# Patient Record
Sex: FEMALE | Race: BLACK OR AFRICAN AMERICAN | NOT HISPANIC OR LATINO | Employment: OTHER | URBAN - METROPOLITAN AREA
[De-identification: names, ages, dates, MRNs, and addresses within clinical notes are randomized per-mention and may not be internally consistent; named-entity substitution may affect disease eponyms.]

---

## 2019-01-07 ENCOUNTER — OFFICE VISIT CONVERTED (OUTPATIENT)
Dept: ORTHOPEDIC SURGERY | Facility: CLINIC | Age: 74
End: 2019-01-07
Attending: ORTHOPAEDIC SURGERY

## 2019-03-19 ENCOUNTER — OFFICE VISIT CONVERTED (OUTPATIENT)
Dept: ORTHOPEDIC SURGERY | Facility: CLINIC | Age: 74
End: 2019-03-19
Attending: ORTHOPAEDIC SURGERY

## 2019-04-11 ENCOUNTER — HOSPITAL ENCOUNTER (OUTPATIENT)
Dept: URGENT CARE | Facility: CLINIC | Age: 74
Discharge: HOME | End: 2019-04-11

## 2019-05-14 ENCOUNTER — HOSPITAL ENCOUNTER (OUTPATIENT)
Dept: URGENT CARE | Facility: CLINIC | Age: 74
Discharge: HOME | End: 2019-05-14
Attending: PHYSICIAN ASSISTANT

## 2019-06-14 ENCOUNTER — HOSPITAL ENCOUNTER (OUTPATIENT)
Dept: OTHER | Facility: HOSPITAL | Age: 74
Discharge: HOME | End: 2019-06-14
Attending: NURSE PRACTITIONER

## 2019-06-15 LAB
CONV HEPATITIS C AB WITH REFLEX TO CONFIRMATION: <0.1 S/CO RATIO (ref 0–0.9)
CONV HEPATITIS COMMENT: NORMAL

## 2019-06-19 ENCOUNTER — HOSPITAL ENCOUNTER (OUTPATIENT)
Dept: CARDIOLOGY | Facility: HOSPITAL | Age: 74
Discharge: HOME | End: 2019-06-19
Attending: NURSE PRACTITIONER

## 2019-08-13 ENCOUNTER — HOSPITAL ENCOUNTER (OUTPATIENT)
Dept: MAMMOGRAPHY | Facility: HOSPITAL | Age: 74
Discharge: HOME | End: 2019-08-13
Attending: NURSE PRACTITIONER

## 2020-01-08 ENCOUNTER — HOSPITAL ENCOUNTER (OUTPATIENT)
Dept: URGENT CARE | Facility: CLINIC | Age: 75
Discharge: HOME | End: 2020-01-08
Attending: NURSE PRACTITIONER

## 2020-03-19 ENCOUNTER — HOSPITAL ENCOUNTER (OUTPATIENT)
Dept: URGENT CARE | Facility: CLINIC | Age: 75
Discharge: HOME | End: 2020-03-19

## 2020-07-31 ENCOUNTER — HOSPITAL ENCOUNTER (OUTPATIENT)
Dept: OTHER | Facility: HOSPITAL | Age: 75
Discharge: HOME | End: 2020-07-31
Attending: INTERNAL MEDICINE

## 2020-09-08 ENCOUNTER — HOSPITAL ENCOUNTER (OUTPATIENT)
Dept: URGENT CARE | Facility: CLINIC | Age: 75
Discharge: HOME | End: 2020-09-08
Attending: EMERGENCY MEDICINE

## 2020-09-10 LAB — SARS-COV-2 RNA SPEC QL NAA+PROBE: NOT DETECTED

## 2020-10-02 ENCOUNTER — HOSPITAL ENCOUNTER (OUTPATIENT)
Dept: MAMMOGRAPHY | Facility: HOSPITAL | Age: 75
Discharge: HOME | End: 2020-10-02
Attending: NURSE PRACTITIONER

## 2021-01-01 ENCOUNTER — OFFICE VISIT (OUTPATIENT)
Dept: GASTROENTEROLOGY | Facility: CLINIC | Age: 76
End: 2021-01-01

## 2021-01-01 ENCOUNTER — PREP FOR SURGERY (OUTPATIENT)
Dept: OTHER | Facility: HOSPITAL | Age: 76
End: 2021-01-01

## 2021-01-01 VITALS
OXYGEN SATURATION: 99 % | WEIGHT: 227 LBS | DIASTOLIC BLOOD PRESSURE: 85 MMHG | HEART RATE: 95 BPM | SYSTOLIC BLOOD PRESSURE: 180 MMHG | HEIGHT: 68 IN | BODY MASS INDEX: 34.4 KG/M2

## 2021-01-01 DIAGNOSIS — R19.5 POSITIVE COLORECTAL CANCER SCREENING USING COLOGUARD TEST: Primary | ICD-10-CM

## 2021-01-01 PROCEDURE — 99203 OFFICE O/P NEW LOW 30 MIN: CPT | Performed by: NURSE PRACTITIONER

## 2021-01-01 RX ORDER — POLYETHYLENE GLYCOL 3350, SODIUM SULFATE ANHYDROUS, SODIUM BICARBONATE, SODIUM CHLORIDE, POTASSIUM CHLORIDE 227.1; 21.5; 6.36; 5.53; .754 G/L; G/L; G/L; G/L; G/L
4 POWDER, FOR SOLUTION ORAL DAILY
Qty: 1 EACH | Refills: 0 | Status: SHIPPED | OUTPATIENT
Start: 2021-01-01 | End: 2021-01-01

## 2021-02-09 ENCOUNTER — HOSPITAL ENCOUNTER (OUTPATIENT)
Dept: VACCINE CLINIC | Facility: HOSPITAL | Age: 76
Discharge: HOME | End: 2021-02-09
Attending: INTERNAL MEDICINE

## 2021-03-03 ENCOUNTER — HOSPITAL ENCOUNTER (OUTPATIENT)
Dept: URGENT CARE | Facility: CLINIC | Age: 76
Discharge: HOME | End: 2021-03-03
Attending: EMERGENCY MEDICINE

## 2021-03-11 ENCOUNTER — HOSPITAL ENCOUNTER (OUTPATIENT)
Dept: VACCINE CLINIC | Facility: HOSPITAL | Age: 76
Discharge: HOME | End: 2021-03-11
Attending: INTERNAL MEDICINE

## 2021-05-15 VITALS — BODY MASS INDEX: 35.61 KG/M2 | RESPIRATION RATE: 16 BRPM | WEIGHT: 235 LBS | HEIGHT: 68 IN

## 2021-05-15 VITALS — RESPIRATION RATE: 16 BRPM | BODY MASS INDEX: 35.61 KG/M2 | HEIGHT: 68 IN | WEIGHT: 235 LBS

## 2021-12-14 PROBLEM — R19.5 POSITIVE COLORECTAL CANCER SCREENING USING COLOGUARD TEST: Status: ACTIVE | Noted: 2021-01-01

## 2021-12-14 NOTE — PROGRESS NOTES
"Patient Name: Lauren Redd   Visit Date: 12/14/2021   Patient ID: [id number]  Provider: GERMAINE Callaway    Sex: female  Location:  Location Address:  Location Phone: [phone]  [place] [address] [phone]    YOB: 1945  Age: 76 y.o.   Primary Care Provider Dennis Kohler MD      Referring Provider: No ref. provider found        Chief Complaint  Referral (Colonoscopy from [facility])    History of Present Illness  New pt presents for +cologuard. Last colonoscopy >10 yrs ago. Pt does not have any GI c/o. No blood in stool, no abd pain, no diarrhea.  No HB.   Pt has seen cardio, describes follows annually for aneurysm.     Past Medical History:   Diagnosis Date   • Aneurysm (HCC)    • Arthritis    • Hypertension        Past Surgical History:   Procedure Laterality Date   • COLONOSCOPY     • HYSTERECTOMY         Allergies   Allergen Reactions   • Lotrel [Amlodipine Besy-Benazepril Hcl] Unknown - Low Severity       Family History   Problem Relation Age of Onset   • Hypertension Mother    • Colon cancer Neg Hx         Social History     Tobacco Use   • Smoking status: Never Smoker   • Smokeless tobacco: Never Used   Vaping Use   • Vaping Use: Never used   Substance Use Topics   • Alcohol use: Not Currently   • Drug use: Never       Objective     Vital Signs:   /85 (BP Location: Right arm, Patient Position: Sitting, Cuff Size: Adult)   Pulse 95   Ht 172.7 cm (68\")   Wt 103 kg (227 lb)   SpO2 99%   BMI 34.52 kg/m²       Physical Exam  Constitutional:       General: The patient is not in acute distress.     Appearance: Normal appearance.   HENT:      Head: Normocephalic and atraumatic.      Nose: Nose normal.   Pulmonary:      Effort: Pulmonary effort is normal. No respiratory distress.   Abdominal:      General: Abdomen is flat.      Palpations: Abdomen is soft. There is no mass.      Tenderness: There is no abdominal tenderness. There is no guarding.   Musculoskeletal:      " Cervical back: Neck supple.      Right lower leg: No edema.      Left lower leg: No edema.   Skin:     General: Skin is warm and dry.   Neurological:      General: No focal deficit present.      Mental Status: The patient is alert and oriented to person, place, and time.      Gait: Gait normal.   Psychiatric:         Mood and Affect: Mood normal.         Speech: Speech normal.         Behavior: Behavior normal.         Thought Content: Thought content normal.     Result Review :   The following data was reviewed by: GERMAINE Callaway on 12/14/2021:  CMP    CMP 3/3/21   Glucose 99   BUN 20   Creatinine 0.91 (A)   Sodium 146   Potassium 3.6   Chloride 104   Calcium 10.3   Albumin 4.1   Total Bilirubin 0.38   Alkaline Phosphatase 83   AST (SGOT) 21   ALT (SGPT) 22   (A) Abnormal value            CBC    CBC 3/3/21   WBC 7.18   RBC 4.42   Hemoglobin 13.1   Hematocrit 40.9   MCV 92.5   MCH 29.6   MCHC 32.0 (A)   RDW 13.8   Platelets 216   (A) Abnormal value                      Assessment and Plan    Diagnoses and all orders for this visit:    1. Positive colorectal cancer screening using Cologuard test (Primary)            Follow Up      Colonoscopy Surgical Risk and Benefits: Possible risks/complications, benefits, and alternatives to surgical or invasive procedure have been explained to patient and/or legal guardian; risks include bleeding, infection, and perforation. Patient has been evaluated and can tolerate anesthesia and/or sedation. Risks, benefits, and alternatives to anesthesia and sedation have been explained to patient and/or legal guardian.   Cardio clearance   Patient was given instructions and counseling regarding her condition or for health maintenance advice. Please see specific information pulled into the AVS if appropriate.

## 2022-01-01 ENCOUNTER — TRANSCRIBE ORDERS (OUTPATIENT)
Dept: LAB | Facility: HOSPITAL | Age: 77
End: 2022-01-01

## 2022-01-01 ENCOUNTER — APPOINTMENT (OUTPATIENT)
Dept: GENERAL RADIOLOGY | Facility: HOSPITAL | Age: 77
End: 2022-01-01

## 2022-01-01 ENCOUNTER — OFFICE VISIT (OUTPATIENT)
Dept: WOUND CARE | Facility: HOSPITAL | Age: 77
End: 2022-01-01

## 2022-01-01 ENCOUNTER — HOSPITAL ENCOUNTER (OUTPATIENT)
Dept: OCCUPATIONAL THERAPY | Facility: HOSPITAL | Age: 77
Setting detail: THERAPIES SERIES
Discharge: HOME | End: 2022-03-11

## 2022-01-01 ENCOUNTER — DOCUMENTATION (OUTPATIENT)
Dept: OCCUPATIONAL THERAPY | Facility: HOSPITAL | Age: 77
End: 2022-01-01

## 2022-01-01 ENCOUNTER — APPOINTMENT (OUTPATIENT)
Dept: CT IMAGING | Facility: HOSPITAL | Age: 77
End: 2022-01-01

## 2022-01-01 ENCOUNTER — ANESTHESIA (OUTPATIENT)
Dept: GASTROENTEROLOGY | Facility: HOSPITAL | Age: 77
End: 2022-01-01

## 2022-01-01 ENCOUNTER — PREP FOR SURGERY (OUTPATIENT)
Dept: OTHER | Facility: HOSPITAL | Age: 77
End: 2022-01-01

## 2022-01-01 ENCOUNTER — LAB (OUTPATIENT)
Dept: LAB | Facility: HOSPITAL | Age: 77
End: 2022-01-01

## 2022-01-01 ENCOUNTER — READMISSION MANAGEMENT (OUTPATIENT)
Dept: CALL CENTER | Facility: HOSPITAL | Age: 77
End: 2022-01-01

## 2022-01-01 ENCOUNTER — HOSPITAL ENCOUNTER (INPATIENT)
Facility: HOSPITAL | Age: 77
LOS: 13 days | Discharge: NURSING, REHAB OR LONG-TERM CARE | End: 2022-10-14
Attending: EMERGENCY MEDICINE | Admitting: INTERNAL MEDICINE

## 2022-01-01 ENCOUNTER — HOSPITAL ENCOUNTER (INPATIENT)
Facility: HOSPITAL | Age: 77
LOS: 9 days | Discharge: NURSING, REHAB OR LONG-TERM CARE | End: 2022-03-23
Attending: INTERNAL MEDICINE | Admitting: INTERNAL MEDICINE

## 2022-01-01 ENCOUNTER — TELEPHONE (OUTPATIENT)
Dept: GASTROENTEROLOGY | Facility: CLINIC | Age: 77
End: 2022-01-01

## 2022-01-01 ENCOUNTER — HOSPITAL ENCOUNTER (INPATIENT)
Facility: HOSPITAL | Age: 77
LOS: 4 days | Discharge: HOME | End: 2022-07-14
Attending: EMERGENCY MEDICINE | Admitting: INTERNAL MEDICINE

## 2022-01-01 ENCOUNTER — APPOINTMENT (OUTPATIENT)
Dept: CARDIOLOGY | Facility: HOSPITAL | Age: 77
End: 2022-01-01

## 2022-01-01 ENCOUNTER — HOSPITAL ENCOUNTER (INPATIENT)
Facility: HOSPITAL | Age: 77
LOS: 29 days | Discharge: HOME | End: 2022-04-21
Attending: INTERNAL MEDICINE | Admitting: INTERNAL MEDICINE

## 2022-01-01 ENCOUNTER — ANESTHESIA EVENT (OUTPATIENT)
Dept: GASTROENTEROLOGY | Facility: HOSPITAL | Age: 77
End: 2022-01-01

## 2022-01-01 ENCOUNTER — HOSPITAL ENCOUNTER (EMERGENCY)
Facility: HOSPITAL | Age: 77
Discharge: HOME | End: 2022-02-19
Admitting: EMERGENCY MEDICINE

## 2022-01-01 ENCOUNTER — HOSPITAL ENCOUNTER (INPATIENT)
Facility: HOSPITAL | Age: 77
LOS: 6 days | Discharge: NURSING, REHAB OR LONG-TERM CARE | End: 2022-04-29
Attending: EMERGENCY MEDICINE | Admitting: INTERNAL MEDICINE

## 2022-01-01 ENCOUNTER — HOSPITAL ENCOUNTER (INPATIENT)
Facility: HOSPITAL | Age: 77
LOS: 5 days | Discharge: NURSING, REHAB OR LONG-TERM CARE | End: 2022-06-24
Attending: EMERGENCY MEDICINE | Admitting: INTERNAL MEDICINE

## 2022-01-01 ENCOUNTER — APPOINTMENT (OUTPATIENT)
Dept: OCCUPATIONAL THERAPY | Facility: HOSPITAL | Age: 77
End: 2022-01-01

## 2022-01-01 ENCOUNTER — TRANSCRIBE ORDERS (OUTPATIENT)
Dept: CARDIOLOGY | Facility: HOSPITAL | Age: 77
End: 2022-01-01

## 2022-01-01 ENCOUNTER — HOSPITAL ENCOUNTER (OUTPATIENT)
Facility: HOSPITAL | Age: 77
Setting detail: OBSERVATION
Discharge: HOME | End: 2022-02-10
Attending: EMERGENCY MEDICINE | Admitting: INTERNAL MEDICINE

## 2022-01-01 VITALS
SYSTOLIC BLOOD PRESSURE: 134 MMHG | HEART RATE: 68 BPM | HEIGHT: 68 IN | BODY MASS INDEX: 40.16 KG/M2 | TEMPERATURE: 98.6 F | DIASTOLIC BLOOD PRESSURE: 86 MMHG | RESPIRATION RATE: 18 BRPM | WEIGHT: 265 LBS

## 2022-01-01 VITALS
WEIGHT: 252.43 LBS | DIASTOLIC BLOOD PRESSURE: 63 MMHG | BODY MASS INDEX: 38.26 KG/M2 | TEMPERATURE: 97.3 F | HEIGHT: 68 IN | SYSTOLIC BLOOD PRESSURE: 96 MMHG | HEART RATE: 93 BPM | RESPIRATION RATE: 16 BRPM | OXYGEN SATURATION: 94 %

## 2022-01-01 VITALS
RESPIRATION RATE: 20 BRPM | HEIGHT: 68 IN | TEMPERATURE: 97.5 F | OXYGEN SATURATION: 98 % | WEIGHT: 246.25 LBS | DIASTOLIC BLOOD PRESSURE: 90 MMHG | BODY MASS INDEX: 37.32 KG/M2 | HEART RATE: 83 BPM | SYSTOLIC BLOOD PRESSURE: 131 MMHG

## 2022-01-01 VITALS
DIASTOLIC BLOOD PRESSURE: 76 MMHG | WEIGHT: 233.69 LBS | TEMPERATURE: 98.1 F | BODY MASS INDEX: 35.42 KG/M2 | HEART RATE: 75 BPM | RESPIRATION RATE: 14 BRPM | SYSTOLIC BLOOD PRESSURE: 100 MMHG | OXYGEN SATURATION: 100 % | HEIGHT: 68 IN

## 2022-01-01 VITALS
TEMPERATURE: 97.6 F | RESPIRATION RATE: 18 BRPM | BODY MASS INDEX: 30.27 KG/M2 | DIASTOLIC BLOOD PRESSURE: 66 MMHG | OXYGEN SATURATION: 100 % | HEIGHT: 68 IN | WEIGHT: 199.74 LBS | SYSTOLIC BLOOD PRESSURE: 99 MMHG | HEART RATE: 68 BPM

## 2022-01-01 VITALS
SYSTOLIC BLOOD PRESSURE: 108 MMHG | RESPIRATION RATE: 18 BRPM | TEMPERATURE: 96.6 F | HEART RATE: 66 BPM | OXYGEN SATURATION: 98 % | DIASTOLIC BLOOD PRESSURE: 78 MMHG

## 2022-01-01 VITALS
TEMPERATURE: 98.1 F | HEIGHT: 68 IN | WEIGHT: 248.68 LBS | BODY MASS INDEX: 37.69 KG/M2 | RESPIRATION RATE: 20 BRPM | DIASTOLIC BLOOD PRESSURE: 87 MMHG | SYSTOLIC BLOOD PRESSURE: 127 MMHG | OXYGEN SATURATION: 100 % | HEART RATE: 71 BPM

## 2022-01-01 VITALS
TEMPERATURE: 97.4 F | RESPIRATION RATE: 20 BRPM | BODY MASS INDEX: 40.16 KG/M2 | SYSTOLIC BLOOD PRESSURE: 114 MMHG | WEIGHT: 265 LBS | HEART RATE: 76 BPM | DIASTOLIC BLOOD PRESSURE: 76 MMHG | HEIGHT: 68 IN

## 2022-01-01 VITALS
TEMPERATURE: 97.1 F | HEIGHT: 68 IN | DIASTOLIC BLOOD PRESSURE: 84 MMHG | BODY MASS INDEX: 40.16 KG/M2 | SYSTOLIC BLOOD PRESSURE: 130 MMHG | RESPIRATION RATE: 18 BRPM | WEIGHT: 265 LBS | HEART RATE: 112 BPM

## 2022-01-01 VITALS
TEMPERATURE: 97.7 F | HEIGHT: 68 IN | RESPIRATION RATE: 20 BRPM | HEART RATE: 62 BPM | HEART RATE: 72 BPM | BODY MASS INDEX: 40.16 KG/M2 | WEIGHT: 265 LBS | RESPIRATION RATE: 18 BRPM | HEIGHT: 68 IN | DIASTOLIC BLOOD PRESSURE: 76 MMHG | WEIGHT: 265 LBS | SYSTOLIC BLOOD PRESSURE: 108 MMHG | TEMPERATURE: 97.2 F | BODY MASS INDEX: 40.16 KG/M2 | DIASTOLIC BLOOD PRESSURE: 70 MMHG | SYSTOLIC BLOOD PRESSURE: 130 MMHG

## 2022-01-01 VITALS
DIASTOLIC BLOOD PRESSURE: 77 MMHG | HEART RATE: 82 BPM | TEMPERATURE: 97.6 F | BODY MASS INDEX: 38.38 KG/M2 | RESPIRATION RATE: 18 BRPM | OXYGEN SATURATION: 100 % | SYSTOLIC BLOOD PRESSURE: 124 MMHG | HEIGHT: 68 IN

## 2022-01-01 VITALS
HEART RATE: 83 BPM | WEIGHT: 217.15 LBS | DIASTOLIC BLOOD PRESSURE: 64 MMHG | BODY MASS INDEX: 32.91 KG/M2 | RESPIRATION RATE: 16 BRPM | HEIGHT: 68 IN | SYSTOLIC BLOOD PRESSURE: 93 MMHG | OXYGEN SATURATION: 100 % | TEMPERATURE: 98.2 F

## 2022-01-01 VITALS
DIASTOLIC BLOOD PRESSURE: 78 MMHG | HEART RATE: 80 BPM | TEMPERATURE: 98 F | HEIGHT: 68 IN | SYSTOLIC BLOOD PRESSURE: 124 MMHG | WEIGHT: 265 LBS | RESPIRATION RATE: 18 BRPM | BODY MASS INDEX: 40.16 KG/M2

## 2022-01-01 VITALS
HEART RATE: 83 BPM | OXYGEN SATURATION: 99 % | BODY MASS INDEX: 37.42 KG/M2 | RESPIRATION RATE: 16 BRPM | DIASTOLIC BLOOD PRESSURE: 69 MMHG | HEIGHT: 68 IN | SYSTOLIC BLOOD PRESSURE: 117 MMHG | WEIGHT: 246.91 LBS | TEMPERATURE: 98 F

## 2022-01-01 DIAGNOSIS — I83.029 VENOUS STASIS ULCER OF LEFT LOWER EXTREMITY: ICD-10-CM

## 2022-01-01 DIAGNOSIS — R60.0 VENOUS STASIS ULCER OF LEFT LOWER LEG WITH EDEMA OF LEFT LOWER LEG: ICD-10-CM

## 2022-01-01 DIAGNOSIS — Z78.9 DECREASED ACTIVITIES OF DAILY LIVING (ADL): ICD-10-CM

## 2022-01-01 DIAGNOSIS — R60.0 BILATERAL LOWER EXTREMITY EDEMA: ICD-10-CM

## 2022-01-01 DIAGNOSIS — L97.929 VENOUS STASIS ULCER OF LEFT LOWER EXTREMITY: ICD-10-CM

## 2022-01-01 DIAGNOSIS — R26.2 DIFFICULTY WALKING: ICD-10-CM

## 2022-01-01 DIAGNOSIS — I83.892 VENOUS STASIS ULCER OF LEFT LOWER LEG WITH EDEMA OF LEFT LOWER LEG: Primary | ICD-10-CM

## 2022-01-01 DIAGNOSIS — T14.8XXA WOUND INFECTION: Primary | ICD-10-CM

## 2022-01-01 DIAGNOSIS — R06.02 SHORTNESS OF BREATH: ICD-10-CM

## 2022-01-01 DIAGNOSIS — I50.9 CHRONIC CONGESTIVE HEART FAILURE, UNSPECIFIED HEART FAILURE TYPE: Primary | ICD-10-CM

## 2022-01-01 DIAGNOSIS — R60.0 VENOUS STASIS ULCER OF RIGHT LOWER LEG WITH EDEMA OF RIGHT LOWER LEG: ICD-10-CM

## 2022-01-01 DIAGNOSIS — R07.9 CHEST PAIN, UNSPECIFIED TYPE: ICD-10-CM

## 2022-01-01 DIAGNOSIS — I48.91 ATRIAL FIBRILLATION WITH RVR: ICD-10-CM

## 2022-01-01 DIAGNOSIS — L97.929 VENOUS STASIS ULCER OF LEFT LOWER LEG WITH EDEMA OF LEFT LOWER LEG: ICD-10-CM

## 2022-01-01 DIAGNOSIS — L97.919 VENOUS STASIS ULCER OF RIGHT LOWER EXTREMITY: ICD-10-CM

## 2022-01-01 DIAGNOSIS — L97.919 VENOUS STASIS ULCER OF RIGHT LOWER LEG WITH EDEMA OF RIGHT LOWER LEG: ICD-10-CM

## 2022-01-01 DIAGNOSIS — I83.019 VENOUS STASIS ULCERS OF BOTH LOWER EXTREMITIES: ICD-10-CM

## 2022-01-01 DIAGNOSIS — I83.019 VENOUS STASIS ULCER OF RIGHT LOWER LEG WITH EDEMA OF RIGHT LOWER LEG: ICD-10-CM

## 2022-01-01 DIAGNOSIS — R53.1 GENERALIZED WEAKNESS: ICD-10-CM

## 2022-01-01 DIAGNOSIS — R60.0 VENOUS STASIS ULCER OF LEFT LOWER LEG WITH EDEMA OF LEFT LOWER LEG: Primary | ICD-10-CM

## 2022-01-01 DIAGNOSIS — L03.119 CELLULITIS OF ANTERIOR LOWER LEG: Chronic | ICD-10-CM

## 2022-01-01 DIAGNOSIS — L97.929 VENOUS STASIS ULCER OF LEFT LOWER LEG WITH EDEMA OF LEFT LOWER LEG: Primary | ICD-10-CM

## 2022-01-01 DIAGNOSIS — I48.91 ATRIAL FIBRILLATION, NEW ONSET: ICD-10-CM

## 2022-01-01 DIAGNOSIS — I83.029 VENOUS STASIS ULCER OF LEFT LOWER LEG WITH EDEMA OF LEFT LOWER LEG: Primary | ICD-10-CM

## 2022-01-01 DIAGNOSIS — R07.9 CHEST PAIN, UNSPECIFIED TYPE: Primary | ICD-10-CM

## 2022-01-01 DIAGNOSIS — I83.029 VENOUS STASIS ULCERS OF BOTH LOWER EXTREMITIES: ICD-10-CM

## 2022-01-01 DIAGNOSIS — I89.0 LYMPHEDEMA: Primary | ICD-10-CM

## 2022-01-01 DIAGNOSIS — R53.81 MALAISE AND FATIGUE: ICD-10-CM

## 2022-01-01 DIAGNOSIS — I83.891 VENOUS STASIS ULCER OF RIGHT LOWER LEG WITH EDEMA OF RIGHT LOWER LEG: ICD-10-CM

## 2022-01-01 DIAGNOSIS — I83.892 VENOUS STASIS ULCER OF LEFT LOWER LEG WITH EDEMA OF LEFT LOWER LEG: ICD-10-CM

## 2022-01-01 DIAGNOSIS — L98.8 MACERATION OF SKIN: ICD-10-CM

## 2022-01-01 DIAGNOSIS — R04.0 ACUTE POSTERIOR EPISTAXIS: Primary | ICD-10-CM

## 2022-01-01 DIAGNOSIS — I48.11 LONGSTANDING PERSISTENT ATRIAL FIBRILLATION: ICD-10-CM

## 2022-01-01 DIAGNOSIS — R60.0 BILATERAL LOWER EXTREMITY EDEMA: Primary | ICD-10-CM

## 2022-01-01 DIAGNOSIS — I83.029 VENOUS STASIS ULCER OF LEFT LOWER LEG WITH EDEMA OF LEFT LOWER LEG: ICD-10-CM

## 2022-01-01 DIAGNOSIS — R13.12 OROPHARYNGEAL DYSPHAGIA: ICD-10-CM

## 2022-01-01 DIAGNOSIS — R53.83 MALAISE AND FATIGUE: ICD-10-CM

## 2022-01-01 DIAGNOSIS — I48.91 ATRIAL FIBRILLATION WITH RAPID VENTRICULAR RESPONSE: Primary | ICD-10-CM

## 2022-01-01 DIAGNOSIS — S81.801A WOUND OF RIGHT LOWER EXTREMITY, INITIAL ENCOUNTER: Primary | ICD-10-CM

## 2022-01-01 DIAGNOSIS — R06.02 SHORTNESS OF BREATH: Primary | ICD-10-CM

## 2022-01-01 DIAGNOSIS — J18.9 PNEUMONIA DUE TO INFECTIOUS ORGANISM, UNSPECIFIED LATERALITY, UNSPECIFIED PART OF LUNG: Primary | ICD-10-CM

## 2022-01-01 DIAGNOSIS — I50.9 CHRONIC CONGESTIVE HEART FAILURE, UNSPECIFIED HEART FAILURE TYPE: ICD-10-CM

## 2022-01-01 DIAGNOSIS — K92.2 GASTROINTESTINAL HEMORRHAGE, UNSPECIFIED GASTROINTESTINAL HEMORRHAGE TYPE: ICD-10-CM

## 2022-01-01 DIAGNOSIS — R26.2 DIFFICULTY IN WALKING: ICD-10-CM

## 2022-01-01 DIAGNOSIS — R53.1 WEAKNESS GENERALIZED: ICD-10-CM

## 2022-01-01 DIAGNOSIS — E87.5 HYPERKALEMIA: ICD-10-CM

## 2022-01-01 DIAGNOSIS — L08.9 WOUND INFECTION: Primary | ICD-10-CM

## 2022-01-01 DIAGNOSIS — L60.8 ONYCHOMADESIS OF TOENAIL: ICD-10-CM

## 2022-01-01 DIAGNOSIS — L03.115 CELLULITIS OF RIGHT LOWER EXTREMITY: ICD-10-CM

## 2022-01-01 DIAGNOSIS — L30.8 ASTEATOTIC DERMATITIS: ICD-10-CM

## 2022-01-01 DIAGNOSIS — K92.2 UPPER GI BLEED: ICD-10-CM

## 2022-01-01 DIAGNOSIS — L97.929 VENOUS STASIS ULCERS OF BOTH LOWER EXTREMITIES: ICD-10-CM

## 2022-01-01 DIAGNOSIS — I50.9 ACUTE ON CHRONIC CONGESTIVE HEART FAILURE, UNSPECIFIED HEART FAILURE TYPE: Primary | ICD-10-CM

## 2022-01-01 DIAGNOSIS — I83.019 VENOUS STASIS ULCER OF RIGHT LOWER EXTREMITY: ICD-10-CM

## 2022-01-01 DIAGNOSIS — R26.2 DIFFICULTY IN WALKING: Primary | ICD-10-CM

## 2022-01-01 DIAGNOSIS — D64.9 ANEMIA, UNSPECIFIED TYPE: Primary | ICD-10-CM

## 2022-01-01 DIAGNOSIS — L97.919 VENOUS STASIS ULCERS OF BOTH LOWER EXTREMITIES: ICD-10-CM

## 2022-01-01 LAB
ABO GROUP BLD: NORMAL
ALBUMIN SERPL-MCNC: 2.1 G/DL (ref 3.5–5.2)
ALBUMIN SERPL-MCNC: 2.2 G/DL (ref 3.5–5.2)
ALBUMIN SERPL-MCNC: 2.2 G/DL (ref 3.5–5.2)
ALBUMIN SERPL-MCNC: 2.3 G/DL (ref 3.5–5.2)
ALBUMIN SERPL-MCNC: 2.3 G/DL (ref 3.5–5.2)
ALBUMIN SERPL-MCNC: 2.4 G/DL (ref 3.5–5.2)
ALBUMIN SERPL-MCNC: 2.5 G/DL (ref 3.5–5.2)
ALBUMIN SERPL-MCNC: 2.6 G/DL (ref 3.5–5.2)
ALBUMIN SERPL-MCNC: 2.7 G/DL (ref 3.5–5.2)
ALBUMIN SERPL-MCNC: 2.8 G/DL (ref 3.5–5.2)
ALBUMIN SERPL-MCNC: 2.9 G/DL (ref 3.5–5.2)
ALBUMIN SERPL-MCNC: 3 G/DL (ref 3.5–5.2)
ALBUMIN SERPL-MCNC: 3.1 G/DL (ref 3.5–5.2)
ALBUMIN SERPL-MCNC: 3.2 G/DL (ref 3.5–5.2)
ALBUMIN SERPL-MCNC: 3.2 G/DL (ref 3.5–5.2)
ALBUMIN SERPL-MCNC: 3.3 G/DL (ref 3.5–5.2)
ALBUMIN SERPL-MCNC: 3.3 G/DL (ref 3.5–5.2)
ALBUMIN SERPL-MCNC: 3.4 G/DL (ref 3.5–5.2)
ALBUMIN SERPL-MCNC: 3.7 G/DL (ref 3.5–5.2)
ALBUMIN SERPL-MCNC: 3.9 G/DL (ref 3.5–5.2)
ALBUMIN/GLOB SERPL: 0.5 G/DL
ALBUMIN/GLOB SERPL: 0.5 G/DL
ALBUMIN/GLOB SERPL: 0.6 G/DL
ALBUMIN/GLOB SERPL: 0.7 G/DL
ALBUMIN/GLOB SERPL: 0.8 G/DL
ALBUMIN/GLOB SERPL: 0.9 G/DL
ALBUMIN/GLOB SERPL: 1 G/DL
ALBUMIN/GLOB SERPL: 1.1 G/DL
ALBUMIN/GLOB SERPL: 1.1 G/DL
ALBUMIN/GLOB SERPL: 1.4 G/DL
ALP SERPL-CCNC: 101 U/L (ref 39–117)
ALP SERPL-CCNC: 103 U/L (ref 39–117)
ALP SERPL-CCNC: 105 U/L (ref 39–117)
ALP SERPL-CCNC: 111 U/L (ref 39–117)
ALP SERPL-CCNC: 117 U/L (ref 39–117)
ALP SERPL-CCNC: 122 U/L (ref 39–117)
ALP SERPL-CCNC: 123 U/L (ref 39–117)
ALP SERPL-CCNC: 125 U/L (ref 39–117)
ALP SERPL-CCNC: 129 U/L (ref 39–117)
ALP SERPL-CCNC: 136 U/L (ref 39–117)
ALP SERPL-CCNC: 136 U/L (ref 39–117)
ALP SERPL-CCNC: 141 U/L (ref 39–117)
ALP SERPL-CCNC: 141 U/L (ref 39–117)
ALP SERPL-CCNC: 147 U/L (ref 39–117)
ALP SERPL-CCNC: 151 U/L (ref 39–117)
ALP SERPL-CCNC: 158 U/L (ref 39–117)
ALP SERPL-CCNC: 162 U/L (ref 39–117)
ALP SERPL-CCNC: 171 U/L (ref 39–117)
ALP SERPL-CCNC: 176 U/L (ref 39–117)
ALP SERPL-CCNC: 176 U/L (ref 39–117)
ALP SERPL-CCNC: 181 U/L (ref 39–117)
ALP SERPL-CCNC: 75 U/L (ref 39–117)
ALP SERPL-CCNC: 76 U/L (ref 39–117)
ALP SERPL-CCNC: 77 U/L (ref 39–117)
ALP SERPL-CCNC: 79 U/L (ref 39–117)
ALP SERPL-CCNC: 82 U/L (ref 39–117)
ALP SERPL-CCNC: 92 U/L (ref 39–117)
ALP SERPL-CCNC: 93 U/L (ref 39–117)
ALP SERPL-CCNC: 94 U/L (ref 39–117)
ALP SERPL-CCNC: 95 U/L (ref 39–117)
ALP SERPL-CCNC: 96 U/L (ref 39–117)
ALP SERPL-CCNC: 99 U/L (ref 39–117)
ALT SERPL W P-5'-P-CCNC: 118 U/L (ref 1–33)
ALT SERPL W P-5'-P-CCNC: 13 U/L (ref 1–33)
ALT SERPL W P-5'-P-CCNC: 14 U/L (ref 1–33)
ALT SERPL W P-5'-P-CCNC: 14 U/L (ref 1–33)
ALT SERPL W P-5'-P-CCNC: 15 U/L (ref 1–33)
ALT SERPL W P-5'-P-CCNC: 16 U/L (ref 1–33)
ALT SERPL W P-5'-P-CCNC: 169 U/L (ref 1–33)
ALT SERPL W P-5'-P-CCNC: 17 U/L (ref 1–33)
ALT SERPL W P-5'-P-CCNC: 17 U/L (ref 1–33)
ALT SERPL W P-5'-P-CCNC: 18 U/L (ref 1–33)
ALT SERPL W P-5'-P-CCNC: 19 U/L (ref 1–33)
ALT SERPL W P-5'-P-CCNC: 20 U/L (ref 1–33)
ALT SERPL W P-5'-P-CCNC: 21 U/L (ref 1–33)
ALT SERPL W P-5'-P-CCNC: 21 U/L (ref 1–33)
ALT SERPL W P-5'-P-CCNC: 22 U/L (ref 1–33)
ALT SERPL W P-5'-P-CCNC: 23 U/L (ref 1–33)
ALT SERPL W P-5'-P-CCNC: 23 U/L (ref 1–33)
ALT SERPL W P-5'-P-CCNC: 24 U/L (ref 1–33)
ALT SERPL W P-5'-P-CCNC: 25 U/L (ref 1–33)
ALT SERPL W P-5'-P-CCNC: 31 U/L (ref 1–33)
ALT SERPL W P-5'-P-CCNC: 33 U/L (ref 1–33)
ALT SERPL W P-5'-P-CCNC: 36 U/L (ref 1–33)
ALT SERPL W P-5'-P-CCNC: 48 U/L (ref 1–33)
ALT SERPL W P-5'-P-CCNC: 51 U/L (ref 1–33)
ALT SERPL W P-5'-P-CCNC: 51 U/L (ref 1–33)
ALT SERPL W P-5'-P-CCNC: 53 U/L (ref 1–33)
ALT SERPL W P-5'-P-CCNC: 68 U/L (ref 1–33)
ALT SERPL W P-5'-P-CCNC: 69 U/L (ref 1–33)
ALT SERPL W P-5'-P-CCNC: 8 U/L (ref 1–33)
ALT SERPL W P-5'-P-CCNC: <5 U/L (ref 1–33)
ANION GAP SERPL CALCULATED.3IONS-SCNC: 10.1 MMOL/L (ref 5–15)
ANION GAP SERPL CALCULATED.3IONS-SCNC: 10.3 MMOL/L (ref 5–15)
ANION GAP SERPL CALCULATED.3IONS-SCNC: 10.4 MMOL/L (ref 5–15)
ANION GAP SERPL CALCULATED.3IONS-SCNC: 10.7 MMOL/L (ref 5–15)
ANION GAP SERPL CALCULATED.3IONS-SCNC: 10.8 MMOL/L (ref 5–15)
ANION GAP SERPL CALCULATED.3IONS-SCNC: 10.9 MMOL/L (ref 5–15)
ANION GAP SERPL CALCULATED.3IONS-SCNC: 11.1 MMOL/L (ref 5–15)
ANION GAP SERPL CALCULATED.3IONS-SCNC: 11.4 MMOL/L (ref 5–15)
ANION GAP SERPL CALCULATED.3IONS-SCNC: 12.2 MMOL/L (ref 5–15)
ANION GAP SERPL CALCULATED.3IONS-SCNC: 12.6 MMOL/L (ref 5–15)
ANION GAP SERPL CALCULATED.3IONS-SCNC: 12.7 MMOL/L (ref 5–15)
ANION GAP SERPL CALCULATED.3IONS-SCNC: 13 MMOL/L (ref 5–15)
ANION GAP SERPL CALCULATED.3IONS-SCNC: 13.4 MMOL/L (ref 5–15)
ANION GAP SERPL CALCULATED.3IONS-SCNC: 13.5 MMOL/L (ref 5–15)
ANION GAP SERPL CALCULATED.3IONS-SCNC: 14 MMOL/L (ref 5–15)
ANION GAP SERPL CALCULATED.3IONS-SCNC: 14.1 MMOL/L (ref 5–15)
ANION GAP SERPL CALCULATED.3IONS-SCNC: 15.4 MMOL/L (ref 5–15)
ANION GAP SERPL CALCULATED.3IONS-SCNC: 4.2 MMOL/L (ref 5–15)
ANION GAP SERPL CALCULATED.3IONS-SCNC: 6.4 MMOL/L (ref 5–15)
ANION GAP SERPL CALCULATED.3IONS-SCNC: 6.8 MMOL/L (ref 5–15)
ANION GAP SERPL CALCULATED.3IONS-SCNC: 7 MMOL/L (ref 5–15)
ANION GAP SERPL CALCULATED.3IONS-SCNC: 7.5 MMOL/L (ref 5–15)
ANION GAP SERPL CALCULATED.3IONS-SCNC: 7.7 MMOL/L (ref 5–15)
ANION GAP SERPL CALCULATED.3IONS-SCNC: 7.8 MMOL/L (ref 5–15)
ANION GAP SERPL CALCULATED.3IONS-SCNC: 7.9 MMOL/L (ref 5–15)
ANION GAP SERPL CALCULATED.3IONS-SCNC: 8.1 MMOL/L (ref 5–15)
ANION GAP SERPL CALCULATED.3IONS-SCNC: 8.2 MMOL/L (ref 5–15)
ANION GAP SERPL CALCULATED.3IONS-SCNC: 8.3 MMOL/L (ref 5–15)
ANION GAP SERPL CALCULATED.3IONS-SCNC: 8.6 MMOL/L (ref 5–15)
ANION GAP SERPL CALCULATED.3IONS-SCNC: 8.6 MMOL/L (ref 5–15)
ANION GAP SERPL CALCULATED.3IONS-SCNC: 8.7 MMOL/L (ref 5–15)
ANION GAP SERPL CALCULATED.3IONS-SCNC: 8.8 MMOL/L (ref 5–15)
ANION GAP SERPL CALCULATED.3IONS-SCNC: 8.8 MMOL/L (ref 5–15)
ANION GAP SERPL CALCULATED.3IONS-SCNC: 8.9 MMOL/L (ref 5–15)
ANION GAP SERPL CALCULATED.3IONS-SCNC: 8.9 MMOL/L (ref 5–15)
ANION GAP SERPL CALCULATED.3IONS-SCNC: 9.2 MMOL/L (ref 5–15)
ANION GAP SERPL CALCULATED.3IONS-SCNC: 9.3 MMOL/L (ref 5–15)
ANION GAP SERPL CALCULATED.3IONS-SCNC: 9.3 MMOL/L (ref 5–15)
ANION GAP SERPL CALCULATED.3IONS-SCNC: 9.4 MMOL/L (ref 5–15)
ANION GAP SERPL CALCULATED.3IONS-SCNC: 9.5 MMOL/L (ref 5–15)
ANION GAP SERPL CALCULATED.3IONS-SCNC: 9.5 MMOL/L (ref 5–15)
ANION GAP SERPL CALCULATED.3IONS-SCNC: 9.6 MMOL/L (ref 5–15)
ANION GAP SERPL CALCULATED.3IONS-SCNC: 9.7 MMOL/L (ref 5–15)
ANION GAP SERPL CALCULATED.3IONS-SCNC: 9.7 MMOL/L (ref 5–15)
ANISOCYTOSIS BLD QL: ABNORMAL
ANISOCYTOSIS BLD QL: NORMAL
AORTIC DIMENSIONLESS INDEX: 0.6 (DI)
APTT PPP: 22.6 SECONDS (ref 22.2–34.2)
ARTERIAL PATENCY WRIST A: ABNORMAL
ARTERIAL PATENCY WRIST A: ABNORMAL
ARTERIAL PATENCY WRIST A: POSITIVE
ASCENDING AORTA: 4 CM
AST SERPL-CCNC: 14 U/L (ref 1–32)
AST SERPL-CCNC: 14 U/L (ref 1–32)
AST SERPL-CCNC: 15 U/L (ref 1–32)
AST SERPL-CCNC: 15 U/L (ref 1–32)
AST SERPL-CCNC: 18 U/L (ref 1–32)
AST SERPL-CCNC: 20 U/L (ref 1–32)
AST SERPL-CCNC: 21 U/L (ref 1–32)
AST SERPL-CCNC: 22 U/L (ref 1–32)
AST SERPL-CCNC: 22 U/L (ref 1–32)
AST SERPL-CCNC: 23 U/L (ref 1–32)
AST SERPL-CCNC: 24 U/L (ref 1–32)
AST SERPL-CCNC: 25 U/L (ref 1–32)
AST SERPL-CCNC: 25 U/L (ref 1–32)
AST SERPL-CCNC: 26 U/L (ref 1–32)
AST SERPL-CCNC: 26 U/L (ref 1–32)
AST SERPL-CCNC: 28 U/L (ref 1–32)
AST SERPL-CCNC: 29 U/L (ref 1–32)
AST SERPL-CCNC: 31 U/L (ref 1–32)
AST SERPL-CCNC: 33 U/L (ref 1–32)
AST SERPL-CCNC: 36 U/L (ref 1–32)
AST SERPL-CCNC: 38 U/L (ref 1–32)
AST SERPL-CCNC: 41 U/L (ref 1–32)
AST SERPL-CCNC: 43 U/L (ref 1–32)
AST SERPL-CCNC: 50 U/L (ref 1–32)
AST SERPL-CCNC: 57 U/L (ref 1–32)
AST SERPL-CCNC: 63 U/L (ref 1–32)
AST SERPL-CCNC: 67 U/L (ref 1–32)
BACTERIA BLD CULT: ABNORMAL
BACTERIA BLD CULT: NORMAL
BACTERIA SPEC AEROBE CULT: ABNORMAL
BACTERIA SPEC AEROBE CULT: NORMAL
BACTERIA SPEC RESP CULT: NORMAL
BACTERIA UR QL AUTO: ABNORMAL /HPF
BACTERIA UR QL AUTO: ABNORMAL /HPF
BASE EXCESS BLDA CALC-SCNC: -1.4 MMOL/L (ref -2–2)
BASE EXCESS BLDA CALC-SCNC: -1.7 MMOL/L (ref -2–2)
BASE EXCESS BLDA CALC-SCNC: 10.5 MMOL/L (ref -2–2)
BASE EXCESS BLDA CALC-SCNC: 5 MMOL/L (ref -2–2)
BASOPHILS # BLD AUTO: 0.01 10*3/MM3 (ref 0–0.2)
BASOPHILS # BLD AUTO: 0.02 10*3/MM3 (ref 0–0.2)
BASOPHILS # BLD AUTO: 0.02 10*3/MM3 (ref 0–0.2)
BASOPHILS # BLD AUTO: 0.03 10*3/MM3 (ref 0–0.2)
BASOPHILS # BLD AUTO: 0.04 10*3/MM3 (ref 0–0.2)
BASOPHILS # BLD AUTO: 0.04 10*3/MM3 (ref 0–0.2)
BASOPHILS # BLD AUTO: 0.05 10*3/MM3 (ref 0–0.2)
BASOPHILS # BLD AUTO: 0.06 10*3/MM3 (ref 0–0.2)
BASOPHILS # BLD AUTO: 0.07 10*3/MM3 (ref 0–0.2)
BASOPHILS # BLD AUTO: 0.08 10*3/MM3 (ref 0–0.2)
BASOPHILS # BLD AUTO: 0.09 10*3/MM3 (ref 0–0.2)
BASOPHILS NFR BLD AUTO: 0.1 % (ref 0–1.5)
BASOPHILS NFR BLD AUTO: 0.2 % (ref 0–1.5)
BASOPHILS NFR BLD AUTO: 0.3 % (ref 0–1.5)
BASOPHILS NFR BLD AUTO: 0.4 % (ref 0–1.5)
BASOPHILS NFR BLD AUTO: 0.5 % (ref 0–1.5)
BASOPHILS NFR BLD AUTO: 0.6 % (ref 0–1.5)
BASOPHILS NFR BLD AUTO: 0.6 % (ref 0–1.5)
BASOPHILS NFR BLD AUTO: 0.7 % (ref 0–1.5)
BASOPHILS NFR BLD AUTO: 0.7 % (ref 0–1.5)
BDY SITE: ABNORMAL
BH BB BLOOD EXPIRATION DATE: NORMAL
BH BB BLOOD EXPIRATION DATE: NORMAL
BH BB BLOOD TYPE BARCODE: 5100
BH BB BLOOD TYPE BARCODE: 5100
BH BB DISPENSE STATUS: NORMAL
BH BB DISPENSE STATUS: NORMAL
BH BB PRODUCT CODE: NORMAL
BH BB PRODUCT CODE: NORMAL
BH BB UNIT NUMBER: NORMAL
BH BB UNIT NUMBER: NORMAL
BH CV ECHO MEAS - AI P1/2T: 348 MSEC
BH CV ECHO MEAS - AI P1/2T: 455 MSEC
BH CV ECHO MEAS - AO MAX PG: 9 MMHG
BH CV ECHO MEAS - AO MEAN PG: 5 MMHG
BH CV ECHO MEAS - AO ROOT DIAM: 2.7 CM
BH CV ECHO MEAS - AO ROOT DIAM: 3.1 CM
BH CV ECHO MEAS - AO V2 MAX: 149 CM/SEC
BH CV ECHO MEAS - AO V2 VTI: 26.7 CM
BH CV ECHO MEAS - AVA PLANIMETRY TRACED: 1.3 CM2
BH CV ECHO MEAS - EDV(MOD-SP2): 83.5 ML
BH CV ECHO MEAS - EDV(MOD-SP4): 95.3 ML
BH CV ECHO MEAS - EF(MOD-BP): 59 %
BH CV ECHO MEAS - EF(MOD-BP): 65 %
BH CV ECHO MEAS - ESV(MOD-SP2): 30 ML
BH CV ECHO MEAS - ESV(MOD-SP4): 35.3 ML
BH CV ECHO MEAS - IVSD: 1 CM
BH CV ECHO MEAS - IVSD: 1.8 CM
BH CV ECHO MEAS - LA A2CS (ATRIAL LENGTH): 8.4 CM
BH CV ECHO MEAS - LA DIMENSION(2D): 5.2 CM
BH CV ECHO MEAS - LA DIMENSION: 5.9 CM
BH CV ECHO MEAS - LAT PEAK E' VEL: 11.9 CM/SEC
BH CV ECHO MEAS - LAT PEAK E' VEL: 19.2 CM/SEC
BH CV ECHO MEAS - LV MAX PG: 5 MMHG
BH CV ECHO MEAS - LV MEAN PG: 3 MMHG
BH CV ECHO MEAS - LV V1 MAX: 115 CM/SEC
BH CV ECHO MEAS - LV V1 VTI: 14.9 CM
BH CV ECHO MEAS - LVIDD: 5 CM
BH CV ECHO MEAS - LVIDD: 5.8 CM
BH CV ECHO MEAS - LVIDS: 3.3 CM
BH CV ECHO MEAS - LVIDS: 3.9 CM
BH CV ECHO MEAS - LVOT DIAM: 1.7 CM
BH CV ECHO MEAS - LVOT DIAM: 1.8 CM
BH CV ECHO MEAS - LVPWD: 1 CM
BH CV ECHO MEAS - LVPWD: 2.1 CM
BH CV ECHO MEAS - MED PEAK E' VEL: 13.4 CM/SEC
BH CV ECHO MEAS - MED PEAK E' VEL: 9 CM/SEC
BH CV ECHO MEAS - MR MAX PG: 70 MMHG
BH CV ECHO MEAS - MR MAX PG: 73 MMHG
BH CV ECHO MEAS - MR MAX VEL: 418 CM/SEC
BH CV ECHO MEAS - MR MAX VEL: 428 CM/SEC
BH CV ECHO MEAS - MR MEAN PG: 47 MMHG
BH CV ECHO MEAS - MR MEAN PG: 49 MMHG
BH CV ECHO MEAS - MR MEAN VEL: 323 CM/SEC
BH CV ECHO MEAS - MR MEAN VEL: 338 CM/SEC
BH CV ECHO MEAS - MR VTI: 103 CM
BH CV ECHO MEAS - MR VTI: 114 CM
BH CV ECHO MEAS - MV DEC SLOPE: 468 CM/SEC2
BH CV ECHO MEAS - MV DEC TIME: 270 MSEC
BH CV ECHO MEAS - MV DEC TIME: 273 MSEC
BH CV ECHO MEAS - MV E MAX VEL: 105 CM/SEC
BH CV ECHO MEAS - MV E MAX VEL: 128 CM/SEC
BH CV ECHO MEAS - MV MAX PG: 10 MMHG
BH CV ECHO MEAS - MV MEAN PG: 4 MMHG
BH CV ECHO MEAS - MV P1/2T: 84 MSEC
BH CV ECHO MEAS - MV V2 VTI: 36 CM
BH CV ECHO MEAS - MVA(P1/2T): 2.6 CM2
BH CV ECHO MEAS - PA V2 MAX: 64 CM/SEC
BH CV ECHO MEAS - RAP SYSTOLE: 15 MMHG
BH CV ECHO MEAS - RVDD: 3.4 CM
BH CV ECHO MEAS - RVDD: 3.4 CM
BH CV ECHO MEAS - RVSP: 40 MMHG
BH CV ECHO MEAS - TR MAX PG: 25 MMHG
BH CV ECHO MEAS - TR MAX PG: 25 MMHG
BH CV ECHO MEAS - TR MAX VEL: 252 CM/SEC
BH CV ECHO MEAS - TR MAX VEL: 252 CM/SEC
BH CV ECHO MEASUREMENTS AVERAGE E/E' RATIO: 12.25
BH CV ECHO MEASUREMENTS AVERAGE E/E' RATIO: 6.44
BH CV LOWER ARTERIAL LEFT ABI RATIO: 1.1
BH CV LOWER ARTERIAL LEFT DORSALIS PEDIS SYS MAX: 124
BH CV LOWER ARTERIAL LEFT GREAT TOE SYS MAX: 95
BH CV LOWER ARTERIAL LEFT POST TIBIAL SYS MAX: 129
BH CV LOWER ARTERIAL LEFT TBI RATIO: 0.87
BH CV LOWER ARTERIAL RIGHT ABI RATIO: 1.2
BH CV LOWER ARTERIAL RIGHT DORSALIS PEDIS SYS MAX: 129
BH CV LOWER ARTERIAL RIGHT GREAT TOE SYS MAX: 110
BH CV LOWER ARTERIAL RIGHT POST TIBIAL SYS MAX: 133
BH CV LOWER ARTERIAL RIGHT TBI RATIO: 1
BH CV LOWER VASCULAR LEFT COMMON FEMORAL AUGMENT: NORMAL
BH CV LOWER VASCULAR LEFT COMMON FEMORAL COMPETENT: NORMAL
BH CV LOWER VASCULAR LEFT COMMON FEMORAL COMPRESS: NORMAL
BH CV LOWER VASCULAR LEFT COMMON FEMORAL PHASIC: NORMAL
BH CV LOWER VASCULAR LEFT COMMON FEMORAL SPONT: NORMAL
BH CV LOWER VASCULAR LEFT DISTAL FEMORAL COMPRESS: NORMAL
BH CV LOWER VASCULAR LEFT GREATER SAPH AK COMPRESS: NORMAL
BH CV LOWER VASCULAR LEFT GREATER SAPH BK COMPRESS: NORMAL
BH CV LOWER VASCULAR LEFT MID FEMORAL AUGMENT: NORMAL
BH CV LOWER VASCULAR LEFT MID FEMORAL COMPETENT: NORMAL
BH CV LOWER VASCULAR LEFT MID FEMORAL COMPRESS: NORMAL
BH CV LOWER VASCULAR LEFT MID FEMORAL PHASIC: NORMAL
BH CV LOWER VASCULAR LEFT MID FEMORAL SPONT: NORMAL
BH CV LOWER VASCULAR LEFT PERONEAL COMPRESS: NORMAL
BH CV LOWER VASCULAR LEFT POPLITEAL AUGMENT: NORMAL
BH CV LOWER VASCULAR LEFT POPLITEAL COMPETENT: NORMAL
BH CV LOWER VASCULAR LEFT POPLITEAL COMPRESS: NORMAL
BH CV LOWER VASCULAR LEFT POPLITEAL PHASIC: NORMAL
BH CV LOWER VASCULAR LEFT POPLITEAL SPONT: NORMAL
BH CV LOWER VASCULAR LEFT POSTERIOR TIBIAL COMPRESS: NORMAL
BH CV LOWER VASCULAR LEFT PROXIMAL FEMORAL COMPRESS: NORMAL
BH CV LOWER VASCULAR LEFT SAPHENOFEMORAL JUNCTION COMPRESS: NORMAL
BH CV LOWER VASCULAR RIGHT COMMON FEMORAL AUGMENT: NORMAL
BH CV LOWER VASCULAR RIGHT COMMON FEMORAL COMPETENT: NORMAL
BH CV LOWER VASCULAR RIGHT COMMON FEMORAL COMPRESS: NORMAL
BH CV LOWER VASCULAR RIGHT COMMON FEMORAL PHASIC: NORMAL
BH CV LOWER VASCULAR RIGHT COMMON FEMORAL SPONT: NORMAL
BH CV LOWER VASCULAR RIGHT DISTAL FEMORAL COMPRESS: NORMAL
BH CV LOWER VASCULAR RIGHT GREATER SAPH AK COMPRESS: NORMAL
BH CV LOWER VASCULAR RIGHT GREATER SAPH BK COMPRESS: NORMAL
BH CV LOWER VASCULAR RIGHT MID FEMORAL AUGMENT: NORMAL
BH CV LOWER VASCULAR RIGHT MID FEMORAL COMPETENT: NORMAL
BH CV LOWER VASCULAR RIGHT MID FEMORAL COMPRESS: NORMAL
BH CV LOWER VASCULAR RIGHT MID FEMORAL PHASIC: NORMAL
BH CV LOWER VASCULAR RIGHT MID FEMORAL SPONT: NORMAL
BH CV LOWER VASCULAR RIGHT PERONEAL COMPRESS: NORMAL
BH CV LOWER VASCULAR RIGHT POPLITEAL AUGMENT: NORMAL
BH CV LOWER VASCULAR RIGHT POPLITEAL COMPETENT: NORMAL
BH CV LOWER VASCULAR RIGHT POPLITEAL COMPRESS: NORMAL
BH CV LOWER VASCULAR RIGHT POPLITEAL PHASIC: NORMAL
BH CV LOWER VASCULAR RIGHT POPLITEAL SPONT: NORMAL
BH CV LOWER VASCULAR RIGHT POSTERIOR TIBIAL COMPRESS: NORMAL
BH CV LOWER VASCULAR RIGHT PROXIMAL FEMORAL COMPRESS: NORMAL
BH CV LOWER VASCULAR RIGHT SAPHENOFEMORAL JUNCTION COMPRESS: NORMAL
BH CV XLRA - RV BASE: 5.9 CM
BH CV XLRA - RV LENGTH: 7.6 CM
BH CV XLRA - RV MID: 3.8 CM
BILIRUB SERPL-MCNC: 0.4 MG/DL (ref 0–1.2)
BILIRUB SERPL-MCNC: 0.5 MG/DL (ref 0–1.2)
BILIRUB SERPL-MCNC: 0.6 MG/DL (ref 0–1.2)
BILIRUB SERPL-MCNC: 0.7 MG/DL (ref 0–1.2)
BILIRUB SERPL-MCNC: 0.8 MG/DL (ref 0–1.2)
BILIRUB SERPL-MCNC: 1 MG/DL (ref 0–1.2)
BILIRUB SERPL-MCNC: 1.2 MG/DL (ref 0–1.2)
BILIRUB SERPL-MCNC: 1.3 MG/DL (ref 0–1.2)
BILIRUB SERPL-MCNC: 1.4 MG/DL (ref 0–1.2)
BILIRUB SERPL-MCNC: 1.4 MG/DL (ref 0–1.2)
BILIRUB SERPL-MCNC: 1.5 MG/DL (ref 0–1.2)
BILIRUB UR QL STRIP: NEGATIVE
BLD GP AB SCN SERPL QL: NEGATIVE
BLD GP AB SCN SERPL QL: NEGATIVE
BUN SERPL-MCNC: 100 MG/DL (ref 8–23)
BUN SERPL-MCNC: 27 MG/DL (ref 8–23)
BUN SERPL-MCNC: 29 MG/DL (ref 8–23)
BUN SERPL-MCNC: 37 MG/DL (ref 8–23)
BUN SERPL-MCNC: 37 MG/DL (ref 8–23)
BUN SERPL-MCNC: 40 MG/DL (ref 8–23)
BUN SERPL-MCNC: 42 MG/DL (ref 8–23)
BUN SERPL-MCNC: 43 MG/DL (ref 8–23)
BUN SERPL-MCNC: 43 MG/DL (ref 8–23)
BUN SERPL-MCNC: 45 MG/DL (ref 8–23)
BUN SERPL-MCNC: 45 MG/DL (ref 8–23)
BUN SERPL-MCNC: 46 MG/DL (ref 8–23)
BUN SERPL-MCNC: 46 MG/DL (ref 8–23)
BUN SERPL-MCNC: 47 MG/DL (ref 8–23)
BUN SERPL-MCNC: 48 MG/DL (ref 8–23)
BUN SERPL-MCNC: 49 MG/DL (ref 8–23)
BUN SERPL-MCNC: 49 MG/DL (ref 8–23)
BUN SERPL-MCNC: 50 MG/DL (ref 8–23)
BUN SERPL-MCNC: 51 MG/DL (ref 8–23)
BUN SERPL-MCNC: 55 MG/DL (ref 8–23)
BUN SERPL-MCNC: 56 MG/DL (ref 8–23)
BUN SERPL-MCNC: 57 MG/DL (ref 8–23)
BUN SERPL-MCNC: 62 MG/DL (ref 8–23)
BUN SERPL-MCNC: 67 MG/DL (ref 8–23)
BUN SERPL-MCNC: 68 MG/DL (ref 8–23)
BUN SERPL-MCNC: 68 MG/DL (ref 8–23)
BUN SERPL-MCNC: 69 MG/DL (ref 8–23)
BUN SERPL-MCNC: 69 MG/DL (ref 8–23)
BUN SERPL-MCNC: 74 MG/DL (ref 8–23)
BUN SERPL-MCNC: 75 MG/DL (ref 8–23)
BUN SERPL-MCNC: 78 MG/DL (ref 8–23)
BUN SERPL-MCNC: 81 MG/DL (ref 8–23)
BUN SERPL-MCNC: 82 MG/DL (ref 8–23)
BUN SERPL-MCNC: 83 MG/DL (ref 8–23)
BUN SERPL-MCNC: 90 MG/DL (ref 8–23)
BUN SERPL-MCNC: 91 MG/DL (ref 8–23)
BUN SERPL-MCNC: 93 MG/DL (ref 8–23)
BUN SERPL-MCNC: 95 MG/DL (ref 8–23)
BUN/CREAT SERPL: 22.8 (ref 7–25)
BUN/CREAT SERPL: 25 (ref 7–25)
BUN/CREAT SERPL: 25.2 (ref 7–25)
BUN/CREAT SERPL: 25.4 (ref 7–25)
BUN/CREAT SERPL: 25.4 (ref 7–25)
BUN/CREAT SERPL: 26.1 (ref 7–25)
BUN/CREAT SERPL: 26.3 (ref 7–25)
BUN/CREAT SERPL: 28.1 (ref 7–25)
BUN/CREAT SERPL: 28.2 (ref 7–25)
BUN/CREAT SERPL: 28.8 (ref 7–25)
BUN/CREAT SERPL: 28.8 (ref 7–25)
BUN/CREAT SERPL: 29 (ref 7–25)
BUN/CREAT SERPL: 29.1 (ref 7–25)
BUN/CREAT SERPL: 29.7 (ref 7–25)
BUN/CREAT SERPL: 30.2 (ref 7–25)
BUN/CREAT SERPL: 30.6 (ref 7–25)
BUN/CREAT SERPL: 30.6 (ref 7–25)
BUN/CREAT SERPL: 30.7 (ref 7–25)
BUN/CREAT SERPL: 31 (ref 7–25)
BUN/CREAT SERPL: 31.4 (ref 7–25)
BUN/CREAT SERPL: 31.5 (ref 7–25)
BUN/CREAT SERPL: 32.3 (ref 7–25)
BUN/CREAT SERPL: 32.5 (ref 7–25)
BUN/CREAT SERPL: 32.7 (ref 7–25)
BUN/CREAT SERPL: 33.5 (ref 7–25)
BUN/CREAT SERPL: 33.5 (ref 7–25)
BUN/CREAT SERPL: 33.6 (ref 7–25)
BUN/CREAT SERPL: 33.6 (ref 7–25)
BUN/CREAT SERPL: 34.1 (ref 7–25)
BUN/CREAT SERPL: 34.5 (ref 7–25)
BUN/CREAT SERPL: 34.6 (ref 7–25)
BUN/CREAT SERPL: 34.7 (ref 7–25)
BUN/CREAT SERPL: 34.9 (ref 7–25)
BUN/CREAT SERPL: 35.1 (ref 7–25)
BUN/CREAT SERPL: 35.8 (ref 7–25)
BUN/CREAT SERPL: 36.9 (ref 7–25)
BUN/CREAT SERPL: 38.6 (ref 7–25)
BUN/CREAT SERPL: 38.8 (ref 7–25)
BUN/CREAT SERPL: 39 (ref 7–25)
BUN/CREAT SERPL: 39.2 (ref 7–25)
BUN/CREAT SERPL: 39.3 (ref 7–25)
BUN/CREAT SERPL: 40.3 (ref 7–25)
BUN/CREAT SERPL: 40.8 (ref 7–25)
BUN/CREAT SERPL: 41 (ref 7–25)
BUN/CREAT SERPL: 41.7 (ref 7–25)
BUN/CREAT SERPL: 41.8 (ref 7–25)
BUN/CREAT SERPL: 44.8 (ref 7–25)
BUN/CREAT SERPL: 50 (ref 7–25)
BURR CELLS BLD QL SMEAR: ABNORMAL
CA-I BLDA-SCNC: 1.15 MMOL/L (ref 1.13–1.32)
CA-I BLDA-SCNC: 1.16 MMOL/L (ref 1.13–1.32)
CA-I BLDA-SCNC: 1.18 MMOL/L (ref 1.13–1.32)
CALCIUM SPEC-SCNC: 10 MG/DL (ref 8.6–10.5)
CALCIUM SPEC-SCNC: 10.1 MG/DL (ref 8.6–10.5)
CALCIUM SPEC-SCNC: 10.2 MG/DL (ref 8.6–10.5)
CALCIUM SPEC-SCNC: 10.5 MG/DL (ref 8.6–10.5)
CALCIUM SPEC-SCNC: 10.6 MG/DL (ref 8.6–10.5)
CALCIUM SPEC-SCNC: 10.8 MG/DL (ref 8.6–10.5)
CALCIUM SPEC-SCNC: 11.2 MG/DL (ref 8.6–10.5)
CALCIUM SPEC-SCNC: 7.3 MG/DL (ref 8.6–10.5)
CALCIUM SPEC-SCNC: 8.5 MG/DL (ref 8.6–10.5)
CALCIUM SPEC-SCNC: 8.8 MG/DL (ref 8.6–10.5)
CALCIUM SPEC-SCNC: 8.9 MG/DL (ref 8.6–10.5)
CALCIUM SPEC-SCNC: 8.9 MG/DL (ref 8.6–10.5)
CALCIUM SPEC-SCNC: 9 MG/DL (ref 8.6–10.5)
CALCIUM SPEC-SCNC: 9.1 MG/DL (ref 8.6–10.5)
CALCIUM SPEC-SCNC: 9.2 MG/DL (ref 8.6–10.5)
CALCIUM SPEC-SCNC: 9.3 MG/DL (ref 8.6–10.5)
CALCIUM SPEC-SCNC: 9.4 MG/DL (ref 8.6–10.5)
CALCIUM SPEC-SCNC: 9.5 MG/DL (ref 8.6–10.5)
CALCIUM SPEC-SCNC: 9.5 MG/DL (ref 8.6–10.5)
CALCIUM SPEC-SCNC: 9.6 MG/DL (ref 8.6–10.5)
CALCIUM SPEC-SCNC: 9.7 MG/DL (ref 8.6–10.5)
CALCIUM SPEC-SCNC: 9.8 MG/DL (ref 8.6–10.5)
CALCIUM SPEC-SCNC: 9.9 MG/DL (ref 8.6–10.5)
CHLORIDE BLDA-SCNC: 100 MMOL/L (ref 98–106)
CHLORIDE BLDA-SCNC: 90 MMOL/L (ref 98–106)
CHLORIDE SERPL-SCNC: 100 MMOL/L (ref 98–107)
CHLORIDE SERPL-SCNC: 101 MMOL/L (ref 98–107)
CHLORIDE SERPL-SCNC: 102 MMOL/L (ref 98–107)
CHLORIDE SERPL-SCNC: 103 MMOL/L (ref 98–107)
CHLORIDE SERPL-SCNC: 103 MMOL/L (ref 98–107)
CHLORIDE SERPL-SCNC: 104 MMOL/L (ref 98–107)
CHLORIDE SERPL-SCNC: 105 MMOL/L (ref 98–107)
CHLORIDE SERPL-SCNC: 106 MMOL/L (ref 98–107)
CHLORIDE SERPL-SCNC: 108 MMOL/L (ref 98–107)
CHLORIDE SERPL-SCNC: 90 MMOL/L (ref 98–107)
CHLORIDE SERPL-SCNC: 91 MMOL/L (ref 98–107)
CHLORIDE SERPL-SCNC: 92 MMOL/L (ref 98–107)
CHLORIDE SERPL-SCNC: 93 MMOL/L (ref 98–107)
CHLORIDE SERPL-SCNC: 95 MMOL/L (ref 98–107)
CHLORIDE SERPL-SCNC: 96 MMOL/L (ref 98–107)
CHLORIDE SERPL-SCNC: 97 MMOL/L (ref 98–107)
CHLORIDE SERPL-SCNC: 97 MMOL/L (ref 98–107)
CHLORIDE SERPL-SCNC: 98 MMOL/L (ref 98–107)
CHLORIDE SERPL-SCNC: 99 MMOL/L (ref 98–107)
CLARITY UR: ABNORMAL
CLARITY UR: CLEAR
CLARITY UR: CLEAR
CLUMPED PLATELETS: PRESENT
CO2 SERPL-SCNC: 19.6 MMOL/L (ref 22–29)
CO2 SERPL-SCNC: 21.4 MMOL/L (ref 22–29)
CO2 SERPL-SCNC: 22.4 MMOL/L (ref 22–29)
CO2 SERPL-SCNC: 22.5 MMOL/L (ref 22–29)
CO2 SERPL-SCNC: 22.6 MMOL/L (ref 22–29)
CO2 SERPL-SCNC: 22.7 MMOL/L (ref 22–29)
CO2 SERPL-SCNC: 22.8 MMOL/L (ref 22–29)
CO2 SERPL-SCNC: 23 MMOL/L (ref 22–29)
CO2 SERPL-SCNC: 23.1 MMOL/L (ref 22–29)
CO2 SERPL-SCNC: 23.1 MMOL/L (ref 22–29)
CO2 SERPL-SCNC: 23.5 MMOL/L (ref 22–29)
CO2 SERPL-SCNC: 23.8 MMOL/L (ref 22–29)
CO2 SERPL-SCNC: 24.2 MMOL/L (ref 22–29)
CO2 SERPL-SCNC: 24.6 MMOL/L (ref 22–29)
CO2 SERPL-SCNC: 24.8 MMOL/L (ref 22–29)
CO2 SERPL-SCNC: 24.9 MMOL/L (ref 22–29)
CO2 SERPL-SCNC: 25.4 MMOL/L (ref 22–29)
CO2 SERPL-SCNC: 26.3 MMOL/L (ref 22–29)
CO2 SERPL-SCNC: 26.3 MMOL/L (ref 22–29)
CO2 SERPL-SCNC: 26.7 MMOL/L (ref 22–29)
CO2 SERPL-SCNC: 27.9 MMOL/L (ref 22–29)
CO2 SERPL-SCNC: 28.2 MMOL/L (ref 22–29)
CO2 SERPL-SCNC: 28.3 MMOL/L (ref 22–29)
CO2 SERPL-SCNC: 28.4 MMOL/L (ref 22–29)
CO2 SERPL-SCNC: 28.7 MMOL/L (ref 22–29)
CO2 SERPL-SCNC: 29.2 MMOL/L (ref 22–29)
CO2 SERPL-SCNC: 29.9 MMOL/L (ref 22–29)
CO2 SERPL-SCNC: 30.1 MMOL/L (ref 22–29)
CO2 SERPL-SCNC: 31 MMOL/L (ref 22–29)
CO2 SERPL-SCNC: 33.6 MMOL/L (ref 22–29)
CO2 SERPL-SCNC: 34.1 MMOL/L (ref 22–29)
CO2 SERPL-SCNC: 34.3 MMOL/L (ref 22–29)
CO2 SERPL-SCNC: 34.6 MMOL/L (ref 22–29)
CO2 SERPL-SCNC: 35.5 MMOL/L (ref 22–29)
CO2 SERPL-SCNC: 35.7 MMOL/L (ref 22–29)
CO2 SERPL-SCNC: 35.8 MMOL/L (ref 22–29)
CO2 SERPL-SCNC: 36.3 MMOL/L (ref 22–29)
CO2 SERPL-SCNC: 36.5 MMOL/L (ref 22–29)
CO2 SERPL-SCNC: 36.7 MMOL/L (ref 22–29)
CO2 SERPL-SCNC: 36.9 MMOL/L (ref 22–29)
CO2 SERPL-SCNC: 37 MMOL/L (ref 22–29)
CO2 SERPL-SCNC: 37.3 MMOL/L (ref 22–29)
CO2 SERPL-SCNC: 39.2 MMOL/L (ref 22–29)
CO2 SERPL-SCNC: 39.3 MMOL/L (ref 22–29)
CO2 SERPL-SCNC: 39.3 MMOL/L (ref 22–29)
CO2 SERPL-SCNC: 39.6 MMOL/L (ref 22–29)
CO2 SERPL-SCNC: 42.2 MMOL/L (ref 22–29)
CO2 SERPL-SCNC: 43.7 MMOL/L (ref 22–29)
COHGB MFR BLD: 0.4 % (ref 0–1.5)
COHGB MFR BLD: 0.4 % (ref 0–1.5)
COHGB MFR BLD: 0.7 % (ref 0–1.5)
COHGB MFR BLD: 1 % (ref 0–1.5)
COLOR UR: ABNORMAL
COLOR UR: ABNORMAL
COLOR UR: YELLOW
CREAT SERPL-MCNC: 1.07 MG/DL (ref 0.57–1)
CREAT SERPL-MCNC: 1.14 MG/DL (ref 0.57–1)
CREAT SERPL-MCNC: 1.19 MG/DL (ref 0.57–1)
CREAT SERPL-MCNC: 1.21 MG/DL (ref 0.57–1)
CREAT SERPL-MCNC: 1.27 MG/DL (ref 0.57–1)
CREAT SERPL-MCNC: 1.35 MG/DL (ref 0.57–1)
CREAT SERPL-MCNC: 1.39 MG/DL (ref 0.57–1)
CREAT SERPL-MCNC: 1.4 MG/DL (ref 0.57–1)
CREAT SERPL-MCNC: 1.42 MG/DL (ref 0.57–1)
CREAT SERPL-MCNC: 1.46 MG/DL (ref 0.57–1)
CREAT SERPL-MCNC: 1.47 MG/DL (ref 0.57–1)
CREAT SERPL-MCNC: 1.49 MG/DL (ref 0.57–1)
CREAT SERPL-MCNC: 1.49 MG/DL (ref 0.57–1)
CREAT SERPL-MCNC: 1.52 MG/DL (ref 0.57–1)
CREAT SERPL-MCNC: 1.53 MG/DL (ref 0.57–1)
CREAT SERPL-MCNC: 1.56 MG/DL (ref 0.57–1)
CREAT SERPL-MCNC: 1.57 MG/DL (ref 0.57–1)
CREAT SERPL-MCNC: 1.58 MG/DL (ref 0.57–1)
CREAT SERPL-MCNC: 1.6 MG/DL (ref 0.57–1)
CREAT SERPL-MCNC: 1.62 MG/DL (ref 0.57–1)
CREAT SERPL-MCNC: 1.64 MG/DL (ref 0.57–1)
CREAT SERPL-MCNC: 1.65 MG/DL (ref 0.57–1)
CREAT SERPL-MCNC: 1.66 MG/DL (ref 0.57–1)
CREAT SERPL-MCNC: 1.68 MG/DL (ref 0.57–1)
CREAT SERPL-MCNC: 1.69 MG/DL (ref 0.57–1)
CREAT SERPL-MCNC: 1.7 MG/DL (ref 0.57–1)
CREAT SERPL-MCNC: 1.71 MG/DL (ref 0.57–1)
CREAT SERPL-MCNC: 1.72 MG/DL (ref 0.57–1)
CREAT SERPL-MCNC: 1.73 MG/DL (ref 0.57–1)
CREAT SERPL-MCNC: 1.75 MG/DL (ref 0.57–1)
CREAT SERPL-MCNC: 1.77 MG/DL (ref 0.57–1)
CREAT SERPL-MCNC: 1.78 MG/DL (ref 0.57–1)
CREAT SERPL-MCNC: 1.89 MG/DL (ref 0.57–1)
CREAT SERPL-MCNC: 1.89 MG/DL (ref 0.57–1)
CREAT SERPL-MCNC: 1.9 MG/DL (ref 0.57–1)
CREAT SERPL-MCNC: 1.91 MG/DL (ref 0.57–1)
CREAT SERPL-MCNC: 1.93 MG/DL (ref 0.57–1)
CREAT SERPL-MCNC: 2 MG/DL (ref 0.57–1)
CREAT SERPL-MCNC: 2 MG/DL (ref 0.57–1)
CREAT SERPL-MCNC: 2.06 MG/DL (ref 0.57–1)
CREAT SERPL-MCNC: 2.15 MG/DL (ref 0.57–1)
CREAT SERPL-MCNC: 2.16 MG/DL (ref 0.57–1)
CREAT SERPL-MCNC: 2.17 MG/DL (ref 0.57–1)
CREAT SERPL-MCNC: 2.23 MG/DL (ref 0.57–1)
CREAT SERPL-MCNC: 2.32 MG/DL (ref 0.57–1)
CREAT SERPL-MCNC: 2.4 MG/DL (ref 0.57–1)
CREAT SERPL-MCNC: 2.59 MG/DL (ref 0.57–1)
CREAT SERPL-MCNC: 2.75 MG/DL (ref 0.57–1)
CROSSMATCH INTERPRETATION: NORMAL
CROSSMATCH INTERPRETATION: NORMAL
CRP SERPL-MCNC: 1.15 MG/DL (ref 0–0.5)
CRP SERPL-MCNC: 7.38 MG/DL (ref 0–0.5)
CYTO UR: NORMAL
D-LACTATE SERPL-SCNC: 1.2 MMOL/L (ref 0.5–2)
D-LACTATE SERPL-SCNC: 1.3 MMOL/L (ref 0.5–2)
D-LACTATE SERPL-SCNC: 1.3 MMOL/L (ref 0.5–2)
D-LACTATE SERPL-SCNC: 1.4 MMOL/L (ref 0.5–2)
D-LACTATE SERPL-SCNC: 1.4 MMOL/L (ref 0.5–2)
D-LACTATE SERPL-SCNC: 1.7 MMOL/L (ref 0.5–2)
D-LACTATE SERPL-SCNC: 1.8 MMOL/L (ref 0.5–2)
D-LACTATE SERPL-SCNC: 1.8 MMOL/L (ref 0.5–2)
D-LACTATE SERPL-SCNC: 2 MMOL/L (ref 0.5–2)
D-LACTATE SERPL-SCNC: 2.1 MMOL/L (ref 0.5–2)
D-LACTATE SERPL-SCNC: 2.2 MMOL/L (ref 0.5–2)
D-LACTATE SERPL-SCNC: 2.5 MMOL/L (ref 0.5–2)
D-LACTATE SERPL-SCNC: 2.7 MMOL/L (ref 0.5–2)
D-LACTATE SERPL-SCNC: 3.1 MMOL/L (ref 0.5–2)
D-LACTATE SERPL-SCNC: 3.2 MMOL/L (ref 0.5–2)
D-LACTATE SERPL-SCNC: 3.3 MMOL/L (ref 0.5–2)
D-LACTATE SERPL-SCNC: 3.4 MMOL/L (ref 0.5–2)
D-LACTATE SERPL-SCNC: 3.6 MMOL/L (ref 0.5–2)
D-LACTATE SERPL-SCNC: 3.7 MMOL/L (ref 0.5–2)
D-LACTATE SERPL-SCNC: 3.8 MMOL/L (ref 0.5–2)
D-LACTATE SERPL-SCNC: 5 MMOL/L (ref 0.5–2)
D-LACTATE SERPL-SCNC: 5.7 MMOL/L (ref 0.5–2)
D-LACTATE SERPL-SCNC: 5.8 MMOL/L (ref 0.5–2)
DEPRECATED RDW RBC AUTO: 53.4 FL (ref 37–54)
DEPRECATED RDW RBC AUTO: 53.7 FL (ref 37–54)
DEPRECATED RDW RBC AUTO: 55.1 FL (ref 37–54)
DEPRECATED RDW RBC AUTO: 55.4 FL (ref 37–54)
DEPRECATED RDW RBC AUTO: 57 FL (ref 37–54)
DEPRECATED RDW RBC AUTO: 57.1 FL (ref 37–54)
DEPRECATED RDW RBC AUTO: 57.2 FL (ref 37–54)
DEPRECATED RDW RBC AUTO: 57.2 FL (ref 37–54)
DEPRECATED RDW RBC AUTO: 57.6 FL (ref 37–54)
DEPRECATED RDW RBC AUTO: 57.9 FL (ref 37–54)
DEPRECATED RDW RBC AUTO: 59.2 FL (ref 37–54)
DEPRECATED RDW RBC AUTO: 59.4 FL (ref 37–54)
DEPRECATED RDW RBC AUTO: 59.7 FL (ref 37–54)
DEPRECATED RDW RBC AUTO: 59.8 FL (ref 37–54)
DEPRECATED RDW RBC AUTO: 60.1 FL (ref 37–54)
DEPRECATED RDW RBC AUTO: 60.6 FL (ref 37–54)
DEPRECATED RDW RBC AUTO: 60.8 FL (ref 37–54)
DEPRECATED RDW RBC AUTO: 60.9 FL (ref 37–54)
DEPRECATED RDW RBC AUTO: 61.3 FL (ref 37–54)
DEPRECATED RDW RBC AUTO: 61.8 FL (ref 37–54)
DEPRECATED RDW RBC AUTO: 62 FL (ref 37–54)
DEPRECATED RDW RBC AUTO: 62.3 FL (ref 37–54)
DEPRECATED RDW RBC AUTO: 63.3 FL (ref 37–54)
DEPRECATED RDW RBC AUTO: 63.6 FL (ref 37–54)
DEPRECATED RDW RBC AUTO: 63.7 FL (ref 37–54)
DEPRECATED RDW RBC AUTO: 66.9 FL (ref 37–54)
DEPRECATED RDW RBC AUTO: 70.5 FL (ref 37–54)
DEPRECATED RDW RBC AUTO: 70.7 FL (ref 37–54)
DEPRECATED RDW RBC AUTO: 72.1 FL (ref 37–54)
DEPRECATED RDW RBC AUTO: 72.2 FL (ref 37–54)
DIGOXIN SERPL-MCNC: 0.34 NG/ML (ref 0.6–1.2)
DIGOXIN SERPL-MCNC: 0.34 NG/ML (ref 0.6–1.2)
DIGOXIN SERPL-MCNC: 0.61 NG/ML (ref 0.6–1.2)
DIGOXIN SERPL-MCNC: 0.87 NG/ML (ref 0.6–1.2)
DIGOXIN SERPL-MCNC: 1.1 NG/ML (ref 0.6–1.2)
DIGOXIN SERPL-MCNC: 1.14 NG/ML (ref 0.6–1.2)
DIGOXIN SERPL-MCNC: 1.18 NG/ML (ref 0.6–1.2)
DIGOXIN SERPL-MCNC: 1.32 NG/ML (ref 0.6–1.2)
EGFRCR SERPLBLD CKD-EPI 2021: 17.4 ML/MIN/1.73
EGFRCR SERPLBLD CKD-EPI 2021: 18.7 ML/MIN/1.73
EGFRCR SERPLBLD CKD-EPI 2021: 20.5 ML/MIN/1.73
EGFRCR SERPLBLD CKD-EPI 2021: 21.3 ML/MIN/1.73
EGFRCR SERPLBLD CKD-EPI 2021: 22.3 ML/MIN/1.73
EGFRCR SERPLBLD CKD-EPI 2021: 23.1 ML/MIN/1.73
EGFRCR SERPLBLD CKD-EPI 2021: 23.2 ML/MIN/1.73
EGFRCR SERPLBLD CKD-EPI 2021: 23.3 ML/MIN/1.73
EGFRCR SERPLBLD CKD-EPI 2021: 24.6 ML/MIN/1.73
EGFRCR SERPLBLD CKD-EPI 2021: 25.5 ML/MIN/1.73
EGFRCR SERPLBLD CKD-EPI 2021: 25.5 ML/MIN/1.73
EGFRCR SERPLBLD CKD-EPI 2021: 26.6 ML/MIN/1.73
EGFRCR SERPLBLD CKD-EPI 2021: 26.9 ML/MIN/1.73
EGFRCR SERPLBLD CKD-EPI 2021: 27.1 ML/MIN/1.73
EGFRCR SERPLBLD CKD-EPI 2021: 27.3 ML/MIN/1.73
EGFRCR SERPLBLD CKD-EPI 2021: 27.3 ML/MIN/1.73
EGFRCR SERPLBLD CKD-EPI 2021: 29.3 ML/MIN/1.73
EGFRCR SERPLBLD CKD-EPI 2021: 29.5 ML/MIN/1.73
EGFRCR SERPLBLD CKD-EPI 2021: 29.9 ML/MIN/1.73
EGFRCR SERPLBLD CKD-EPI 2021: 30.3 ML/MIN/1.73
EGFRCR SERPLBLD CKD-EPI 2021: 30.5 ML/MIN/1.73
EGFRCR SERPLBLD CKD-EPI 2021: 30.7 ML/MIN/1.73
EGFRCR SERPLBLD CKD-EPI 2021: 31 ML/MIN/1.73
EGFRCR SERPLBLD CKD-EPI 2021: 31.8 ML/MIN/1.73
EGFRCR SERPLBLD CKD-EPI 2021: 32.1 ML/MIN/1.73
EGFRCR SERPLBLD CKD-EPI 2021: 32.3 ML/MIN/1.73
EGFRCR SERPLBLD CKD-EPI 2021: 32.8 ML/MIN/1.73
EGFRCR SERPLBLD CKD-EPI 2021: 33.3 ML/MIN/1.73
EGFRCR SERPLBLD CKD-EPI 2021: 33.8 ML/MIN/1.73
EGFRCR SERPLBLD CKD-EPI 2021: 34.1 ML/MIN/1.73
EGFRCR SERPLBLD CKD-EPI 2021: 35.1 ML/MIN/1.73
EGFRCR SERPLBLD CKD-EPI 2021: 35.4 ML/MIN/1.73
EGFRCR SERPLBLD CKD-EPI 2021: 36.3 ML/MIN/1.73
EGFRCR SERPLBLD CKD-EPI 2021: 36.3 ML/MIN/1.73
EGFRCR SERPLBLD CKD-EPI 2021: 36.8 ML/MIN/1.73
EGFRCR SERPLBLD CKD-EPI 2021: 38.4 ML/MIN/1.73
EGFRCR SERPLBLD CKD-EPI 2021: 39.1 ML/MIN/1.73
EGFRCR SERPLBLD CKD-EPI 2021: 39.4 ML/MIN/1.73
EGFRCR SERPLBLD CKD-EPI 2021: 40.8 ML/MIN/1.73
EGFRCR SERPLBLD CKD-EPI 2021: 43.9 ML/MIN/1.73
EGFRCR SERPLBLD CKD-EPI 2021: 46.5 ML/MIN/1.73
EGFRCR SERPLBLD CKD-EPI 2021: 47.5 ML/MIN/1.73
EGFRCR SERPLBLD CKD-EPI 2021: 50 ML/MIN/1.73
EGFRCR SERPLBLD CKD-EPI 2021: 53.9 ML/MIN/1.73
EOSINOPHIL # BLD AUTO: 0 10*3/MM3 (ref 0–0.4)
EOSINOPHIL # BLD AUTO: 0.05 10*3/MM3 (ref 0–0.4)
EOSINOPHIL # BLD AUTO: 0.05 10*3/MM3 (ref 0–0.4)
EOSINOPHIL # BLD AUTO: 0.08 10*3/MM3 (ref 0–0.4)
EOSINOPHIL # BLD AUTO: 0.1 10*3/MM3 (ref 0–0.4)
EOSINOPHIL # BLD AUTO: 0.12 10*3/MM3 (ref 0–0.4)
EOSINOPHIL # BLD AUTO: 0.13 10*3/MM3 (ref 0–0.4)
EOSINOPHIL # BLD AUTO: 0.17 10*3/MM3 (ref 0–0.4)
EOSINOPHIL # BLD AUTO: 0.22 10*3/MM3 (ref 0–0.4)
EOSINOPHIL # BLD AUTO: 0.23 10*3/MM3 (ref 0–0.4)
EOSINOPHIL # BLD AUTO: 0.25 10*3/MM3 (ref 0–0.4)
EOSINOPHIL # BLD AUTO: 0.27 10*3/MM3 (ref 0–0.4)
EOSINOPHIL # BLD AUTO: 0.32 10*3/MM3 (ref 0–0.4)
EOSINOPHIL # BLD AUTO: 0.33 10*3/MM3 (ref 0–0.4)
EOSINOPHIL # BLD AUTO: 0.34 10*3/MM3 (ref 0–0.4)
EOSINOPHIL # BLD AUTO: 0.35 10*3/MM3 (ref 0–0.4)
EOSINOPHIL # BLD AUTO: 0.36 10*3/MM3 (ref 0–0.4)
EOSINOPHIL # BLD AUTO: 0.39 10*3/MM3 (ref 0–0.4)
EOSINOPHIL # BLD AUTO: 0.45 10*3/MM3 (ref 0–0.4)
EOSINOPHIL # BLD AUTO: 0.62 10*3/MM3 (ref 0–0.4)
EOSINOPHIL # BLD AUTO: 0.71 10*3/MM3 (ref 0–0.4)
EOSINOPHIL # BLD AUTO: 0.74 10*3/MM3 (ref 0–0.4)
EOSINOPHIL # BLD AUTO: 0.84 10*3/MM3 (ref 0–0.4)
EOSINOPHIL # BLD MANUAL: 0.36 10*3/MM3 (ref 0–0.4)
EOSINOPHIL # BLD MANUAL: 0.5 10*3/MM3 (ref 0–0.4)
EOSINOPHIL # BLD MANUAL: 0.7 10*3/MM3 (ref 0–0.4)
EOSINOPHIL NFR BLD AUTO: 0 % (ref 0.3–6.2)
EOSINOPHIL NFR BLD AUTO: 0.4 % (ref 0.3–6.2)
EOSINOPHIL NFR BLD AUTO: 0.5 % (ref 0.3–6.2)
EOSINOPHIL NFR BLD AUTO: 0.5 % (ref 0.3–6.2)
EOSINOPHIL NFR BLD AUTO: 1.1 % (ref 0.3–6.2)
EOSINOPHIL NFR BLD AUTO: 1.3 % (ref 0.3–6.2)
EOSINOPHIL NFR BLD AUTO: 1.6 % (ref 0.3–6.2)
EOSINOPHIL NFR BLD AUTO: 1.8 % (ref 0.3–6.2)
EOSINOPHIL NFR BLD AUTO: 1.8 % (ref 0.3–6.2)
EOSINOPHIL NFR BLD AUTO: 2 % (ref 0.3–6.2)
EOSINOPHIL NFR BLD AUTO: 2.2 % (ref 0.3–6.2)
EOSINOPHIL NFR BLD AUTO: 2.3 % (ref 0.3–6.2)
EOSINOPHIL NFR BLD AUTO: 2.5 % (ref 0.3–6.2)
EOSINOPHIL NFR BLD AUTO: 2.6 % (ref 0.3–6.2)
EOSINOPHIL NFR BLD AUTO: 4.1 % (ref 0.3–6.2)
EOSINOPHIL NFR BLD AUTO: 4.2 % (ref 0.3–6.2)
EOSINOPHIL NFR BLD AUTO: 4.3 % (ref 0.3–6.2)
EOSINOPHIL NFR BLD AUTO: 4.5 % (ref 0.3–6.2)
EOSINOPHIL NFR BLD AUTO: 5.6 % (ref 0.3–6.2)
EOSINOPHIL NFR BLD AUTO: 6.2 % (ref 0.3–6.2)
EOSINOPHIL NFR BLD AUTO: 6.4 % (ref 0.3–6.2)
EOSINOPHIL NFR BLD MANUAL: 2 % (ref 0.3–6.2)
EOSINOPHIL NFR BLD MANUAL: 3 % (ref 0.3–6.2)
EOSINOPHIL NFR BLD MANUAL: 4 % (ref 0.3–6.2)
ERYTHROCYTE [DISTWIDTH] IN BLOOD BY AUTOMATED COUNT: 15.7 % (ref 12.3–15.4)
ERYTHROCYTE [DISTWIDTH] IN BLOOD BY AUTOMATED COUNT: 15.9 % (ref 12.3–15.4)
ERYTHROCYTE [DISTWIDTH] IN BLOOD BY AUTOMATED COUNT: 16 % (ref 12.3–15.4)
ERYTHROCYTE [DISTWIDTH] IN BLOOD BY AUTOMATED COUNT: 16.1 % (ref 12.3–15.4)
ERYTHROCYTE [DISTWIDTH] IN BLOOD BY AUTOMATED COUNT: 16.3 % (ref 12.3–15.4)
ERYTHROCYTE [DISTWIDTH] IN BLOOD BY AUTOMATED COUNT: 16.6 % (ref 12.3–15.4)
ERYTHROCYTE [DISTWIDTH] IN BLOOD BY AUTOMATED COUNT: 16.7 % (ref 12.3–15.4)
ERYTHROCYTE [DISTWIDTH] IN BLOOD BY AUTOMATED COUNT: 16.8 % (ref 12.3–15.4)
ERYTHROCYTE [DISTWIDTH] IN BLOOD BY AUTOMATED COUNT: 17.1 % (ref 12.3–15.4)
ERYTHROCYTE [DISTWIDTH] IN BLOOD BY AUTOMATED COUNT: 17.2 % (ref 12.3–15.4)
ERYTHROCYTE [DISTWIDTH] IN BLOOD BY AUTOMATED COUNT: 17.9 % (ref 12.3–15.4)
ERYTHROCYTE [DISTWIDTH] IN BLOOD BY AUTOMATED COUNT: 18.5 % (ref 12.3–15.4)
ERYTHROCYTE [DISTWIDTH] IN BLOOD BY AUTOMATED COUNT: 18.6 % (ref 12.3–15.4)
ERYTHROCYTE [DISTWIDTH] IN BLOOD BY AUTOMATED COUNT: 19.1 % (ref 12.3–15.4)
ERYTHROCYTE [DISTWIDTH] IN BLOOD BY AUTOMATED COUNT: 19.4 % (ref 12.3–15.4)
ERYTHROCYTE [DISTWIDTH] IN BLOOD BY AUTOMATED COUNT: 19.7 % (ref 12.3–15.4)
ERYTHROCYTE [DISTWIDTH] IN BLOOD BY AUTOMATED COUNT: 19.7 % (ref 12.3–15.4)
ERYTHROCYTE [DISTWIDTH] IN BLOOD BY AUTOMATED COUNT: 20.1 % (ref 12.3–15.4)
ERYTHROCYTE [DISTWIDTH] IN BLOOD BY AUTOMATED COUNT: 20.1 % (ref 12.3–15.4)
ERYTHROCYTE [DISTWIDTH] IN BLOOD BY AUTOMATED COUNT: 20.5 % (ref 12.3–15.4)
ERYTHROCYTE [DISTWIDTH] IN BLOOD BY AUTOMATED COUNT: 20.6 % (ref 12.3–15.4)
ERYTHROCYTE [DISTWIDTH] IN BLOOD BY AUTOMATED COUNT: 21 % (ref 12.3–15.4)
ERYTHROCYTE [DISTWIDTH] IN BLOOD BY AUTOMATED COUNT: 21.2 % (ref 12.3–15.4)
ERYTHROCYTE [DISTWIDTH] IN BLOOD BY AUTOMATED COUNT: 21.2 % (ref 12.3–15.4)
ERYTHROCYTE [DISTWIDTH] IN BLOOD BY AUTOMATED COUNT: 21.8 % (ref 12.3–15.4)
ERYTHROCYTE [DISTWIDTH] IN BLOOD BY AUTOMATED COUNT: 22.2 % (ref 12.3–15.4)
ERYTHROCYTE [DISTWIDTH] IN BLOOD BY AUTOMATED COUNT: 23.4 % (ref 12.3–15.4)
ERYTHROCYTE [DISTWIDTH] IN BLOOD BY AUTOMATED COUNT: 23.5 % (ref 12.3–15.4)
ERYTHROCYTE [SEDIMENTATION RATE] IN BLOOD: 55 MM/HR (ref 0–30)
FERRITIN SERPL-MCNC: 90.6 NG/ML (ref 13–150)
FHHB: 1.4 % (ref 0–5)
FHHB: 2.5 % (ref 0–5)
FHHB: 2.6 % (ref 0–5)
FHHB: 3.6 % (ref 0–5)
GAS FLOW AIRWAY: 2 LPM
GAS FLOW AIRWAY: 6 LPM
GAS FLOW AIRWAY: ABNORMAL L/MIN
GFR SERPL CREATININE-BSD FRML MDRD: 36 ML/MIN/1.73
GFR SERPL CREATININE-BSD FRML MDRD: 36 ML/MIN/1.73
GFR SERPL CREATININE-BSD FRML MDRD: 39 ML/MIN/1.73
GFR SERPL CREATININE-BSD FRML MDRD: 42 ML/MIN/1.73
GIANT PLATELETS: ABNORMAL
GLOBULIN UR ELPH-MCNC: 2.4 GM/DL
GLOBULIN UR ELPH-MCNC: 2.4 GM/DL
GLOBULIN UR ELPH-MCNC: 2.7 GM/DL
GLOBULIN UR ELPH-MCNC: 3.1 GM/DL
GLOBULIN UR ELPH-MCNC: 3.1 GM/DL
GLOBULIN UR ELPH-MCNC: 3.2 GM/DL
GLOBULIN UR ELPH-MCNC: 3.2 GM/DL
GLOBULIN UR ELPH-MCNC: 3.3 GM/DL
GLOBULIN UR ELPH-MCNC: 3.4 GM/DL
GLOBULIN UR ELPH-MCNC: 3.4 GM/DL
GLOBULIN UR ELPH-MCNC: 3.5 GM/DL
GLOBULIN UR ELPH-MCNC: 3.6 GM/DL
GLOBULIN UR ELPH-MCNC: 3.6 GM/DL
GLOBULIN UR ELPH-MCNC: 3.8 GM/DL
GLOBULIN UR ELPH-MCNC: 4 GM/DL
GLOBULIN UR ELPH-MCNC: 4.1 GM/DL
GLOBULIN UR ELPH-MCNC: 4.3 GM/DL
GLOBULIN UR ELPH-MCNC: 4.4 GM/DL
GLOBULIN UR ELPH-MCNC: 4.5 GM/DL
GLOBULIN UR ELPH-MCNC: 4.6 GM/DL
GLUCOSE BLDA-MCNC: 138 MMOL/L (ref 65–99)
GLUCOSE BLDA-MCNC: 90 MMOL/L (ref 65–99)
GLUCOSE BLDC GLUCOMTR-MCNC: 101 MG/DL (ref 70–99)
GLUCOSE BLDC GLUCOMTR-MCNC: 101 MG/DL (ref 70–99)
GLUCOSE BLDC GLUCOMTR-MCNC: 104 MG/DL (ref 70–99)
GLUCOSE BLDC GLUCOMTR-MCNC: 105 MG/DL (ref 70–99)
GLUCOSE BLDC GLUCOMTR-MCNC: 105 MG/DL (ref 70–99)
GLUCOSE BLDC GLUCOMTR-MCNC: 106 MG/DL (ref 70–99)
GLUCOSE BLDC GLUCOMTR-MCNC: 111 MG/DL (ref 70–99)
GLUCOSE BLDC GLUCOMTR-MCNC: 115 MG/DL (ref 70–99)
GLUCOSE BLDC GLUCOMTR-MCNC: 117 MG/DL (ref 70–99)
GLUCOSE BLDC GLUCOMTR-MCNC: 120 MG/DL (ref 70–99)
GLUCOSE BLDC GLUCOMTR-MCNC: 124 MG/DL (ref 70–99)
GLUCOSE BLDC GLUCOMTR-MCNC: 126 MG/DL (ref 70–99)
GLUCOSE BLDC GLUCOMTR-MCNC: 128 MG/DL (ref 70–99)
GLUCOSE BLDC GLUCOMTR-MCNC: 137 MG/DL (ref 70–99)
GLUCOSE BLDC GLUCOMTR-MCNC: 147 MG/DL (ref 70–99)
GLUCOSE BLDC GLUCOMTR-MCNC: 15 MG/DL (ref 70–99)
GLUCOSE BLDC GLUCOMTR-MCNC: 158 MG/DL (ref 70–99)
GLUCOSE BLDC GLUCOMTR-MCNC: 16 MG/DL (ref 70–99)
GLUCOSE BLDC GLUCOMTR-MCNC: 16 MG/DL (ref 70–99)
GLUCOSE BLDC GLUCOMTR-MCNC: 260 MG/DL (ref 70–99)
GLUCOSE BLDC GLUCOMTR-MCNC: 29 MG/DL (ref 70–99)
GLUCOSE BLDC GLUCOMTR-MCNC: 34 MG/DL (ref 70–99)
GLUCOSE BLDC GLUCOMTR-MCNC: 39 MG/DL (ref 70–99)
GLUCOSE BLDC GLUCOMTR-MCNC: 46 MG/DL (ref 70–99)
GLUCOSE BLDC GLUCOMTR-MCNC: 59 MG/DL (ref 70–99)
GLUCOSE BLDC GLUCOMTR-MCNC: 65 MG/DL (ref 70–99)
GLUCOSE BLDC GLUCOMTR-MCNC: 69 MG/DL (ref 70–99)
GLUCOSE BLDC GLUCOMTR-MCNC: 70 MG/DL (ref 70–99)
GLUCOSE BLDC GLUCOMTR-MCNC: 71 MG/DL (ref 70–99)
GLUCOSE BLDC GLUCOMTR-MCNC: 72 MG/DL (ref 70–99)
GLUCOSE BLDC GLUCOMTR-MCNC: 74 MG/DL (ref 70–99)
GLUCOSE BLDC GLUCOMTR-MCNC: 74 MG/DL (ref 70–99)
GLUCOSE BLDC GLUCOMTR-MCNC: 75 MG/DL (ref 70–99)
GLUCOSE BLDC GLUCOMTR-MCNC: 76 MG/DL (ref 70–99)
GLUCOSE BLDC GLUCOMTR-MCNC: 77 MG/DL (ref 70–99)
GLUCOSE BLDC GLUCOMTR-MCNC: 77 MG/DL (ref 70–99)
GLUCOSE BLDC GLUCOMTR-MCNC: 79 MG/DL (ref 70–99)
GLUCOSE BLDC GLUCOMTR-MCNC: 81 MG/DL (ref 70–99)
GLUCOSE BLDC GLUCOMTR-MCNC: 84 MG/DL (ref 70–99)
GLUCOSE BLDC GLUCOMTR-MCNC: 86 MG/DL (ref 70–99)
GLUCOSE BLDC GLUCOMTR-MCNC: 86 MG/DL (ref 70–99)
GLUCOSE BLDC GLUCOMTR-MCNC: 87 MG/DL (ref 70–99)
GLUCOSE BLDC GLUCOMTR-MCNC: 87 MG/DL (ref 70–99)
GLUCOSE BLDC GLUCOMTR-MCNC: 88 MG/DL (ref 70–99)
GLUCOSE BLDC GLUCOMTR-MCNC: 89 MG/DL (ref 70–99)
GLUCOSE BLDC GLUCOMTR-MCNC: 90 MG/DL (ref 70–99)
GLUCOSE BLDC GLUCOMTR-MCNC: 90 MG/DL (ref 70–99)
GLUCOSE BLDC GLUCOMTR-MCNC: 91 MG/DL (ref 70–99)
GLUCOSE BLDC GLUCOMTR-MCNC: 92 MG/DL (ref 70–99)
GLUCOSE BLDC GLUCOMTR-MCNC: 93 MG/DL (ref 70–99)
GLUCOSE BLDC GLUCOMTR-MCNC: 94 MG/DL (ref 70–99)
GLUCOSE BLDC GLUCOMTR-MCNC: 95 MG/DL (ref 70–99)
GLUCOSE BLDC GLUCOMTR-MCNC: 96 MG/DL (ref 70–99)
GLUCOSE BLDC GLUCOMTR-MCNC: 96 MG/DL (ref 70–99)
GLUCOSE BLDC GLUCOMTR-MCNC: 97 MG/DL (ref 70–99)
GLUCOSE SERPL-MCNC: 102 MG/DL (ref 65–99)
GLUCOSE SERPL-MCNC: 104 MG/DL (ref 65–99)
GLUCOSE SERPL-MCNC: 105 MG/DL (ref 65–99)
GLUCOSE SERPL-MCNC: 105 MG/DL (ref 65–99)
GLUCOSE SERPL-MCNC: 106 MG/DL (ref 65–99)
GLUCOSE SERPL-MCNC: 107 MG/DL (ref 65–99)
GLUCOSE SERPL-MCNC: 108 MG/DL (ref 65–99)
GLUCOSE SERPL-MCNC: 108 MG/DL (ref 65–99)
GLUCOSE SERPL-MCNC: 109 MG/DL (ref 65–99)
GLUCOSE SERPL-MCNC: 110 MG/DL (ref 65–99)
GLUCOSE SERPL-MCNC: 110 MG/DL (ref 65–99)
GLUCOSE SERPL-MCNC: 111 MG/DL (ref 65–99)
GLUCOSE SERPL-MCNC: 112 MG/DL (ref 65–99)
GLUCOSE SERPL-MCNC: 113 MG/DL (ref 65–99)
GLUCOSE SERPL-MCNC: 113 MG/DL (ref 65–99)
GLUCOSE SERPL-MCNC: 114 MG/DL (ref 65–99)
GLUCOSE SERPL-MCNC: 114 MG/DL (ref 65–99)
GLUCOSE SERPL-MCNC: 116 MG/DL (ref 65–99)
GLUCOSE SERPL-MCNC: 117 MG/DL (ref 65–99)
GLUCOSE SERPL-MCNC: 118 MG/DL (ref 65–99)
GLUCOSE SERPL-MCNC: 120 MG/DL (ref 65–99)
GLUCOSE SERPL-MCNC: 121 MG/DL (ref 65–99)
GLUCOSE SERPL-MCNC: 127 MG/DL (ref 65–99)
GLUCOSE SERPL-MCNC: 127 MG/DL (ref 65–99)
GLUCOSE SERPL-MCNC: 128 MG/DL (ref 65–99)
GLUCOSE SERPL-MCNC: 134 MG/DL (ref 65–99)
GLUCOSE SERPL-MCNC: 136 MG/DL (ref 65–99)
GLUCOSE SERPL-MCNC: 142 MG/DL (ref 65–99)
GLUCOSE SERPL-MCNC: 148 MG/DL (ref 65–99)
GLUCOSE SERPL-MCNC: 154 MG/DL (ref 65–99)
GLUCOSE SERPL-MCNC: 237 MG/DL (ref 65–99)
GLUCOSE SERPL-MCNC: 89 MG/DL (ref 65–99)
GLUCOSE SERPL-MCNC: 89 MG/DL (ref 65–99)
GLUCOSE SERPL-MCNC: 92 MG/DL (ref 65–99)
GLUCOSE SERPL-MCNC: 93 MG/DL (ref 65–99)
GLUCOSE SERPL-MCNC: 94 MG/DL (ref 65–99)
GLUCOSE SERPL-MCNC: 94 MG/DL (ref 65–99)
GLUCOSE SERPL-MCNC: 96 MG/DL (ref 65–99)
GLUCOSE UR STRIP-MCNC: NEGATIVE MG/DL
GRAM STN SPEC: ABNORMAL
GRAM STN SPEC: NORMAL
HAPTOGLOB SERPL-MCNC: 155 MG/DL (ref 30–200)
HCO3 BLDA-SCNC: 22 MMOL/L (ref 22–26)
HCO3 BLDA-SCNC: 23.8 MMOL/L (ref 22–26)
HCO3 BLDA-SCNC: 28.6 MMOL/L (ref 22–26)
HCO3 BLDA-SCNC: 36.3 MMOL/L (ref 22–26)
HCT VFR BLD AUTO: 21.5 % (ref 34–46.6)
HCT VFR BLD AUTO: 21.9 % (ref 34–46.6)
HCT VFR BLD AUTO: 22.3 % (ref 34–46.6)
HCT VFR BLD AUTO: 22.5 % (ref 34–46.6)
HCT VFR BLD AUTO: 22.8 % (ref 34–46.6)
HCT VFR BLD AUTO: 22.9 % (ref 34–46.6)
HCT VFR BLD AUTO: 23.4 % (ref 34–46.6)
HCT VFR BLD AUTO: 23.4 % (ref 34–46.6)
HCT VFR BLD AUTO: 23.5 % (ref 34–46.6)
HCT VFR BLD AUTO: 23.7 % (ref 34–46.6)
HCT VFR BLD AUTO: 23.7 % (ref 34–46.6)
HCT VFR BLD AUTO: 23.8 % (ref 34–46.6)
HCT VFR BLD AUTO: 24 % (ref 34–46.6)
HCT VFR BLD AUTO: 24.2 % (ref 34–46.6)
HCT VFR BLD AUTO: 24.2 % (ref 34–46.6)
HCT VFR BLD AUTO: 24.3 % (ref 34–46.6)
HCT VFR BLD AUTO: 24.4 % (ref 34–46.6)
HCT VFR BLD AUTO: 24.4 % (ref 34–46.6)
HCT VFR BLD AUTO: 24.8 % (ref 34–46.6)
HCT VFR BLD AUTO: 25.4 % (ref 34–46.6)
HCT VFR BLD AUTO: 26.7 % (ref 34–46.6)
HCT VFR BLD AUTO: 26.9 % (ref 34–46.6)
HCT VFR BLD AUTO: 26.9 % (ref 34–46.6)
HCT VFR BLD AUTO: 27.2 % (ref 34–46.6)
HCT VFR BLD AUTO: 30.1 % (ref 34–46.6)
HCT VFR BLD AUTO: 31.1 % (ref 34–46.6)
HCT VFR BLD AUTO: 31.4 % (ref 34–46.6)
HCT VFR BLD AUTO: 33.6 % (ref 34–46.6)
HCT VFR BLD AUTO: 33.6 % (ref 34–46.6)
HCT VFR BLD AUTO: 35 % (ref 34–46.6)
HCT VFR BLD AUTO: 36.3 % (ref 34–46.6)
HCT VFR BLD AUTO: 36.5 % (ref 34–46.6)
HCT VFR BLD AUTO: 36.6 % (ref 34–46.6)
HCT VFR BLD AUTO: 36.9 % (ref 34–46.6)
HCT VFR BLD AUTO: 37 % (ref 34–46.6)
HCT VFR BLD AUTO: 37 % (ref 34–46.6)
HCT VFR BLD AUTO: 37.2 % (ref 34–46.6)
HCT VFR BLD AUTO: 37.2 % (ref 34–46.6)
HCT VFR BLD AUTO: 37.4 % (ref 34–46.6)
HCT VFR BLD AUTO: 37.8 % (ref 34–46.6)
HCT VFR BLD AUTO: 38 % (ref 34–46.6)
HCT VFR BLD AUTO: 38.2 % (ref 34–46.6)
HCT VFR BLD AUTO: 38.3 % (ref 34–46.6)
HCT VFR BLD AUTO: 38.5 % (ref 34–46.6)
HCT VFR BLD AUTO: 38.9 % (ref 34–46.6)
HCT VFR BLD AUTO: 38.9 % (ref 34–46.6)
HCT VFR BLD AUTO: 39.3 % (ref 34–46.6)
HCT VFR BLD AUTO: 39.6 % (ref 34–46.6)
HCT VFR BLD AUTO: 39.7 % (ref 34–46.6)
HCT VFR BLD AUTO: 39.8 % (ref 34–46.6)
HCT VFR BLD AUTO: 40.2 % (ref 34–46.6)
HCT VFR BLD AUTO: 40.7 % (ref 34–46.6)
HCT VFR BLD AUTO: 41.2 % (ref 34–46.6)
HCT VFR BLD AUTO: 41.3 % (ref 34–46.6)
HCT VFR BLD AUTO: 41.9 % (ref 34–46.6)
HCT VFR BLD AUTO: 43 % (ref 34–46.6)
HCT VFR BLD AUTO: 43.5 % (ref 34–46.6)
HCT VFR BLD AUTO: 44.1 % (ref 34–46.6)
HCT VFR BLD AUTO: 45.7 % (ref 34–46.6)
HEMOCCULT STL QL IA: POSITIVE
HEMOCCULT STL QL IA: POSITIVE
HGB BLD-MCNC: 10.3 G/DL (ref 12–15.9)
HGB BLD-MCNC: 10.4 G/DL (ref 12–15.9)
HGB BLD-MCNC: 10.9 G/DL (ref 12–15.9)
HGB BLD-MCNC: 11 G/DL (ref 12–15.9)
HGB BLD-MCNC: 11.2 G/DL (ref 12–15.9)
HGB BLD-MCNC: 11.2 G/DL (ref 12–15.9)
HGB BLD-MCNC: 11.5 G/DL (ref 12–15.9)
HGB BLD-MCNC: 11.5 G/DL (ref 12–15.9)
HGB BLD-MCNC: 11.6 G/DL (ref 12–15.9)
HGB BLD-MCNC: 11.7 G/DL (ref 12–15.9)
HGB BLD-MCNC: 11.8 G/DL (ref 12–15.9)
HGB BLD-MCNC: 11.8 G/DL (ref 12–15.9)
HGB BLD-MCNC: 12 G/DL (ref 12–15.9)
HGB BLD-MCNC: 12 G/DL (ref 12–15.9)
HGB BLD-MCNC: 12.1 G/DL (ref 12–15.9)
HGB BLD-MCNC: 12.3 G/DL (ref 12–15.9)
HGB BLD-MCNC: 12.3 G/DL (ref 12–15.9)
HGB BLD-MCNC: 12.4 G/DL (ref 12–15.9)
HGB BLD-MCNC: 12.4 G/DL (ref 12–15.9)
HGB BLD-MCNC: 12.7 G/DL (ref 12–15.9)
HGB BLD-MCNC: 12.8 G/DL (ref 12–15.9)
HGB BLD-MCNC: 12.9 G/DL (ref 12–15.9)
HGB BLD-MCNC: 13 G/DL (ref 12–15.9)
HGB BLD-MCNC: 13.3 G/DL (ref 12–15.9)
HGB BLD-MCNC: 13.3 G/DL (ref 12–15.9)
HGB BLD-MCNC: 13.5 G/DL (ref 12–15.9)
HGB BLD-MCNC: 13.7 G/DL (ref 12–15.9)
HGB BLD-MCNC: 13.9 G/DL (ref 12–15.9)
HGB BLD-MCNC: 6.6 G/DL (ref 12–15.9)
HGB BLD-MCNC: 6.8 G/DL (ref 12–15.9)
HGB BLD-MCNC: 6.9 G/DL (ref 12–15.9)
HGB BLD-MCNC: 7 G/DL (ref 12–15.9)
HGB BLD-MCNC: 7.2 G/DL (ref 12–15.9)
HGB BLD-MCNC: 7.3 G/DL (ref 12–15.9)
HGB BLD-MCNC: 7.3 G/DL (ref 12–15.9)
HGB BLD-MCNC: 7.4 G/DL (ref 12–15.9)
HGB BLD-MCNC: 7.4 G/DL (ref 12–15.9)
HGB BLD-MCNC: 7.5 G/DL (ref 12–15.9)
HGB BLD-MCNC: 7.5 G/DL (ref 12–15.9)
HGB BLD-MCNC: 7.6 G/DL (ref 12–15.9)
HGB BLD-MCNC: 7.7 G/DL (ref 12–15.9)
HGB BLD-MCNC: 7.9 G/DL (ref 12–15.9)
HGB BLD-MCNC: 8 G/DL (ref 12–15.9)
HGB BLD-MCNC: 8.1 G/DL (ref 12–15.9)
HGB BLD-MCNC: 8.5 G/DL (ref 12–15.9)
HGB BLD-MCNC: 8.5 G/DL (ref 12–15.9)
HGB BLD-MCNC: 8.6 G/DL (ref 12–15.9)
HGB BLD-MCNC: 8.6 G/DL (ref 12–15.9)
HGB BLD-MCNC: 9.4 G/DL (ref 12–15.9)
HGB BLD-MCNC: 9.6 G/DL (ref 12–15.9)
HGB BLD-MCNC: 9.9 G/DL (ref 12–15.9)
HGB BLDA-MCNC: 13.7 G/DL (ref 11.7–14.6)
HGB BLDA-MCNC: 14.7 G/DL (ref 11.7–14.6)
HGB BLDA-MCNC: 8.1 G/DL (ref 11.7–14.6)
HGB BLDA-MCNC: 8.6 G/DL (ref 11.7–14.6)
HGB UR QL STRIP.AUTO: NEGATIVE
HOLD SPECIMEN: 11
HOLD SPECIMEN: 11
HOLD SPECIMEN: NORMAL
HYALINE CASTS UR QL AUTO: ABNORMAL /LPF
HYALINE CASTS UR QL AUTO: ABNORMAL /LPF
HYPOCHROMIA BLD QL: ABNORMAL
HYPOCHROMIA BLD QL: ABNORMAL
HYPOCHROMIA BLD QL: NORMAL
IMM GRANULOCYTES # BLD AUTO: 0.02 10*3/MM3 (ref 0–0.05)
IMM GRANULOCYTES # BLD AUTO: 0.03 10*3/MM3 (ref 0–0.05)
IMM GRANULOCYTES # BLD AUTO: 0.04 10*3/MM3 (ref 0–0.05)
IMM GRANULOCYTES # BLD AUTO: 0.04 10*3/MM3 (ref 0–0.05)
IMM GRANULOCYTES # BLD AUTO: 0.05 10*3/MM3 (ref 0–0.05)
IMM GRANULOCYTES # BLD AUTO: 0.06 10*3/MM3 (ref 0–0.05)
IMM GRANULOCYTES # BLD AUTO: 0.07 10*3/MM3 (ref 0–0.05)
IMM GRANULOCYTES # BLD AUTO: 0.1 10*3/MM3 (ref 0–0.05)
IMM GRANULOCYTES # BLD AUTO: 0.11 10*3/MM3 (ref 0–0.05)
IMM GRANULOCYTES # BLD AUTO: 0.13 10*3/MM3 (ref 0–0.05)
IMM GRANULOCYTES # BLD AUTO: 0.13 10*3/MM3 (ref 0–0.05)
IMM GRANULOCYTES # BLD AUTO: 0.14 10*3/MM3 (ref 0–0.05)
IMM GRANULOCYTES # BLD AUTO: 0.18 10*3/MM3 (ref 0–0.05)
IMM GRANULOCYTES # BLD AUTO: 0.2 10*3/MM3 (ref 0–0.05)
IMM GRANULOCYTES # BLD AUTO: 0.21 10*3/MM3 (ref 0–0.05)
IMM GRANULOCYTES # BLD AUTO: 0.26 10*3/MM3 (ref 0–0.05)
IMM GRANULOCYTES # BLD AUTO: 0.3 10*3/MM3 (ref 0–0.05)
IMM GRANULOCYTES # BLD AUTO: 0.3 10*3/MM3 (ref 0–0.05)
IMM GRANULOCYTES # BLD AUTO: 0.54 10*3/MM3 (ref 0–0.05)
IMM GRANULOCYTES # BLD AUTO: 0.65 10*3/MM3 (ref 0–0.05)
IMM GRANULOCYTES NFR BLD AUTO: 0.3 % (ref 0–0.5)
IMM GRANULOCYTES NFR BLD AUTO: 0.4 % (ref 0–0.5)
IMM GRANULOCYTES NFR BLD AUTO: 0.5 % (ref 0–0.5)
IMM GRANULOCYTES NFR BLD AUTO: 0.6 % (ref 0–0.5)
IMM GRANULOCYTES NFR BLD AUTO: 0.7 % (ref 0–0.5)
IMM GRANULOCYTES NFR BLD AUTO: 0.8 % (ref 0–0.5)
IMM GRANULOCYTES NFR BLD AUTO: 0.9 % (ref 0–0.5)
IMM GRANULOCYTES NFR BLD AUTO: 1 % (ref 0–0.5)
IMM GRANULOCYTES NFR BLD AUTO: 1.1 % (ref 0–0.5)
IMM GRANULOCYTES NFR BLD AUTO: 1.2 % (ref 0–0.5)
IMM GRANULOCYTES NFR BLD AUTO: 1.3 % (ref 0–0.5)
IMM GRANULOCYTES NFR BLD AUTO: 1.4 % (ref 0–0.5)
IMM GRANULOCYTES NFR BLD AUTO: 1.5 % (ref 0–0.5)
IMM GRANULOCYTES NFR BLD AUTO: 2.9 % (ref 0–0.5)
IMM GRANULOCYTES NFR BLD AUTO: 3.8 % (ref 0–0.5)
INDURATION: 0 MM (ref 0–10)
INDURATION: 0 MM (ref 0–10)
INHALED O2 CONCENTRATION: 21 %
INHALED O2 CONCENTRATION: 40 %
INHALED O2 CONCENTRATION: 45 %
INHALED O2 CONCENTRATION: ABNORMAL %
INR PPP: 1.1 (ref 2–3)
IRON 24H UR-MRATE: 29 MCG/DL (ref 37–145)
IRON SATN MFR SERPL: 9 % (ref 20–50)
ISOLATED FROM: ABNORMAL
ISOLATED FROM: ABNORMAL
IVRT: 70 MSEC
KETONES UR QL STRIP: ABNORMAL
KETONES UR QL STRIP: NEGATIVE
KETONES UR QL STRIP: NEGATIVE
L PNEUMO1 AG UR QL IA: NEGATIVE
LAB AP CASE REPORT: NORMAL
LAB AP CLINICAL INFORMATION: NORMAL
LAB AP SPECIAL STAINS: NORMAL
LACTATE BLDA-SCNC: 3.77 MMOL/L (ref 0.5–2)
LACTATE BLDA-SCNC: 3.92 MMOL/L (ref 0.5–2)
LACTATE BLDA-SCNC: ABNORMAL MMOL/L
LACTATE BLDA-SCNC: ABNORMAL MMOL/L
LARGE PLATELETS: ABNORMAL
LEFT ATRIUM VOLUME INDEX: 48.8 ML/M2
LEFT ATRIUM VOLUME INDEX: 80.3 ML/M2
LEFT ATRIUM VOLUME: 122 CM3
LEUKOCYTE ESTERASE UR QL STRIP.AUTO: ABNORMAL
LEUKOCYTE ESTERASE UR QL STRIP.AUTO: ABNORMAL
LEUKOCYTE ESTERASE UR QL STRIP.AUTO: NEGATIVE
LYMPHOCYTES # BLD AUTO: 1.32 10*3/MM3 (ref 0.7–3.1)
LYMPHOCYTES # BLD AUTO: 1.38 10*3/MM3 (ref 0.7–3.1)
LYMPHOCYTES # BLD AUTO: 1.39 10*3/MM3 (ref 0.7–3.1)
LYMPHOCYTES # BLD AUTO: 1.67 10*3/MM3 (ref 0.7–3.1)
LYMPHOCYTES # BLD AUTO: 1.69 10*3/MM3 (ref 0.7–3.1)
LYMPHOCYTES # BLD AUTO: 1.72 10*3/MM3 (ref 0.7–3.1)
LYMPHOCYTES # BLD AUTO: 1.79 10*3/MM3 (ref 0.7–3.1)
LYMPHOCYTES # BLD AUTO: 1.8 10*3/MM3 (ref 0.7–3.1)
LYMPHOCYTES # BLD AUTO: 1.86 10*3/MM3 (ref 0.7–3.1)
LYMPHOCYTES # BLD AUTO: 1.87 10*3/MM3 (ref 0.7–3.1)
LYMPHOCYTES # BLD AUTO: 1.93 10*3/MM3 (ref 0.7–3.1)
LYMPHOCYTES # BLD AUTO: 1.95 10*3/MM3 (ref 0.7–3.1)
LYMPHOCYTES # BLD AUTO: 1.97 10*3/MM3 (ref 0.7–3.1)
LYMPHOCYTES # BLD AUTO: 1.98 10*3/MM3 (ref 0.7–3.1)
LYMPHOCYTES # BLD AUTO: 1.99 10*3/MM3 (ref 0.7–3.1)
LYMPHOCYTES # BLD AUTO: 2.13 10*3/MM3 (ref 0.7–3.1)
LYMPHOCYTES # BLD AUTO: 2.15 10*3/MM3 (ref 0.7–3.1)
LYMPHOCYTES # BLD AUTO: 2.27 10*3/MM3 (ref 0.7–3.1)
LYMPHOCYTES # BLD AUTO: 2.3 10*3/MM3 (ref 0.7–3.1)
LYMPHOCYTES # BLD AUTO: 2.36 10*3/MM3 (ref 0.7–3.1)
LYMPHOCYTES # BLD AUTO: 2.4 10*3/MM3 (ref 0.7–3.1)
LYMPHOCYTES # BLD AUTO: 2.63 10*3/MM3 (ref 0.7–3.1)
LYMPHOCYTES # BLD AUTO: 2.76 10*3/MM3 (ref 0.7–3.1)
LYMPHOCYTES # BLD MANUAL: 1.08 10*3/MM3 (ref 0.7–3.1)
LYMPHOCYTES # BLD MANUAL: 1.32 10*3/MM3 (ref 0.7–3.1)
LYMPHOCYTES # BLD MANUAL: 2.06 10*3/MM3 (ref 0.7–3.1)
LYMPHOCYTES # BLD MANUAL: 2.66 10*3/MM3 (ref 0.7–3.1)
LYMPHOCYTES # BLD MANUAL: 3.51 10*3/MM3 (ref 0.7–3.1)
LYMPHOCYTES NFR BLD AUTO: 10.8 % (ref 19.6–45.3)
LYMPHOCYTES NFR BLD AUTO: 12.1 % (ref 19.6–45.3)
LYMPHOCYTES NFR BLD AUTO: 12.5 % (ref 19.6–45.3)
LYMPHOCYTES NFR BLD AUTO: 13.1 % (ref 19.6–45.3)
LYMPHOCYTES NFR BLD AUTO: 13.2 % (ref 19.6–45.3)
LYMPHOCYTES NFR BLD AUTO: 14 % (ref 19.6–45.3)
LYMPHOCYTES NFR BLD AUTO: 14.9 % (ref 19.6–45.3)
LYMPHOCYTES NFR BLD AUTO: 15.3 % (ref 19.6–45.3)
LYMPHOCYTES NFR BLD AUTO: 15.4 % (ref 19.6–45.3)
LYMPHOCYTES NFR BLD AUTO: 16.4 % (ref 19.6–45.3)
LYMPHOCYTES NFR BLD AUTO: 16.7 % (ref 19.6–45.3)
LYMPHOCYTES NFR BLD AUTO: 17.5 % (ref 19.6–45.3)
LYMPHOCYTES NFR BLD AUTO: 17.7 % (ref 19.6–45.3)
LYMPHOCYTES NFR BLD AUTO: 18.8 % (ref 19.6–45.3)
LYMPHOCYTES NFR BLD AUTO: 19.4 % (ref 19.6–45.3)
LYMPHOCYTES NFR BLD AUTO: 21.3 % (ref 19.6–45.3)
LYMPHOCYTES NFR BLD AUTO: 23.1 % (ref 19.6–45.3)
LYMPHOCYTES NFR BLD AUTO: 23.7 % (ref 19.6–45.3)
LYMPHOCYTES NFR BLD AUTO: 25.2 % (ref 19.6–45.3)
LYMPHOCYTES NFR BLD AUTO: 31.7 % (ref 19.6–45.3)
LYMPHOCYTES NFR BLD AUTO: 6.9 % (ref 19.6–45.3)
LYMPHOCYTES NFR BLD AUTO: 8.9 % (ref 19.6–45.3)
LYMPHOCYTES NFR BLD AUTO: 9.9 % (ref 19.6–45.3)
LYMPHOCYTES NFR BLD MANUAL: 2 % (ref 5–12)
LYMPHOCYTES NFR BLD MANUAL: 5 % (ref 5–12)
LYMPHOCYTES NFR BLD MANUAL: 5 % (ref 5–12)
LYMPHOCYTES NFR BLD MANUAL: 7 % (ref 5–12)
LYMPHOCYTES NFR BLD MANUAL: 8 % (ref 5–12)
Lab: NORMAL
M PNEUMO IGM SER QL: POSITIVE
MACROCYTES BLD QL SMEAR: ABNORMAL
MACROCYTES BLD QL SMEAR: ABNORMAL
MACROCYTES BLD QL SMEAR: NORMAL
MAGNESIUM SERPL-MCNC: 1.6 MG/DL (ref 1.6–2.4)
MAGNESIUM SERPL-MCNC: 1.7 MG/DL (ref 1.6–2.4)
MAGNESIUM SERPL-MCNC: 1.9 MG/DL (ref 1.6–2.4)
MAGNESIUM SERPL-MCNC: 2.1 MG/DL (ref 1.6–2.4)
MAGNESIUM SERPL-MCNC: 2.2 MG/DL (ref 1.6–2.4)
MAGNESIUM SERPL-MCNC: 2.2 MG/DL (ref 1.6–2.4)
MAGNESIUM SERPL-MCNC: 2.4 MG/DL (ref 1.6–2.4)
MAGNESIUM SERPL-MCNC: 2.5 MG/DL (ref 1.6–2.4)
MAXIMAL PREDICTED HEART RATE: 144 BPM
MCH RBC QN AUTO: 26.1 PG (ref 26.6–33)
MCH RBC QN AUTO: 26.6 PG (ref 26.6–33)
MCH RBC QN AUTO: 26.7 PG (ref 26.6–33)
MCH RBC QN AUTO: 27 PG (ref 26.6–33)
MCH RBC QN AUTO: 27.2 PG (ref 26.6–33)
MCH RBC QN AUTO: 27.2 PG (ref 26.6–33)
MCH RBC QN AUTO: 27.3 PG (ref 26.6–33)
MCH RBC QN AUTO: 27.5 PG (ref 26.6–33)
MCH RBC QN AUTO: 27.6 PG (ref 26.6–33)
MCH RBC QN AUTO: 28.1 PG (ref 26.6–33)
MCH RBC QN AUTO: 28.3 PG (ref 26.6–33)
MCH RBC QN AUTO: 28.4 PG (ref 26.6–33)
MCH RBC QN AUTO: 28.4 PG (ref 26.6–33)
MCH RBC QN AUTO: 28.6 PG (ref 26.6–33)
MCH RBC QN AUTO: 29.1 PG (ref 26.6–33)
MCH RBC QN AUTO: 29.2 PG (ref 26.6–33)
MCH RBC QN AUTO: 29.2 PG (ref 26.6–33)
MCH RBC QN AUTO: 29.3 PG (ref 26.6–33)
MCH RBC QN AUTO: 29.6 PG (ref 26.6–33)
MCH RBC QN AUTO: 29.7 PG (ref 26.6–33)
MCH RBC QN AUTO: 29.9 PG (ref 26.6–33)
MCH RBC QN AUTO: 30 PG (ref 26.6–33)
MCH RBC QN AUTO: 30 PG (ref 26.6–33)
MCH RBC QN AUTO: 30.1 PG (ref 26.6–33)
MCH RBC QN AUTO: 30.2 PG (ref 26.6–33)
MCH RBC QN AUTO: 30.3 PG (ref 26.6–33)
MCH RBC QN AUTO: 30.4 PG (ref 26.6–33)
MCH RBC QN AUTO: 30.8 PG (ref 26.6–33)
MCHC RBC AUTO-ENTMCNC: 30.1 G/DL (ref 31.5–35.7)
MCHC RBC AUTO-ENTMCNC: 30.3 G/DL (ref 31.5–35.7)
MCHC RBC AUTO-ENTMCNC: 30.4 G/DL (ref 31.5–35.7)
MCHC RBC AUTO-ENTMCNC: 30.4 G/DL (ref 31.5–35.7)
MCHC RBC AUTO-ENTMCNC: 30.5 G/DL (ref 31.5–35.7)
MCHC RBC AUTO-ENTMCNC: 30.6 G/DL (ref 31.5–35.7)
MCHC RBC AUTO-ENTMCNC: 30.7 G/DL (ref 31.5–35.7)
MCHC RBC AUTO-ENTMCNC: 30.8 G/DL (ref 31.5–35.7)
MCHC RBC AUTO-ENTMCNC: 30.9 G/DL (ref 31.5–35.7)
MCHC RBC AUTO-ENTMCNC: 31 G/DL (ref 31.5–35.7)
MCHC RBC AUTO-ENTMCNC: 31 G/DL (ref 31.5–35.7)
MCHC RBC AUTO-ENTMCNC: 31.2 G/DL (ref 31.5–35.7)
MCHC RBC AUTO-ENTMCNC: 31.3 G/DL (ref 31.5–35.7)
MCHC RBC AUTO-ENTMCNC: 31.5 G/DL (ref 31.5–35.7)
MCHC RBC AUTO-ENTMCNC: 31.6 G/DL (ref 31.5–35.7)
MCHC RBC AUTO-ENTMCNC: 31.6 G/DL (ref 31.5–35.7)
MCHC RBC AUTO-ENTMCNC: 31.7 G/DL (ref 31.5–35.7)
MCHC RBC AUTO-ENTMCNC: 31.7 G/DL (ref 31.5–35.7)
MCHC RBC AUTO-ENTMCNC: 31.8 G/DL (ref 31.5–35.7)
MCHC RBC AUTO-ENTMCNC: 32.1 G/DL (ref 31.5–35.7)
MCHC RBC AUTO-ENTMCNC: 32.3 G/DL (ref 31.5–35.7)
MCHC RBC AUTO-ENTMCNC: 32.8 G/DL (ref 31.5–35.7)
MCV RBC AUTO: 84.6 FL (ref 79–97)
MCV RBC AUTO: 84.8 FL (ref 79–97)
MCV RBC AUTO: 85 FL (ref 79–97)
MCV RBC AUTO: 85.3 FL (ref 79–97)
MCV RBC AUTO: 86.7 FL (ref 79–97)
MCV RBC AUTO: 87.5 FL (ref 79–97)
MCV RBC AUTO: 88 FL (ref 79–97)
MCV RBC AUTO: 88.5 FL (ref 79–97)
MCV RBC AUTO: 89.1 FL (ref 79–97)
MCV RBC AUTO: 90 FL (ref 79–97)
MCV RBC AUTO: 91.3 FL (ref 79–97)
MCV RBC AUTO: 91.7 FL (ref 79–97)
MCV RBC AUTO: 91.8 FL (ref 79–97)
MCV RBC AUTO: 93.8 FL (ref 79–97)
MCV RBC AUTO: 94.1 FL (ref 79–97)
MCV RBC AUTO: 94.1 FL (ref 79–97)
MCV RBC AUTO: 94.5 FL (ref 79–97)
MCV RBC AUTO: 95 FL (ref 79–97)
MCV RBC AUTO: 95.2 FL (ref 79–97)
MCV RBC AUTO: 95.3 FL (ref 79–97)
MCV RBC AUTO: 95.6 FL (ref 79–97)
MCV RBC AUTO: 95.8 FL (ref 79–97)
MCV RBC AUTO: 96 FL (ref 79–97)
MCV RBC AUTO: 96 FL (ref 79–97)
MCV RBC AUTO: 96.1 FL (ref 79–97)
MCV RBC AUTO: 96.2 FL (ref 79–97)
MCV RBC AUTO: 96.3 FL (ref 79–97)
MCV RBC AUTO: 96.3 FL (ref 79–97)
MCV RBC AUTO: 96.6 FL (ref 79–97)
MCV RBC AUTO: 99.1 FL (ref 79–97)
METAMYELOCYTES NFR BLD MANUAL: 1 % (ref 0–0)
METAMYELOCYTES NFR BLD MANUAL: 2 % (ref 0–0)
METHGB BLD QL: 0.1 % (ref 0–1.5)
METHGB BLD QL: 0.1 % (ref 0–1.5)
METHGB BLD QL: 0.2 % (ref 0–1.5)
METHGB BLD QL: 0.3 % (ref 0–1.5)
MICROCYTES BLD QL: ABNORMAL
MICROCYTES BLD QL: ABNORMAL
MODALITY: ABNORMAL
MONOCYTES # BLD AUTO: 0.72 10*3/MM3 (ref 0.1–0.9)
MONOCYTES # BLD AUTO: 0.88 10*3/MM3 (ref 0.1–0.9)
MONOCYTES # BLD AUTO: 0.89 10*3/MM3 (ref 0.1–0.9)
MONOCYTES # BLD AUTO: 0.94 10*3/MM3 (ref 0.1–0.9)
MONOCYTES # BLD AUTO: 0.95 10*3/MM3 (ref 0.1–0.9)
MONOCYTES # BLD AUTO: 0.96 10*3/MM3 (ref 0.1–0.9)
MONOCYTES # BLD AUTO: 0.98 10*3/MM3 (ref 0.1–0.9)
MONOCYTES # BLD AUTO: 1.08 10*3/MM3 (ref 0.1–0.9)
MONOCYTES # BLD AUTO: 1.13 10*3/MM3 (ref 0.1–0.9)
MONOCYTES # BLD AUTO: 1.16 10*3/MM3 (ref 0.1–0.9)
MONOCYTES # BLD AUTO: 1.25 10*3/MM3 (ref 0.1–0.9)
MONOCYTES # BLD AUTO: 1.26 10*3/MM3 (ref 0.1–0.9)
MONOCYTES # BLD AUTO: 1.27 10*3/MM3 (ref 0.1–0.9)
MONOCYTES # BLD AUTO: 1.42 10*3/MM3 (ref 0.1–0.9)
MONOCYTES # BLD AUTO: 1.47 10*3/MM3 (ref 0.1–0.9)
MONOCYTES # BLD AUTO: 1.53 10*3/MM3 (ref 0.1–0.9)
MONOCYTES # BLD AUTO: 1.54 10*3/MM3 (ref 0.1–0.9)
MONOCYTES # BLD AUTO: 1.57 10*3/MM3 (ref 0.1–0.9)
MONOCYTES # BLD AUTO: 1.6 10*3/MM3 (ref 0.1–0.9)
MONOCYTES # BLD AUTO: 1.62 10*3/MM3 (ref 0.1–0.9)
MONOCYTES # BLD AUTO: 1.73 10*3/MM3 (ref 0.1–0.9)
MONOCYTES # BLD AUTO: 2 10*3/MM3 (ref 0.1–0.9)
MONOCYTES # BLD AUTO: 2.59 10*3/MM3 (ref 0.1–0.9)
MONOCYTES # BLD: 0.33 10*3/MM3 (ref 0.1–0.9)
MONOCYTES # BLD: 0.61 10*3/MM3 (ref 0.1–0.9)
MONOCYTES # BLD: 0.83 10*3/MM3 (ref 0.1–0.9)
MONOCYTES # BLD: 1.26 10*3/MM3 (ref 0.1–0.9)
MONOCYTES # BLD: 1.4 10*3/MM3 (ref 0.1–0.9)
MONOCYTES NFR BLD AUTO: 10.1 % (ref 5–12)
MONOCYTES NFR BLD AUTO: 10.1 % (ref 5–12)
MONOCYTES NFR BLD AUTO: 10.2 % (ref 5–12)
MONOCYTES NFR BLD AUTO: 10.9 % (ref 5–12)
MONOCYTES NFR BLD AUTO: 11.1 % (ref 5–12)
MONOCYTES NFR BLD AUTO: 11.2 % (ref 5–12)
MONOCYTES NFR BLD AUTO: 11.2 % (ref 5–12)
MONOCYTES NFR BLD AUTO: 11.3 % (ref 5–12)
MONOCYTES NFR BLD AUTO: 12.2 % (ref 5–12)
MONOCYTES NFR BLD AUTO: 12.2 % (ref 5–12)
MONOCYTES NFR BLD AUTO: 12.4 % (ref 5–12)
MONOCYTES NFR BLD AUTO: 14.7 % (ref 5–12)
MONOCYTES NFR BLD AUTO: 8.1 % (ref 5–12)
MONOCYTES NFR BLD AUTO: 8.4 % (ref 5–12)
MONOCYTES NFR BLD AUTO: 8.6 % (ref 5–12)
MONOCYTES NFR BLD AUTO: 8.9 % (ref 5–12)
MONOCYTES NFR BLD AUTO: 9 % (ref 5–12)
MONOCYTES NFR BLD AUTO: 9.1 % (ref 5–12)
MONOCYTES NFR BLD AUTO: 9.2 % (ref 5–12)
MONOCYTES NFR BLD AUTO: 9.3 % (ref 5–12)
MONOCYTES NFR BLD AUTO: 9.5 % (ref 5–12)
MYELOCYTES NFR BLD MANUAL: 4 % (ref 0–0)
NEUTROPHILS # BLD AUTO: 11.92 10*3/MM3 (ref 1.7–7)
NEUTROPHILS # BLD AUTO: 12.32 10*3/MM3 (ref 1.7–7)
NEUTROPHILS # BLD AUTO: 14.02 10*3/MM3 (ref 1.7–7)
NEUTROPHILS # BLD AUTO: 15.33 10*3/MM3 (ref 1.7–7)
NEUTROPHILS # BLD AUTO: 9.45 10*3/MM3 (ref 1.7–7)
NEUTROPHILS NFR BLD AUTO: 11.88 10*3/MM3 (ref 1.7–7)
NEUTROPHILS NFR BLD AUTO: 12.47 10*3/MM3 (ref 1.7–7)
NEUTROPHILS NFR BLD AUTO: 13.02 10*3/MM3 (ref 1.7–7)
NEUTROPHILS NFR BLD AUTO: 13.1 10*3/MM3 (ref 1.7–7)
NEUTROPHILS NFR BLD AUTO: 13.91 10*3/MM3 (ref 1.7–7)
NEUTROPHILS NFR BLD AUTO: 17.39 10*3/MM3 (ref 1.7–7)
NEUTROPHILS NFR BLD AUTO: 23.72 10*3/MM3 (ref 1.7–7)
NEUTROPHILS NFR BLD AUTO: 4.39 10*3/MM3 (ref 1.7–7)
NEUTROPHILS NFR BLD AUTO: 4.54 10*3/MM3 (ref 1.7–7)
NEUTROPHILS NFR BLD AUTO: 4.89 10*3/MM3 (ref 1.7–7)
NEUTROPHILS NFR BLD AUTO: 5.3 10*3/MM3 (ref 1.7–7)
NEUTROPHILS NFR BLD AUTO: 50.4 % (ref 42.7–76)
NEUTROPHILS NFR BLD AUTO: 57.7 % (ref 42.7–76)
NEUTROPHILS NFR BLD AUTO: 58 % (ref 42.7–76)
NEUTROPHILS NFR BLD AUTO: 6.18 10*3/MM3 (ref 1.7–7)
NEUTROPHILS NFR BLD AUTO: 6.49 10*3/MM3 (ref 1.7–7)
NEUTROPHILS NFR BLD AUTO: 6.49 10*3/MM3 (ref 1.7–7)
NEUTROPHILS NFR BLD AUTO: 6.6 10*3/MM3 (ref 1.7–7)
NEUTROPHILS NFR BLD AUTO: 61.6 % (ref 42.7–76)
NEUTROPHILS NFR BLD AUTO: 64.5 % (ref 42.7–76)
NEUTROPHILS NFR BLD AUTO: 65.5 % (ref 42.7–76)
NEUTROPHILS NFR BLD AUTO: 66.2 % (ref 42.7–76)
NEUTROPHILS NFR BLD AUTO: 67 % (ref 42.7–76)
NEUTROPHILS NFR BLD AUTO: 67.1 % (ref 42.7–76)
NEUTROPHILS NFR BLD AUTO: 68.1 % (ref 42.7–76)
NEUTROPHILS NFR BLD AUTO: 69.1 % (ref 42.7–76)
NEUTROPHILS NFR BLD AUTO: 69.2 % (ref 42.7–76)
NEUTROPHILS NFR BLD AUTO: 69.8 % (ref 42.7–76)
NEUTROPHILS NFR BLD AUTO: 7.3 10*3/MM3 (ref 1.7–7)
NEUTROPHILS NFR BLD AUTO: 7.69 10*3/MM3 (ref 1.7–7)
NEUTROPHILS NFR BLD AUTO: 71.8 % (ref 42.7–76)
NEUTROPHILS NFR BLD AUTO: 72.5 % (ref 42.7–76)
NEUTROPHILS NFR BLD AUTO: 73.1 % (ref 42.7–76)
NEUTROPHILS NFR BLD AUTO: 74.3 % (ref 42.7–76)
NEUTROPHILS NFR BLD AUTO: 74.5 % (ref 42.7–76)
NEUTROPHILS NFR BLD AUTO: 75.2 % (ref 42.7–76)
NEUTROPHILS NFR BLD AUTO: 76.3 % (ref 42.7–76)
NEUTROPHILS NFR BLD AUTO: 78.7 % (ref 42.7–76)
NEUTROPHILS NFR BLD AUTO: 79.7 % (ref 42.7–76)
NEUTROPHILS NFR BLD AUTO: 8.17 10*3/MM3 (ref 1.7–7)
NEUTROPHILS NFR BLD AUTO: 8.49 10*3/MM3 (ref 1.7–7)
NEUTROPHILS NFR BLD AUTO: 8.59 10*3/MM3 (ref 1.7–7)
NEUTROPHILS NFR BLD AUTO: 8.67 10*3/MM3 (ref 1.7–7)
NEUTROPHILS NFR BLD AUTO: 8.79 10*3/MM3 (ref 1.7–7)
NEUTROPHILS NFR BLD AUTO: 83 % (ref 42.7–76)
NEUTROPHILS NFR BLD AUTO: 9.24 10*3/MM3 (ref 1.7–7)
NEUTROPHILS NFR BLD MANUAL: 68 % (ref 42.7–76)
NEUTROPHILS NFR BLD MANUAL: 70 % (ref 42.7–76)
NEUTROPHILS NFR BLD MANUAL: 78 % (ref 42.7–76)
NEUTROPHILS NFR BLD MANUAL: 82 % (ref 42.7–76)
NEUTROPHILS NFR BLD MANUAL: 85 % (ref 42.7–76)
NEUTS BAND NFR BLD MANUAL: 3 % (ref 0–5)
NEUTS BAND NFR BLD MANUAL: 4 % (ref 0–5)
NITRITE UR QL STRIP: NEGATIVE
NOTE: ABNORMAL
NOTE: ABNORMAL
NRBC BLD AUTO-RTO: 0 /100 WBC (ref 0–0.2)
NRBC BLD AUTO-RTO: 0.2 /100 WBC (ref 0–0.2)
NRBC BLD AUTO-RTO: 0.3 /100 WBC (ref 0–0.2)
NRBC BLD AUTO-RTO: 0.4 /100 WBC (ref 0–0.2)
NRBC BLD AUTO-RTO: 0.4 /100 WBC (ref 0–0.2)
NRBC BLD AUTO-RTO: 0.6 /100 WBC (ref 0–0.2)
NRBC BLD AUTO-RTO: 0.7 /100 WBC (ref 0–0.2)
NRBC BLD AUTO-RTO: 0.7 /100 WBC (ref 0–0.2)
NRBC BLD AUTO-RTO: 0.9 /100 WBC (ref 0–0.2)
NRBC BLD AUTO-RTO: 1.2 /100 WBC (ref 0–0.2)
NRBC BLD AUTO-RTO: 2.7 /100 WBC (ref 0–0.2)
NRBC BLD AUTO-RTO: 3 /100 WBC (ref 0–0.2)
NRBC BLD AUTO-RTO: 3.4 /100 WBC (ref 0–0.2)
NRBC SPEC MANUAL: 1 /100 WBC (ref 0–0.2)
NRBC SPEC MANUAL: 2 /100 WBC (ref 0–0.2)
NRBC SPEC MANUAL: 4 /100 WBC (ref 0–0.2)
NRBC SPEC MANUAL: 7 /100 WBC (ref 0–0.2)
NT-PROBNP SERPL-MCNC: 2019 PG/ML (ref 0–1800)
NT-PROBNP SERPL-MCNC: 2169 PG/ML (ref 0–1800)
NT-PROBNP SERPL-MCNC: 3355 PG/ML (ref 0–1800)
NT-PROBNP SERPL-MCNC: 3437 PG/ML (ref 0–1800)
NT-PROBNP SERPL-MCNC: 3993 PG/ML (ref 0–1800)
NT-PROBNP SERPL-MCNC: 4113 PG/ML (ref 0–1800)
NT-PROBNP SERPL-MCNC: 4195 PG/ML (ref 0–1800)
NT-PROBNP SERPL-MCNC: 4287 PG/ML (ref 0–1800)
NT-PROBNP SERPL-MCNC: 4480 PG/ML (ref 0–1800)
NT-PROBNP SERPL-MCNC: 4675 PG/ML (ref 0–1800)
NT-PROBNP SERPL-MCNC: 5043 PG/ML (ref 0–1800)
OVALOCYTES BLD QL SMEAR: ABNORMAL
OVALOCYTES BLD QL SMEAR: ABNORMAL
OVALOCYTES BLD QL SMEAR: NORMAL
OXYHGB MFR BLDV: 95.2 % (ref 94–99)
OXYHGB MFR BLDV: 96.7 % (ref 94–99)
OXYHGB MFR BLDV: 96.9 % (ref 94–99)
OXYHGB MFR BLDV: 97.9 % (ref 94–99)
PATH REPORT.FINAL DX SPEC: NORMAL
PATH REPORT.GROSS SPEC: NORMAL
PCO2 BLDA: 32.7 MM HG (ref 35–45)
PCO2 BLDA: 38.6 MM HG (ref 35–45)
PCO2 BLDA: 41.9 MM HG (ref 35–45)
PCO2 BLDA: 52.8 MM HG (ref 35–45)
PH BLDA: 7.37 PH UNITS (ref 7.35–7.45)
PH BLDA: 7.45 PH UNITS (ref 7.35–7.45)
PH BLDA: 7.46 PH UNITS (ref 7.35–7.45)
PH BLDA: 7.49 PH UNITS (ref 7.35–7.45)
PH UR STRIP.AUTO: 5.5 [PH] (ref 5–8)
PH UR STRIP.AUTO: <=5 [PH] (ref 5–8)
PH UR STRIP.AUTO: <=5 [PH] (ref 5–8)
PHOSPHATE SERPL-MCNC: 2.5 MG/DL (ref 2.5–4.5)
PHOSPHATE SERPL-MCNC: 2.5 MG/DL (ref 2.5–4.5)
PHOSPHATE SERPL-MCNC: 2.7 MG/DL (ref 2.5–4.5)
PHOSPHATE SERPL-MCNC: 2.7 MG/DL (ref 2.5–4.5)
PHOSPHATE SERPL-MCNC: 2.8 MG/DL (ref 2.5–4.5)
PHOSPHATE SERPL-MCNC: 2.9 MG/DL (ref 2.5–4.5)
PHOSPHATE SERPL-MCNC: 2.9 MG/DL (ref 2.5–4.5)
PHOSPHATE SERPL-MCNC: 3.1 MG/DL (ref 2.5–4.5)
PHOSPHATE SERPL-MCNC: 3.6 MG/DL (ref 2.5–4.5)
PHOSPHATE SERPL-MCNC: 3.7 MG/DL (ref 2.5–4.5)
PHOSPHATE SERPL-MCNC: 3.8 MG/DL (ref 2.5–4.5)
PHOSPHATE SERPL-MCNC: 4 MG/DL (ref 2.5–4.5)
PHOSPHATE SERPL-MCNC: 4.1 MG/DL (ref 2.5–4.5)
PHOSPHATE SERPL-MCNC: 4.2 MG/DL (ref 2.5–4.5)
PISA AR MAX VEL: 374 M/S
PISA RADIUS: 0.4 CM
PLAT MORPH BLD: NORMAL
PLATELET # BLD AUTO: 156 10*3/MM3 (ref 140–450)
PLATELET # BLD AUTO: 167 10*3/MM3 (ref 140–450)
PLATELET # BLD AUTO: 182 10*3/MM3 (ref 140–450)
PLATELET # BLD AUTO: 184 10*3/MM3 (ref 140–450)
PLATELET # BLD AUTO: 192 10*3/MM3 (ref 140–450)
PLATELET # BLD AUTO: 192 10*3/MM3 (ref 140–450)
PLATELET # BLD AUTO: 201 10*3/MM3 (ref 140–450)
PLATELET # BLD AUTO: 202 10*3/MM3 (ref 140–450)
PLATELET # BLD AUTO: 210 10*3/MM3 (ref 140–450)
PLATELET # BLD AUTO: 211 10*3/MM3 (ref 140–450)
PLATELET # BLD AUTO: 211 10*3/MM3 (ref 140–450)
PLATELET # BLD AUTO: 218 10*3/MM3 (ref 140–450)
PLATELET # BLD AUTO: 218 10*3/MM3 (ref 140–450)
PLATELET # BLD AUTO: 219 10*3/MM3 (ref 140–450)
PLATELET # BLD AUTO: 219 10*3/MM3 (ref 140–450)
PLATELET # BLD AUTO: 224 10*3/MM3 (ref 140–450)
PLATELET # BLD AUTO: 225 10*3/MM3 (ref 140–450)
PLATELET # BLD AUTO: 226 10*3/MM3 (ref 140–450)
PLATELET # BLD AUTO: 228 10*3/MM3 (ref 140–450)
PLATELET # BLD AUTO: 240 10*3/MM3 (ref 140–450)
PLATELET # BLD AUTO: 243 10*3/MM3 (ref 140–450)
PLATELET # BLD AUTO: 250 10*3/MM3 (ref 140–450)
PLATELET # BLD AUTO: 267 10*3/MM3 (ref 140–450)
PLATELET # BLD AUTO: 275 10*3/MM3 (ref 140–450)
PLATELET # BLD AUTO: 294 10*3/MM3 (ref 140–450)
PLATELET # BLD AUTO: 298 10*3/MM3 (ref 140–450)
PLATELET # BLD AUTO: 300 10*3/MM3 (ref 140–450)
PLATELET # BLD AUTO: 300 10*3/MM3 (ref 140–450)
PLATELET # BLD AUTO: 310 10*3/MM3 (ref 140–450)
PLATELET # BLD AUTO: 312 10*3/MM3 (ref 140–450)
PMV BLD AUTO: 10 FL (ref 6–12)
PMV BLD AUTO: 10 FL (ref 6–12)
PMV BLD AUTO: 10.1 FL (ref 6–12)
PMV BLD AUTO: 10.3 FL (ref 6–12)
PMV BLD AUTO: 10.3 FL (ref 6–12)
PMV BLD AUTO: 10.4 FL (ref 6–12)
PMV BLD AUTO: 10.6 FL (ref 6–12)
PMV BLD AUTO: 10.7 FL (ref 6–12)
PMV BLD AUTO: 10.8 FL (ref 6–12)
PMV BLD AUTO: 10.9 FL (ref 6–12)
PMV BLD AUTO: 11.2 FL (ref 6–12)
PMV BLD AUTO: 11.4 FL (ref 6–12)
PMV BLD AUTO: 11.6 FL (ref 6–12)
PMV BLD AUTO: 11.6 FL (ref 6–12)
PMV BLD AUTO: 9.2 FL (ref 6–12)
PMV BLD AUTO: 9.4 FL (ref 6–12)
PMV BLD AUTO: 9.4 FL (ref 6–12)
PMV BLD AUTO: 9.5 FL (ref 6–12)
PMV BLD AUTO: 9.5 FL (ref 6–12)
PMV BLD AUTO: 9.6 FL (ref 6–12)
PMV BLD AUTO: 9.7 FL (ref 6–12)
PMV BLD AUTO: 9.8 FL (ref 6–12)
PMV BLD AUTO: 9.8 FL (ref 6–12)
PO2 BLD: 269 MM[HG] (ref 0–500)
PO2 BLD: 369 MM[HG] (ref 0–500)
PO2 BLD: 398 MM[HG] (ref 0–500)
PO2 BLD: ABNORMAL MM[HG]
PO2 BLDA: 107.4 MM HG (ref 80–100)
PO2 BLDA: 112.1 MM HG (ref 80–100)
PO2 BLDA: 166.1 MM HG (ref 80–100)
PO2 BLDA: 83.5 MM HG (ref 80–100)
POIKILOCYTOSIS BLD QL SMEAR: ABNORMAL
POIKILOCYTOSIS BLD QL SMEAR: ABNORMAL
POLYCHROMASIA BLD QL SMEAR: ABNORMAL
POLYCHROMASIA BLD QL SMEAR: NORMAL
POTASSIUM BLDA-SCNC: 3.7 MMOL/L (ref 3.5–5)
POTASSIUM BLDA-SCNC: 4.2 MMOL/L (ref 3.5–5)
POTASSIUM SERPL-SCNC: 3 MMOL/L (ref 3.5–5.2)
POTASSIUM SERPL-SCNC: 3.2 MMOL/L (ref 3.5–5.2)
POTASSIUM SERPL-SCNC: 3.3 MMOL/L (ref 3.5–5.2)
POTASSIUM SERPL-SCNC: 3.4 MMOL/L (ref 3.5–5.2)
POTASSIUM SERPL-SCNC: 3.5 MMOL/L (ref 3.5–5.2)
POTASSIUM SERPL-SCNC: 3.5 MMOL/L (ref 3.5–5.2)
POTASSIUM SERPL-SCNC: 3.6 MMOL/L (ref 3.5–5.2)
POTASSIUM SERPL-SCNC: 3.7 MMOL/L (ref 3.5–5.2)
POTASSIUM SERPL-SCNC: 3.8 MMOL/L (ref 3.5–5.2)
POTASSIUM SERPL-SCNC: 3.9 MMOL/L (ref 3.5–5.2)
POTASSIUM SERPL-SCNC: 4 MMOL/L (ref 3.5–5.2)
POTASSIUM SERPL-SCNC: 4.2 MMOL/L (ref 3.5–5.2)
POTASSIUM SERPL-SCNC: 4.3 MMOL/L (ref 3.5–5.2)
POTASSIUM SERPL-SCNC: 4.4 MMOL/L (ref 3.5–5.2)
POTASSIUM SERPL-SCNC: 4.5 MMOL/L (ref 3.5–5.2)
POTASSIUM SERPL-SCNC: 4.6 MMOL/L (ref 3.5–5.2)
POTASSIUM SERPL-SCNC: 4.7 MMOL/L (ref 3.5–5.2)
POTASSIUM SERPL-SCNC: 4.8 MMOL/L (ref 3.5–5.2)
POTASSIUM SERPL-SCNC: 4.9 MMOL/L (ref 3.5–5.2)
POTASSIUM SERPL-SCNC: 5 MMOL/L (ref 3.5–5.2)
POTASSIUM SERPL-SCNC: 5 MMOL/L (ref 3.5–5.2)
POTASSIUM SERPL-SCNC: 5.2 MMOL/L (ref 3.5–5.2)
POTASSIUM SERPL-SCNC: 5.6 MMOL/L (ref 3.5–5.2)
POTASSIUM SERPL-SCNC: 5.6 MMOL/L (ref 3.5–5.2)
PROCALCITONIN SERPL-MCNC: 0.25 NG/ML (ref 0–0.25)
PROCALCITONIN SERPL-MCNC: 0.34 NG/ML (ref 0–0.25)
PROCALCITONIN SERPL-MCNC: 4.7 NG/ML (ref 0–0.25)
PROCALCITONIN SERPL-MCNC: 8.08 NG/ML (ref 0–0.25)
PROCALCITONIN SERPL-MCNC: 8.17 NG/ML (ref 0–0.25)
PROT SERPL-MCNC: 4.6 G/DL (ref 6–8.5)
PROT SERPL-MCNC: 5.5 G/DL (ref 6–8.5)
PROT SERPL-MCNC: 5.6 G/DL (ref 6–8.5)
PROT SERPL-MCNC: 5.7 G/DL (ref 6–8.5)
PROT SERPL-MCNC: 5.8 G/DL (ref 6–8.5)
PROT SERPL-MCNC: 6 G/DL (ref 6–8.5)
PROT SERPL-MCNC: 6.1 G/DL (ref 6–8.5)
PROT SERPL-MCNC: 6.2 G/DL (ref 6–8.5)
PROT SERPL-MCNC: 6.3 G/DL (ref 6–8.5)
PROT SERPL-MCNC: 6.3 G/DL (ref 6–8.5)
PROT SERPL-MCNC: 6.4 G/DL (ref 6–8.5)
PROT SERPL-MCNC: 6.4 G/DL (ref 6–8.5)
PROT SERPL-MCNC: 6.5 G/DL (ref 6–8.5)
PROT SERPL-MCNC: 6.6 G/DL (ref 6–8.5)
PROT SERPL-MCNC: 6.6 G/DL (ref 6–8.5)
PROT SERPL-MCNC: 6.7 G/DL (ref 6–8.5)
PROT SERPL-MCNC: 6.7 G/DL (ref 6–8.5)
PROT SERPL-MCNC: 6.9 G/DL (ref 6–8.5)
PROT SERPL-MCNC: 7 G/DL (ref 6–8.5)
PROT SERPL-MCNC: 7.1 G/DL (ref 6–8.5)
PROT SERPL-MCNC: 7.2 G/DL (ref 6–8.5)
PROT SERPL-MCNC: 7.5 G/DL (ref 6–8.5)
PROT SERPL-MCNC: 7.5 G/DL (ref 6–8.5)
PROT SERPL-MCNC: 8 G/DL (ref 6–8.5)
PROT UR QL STRIP: ABNORMAL
PROT UR QL STRIP: ABNORMAL
PROT UR QL STRIP: NEGATIVE
PROTHROMBIN TIME: 11.4 SECONDS (ref 9.4–12)
QT INTERVAL: 311 MS
QT INTERVAL: 335 MS
QT INTERVAL: 344 MS
QT INTERVAL: 346 MS
QT INTERVAL: 359 MS
QT INTERVAL: 363 MS
QT INTERVAL: 366 MS
QT INTERVAL: 367 MS
RBC # BLD AUTO: 2.53 10*6/MM3 (ref 3.77–5.28)
RBC # BLD AUTO: 2.55 10*6/MM3 (ref 3.77–5.28)
RBC # BLD AUTO: 2.63 10*6/MM3 (ref 3.77–5.28)
RBC # BLD AUTO: 2.67 10*6/MM3 (ref 3.77–5.28)
RBC # BLD AUTO: 2.83 10*6/MM3 (ref 3.77–5.28)
RBC # BLD AUTO: 2.93 10*6/MM3 (ref 3.77–5.28)
RBC # BLD AUTO: 3.13 10*6/MM3 (ref 3.77–5.28)
RBC # BLD AUTO: 3.4 10*6/MM3 (ref 3.77–5.28)
RBC # BLD AUTO: 3.49 10*6/MM3 (ref 3.77–5.28)
RBC # BLD AUTO: 3.49 10*6/MM3 (ref 3.77–5.28)
RBC # BLD AUTO: 3.66 10*6/MM3 (ref 3.77–5.28)
RBC # BLD AUTO: 3.68 10*6/MM3 (ref 3.77–5.28)
RBC # BLD AUTO: 3.78 10*6/MM3 (ref 3.77–5.28)
RBC # BLD AUTO: 3.84 10*6/MM3 (ref 3.77–5.28)
RBC # BLD AUTO: 3.99 10*6/MM3 (ref 3.77–5.28)
RBC # BLD AUTO: 4.04 10*6/MM3 (ref 3.77–5.28)
RBC # BLD AUTO: 4.04 10*6/MM3 (ref 3.77–5.28)
RBC # BLD AUTO: 4.06 10*6/MM3 (ref 3.77–5.28)
RBC # BLD AUTO: 4.08 10*6/MM3 (ref 3.77–5.28)
RBC # BLD AUTO: 4.13 10*6/MM3 (ref 3.77–5.28)
RBC # BLD AUTO: 4.19 10*6/MM3 (ref 3.77–5.28)
RBC # BLD AUTO: 4.23 10*6/MM3 (ref 3.77–5.28)
RBC # BLD AUTO: 4.27 10*6/MM3 (ref 3.77–5.28)
RBC # BLD AUTO: 4.28 10*6/MM3 (ref 3.77–5.28)
RBC # BLD AUTO: 4.34 10*6/MM3 (ref 3.77–5.28)
RBC # BLD AUTO: 4.41 10*6/MM3 (ref 3.77–5.28)
RBC # BLD AUTO: 4.45 10*6/MM3 (ref 3.77–5.28)
RBC # BLD AUTO: 4.5 10*6/MM3 (ref 3.77–5.28)
RBC # BLD AUTO: 4.52 10*6/MM3 (ref 3.77–5.28)
RBC # BLD AUTO: 4.76 10*6/MM3 (ref 3.77–5.28)
RBC # UR STRIP: ABNORMAL /HPF
RBC # UR STRIP: ABNORMAL /HPF
REF LAB TEST METHOD: ABNORMAL
REF LAB TEST METHOD: ABNORMAL
RETICS # AUTO: 0.21 10*6/MM3 (ref 0.02–0.13)
RETICS/RBC NFR AUTO: 6.29 % (ref 0.7–1.9)
RH BLD: POSITIVE
S PNEUM AG SPEC QL LA: NEGATIVE
SAO2 % BLDCOA: 96.4 % (ref 95–99)
SAO2 % BLDCOA: 97.4 % (ref 95–99)
SAO2 % BLDCOA: 97.5 % (ref 95–99)
SAO2 % BLDCOA: 98.6 % (ref 95–99)
SARS-COV-2 RNA PNL SPEC NAA+PROBE: NOT DETECTED
SARS-COV-2 RNA PNL SPEC NAA+PROBE: NOT DETECTED
SCAN SLIDE: NORMAL
SCAN SLIDE: NORMAL
SMALL PLATELETS BLD QL SMEAR: ADEQUATE
SODIUM BLDA-SCNC: 129.3 MMOL/L (ref 136–146)
SODIUM BLDA-SCNC: 136.5 MMOL/L (ref 136–146)
SODIUM SERPL-SCNC: 130 MMOL/L (ref 136–145)
SODIUM SERPL-SCNC: 132 MMOL/L (ref 136–145)
SODIUM SERPL-SCNC: 132 MMOL/L (ref 136–145)
SODIUM SERPL-SCNC: 133 MMOL/L (ref 136–145)
SODIUM SERPL-SCNC: 134 MMOL/L (ref 136–145)
SODIUM SERPL-SCNC: 135 MMOL/L (ref 136–145)
SODIUM SERPL-SCNC: 136 MMOL/L (ref 136–145)
SODIUM SERPL-SCNC: 137 MMOL/L (ref 136–145)
SODIUM SERPL-SCNC: 138 MMOL/L (ref 136–145)
SODIUM SERPL-SCNC: 139 MMOL/L (ref 136–145)
SODIUM SERPL-SCNC: 140 MMOL/L (ref 136–145)
SODIUM SERPL-SCNC: 141 MMOL/L (ref 136–145)
SODIUM SERPL-SCNC: 142 MMOL/L (ref 136–145)
SODIUM SERPL-SCNC: 142 MMOL/L (ref 136–145)
SODIUM SERPL-SCNC: 143 MMOL/L (ref 136–145)
SODIUM SERPL-SCNC: 144 MMOL/L (ref 136–145)
SODIUM SERPL-SCNC: 144 MMOL/L (ref 136–145)
SODIUM SERPL-SCNC: 146 MMOL/L (ref 136–145)
SODIUM SERPL-SCNC: 147 MMOL/L (ref 136–145)
SP GR UR STRIP: 1.01 (ref 1–1.03)
SP GR UR STRIP: 1.02 (ref 1–1.03)
SP GR UR STRIP: 1.02 (ref 1–1.03)
SQUAMOUS #/AREA URNS HPF: ABNORMAL /HPF
SQUAMOUS #/AREA URNS HPF: ABNORMAL /HPF
STRESS TARGET HR: 122 BPM
T&S EXPIRATION DATE: NORMAL
T&S EXPIRATION DATE: NORMAL
T4 FREE SERPL-MCNC: 1.24 NG/DL (ref 0.93–1.7)
T4 FREE SERPL-MCNC: 1.45 NG/DL (ref 0.93–1.7)
T4 SERPL-MCNC: 8.6 MCG/DL (ref 4.5–11.7)
TARGETS BLD QL SMEAR: ABNORMAL
TARGETS BLD QL SMEAR: ABNORMAL
TB SKIN TEST: NEGATIVE
TB SKIN TEST: NEGATIVE
TIBC SERPL-MCNC: 340 MCG/DL (ref 298–536)
TRANSFERRIN SERPL-MCNC: 228 MG/DL (ref 200–360)
TROPONIN T SERPL-MCNC: 0.01 NG/ML (ref 0–0.03)
TROPONIN T SERPL-MCNC: <0.01 NG/ML (ref 0–0.03)
TSH SERPL DL<=0.05 MIU/L-ACNC: 1.44 UIU/ML (ref 0.27–4.2)
TSH SERPL DL<=0.05 MIU/L-ACNC: 2.38 UIU/ML (ref 0.27–4.2)
TSH SERPL DL<=0.05 MIU/L-ACNC: 2.43 UIU/ML (ref 0.27–4.2)
UNIT  ABO: NORMAL
UNIT  ABO: NORMAL
UNIT  RH: NORMAL
UNIT  RH: NORMAL
UPPER ARTERIAL LEFT ARM BRACHIAL SYS MAX: 107
UPPER ARTERIAL RIGHT ARM BRACHIAL SYS MAX: 109
UROBILINOGEN UR QL STRIP: ABNORMAL
UROBILINOGEN UR QL STRIP: ABNORMAL
UROBILINOGEN UR QL STRIP: NORMAL
VANCOMYCIN SERPL-MCNC: 12.61 MCG/ML (ref 5–40)
VANCOMYCIN SERPL-MCNC: 14.1 MCG/ML (ref 5–40)
VANCOMYCIN SERPL-MCNC: 17.6 MCG/ML (ref 5–40)
VANCOMYCIN SERPL-MCNC: 20.33 MCG/ML (ref 5–40)
VANCOMYCIN SERPL-MCNC: 23.2 MCG/ML (ref 5–40)
VARIANT LYMPHS NFR BLD MANUAL: 16 % (ref 19.6–45.3)
VARIANT LYMPHS NFR BLD MANUAL: 17 % (ref 19.6–45.3)
VARIANT LYMPHS NFR BLD MANUAL: 20 % (ref 19.6–45.3)
VARIANT LYMPHS NFR BLD MANUAL: 6 % (ref 19.6–45.3)
VARIANT LYMPHS NFR BLD MANUAL: 8 % (ref 19.6–45.3)
WBC # UR STRIP: ABNORMAL /HPF
WBC # UR STRIP: ABNORMAL /HPF
WBC MORPH BLD: NORMAL
WBC NRBC COR # BLD: 10.02 10*3/MM3 (ref 3.4–10.8)
WBC NRBC COR # BLD: 10.29 10*3/MM3 (ref 3.4–10.8)
WBC NRBC COR # BLD: 10.6 10*3/MM3 (ref 3.4–10.8)
WBC NRBC COR # BLD: 11.03 10*3/MM3 (ref 3.4–10.8)
WBC NRBC COR # BLD: 11.13 10*3/MM3 (ref 3.4–10.8)
WBC NRBC COR # BLD: 11.4 10*3/MM3 (ref 3.4–10.8)
WBC NRBC COR # BLD: 12.11 10*3/MM3 (ref 3.4–10.8)
WBC NRBC COR # BLD: 12.18 10*3/MM3 (ref 3.4–10.8)
WBC NRBC COR # BLD: 12.55 10*3/MM3 (ref 3.4–10.8)
WBC NRBC COR # BLD: 12.59 10*3/MM3 (ref 3.4–10.8)
WBC NRBC COR # BLD: 12.65 10*3/MM3 (ref 3.4–10.8)
WBC NRBC COR # BLD: 13.12 10*3/MM3 (ref 3.4–10.8)
WBC NRBC COR # BLD: 15.65 10*3/MM3 (ref 3.4–10.8)
WBC NRBC COR # BLD: 16.49 10*3/MM3 (ref 3.4–10.8)
WBC NRBC COR # BLD: 16.65 10*3/MM3 (ref 3.4–10.8)
WBC NRBC COR # BLD: 17.17 10*3/MM3 (ref 3.4–10.8)
WBC NRBC COR # BLD: 17.4 10*3/MM3 (ref 3.4–10.8)
WBC NRBC COR # BLD: 17.51 10*3/MM3 (ref 3.4–10.8)
WBC NRBC COR # BLD: 17.53 10*3/MM3 (ref 3.4–10.8)
WBC NRBC COR # BLD: 18.04 10*3/MM3 (ref 3.4–10.8)
WBC NRBC COR # BLD: 18.69 10*3/MM3 (ref 3.4–10.8)
WBC NRBC COR # BLD: 22.08 10*3/MM3 (ref 3.4–10.8)
WBC NRBC COR # BLD: 27.87 10*3/MM3 (ref 3.4–10.8)
WBC NRBC COR # BLD: 28.64 10*3/MM3 (ref 3.4–10.8)
WBC NRBC COR # BLD: 7.58 10*3/MM3 (ref 3.4–10.8)
WBC NRBC COR # BLD: 7.86 10*3/MM3 (ref 3.4–10.8)
WBC NRBC COR # BLD: 7.9 10*3/MM3 (ref 3.4–10.8)
WBC NRBC COR # BLD: 8.71 10*3/MM3 (ref 3.4–10.8)
WBC NRBC COR # BLD: 9.03 10*3/MM3 (ref 3.4–10.8)
WBC NRBC COR # BLD: 9.53 10*3/MM3 (ref 3.4–10.8)
WHOLE BLOOD HOLD COAG: NORMAL
WHOLE BLOOD HOLD SPECIMEN: 11
WHOLE BLOOD HOLD SPECIMEN: NORMAL

## 2022-01-01 PROCEDURE — 85014 HEMATOCRIT: CPT | Performed by: INTERNAL MEDICINE

## 2022-01-01 PROCEDURE — 80053 COMPREHEN METABOLIC PANEL: CPT | Performed by: INTERNAL MEDICINE

## 2022-01-01 PROCEDURE — 83735 ASSAY OF MAGNESIUM: CPT | Performed by: EMERGENCY MEDICINE

## 2022-01-01 PROCEDURE — 25010000002 MORPHINE PER 10 MG: Performed by: INTERNAL MEDICINE

## 2022-01-01 PROCEDURE — 85730 THROMBOPLASTIN TIME PARTIAL: CPT | Performed by: EMERGENCY MEDICINE

## 2022-01-01 PROCEDURE — 71046 X-RAY EXAM CHEST 2 VIEWS: CPT

## 2022-01-01 PROCEDURE — 85025 COMPLETE CBC W/AUTO DIFF WBC: CPT | Performed by: EMERGENCY MEDICINE

## 2022-01-01 PROCEDURE — 36415 COLL VENOUS BLD VENIPUNCTURE: CPT

## 2022-01-01 PROCEDURE — 87077 CULTURE AEROBIC IDENTIFY: CPT | Performed by: INTERNAL MEDICINE

## 2022-01-01 PROCEDURE — 80053 COMPREHEN METABOLIC PANEL: CPT | Performed by: EMERGENCY MEDICINE

## 2022-01-01 PROCEDURE — 85025 COMPLETE CBC W/AUTO DIFF WBC: CPT | Performed by: INTERNAL MEDICINE

## 2022-01-01 PROCEDURE — 80048 BASIC METABOLIC PNL TOTAL CA: CPT | Performed by: INTERNAL MEDICINE

## 2022-01-01 PROCEDURE — 02HV33Z INSERTION OF INFUSION DEVICE INTO SUPERIOR VENA CAVA, PERCUTANEOUS APPROACH: ICD-10-PCS | Performed by: INTERNAL MEDICINE

## 2022-01-01 PROCEDURE — 84484 ASSAY OF TROPONIN QUANT: CPT | Performed by: EMERGENCY MEDICINE

## 2022-01-01 PROCEDURE — 99291 CRITICAL CARE FIRST HOUR: CPT | Performed by: INTERNAL MEDICINE

## 2022-01-01 PROCEDURE — 25010000002 FUROSEMIDE PER 20 MG: Performed by: INTERNAL MEDICINE

## 2022-01-01 PROCEDURE — 85018 HEMOGLOBIN: CPT | Performed by: INTERNAL MEDICINE

## 2022-01-01 PROCEDURE — 25010000002 CEFAZOLIN IN DEXTROSE 2-4 GM/100ML-% SOLUTION: Performed by: INTERNAL MEDICINE

## 2022-01-01 PROCEDURE — 97116 GAIT TRAINING THERAPY: CPT

## 2022-01-01 PROCEDURE — 80069 RENAL FUNCTION PANEL: CPT | Performed by: INTERNAL MEDICINE

## 2022-01-01 PROCEDURE — 87070 CULTURE OTHR SPECIMN AEROBIC: CPT | Performed by: INTERNAL MEDICINE

## 2022-01-01 PROCEDURE — 97535 SELF CARE MNGMENT TRAINING: CPT

## 2022-01-01 PROCEDURE — 25010000002 CEFTRIAXONE PER 250 MG: Performed by: INTERNAL MEDICINE

## 2022-01-01 PROCEDURE — 97530 THERAPEUTIC ACTIVITIES: CPT

## 2022-01-01 PROCEDURE — 83735 ASSAY OF MAGNESIUM: CPT | Performed by: PHYSICIAN ASSISTANT

## 2022-01-01 PROCEDURE — 71045 X-RAY EXAM CHEST 1 VIEW: CPT

## 2022-01-01 PROCEDURE — 80162 ASSAY OF DIGOXIN TOTAL: CPT | Performed by: EMERGENCY MEDICINE

## 2022-01-01 PROCEDURE — 94799 UNLISTED PULMONARY SVC/PX: CPT

## 2022-01-01 PROCEDURE — 84145 PROCALCITONIN (PCT): CPT | Performed by: INTERNAL MEDICINE

## 2022-01-01 PROCEDURE — 83605 ASSAY OF LACTIC ACID: CPT | Performed by: EMERGENCY MEDICINE

## 2022-01-01 PROCEDURE — 99285 EMERGENCY DEPT VISIT HI MDM: CPT

## 2022-01-01 PROCEDURE — 25010000002 VANCOMYCIN 5 G RECONSTITUTED SOLUTION: Performed by: INTERNAL MEDICINE

## 2022-01-01 PROCEDURE — 86901 BLOOD TYPING SEROLOGIC RH(D): CPT | Performed by: EMERGENCY MEDICINE

## 2022-01-01 PROCEDURE — 87040 BLOOD CULTURE FOR BACTERIA: CPT | Performed by: EMERGENCY MEDICINE

## 2022-01-01 PROCEDURE — 25010000002 DIGOXIN PER 500 MCG: Performed by: INTERNAL MEDICINE

## 2022-01-01 PROCEDURE — 82962 GLUCOSE BLOOD TEST: CPT

## 2022-01-01 PROCEDURE — 25010000002 HYDROMORPHONE 1 MG/ML SOLUTION: Performed by: INTERNAL MEDICINE

## 2022-01-01 PROCEDURE — 94618 PULMONARY STRESS TESTING: CPT

## 2022-01-01 PROCEDURE — 99232 SBSQ HOSP IP/OBS MODERATE 35: CPT | Performed by: OTOLARYNGOLOGY

## 2022-01-01 PROCEDURE — 87086 URINE CULTURE/COLONY COUNT: CPT | Performed by: EMERGENCY MEDICINE

## 2022-01-01 PROCEDURE — 97165 OT EVAL LOW COMPLEX 30 MIN: CPT

## 2022-01-01 PROCEDURE — 96366 THER/PROPH/DIAG IV INF ADDON: CPT

## 2022-01-01 PROCEDURE — 97110 THERAPEUTIC EXERCISES: CPT

## 2022-01-01 PROCEDURE — 99283 EMERGENCY DEPT VISIT LOW MDM: CPT

## 2022-01-01 PROCEDURE — 92526 ORAL FUNCTION THERAPY: CPT

## 2022-01-01 PROCEDURE — 81001 URINALYSIS AUTO W/SCOPE: CPT | Performed by: EMERGENCY MEDICINE

## 2022-01-01 PROCEDURE — 83735 ASSAY OF MAGNESIUM: CPT | Performed by: INTERNAL MEDICINE

## 2022-01-01 PROCEDURE — 94760 N-INVAS EAR/PLS OXIMETRY 1: CPT

## 2022-01-01 PROCEDURE — G0378 HOSPITAL OBSERVATION PER HR: HCPCS

## 2022-01-01 PROCEDURE — 83880 ASSAY OF NATRIURETIC PEPTIDE: CPT | Performed by: EMERGENCY MEDICINE

## 2022-01-01 PROCEDURE — 25010000002 CEFEPIME PER 500 MG: Performed by: INTERNAL MEDICINE

## 2022-01-01 PROCEDURE — 82274 ASSAY TEST FOR BLOOD FECAL: CPT | Performed by: EMERGENCY MEDICINE

## 2022-01-01 PROCEDURE — 93922 UPR/L XTREMITY ART 2 LEVELS: CPT | Performed by: SURGERY

## 2022-01-01 PROCEDURE — 82375 ASSAY CARBOXYHB QUANT: CPT | Performed by: INTERNAL MEDICINE

## 2022-01-01 PROCEDURE — 25010000002 FUROSEMIDE PER 20 MG: Performed by: EMERGENCY MEDICINE

## 2022-01-01 PROCEDURE — 85007 BL SMEAR W/DIFF WBC COUNT: CPT | Performed by: INTERNAL MEDICINE

## 2022-01-01 PROCEDURE — 87147 CULTURE TYPE IMMUNOLOGIC: CPT | Performed by: EMERGENCY MEDICINE

## 2022-01-01 PROCEDURE — 86140 C-REACTIVE PROTEIN: CPT | Performed by: EMERGENCY MEDICINE

## 2022-01-01 PROCEDURE — 36415 COLL VENOUS BLD VENIPUNCTURE: CPT | Performed by: EMERGENCY MEDICINE

## 2022-01-01 PROCEDURE — 83605 ASSAY OF LACTIC ACID: CPT | Performed by: INTERNAL MEDICINE

## 2022-01-01 PROCEDURE — 97161 PT EVAL LOW COMPLEX 20 MIN: CPT

## 2022-01-01 PROCEDURE — 93306 TTE W/DOPPLER COMPLETE: CPT

## 2022-01-01 PROCEDURE — 25010000002 AZITHROMYCIN PER 500 MG: Performed by: EMERGENCY MEDICINE

## 2022-01-01 PROCEDURE — 93010 ELECTROCARDIOGRAM REPORT: CPT | Performed by: INTERNAL MEDICINE

## 2022-01-01 PROCEDURE — 93005 ELECTROCARDIOGRAM TRACING: CPT | Performed by: EMERGENCY MEDICINE

## 2022-01-01 PROCEDURE — 86850 RBC ANTIBODY SCREEN: CPT | Performed by: EMERGENCY MEDICINE

## 2022-01-01 PROCEDURE — 93970 EXTREMITY STUDY: CPT | Performed by: SURGERY

## 2022-01-01 PROCEDURE — 84145 PROCALCITONIN (PCT): CPT | Performed by: PHYSICIAN ASSISTANT

## 2022-01-01 PROCEDURE — 94664 DEMO&/EVAL PT USE INHALER: CPT

## 2022-01-01 PROCEDURE — 87186 SC STD MICRODIL/AGAR DIL: CPT | Performed by: INTERNAL MEDICINE

## 2022-01-01 PROCEDURE — 86923 COMPATIBILITY TEST ELECTRIC: CPT

## 2022-01-01 PROCEDURE — 25010000002 CEFTRIAXONE PER 250 MG: Performed by: EMERGENCY MEDICINE

## 2022-01-01 PROCEDURE — 86140 C-REACTIVE PROTEIN: CPT | Performed by: INTERNAL MEDICINE

## 2022-01-01 PROCEDURE — 83880 ASSAY OF NATRIURETIC PEPTIDE: CPT | Performed by: INTERNAL MEDICINE

## 2022-01-01 PROCEDURE — 99308 SBSQ NF CARE LOW MDM 20: CPT | Performed by: INTERNAL MEDICINE

## 2022-01-01 PROCEDURE — 80053 COMPREHEN METABOLIC PANEL: CPT

## 2022-01-01 PROCEDURE — 36556 INSERT NON-TUNNEL CV CATH: CPT | Performed by: INTERNAL MEDICINE

## 2022-01-01 PROCEDURE — 96361 HYDRATE IV INFUSION ADD-ON: CPT

## 2022-01-01 PROCEDURE — 99214 OFFICE O/P EST MOD 30 MIN: CPT | Performed by: EMERGENCY MEDICINE

## 2022-01-01 PROCEDURE — 25010000002 AZITHROMYCIN PER 500 MG: Performed by: INTERNAL MEDICINE

## 2022-01-01 PROCEDURE — 25010000002 PIPERACILLIN SOD-TAZOBACTAM PER 1 G: Performed by: INTERNAL MEDICINE

## 2022-01-01 PROCEDURE — 99233 SBSQ HOSP IP/OBS HIGH 50: CPT | Performed by: INTERNAL MEDICINE

## 2022-01-01 PROCEDURE — 25010000002 ONDANSETRON PER 1 MG: Performed by: INTERNAL MEDICINE

## 2022-01-01 PROCEDURE — U0004 COV-19 TEST NON-CDC HGH THRU: HCPCS | Performed by: INTERNAL MEDICINE

## 2022-01-01 PROCEDURE — 87076 CULTURE ANAEROBE IDENT EACH: CPT | Performed by: EMERGENCY MEDICINE

## 2022-01-01 PROCEDURE — 36430 TRANSFUSION BLD/BLD COMPNT: CPT

## 2022-01-01 PROCEDURE — 94640 AIRWAY INHALATION TREATMENT: CPT

## 2022-01-01 PROCEDURE — 80202 ASSAY OF VANCOMYCIN: CPT | Performed by: INTERNAL MEDICINE

## 2022-01-01 PROCEDURE — 83050 HGB METHEMOGLOBIN QUAN: CPT | Performed by: INTERNAL MEDICINE

## 2022-01-01 PROCEDURE — 96365 THER/PROPH/DIAG IV INF INIT: CPT

## 2022-01-01 PROCEDURE — 84439 ASSAY OF FREE THYROXINE: CPT | Performed by: INTERNAL MEDICINE

## 2022-01-01 PROCEDURE — 99204 OFFICE O/P NEW MOD 45 MIN: CPT | Performed by: EMERGENCY MEDICINE

## 2022-01-01 PROCEDURE — 84100 ASSAY OF PHOSPHORUS: CPT | Performed by: INTERNAL MEDICINE

## 2022-01-01 PROCEDURE — 80162 ASSAY OF DIGOXIN TOTAL: CPT | Performed by: INTERNAL MEDICINE

## 2022-01-01 PROCEDURE — 36600 WITHDRAWAL OF ARTERIAL BLOOD: CPT | Performed by: INTERNAL MEDICINE

## 2022-01-01 PROCEDURE — 93922 UPR/L XTREMITY ART 2 LEVELS: CPT

## 2022-01-01 PROCEDURE — 97166 OT EVAL MOD COMPLEX 45 MIN: CPT

## 2022-01-01 PROCEDURE — P9612 CATHETERIZE FOR URINE SPEC: HCPCS

## 2022-01-01 PROCEDURE — 83880 ASSAY OF NATRIURETIC PEPTIDE: CPT

## 2022-01-01 PROCEDURE — 25010000002 ENOXAPARIN PER 10 MG: Performed by: INTERNAL MEDICINE

## 2022-01-01 PROCEDURE — 87205 SMEAR GRAM STAIN: CPT | Performed by: INTERNAL MEDICINE

## 2022-01-01 PROCEDURE — C9046 COCAINE HCL NASAL SOLUTION: HCPCS | Performed by: EMERGENCY MEDICINE

## 2022-01-01 PROCEDURE — 85025 COMPLETE CBC W/AUTO DIFF WBC: CPT

## 2022-01-01 PROCEDURE — 25010000002 VANCOMYCIN 5 G RECONSTITUTED SOLUTION: Performed by: EMERGENCY MEDICINE

## 2022-01-01 PROCEDURE — 84466 ASSAY OF TRANSFERRIN: CPT | Performed by: INTERNAL MEDICINE

## 2022-01-01 PROCEDURE — 86900 BLOOD TYPING SEROLOGIC ABO: CPT | Performed by: EMERGENCY MEDICINE

## 2022-01-01 PROCEDURE — 84100 ASSAY OF PHOSPHORUS: CPT | Performed by: PHYSICIAN ASSISTANT

## 2022-01-01 PROCEDURE — 83010 ASSAY OF HAPTOGLOBIN QUANT: CPT | Performed by: INTERNAL MEDICINE

## 2022-01-01 PROCEDURE — 99222 1ST HOSP IP/OBS MODERATE 55: CPT | Performed by: OTOLARYNGOLOGY

## 2022-01-01 PROCEDURE — 85007 BL SMEAR W/DIFF WBC COUNT: CPT | Performed by: PHYSICIAN ASSISTANT

## 2022-01-01 PROCEDURE — 25010000002 NA FERRIC GLUC CPLX PER 12.5 MG: Performed by: INTERNAL MEDICINE

## 2022-01-01 PROCEDURE — 85025 COMPLETE CBC W/AUTO DIFF WBC: CPT | Performed by: PHYSICIAN ASSISTANT

## 2022-01-01 PROCEDURE — 82805 BLOOD GASES W/O2 SATURATION: CPT | Performed by: INTERNAL MEDICINE

## 2022-01-01 PROCEDURE — 96375 TX/PRO/DX INJ NEW DRUG ADDON: CPT

## 2022-01-01 PROCEDURE — 93005 ELECTROCARDIOGRAM TRACING: CPT

## 2022-01-01 PROCEDURE — 87070 CULTURE OTHR SPECIMN AEROBIC: CPT | Performed by: EMERGENCY MEDICINE

## 2022-01-01 PROCEDURE — 80053 COMPREHEN METABOLIC PANEL: CPT | Performed by: PHYSICIAN ASSISTANT

## 2022-01-01 PROCEDURE — 86900 BLOOD TYPING SEROLOGIC ABO: CPT | Performed by: INTERNAL MEDICINE

## 2022-01-01 PROCEDURE — 83540 ASSAY OF IRON: CPT | Performed by: INTERNAL MEDICINE

## 2022-01-01 PROCEDURE — 71250 CT THORAX DX C-: CPT

## 2022-01-01 PROCEDURE — 88305 TISSUE EXAM BY PATHOLOGIST: CPT | Performed by: INTERNAL MEDICINE

## 2022-01-01 PROCEDURE — 86901 BLOOD TYPING SEROLOGIC RH(D): CPT | Performed by: INTERNAL MEDICINE

## 2022-01-01 PROCEDURE — 76937 US GUIDE VASCULAR ACCESS: CPT | Performed by: INTERNAL MEDICINE

## 2022-01-01 PROCEDURE — 86850 RBC ANTIBODY SCREEN: CPT | Performed by: INTERNAL MEDICINE

## 2022-01-01 PROCEDURE — 43239 EGD BIOPSY SINGLE/MULTIPLE: CPT | Performed by: INTERNAL MEDICINE

## 2022-01-01 PROCEDURE — U0005 INFEC AGEN DETEC AMPLI PROBE: HCPCS | Performed by: INTERNAL MEDICINE

## 2022-01-01 PROCEDURE — 87899 AGENT NOS ASSAY W/OPTIC: CPT | Performed by: INTERNAL MEDICINE

## 2022-01-01 PROCEDURE — 99231 SBSQ HOSP IP/OBS SF/LOW 25: CPT | Performed by: SURGERY

## 2022-01-01 PROCEDURE — 88312 SPECIAL STAINS GROUP 1: CPT | Performed by: EMERGENCY MEDICINE

## 2022-01-01 PROCEDURE — P9016 RBC LEUKOCYTES REDUCED: HCPCS

## 2022-01-01 PROCEDURE — 85610 PROTHROMBIN TIME: CPT | Performed by: EMERGENCY MEDICINE

## 2022-01-01 PROCEDURE — 86738 MYCOPLASMA ANTIBODY: CPT | Performed by: INTERNAL MEDICINE

## 2022-01-01 PROCEDURE — 87040 BLOOD CULTURE FOR BACTERIA: CPT

## 2022-01-01 PROCEDURE — 87150 DNA/RNA AMPLIFIED PROBE: CPT | Performed by: EMERGENCY MEDICINE

## 2022-01-01 PROCEDURE — 86900 BLOOD TYPING SEROLOGIC ABO: CPT

## 2022-01-01 PROCEDURE — 84100 ASSAY OF PHOSPHORUS: CPT | Performed by: EMERGENCY MEDICINE

## 2022-01-01 PROCEDURE — 84436 ASSAY OF TOTAL THYROXINE: CPT

## 2022-01-01 PROCEDURE — G0463 HOSPITAL OUTPT CLINIC VISIT: HCPCS | Performed by: EMERGENCY MEDICINE

## 2022-01-01 PROCEDURE — 87185 SC STD ENZYME DETCJ PER NZM: CPT | Performed by: EMERGENCY MEDICINE

## 2022-01-01 PROCEDURE — 73700 CT LOWER EXTREMITY W/O DYE: CPT

## 2022-01-01 PROCEDURE — 99221 1ST HOSP IP/OBS SF/LOW 40: CPT | Performed by: SURGERY

## 2022-01-01 PROCEDURE — P9047 ALBUMIN (HUMAN), 25%, 50ML: HCPCS | Performed by: PHYSICIAN ASSISTANT

## 2022-01-01 PROCEDURE — 93005 ELECTROCARDIOGRAM TRACING: CPT | Performed by: INTERNAL MEDICINE

## 2022-01-01 PROCEDURE — 86901 BLOOD TYPING SEROLOGIC RH(D): CPT

## 2022-01-01 PROCEDURE — 25010000002 PROPOFOL 10 MG/ML EMULSION: Performed by: NURSE ANESTHETIST, CERTIFIED REGISTERED

## 2022-01-01 PROCEDURE — 92610 EVALUATE SWALLOWING FUNCTION: CPT

## 2022-01-01 PROCEDURE — 0DB68ZX EXCISION OF STOMACH, VIA NATURAL OR ARTIFICIAL OPENING ENDOSCOPIC, DIAGNOSTIC: ICD-10-PCS | Performed by: INTERNAL MEDICINE

## 2022-01-01 PROCEDURE — C1751 CATH, INF, PER/CENT/MIDLINE: HCPCS

## 2022-01-01 PROCEDURE — 85027 COMPLETE CBC AUTOMATED: CPT | Performed by: INTERNAL MEDICINE

## 2022-01-01 PROCEDURE — 84443 ASSAY THYROID STIM HORMONE: CPT

## 2022-01-01 PROCEDURE — 84484 ASSAY OF TROPONIN QUANT: CPT | Performed by: INTERNAL MEDICINE

## 2022-01-01 PROCEDURE — 88342 IMHCHEM/IMCYTCHM 1ST ANTB: CPT | Performed by: INTERNAL MEDICINE

## 2022-01-01 PROCEDURE — 81003 URINALYSIS AUTO W/O SCOPE: CPT | Performed by: EMERGENCY MEDICINE

## 2022-01-01 PROCEDURE — 82274 ASSAY TEST FOR BLOOD FECAL: CPT | Performed by: INTERNAL MEDICINE

## 2022-01-01 PROCEDURE — 25010000002 COCAINE HCL 40 MG/ML SOLUTION: Performed by: EMERGENCY MEDICINE

## 2022-01-01 PROCEDURE — 87147 CULTURE TYPE IMMUNOLOGIC: CPT | Performed by: INTERNAL MEDICINE

## 2022-01-01 PROCEDURE — 99215 OFFICE O/P EST HI 40 MIN: CPT | Performed by: EMERGENCY MEDICINE

## 2022-01-01 PROCEDURE — 87205 SMEAR GRAM STAIN: CPT | Performed by: EMERGENCY MEDICINE

## 2022-01-01 PROCEDURE — 96376 TX/PRO/DX INJ SAME DRUG ADON: CPT

## 2022-01-01 PROCEDURE — 85045 AUTOMATED RETICULOCYTE COUNT: CPT | Performed by: INTERNAL MEDICINE

## 2022-01-01 PROCEDURE — 84443 ASSAY THYROID STIM HORMONE: CPT | Performed by: INTERNAL MEDICINE

## 2022-01-01 PROCEDURE — 93923 UPR/LXTR ART STDY 3+ LVLS: CPT

## 2022-01-01 PROCEDURE — 25010000002 ALBUMIN HUMAN 25% PER 50 ML: Performed by: PHYSICIAN ASSISTANT

## 2022-01-01 PROCEDURE — 82728 ASSAY OF FERRITIN: CPT | Performed by: INTERNAL MEDICINE

## 2022-01-01 PROCEDURE — 87186 SC STD MICRODIL/AGAR DIL: CPT | Performed by: EMERGENCY MEDICINE

## 2022-01-01 PROCEDURE — 82330 ASSAY OF CALCIUM: CPT

## 2022-01-01 PROCEDURE — 87040 BLOOD CULTURE FOR BACTERIA: CPT | Performed by: PHYSICIAN ASSISTANT

## 2022-01-01 PROCEDURE — 99284 EMERGENCY DEPT VISIT MOD MDM: CPT

## 2022-01-01 PROCEDURE — 25010000002 CEFEPIME PER 500 MG: Performed by: EMERGENCY MEDICINE

## 2022-01-01 PROCEDURE — 83605 ASSAY OF LACTIC ACID: CPT

## 2022-01-01 PROCEDURE — 86920 COMPATIBILITY TEST SPIN: CPT

## 2022-01-01 PROCEDURE — 85652 RBC SED RATE AUTOMATED: CPT | Performed by: INTERNAL MEDICINE

## 2022-01-01 PROCEDURE — 93970 EXTREMITY STUDY: CPT

## 2022-01-01 PROCEDURE — 36410 VNPNXR 3YR/> PHY/QHP DX/THER: CPT

## 2022-01-01 RX ORDER — ATENOLOL 50 MG/1
50 TABLET ORAL
Status: DISCONTINUED | OUTPATIENT
Start: 2022-01-01 | End: 2022-01-01

## 2022-01-01 RX ORDER — METOPROLOL SUCCINATE 25 MG/1
75 TABLET, EXTENDED RELEASE ORAL EVERY 12 HOURS SCHEDULED
Status: DISCONTINUED | OUTPATIENT
Start: 2022-01-01 | End: 2022-01-01

## 2022-01-01 RX ORDER — POLYETHYLENE GLYCOL 3350 17 G/17G
17 POWDER, FOR SOLUTION ORAL DAILY PRN
Status: CANCELLED | OUTPATIENT
Start: 2022-01-01

## 2022-01-01 RX ORDER — DEXTROSE MONOHYDRATE 25 G/50ML
50 INJECTION, SOLUTION INTRAVENOUS ONCE
Status: COMPLETED | OUTPATIENT
Start: 2022-01-01 | End: 2022-01-01

## 2022-01-01 RX ORDER — ECHINACEA PURPUREA EXTRACT 125 MG
2 TABLET ORAL 3 TIMES DAILY
Status: DISCONTINUED | OUTPATIENT
Start: 2022-01-01 | End: 2022-01-01 | Stop reason: HOSPADM

## 2022-01-01 RX ORDER — DILTIAZEM HCL IN NACL,ISO-OSM 125 MG/125
5-15 PLASTIC BAG, INJECTION (ML) INTRAVENOUS
Status: DISCONTINUED | OUTPATIENT
Start: 2022-01-01 | End: 2022-01-01

## 2022-01-01 RX ORDER — METOPROLOL SUCCINATE 25 MG/1
25 TABLET, EXTENDED RELEASE ORAL ONCE
Status: COMPLETED | OUTPATIENT
Start: 2022-01-01 | End: 2022-01-01

## 2022-01-01 RX ORDER — ENOXAPARIN SODIUM 100 MG/ML
30 INJECTION SUBCUTANEOUS EVERY 24 HOURS
Status: DISCONTINUED | OUTPATIENT
Start: 2022-01-01 | End: 2022-01-01 | Stop reason: HOSPADM

## 2022-01-01 RX ORDER — BISACODYL 10 MG
10 SUPPOSITORY, RECTAL RECTAL DAILY PRN
Status: CANCELLED | OUTPATIENT
Start: 2022-01-01

## 2022-01-01 RX ORDER — DOCUSATE SODIUM 100 MG/1
100 CAPSULE, LIQUID FILLED ORAL 2 TIMES DAILY PRN
COMMUNITY

## 2022-01-01 RX ORDER — ALPRAZOLAM 0.25 MG/1
0.25 TABLET ORAL EVERY 6 HOURS PRN
Status: DISCONTINUED | OUTPATIENT
Start: 2022-01-01 | End: 2022-01-01 | Stop reason: HOSPADM

## 2022-01-01 RX ORDER — SODIUM CHLORIDE 0.9 % (FLUSH) 0.9 %
10 SYRINGE (ML) INJECTION AS NEEDED
Status: DISCONTINUED | OUTPATIENT
Start: 2022-01-01 | End: 2022-01-01 | Stop reason: HOSPADM

## 2022-01-01 RX ORDER — DIGOXIN 0.25 MG/ML
250 INJECTION INTRAMUSCULAR; INTRAVENOUS ONCE
Status: COMPLETED | OUTPATIENT
Start: 2022-01-01 | End: 2022-01-01

## 2022-01-01 RX ORDER — SODIUM CHLORIDE, SODIUM LACTATE, POTASSIUM CHLORIDE, CALCIUM CHLORIDE 600; 310; 30; 20 MG/100ML; MG/100ML; MG/100ML; MG/100ML
75 INJECTION, SOLUTION INTRAVENOUS CONTINUOUS
Status: DISCONTINUED | OUTPATIENT
Start: 2022-01-01 | End: 2022-01-01

## 2022-01-01 RX ORDER — TRIAMCINOLONE ACETONIDE 1 MG/ML
LOTION TOPICAL EVERY OTHER DAY
Status: DISCONTINUED | OUTPATIENT
Start: 2022-01-01 | End: 2022-01-01

## 2022-01-01 RX ORDER — AMOXICILLIN 250 MG
1 CAPSULE ORAL 2 TIMES DAILY
Status: DISCONTINUED | OUTPATIENT
Start: 2022-01-01 | End: 2022-01-01 | Stop reason: HOSPADM

## 2022-01-01 RX ORDER — FAMOTIDINE 20 MG/1
40 TABLET, FILM COATED ORAL DAILY
Status: CANCELLED | OUTPATIENT
Start: 2022-01-01

## 2022-01-01 RX ORDER — FUROSEMIDE 40 MG/1
40 TABLET ORAL DAILY
COMMUNITY
End: 2022-01-01 | Stop reason: HOSPADM

## 2022-01-01 RX ORDER — COCAINE HYDROCHLORIDE 40 MG/ML
4 SOLUTION NASAL ONCE
Status: COMPLETED | OUTPATIENT
Start: 2022-01-01 | End: 2022-01-01

## 2022-01-01 RX ORDER — SPIRONOLACTONE 25 MG/1
25 TABLET ORAL DAILY
Status: DISCONTINUED | OUTPATIENT
Start: 2022-01-01 | End: 2022-01-01 | Stop reason: HOSPADM

## 2022-01-01 RX ORDER — DIGOXIN 125 MCG
125 TABLET ORAL DAILY
COMMUNITY
End: 2022-01-01 | Stop reason: HOSPADM

## 2022-01-01 RX ORDER — HYDROCODONE BITARTRATE AND ACETAMINOPHEN 5; 325 MG/1; MG/1
1 TABLET ORAL EVERY 4 HOURS PRN
Status: DISCONTINUED | OUTPATIENT
Start: 2022-01-01 | End: 2022-01-01

## 2022-01-01 RX ORDER — DULOXETIN HYDROCHLORIDE 30 MG/1
30 CAPSULE, DELAYED RELEASE ORAL DAILY
Qty: 30 CAPSULE | Refills: 0 | Status: ON HOLD | OUTPATIENT
Start: 2022-01-01 | End: 2022-01-01

## 2022-01-01 RX ORDER — ONDANSETRON 2 MG/ML
4 INJECTION INTRAMUSCULAR; INTRAVENOUS EVERY 4 HOURS PRN
Status: CANCELLED | OUTPATIENT
Start: 2022-01-01

## 2022-01-01 RX ORDER — DEXTROSE MONOHYDRATE 25 G/50ML
25 INJECTION, SOLUTION INTRAVENOUS
Status: DISCONTINUED | OUTPATIENT
Start: 2022-01-01 | End: 2022-01-01 | Stop reason: HOSPADM

## 2022-01-01 RX ORDER — MORPHINE SULFATE 2 MG/ML
2 INJECTION, SOLUTION INTRAMUSCULAR; INTRAVENOUS
Status: ACTIVE | OUTPATIENT
Start: 2022-01-01 | End: 2022-01-01

## 2022-01-01 RX ORDER — NALOXONE HCL 0.4 MG/ML
0.4 VIAL (ML) INJECTION
Status: DISCONTINUED | OUTPATIENT
Start: 2022-01-01 | End: 2022-01-01 | Stop reason: HOSPADM

## 2022-01-01 RX ORDER — PANTOPRAZOLE SODIUM 40 MG/10ML
40 INJECTION, POWDER, LYOPHILIZED, FOR SOLUTION INTRAVENOUS
Status: DISCONTINUED | OUTPATIENT
Start: 2022-01-01 | End: 2022-01-01

## 2022-01-01 RX ORDER — FUROSEMIDE 40 MG/1
40 TABLET ORAL
Status: DISCONTINUED | OUTPATIENT
Start: 2022-01-01 | End: 2022-01-01 | Stop reason: HOSPADM

## 2022-01-01 RX ORDER — FAMOTIDINE 40 MG/1
40 TABLET, FILM COATED ORAL DAILY
Qty: 30 TABLET | Refills: 0 | Status: SHIPPED | OUTPATIENT
Start: 2022-01-01 | End: 2022-01-01

## 2022-01-01 RX ORDER — METOPROLOL SUCCINATE 50 MG/1
100 TABLET, EXTENDED RELEASE ORAL 2 TIMES DAILY
Status: DISCONTINUED | OUTPATIENT
Start: 2022-01-01 | End: 2022-01-01 | Stop reason: HOSPADM

## 2022-01-01 RX ORDER — BISACODYL 10 MG
10 SUPPOSITORY, RECTAL RECTAL DAILY PRN
Status: DISCONTINUED | OUTPATIENT
Start: 2022-01-01 | End: 2022-01-01 | Stop reason: HOSPADM

## 2022-01-01 RX ORDER — FUROSEMIDE 40 MG/1
40 TABLET ORAL DAILY
Status: DISCONTINUED | OUTPATIENT
Start: 2022-01-01 | End: 2022-01-01

## 2022-01-01 RX ORDER — DIGOXIN 125 MCG
125 TABLET ORAL
Status: DISCONTINUED | OUTPATIENT
Start: 2022-01-01 | End: 2022-01-01 | Stop reason: HOSPADM

## 2022-01-01 RX ORDER — GUAIFENESIN/DEXTROMETHORPHAN 100-10MG/5
5 SYRUP ORAL EVERY 4 HOURS PRN
Status: DISCONTINUED | OUTPATIENT
Start: 2022-01-01 | End: 2022-01-01 | Stop reason: HOSPADM

## 2022-01-01 RX ORDER — LEVALBUTEROL INHALATION SOLUTION 1.25 MG/3ML
1.25 SOLUTION RESPIRATORY (INHALATION) EVERY 4 HOURS PRN
Status: DISCONTINUED | OUTPATIENT
Start: 2022-01-01 | End: 2022-01-01 | Stop reason: HOSPADM

## 2022-01-01 RX ORDER — MIDODRINE HYDROCHLORIDE 10 MG/1
5 TABLET ORAL
Status: ON HOLD | COMMUNITY
End: 2022-01-01

## 2022-01-01 RX ORDER — PANTOPRAZOLE SODIUM 40 MG/1
40 TABLET, DELAYED RELEASE ORAL 2 TIMES DAILY
Qty: 60 TABLET | Refills: 0
Start: 2022-01-01 | End: 2022-01-01

## 2022-01-01 RX ORDER — LANOLIN ALCOHOL/MO/W.PET/CERES
3 CREAM (GRAM) TOPICAL NIGHTLY
COMMUNITY

## 2022-01-01 RX ORDER — NOREPINEPHRINE BIT/0.9 % NACL 8 MG/250ML
INFUSION BOTTLE (ML) INTRAVENOUS
Status: DISPENSED
Start: 2022-01-01 | End: 2022-01-01

## 2022-01-01 RX ORDER — MIDODRINE HYDROCHLORIDE 10 MG/1
10 TABLET ORAL
Qty: 90 TABLET | Refills: 0
Start: 2022-01-01 | End: 2022-01-01

## 2022-01-01 RX ORDER — DILTIAZEM HYDROCHLORIDE 5 MG/ML
10 INJECTION INTRAVENOUS ONCE
Status: COMPLETED | OUTPATIENT
Start: 2022-01-01 | End: 2022-01-01

## 2022-01-01 RX ORDER — POTASSIUM CHLORIDE 750 MG/1
20 CAPSULE, EXTENDED RELEASE ORAL 2 TIMES DAILY WITH MEALS
Status: COMPLETED | OUTPATIENT
Start: 2022-01-01 | End: 2022-01-01

## 2022-01-01 RX ORDER — POTASSIUM CHLORIDE 750 MG/1
20 CAPSULE, EXTENDED RELEASE ORAL 2 TIMES DAILY WITH MEALS
Status: DISCONTINUED | OUTPATIENT
Start: 2022-01-01 | End: 2022-01-01 | Stop reason: HOSPADM

## 2022-01-01 RX ORDER — DILTIAZEM HYDROCHLORIDE 180 MG/1
180 CAPSULE, COATED, EXTENDED RELEASE ORAL
Status: DISCONTINUED | OUTPATIENT
Start: 2022-01-01 | End: 2022-01-01 | Stop reason: HOSPADM

## 2022-01-01 RX ORDER — METOPROLOL SUCCINATE 25 MG/1
25 TABLET, EXTENDED RELEASE ORAL
Status: DISCONTINUED | OUTPATIENT
Start: 2022-01-01 | End: 2022-01-01

## 2022-01-01 RX ORDER — ACETAMINOPHEN 325 MG/1
650 TABLET ORAL 2 TIMES DAILY
Status: DISCONTINUED | OUTPATIENT
Start: 2022-01-01 | End: 2022-01-01

## 2022-01-01 RX ORDER — NITROGLYCERIN 0.4 MG/1
0.4 TABLET SUBLINGUAL
Status: DISCONTINUED | OUTPATIENT
Start: 2022-01-01 | End: 2022-01-01 | Stop reason: HOSPADM

## 2022-01-01 RX ORDER — BUMETANIDE 0.25 MG/ML
2 INJECTION INTRAMUSCULAR; INTRAVENOUS EVERY 12 HOURS
Status: DISCONTINUED | OUTPATIENT
Start: 2022-01-01 | End: 2022-01-01 | Stop reason: HOSPADM

## 2022-01-01 RX ORDER — FUROSEMIDE 10 MG/ML
40 INJECTION INTRAMUSCULAR; INTRAVENOUS DAILY
Status: DISCONTINUED | OUTPATIENT
Start: 2022-01-01 | End: 2022-01-01 | Stop reason: HOSPADM

## 2022-01-01 RX ORDER — HYDROCODONE BITARTRATE AND ACETAMINOPHEN 10; 325 MG/1; MG/1
1 TABLET ORAL 3 TIMES DAILY
Status: DISPENSED | OUTPATIENT
Start: 2022-01-01 | End: 2022-01-01

## 2022-01-01 RX ORDER — FLUTICASONE PROPIONATE 50 MCG
1 SPRAY, SUSPENSION (ML) NASAL EVERY 24 HOURS
Status: DISCONTINUED | OUTPATIENT
Start: 2022-01-01 | End: 2022-01-01 | Stop reason: HOSPADM

## 2022-01-01 RX ORDER — SPIRONOLACTONE 25 MG/1
25 TABLET ORAL DAILY
COMMUNITY
Start: 2022-01-01 | End: 2022-01-01 | Stop reason: HOSPADM

## 2022-01-01 RX ORDER — FAMOTIDINE 20 MG/1
40 TABLET, FILM COATED ORAL DAILY
Status: DISCONTINUED | OUTPATIENT
Start: 2022-01-01 | End: 2022-01-01 | Stop reason: HOSPADM

## 2022-01-01 RX ORDER — PANTOPRAZOLE SODIUM 40 MG/1
40 TABLET, DELAYED RELEASE ORAL NIGHTLY
Status: DISCONTINUED | OUTPATIENT
Start: 2022-01-01 | End: 2022-01-01 | Stop reason: HOSPADM

## 2022-01-01 RX ORDER — IPRATROPIUM BROMIDE AND ALBUTEROL SULFATE 2.5; .5 MG/3ML; MG/3ML
3 SOLUTION RESPIRATORY (INHALATION)
Qty: 360 ML | Refills: 0 | Status: ON HOLD | OUTPATIENT
Start: 2022-01-01 | End: 2022-01-01

## 2022-01-01 RX ORDER — BUMETANIDE 1 MG/1
2 TABLET ORAL 2 TIMES DAILY
Status: DISCONTINUED | OUTPATIENT
Start: 2022-01-01 | End: 2022-01-01

## 2022-01-01 RX ORDER — PROPOFOL 10 MG/ML
VIAL (ML) INTRAVENOUS AS NEEDED
Status: DISCONTINUED | OUTPATIENT
Start: 2022-01-01 | End: 2022-01-01 | Stop reason: SURG

## 2022-01-01 RX ORDER — SODIUM CHLORIDE 0.9 % (FLUSH) 0.9 %
10 SYRINGE (ML) INJECTION EVERY 12 HOURS SCHEDULED
Status: DISCONTINUED | OUTPATIENT
Start: 2022-01-01 | End: 2022-01-01 | Stop reason: HOSPADM

## 2022-01-01 RX ORDER — DIGOXIN 0.25 MG/ML
500 INJECTION INTRAMUSCULAR; INTRAVENOUS ONCE
Status: COMPLETED | OUTPATIENT
Start: 2022-01-01 | End: 2022-01-01

## 2022-01-01 RX ORDER — HYDROCODONE BITARTRATE AND ACETAMINOPHEN 5; 325 MG/1; MG/1
1 TABLET ORAL EVERY 6 HOURS PRN
Status: DISCONTINUED | OUTPATIENT
Start: 2022-01-01 | End: 2022-01-01 | Stop reason: HOSPADM

## 2022-01-01 RX ORDER — FLUTICASONE PROPIONATE 50 MCG
2 SPRAY, SUSPENSION (ML) NASAL DAILY
COMMUNITY

## 2022-01-01 RX ORDER — DIGOXIN 250 MCG
250 TABLET ORAL
Status: DISCONTINUED | OUTPATIENT
Start: 2022-01-01 | End: 2022-01-01

## 2022-01-01 RX ORDER — IPRATROPIUM BROMIDE AND ALBUTEROL SULFATE 2.5; .5 MG/3ML; MG/3ML
3 SOLUTION RESPIRATORY (INHALATION)
Status: DISCONTINUED | OUTPATIENT
Start: 2022-01-01 | End: 2022-01-01 | Stop reason: HOSPADM

## 2022-01-01 RX ORDER — ACETAMINOPHEN 500 MG
500 TABLET ORAL EVERY 6 HOURS
Status: DISCONTINUED | OUTPATIENT
Start: 2022-01-01 | End: 2022-01-01

## 2022-01-01 RX ORDER — MIDODRINE HYDROCHLORIDE 10 MG/1
10 TABLET ORAL
Status: DISCONTINUED | OUTPATIENT
Start: 2022-01-01 | End: 2022-01-01 | Stop reason: HOSPADM

## 2022-01-01 RX ORDER — BUMETANIDE 1 MG/1
2 TABLET ORAL 2 TIMES DAILY
COMMUNITY
Start: 2022-01-01 | End: 2022-01-01 | Stop reason: HOSPADM

## 2022-01-01 RX ORDER — ALUMINA, MAGNESIA, AND SIMETHICONE 2400; 2400; 240 MG/30ML; MG/30ML; MG/30ML
15 SUSPENSION ORAL EVERY 6 HOURS PRN
Status: CANCELLED | OUTPATIENT
Start: 2022-01-01

## 2022-01-01 RX ORDER — POTASSIUM CHLORIDE 750 MG/1
40 CAPSULE, EXTENDED RELEASE ORAL ONCE
Status: COMPLETED | OUTPATIENT
Start: 2022-01-01 | End: 2022-01-01

## 2022-01-01 RX ORDER — MIDODRINE HYDROCHLORIDE 10 MG/1
5 TABLET ORAL
Qty: 45 TABLET | Refills: 0 | Status: SHIPPED | OUTPATIENT
Start: 2022-01-01 | End: 2022-01-01 | Stop reason: HOSPADM

## 2022-01-01 RX ORDER — DILTIAZEM HCL IN NACL,ISO-OSM 125 MG/125
10 PLASTIC BAG, INJECTION (ML) INTRAVENOUS
Status: DISCONTINUED | OUTPATIENT
Start: 2022-01-01 | End: 2022-01-01

## 2022-01-01 RX ORDER — BUMETANIDE 2 MG/1
2 TABLET ORAL DAILY
Status: ON HOLD | COMMUNITY
Start: 2022-01-01 | End: 2022-01-01 | Stop reason: SDUPTHER

## 2022-01-01 RX ORDER — SPIRONOLACTONE 25 MG/1
25 TABLET ORAL DAILY
Status: CANCELLED | OUTPATIENT
Start: 2022-01-01

## 2022-01-01 RX ORDER — ACETAMINOPHEN 325 MG/1
650 TABLET ORAL EVERY 4 HOURS PRN
Status: CANCELLED | OUTPATIENT
Start: 2022-01-01

## 2022-01-01 RX ORDER — METOPROLOL SUCCINATE 100 MG/1
100 TABLET, EXTENDED RELEASE ORAL 2 TIMES DAILY
Qty: 60 TABLET | Refills: 0 | Status: SHIPPED | OUTPATIENT
Start: 2022-01-01 | End: 2022-01-01

## 2022-01-01 RX ORDER — METOLAZONE 5 MG/1
5 TABLET ORAL 2 TIMES DAILY
Status: DISCONTINUED | OUTPATIENT
Start: 2022-01-01 | End: 2022-01-01 | Stop reason: HOSPADM

## 2022-01-01 RX ORDER — POTASSIUM CHLORIDE 750 MG/1
30 CAPSULE, EXTENDED RELEASE ORAL ONCE
Status: COMPLETED | OUTPATIENT
Start: 2022-01-01 | End: 2022-01-01

## 2022-01-01 RX ORDER — HYDROCODONE BITARTRATE AND ACETAMINOPHEN 10; 325 MG/1; MG/1
1 TABLET ORAL EVERY 8 HOURS PRN
Qty: 15 TABLET | Refills: 0 | Status: SHIPPED | OUTPATIENT
Start: 2022-01-01 | End: 2022-01-01 | Stop reason: HOSPADM

## 2022-01-01 RX ORDER — DIGOXIN 125 MCG
125 TABLET ORAL DAILY
Status: DISCONTINUED | OUTPATIENT
Start: 2022-01-01 | End: 2022-01-01 | Stop reason: HOSPADM

## 2022-01-01 RX ORDER — GABAPENTIN 100 MG/1
100 CAPSULE ORAL EVERY 12 HOURS SCHEDULED
Status: DISCONTINUED | OUTPATIENT
Start: 2022-01-01 | End: 2022-01-01 | Stop reason: HOSPADM

## 2022-01-01 RX ORDER — METOPROLOL SUCCINATE 100 MG/1
100 TABLET, EXTENDED RELEASE ORAL 2 TIMES DAILY
Qty: 60 TABLET | Refills: 0 | Status: ON HOLD | OUTPATIENT
Start: 2022-01-01 | End: 2022-01-01

## 2022-01-01 RX ORDER — DULOXETIN HYDROCHLORIDE 30 MG/1
30 CAPSULE, DELAYED RELEASE ORAL DAILY
Status: DISCONTINUED | OUTPATIENT
Start: 2022-01-01 | End: 2022-01-01 | Stop reason: HOSPADM

## 2022-01-01 RX ORDER — FUROSEMIDE 10 MG/ML
20 INJECTION INTRAMUSCULAR; INTRAVENOUS EVERY 12 HOURS
Status: CANCELLED | OUTPATIENT
Start: 2022-01-01

## 2022-01-01 RX ORDER — FUROSEMIDE 10 MG/ML
40 INJECTION INTRAMUSCULAR; INTRAVENOUS DAILY
Status: DISCONTINUED | OUTPATIENT
Start: 2022-01-01 | End: 2022-01-01

## 2022-01-01 RX ORDER — SODIUM CHLORIDE 0.9 % (FLUSH) 0.9 %
10 SYRINGE (ML) INJECTION EVERY 12 HOURS SCHEDULED
Status: CANCELLED | OUTPATIENT
Start: 2022-01-01

## 2022-01-01 RX ORDER — ALUMINA, MAGNESIA, AND SIMETHICONE 2400; 2400; 240 MG/30ML; MG/30ML; MG/30ML
15 SUSPENSION ORAL EVERY 6 HOURS PRN
Status: DISCONTINUED | OUTPATIENT
Start: 2022-01-01 | End: 2022-01-01 | Stop reason: HOSPADM

## 2022-01-01 RX ORDER — CEFAZOLIN SODIUM 2 G/100ML
2 INJECTION, SOLUTION INTRAVENOUS EVERY 12 HOURS
Status: DISCONTINUED | OUTPATIENT
Start: 2022-01-01 | End: 2022-01-01 | Stop reason: HOSPADM

## 2022-01-01 RX ORDER — HYDROCODONE BITARTRATE AND ACETAMINOPHEN 5; 325 MG/1; MG/1
1 TABLET ORAL EVERY 4 HOURS PRN
Status: DISCONTINUED | OUTPATIENT
Start: 2022-01-01 | End: 2022-01-01 | Stop reason: HOSPADM

## 2022-01-01 RX ORDER — MORPHINE SULFATE 2 MG/ML
2 INJECTION, SOLUTION INTRAMUSCULAR; INTRAVENOUS
Status: DISPENSED | OUTPATIENT
Start: 2022-01-01 | End: 2022-01-01

## 2022-01-01 RX ORDER — DILTIAZEM HYDROCHLORIDE 180 MG/1
180 CAPSULE, COATED, EXTENDED RELEASE ORAL
Qty: 30 CAPSULE | Refills: 0
Start: 2022-01-01 | End: 2022-01-01

## 2022-01-01 RX ORDER — ONDANSETRON 2 MG/ML
4 INJECTION INTRAMUSCULAR; INTRAVENOUS EVERY 6 HOURS PRN
Status: DISCONTINUED | OUTPATIENT
Start: 2022-01-01 | End: 2022-01-01 | Stop reason: HOSPADM

## 2022-01-01 RX ORDER — METOPROLOL SUCCINATE 50 MG/1
50 TABLET, EXTENDED RELEASE ORAL ONCE
Status: COMPLETED | OUTPATIENT
Start: 2022-01-01 | End: 2022-01-01

## 2022-01-01 RX ORDER — TEMAZEPAM 15 MG/1
15 CAPSULE ORAL NIGHTLY PRN
Status: CANCELLED | OUTPATIENT
Start: 2022-01-01 | End: 2022-01-01

## 2022-01-01 RX ORDER — CEPHALEXIN 500 MG/1
500 CAPSULE ORAL EVERY 8 HOURS SCHEDULED
Status: DISCONTINUED | OUTPATIENT
Start: 2022-01-01 | End: 2022-01-01 | Stop reason: HOSPADM

## 2022-01-01 RX ORDER — ONDANSETRON 2 MG/ML
4 INJECTION INTRAMUSCULAR; INTRAVENOUS EVERY 4 HOURS PRN
Status: DISCONTINUED | OUTPATIENT
Start: 2022-01-01 | End: 2022-01-01 | Stop reason: HOSPADM

## 2022-01-01 RX ORDER — METOPROLOL SUCCINATE 25 MG/1
25 TABLET, EXTENDED RELEASE ORAL EVERY 12 HOURS SCHEDULED
Status: DISCONTINUED | OUTPATIENT
Start: 2022-01-01 | End: 2022-01-01 | Stop reason: HOSPADM

## 2022-01-01 RX ORDER — HYDROCODONE BITARTRATE AND ACETAMINOPHEN 5; 325 MG/1; MG/1
1 TABLET ORAL EVERY 6 HOURS PRN
Status: DISPENSED | OUTPATIENT
Start: 2022-01-01 | End: 2022-01-01

## 2022-01-01 RX ORDER — FUROSEMIDE 10 MG/ML
40 INJECTION INTRAMUSCULAR; INTRAVENOUS ONCE
Status: COMPLETED | OUTPATIENT
Start: 2022-01-01 | End: 2022-01-01

## 2022-01-01 RX ORDER — NOREPINEPHRINE BIT/0.9 % NACL 8 MG/250ML
.02-.3 INFUSION BOTTLE (ML) INTRAVENOUS
Status: DISCONTINUED | OUTPATIENT
Start: 2022-01-01 | End: 2022-01-01

## 2022-01-01 RX ORDER — BISACODYL 5 MG/1
5 TABLET, DELAYED RELEASE ORAL DAILY PRN
Status: DISCONTINUED | OUTPATIENT
Start: 2022-01-01 | End: 2022-01-01 | Stop reason: HOSPADM

## 2022-01-01 RX ORDER — METOPROLOL SUCCINATE 25 MG/1
25 TABLET, EXTENDED RELEASE ORAL EVERY 12 HOURS SCHEDULED
Status: DISCONTINUED | OUTPATIENT
Start: 2022-01-01 | End: 2022-01-01

## 2022-01-01 RX ORDER — FUROSEMIDE 10 MG/ML
80 INJECTION INTRAMUSCULAR; INTRAVENOUS EVERY 12 HOURS
Status: DISCONTINUED | OUTPATIENT
Start: 2022-01-01 | End: 2022-01-01

## 2022-01-01 RX ORDER — TEMAZEPAM 15 MG/1
15 CAPSULE ORAL NIGHTLY PRN
Status: DISCONTINUED | OUTPATIENT
Start: 2022-01-01 | End: 2022-01-01 | Stop reason: HOSPADM

## 2022-01-01 RX ORDER — TEMAZEPAM 15 MG/1
15 CAPSULE ORAL NIGHTLY PRN
Status: ACTIVE | OUTPATIENT
Start: 2022-01-01 | End: 2022-01-01

## 2022-01-01 RX ORDER — ACETAMINOPHEN 325 MG/1
650 TABLET ORAL EVERY 4 HOURS PRN
Status: DISCONTINUED | OUTPATIENT
Start: 2022-01-01 | End: 2022-01-01 | Stop reason: HOSPADM

## 2022-01-01 RX ORDER — BISACODYL 5 MG/1
5 TABLET, DELAYED RELEASE ORAL DAILY PRN
Status: CANCELLED | OUTPATIENT
Start: 2022-01-01

## 2022-01-01 RX ORDER — AMOXICILLIN 250 MG
1 CAPSULE ORAL 2 TIMES DAILY
Status: CANCELLED | OUTPATIENT
Start: 2022-01-01

## 2022-01-01 RX ORDER — METOPROLOL SUCCINATE 50 MG/1
50 TABLET, EXTENDED RELEASE ORAL EVERY 12 HOURS SCHEDULED
Status: DISCONTINUED | OUTPATIENT
Start: 2022-01-01 | End: 2022-01-01

## 2022-01-01 RX ORDER — METOPROLOL SUCCINATE 25 MG/1
50 TABLET, EXTENDED RELEASE ORAL EVERY 12 HOURS SCHEDULED
Status: DISCONTINUED | OUTPATIENT
Start: 2022-01-01 | End: 2022-01-01

## 2022-01-01 RX ORDER — MORPHINE SULFATE 2 MG/ML
2 INJECTION, SOLUTION INTRAMUSCULAR; INTRAVENOUS
Status: DISCONTINUED | OUTPATIENT
Start: 2022-01-01 | End: 2022-01-01 | Stop reason: HOSPADM

## 2022-01-01 RX ORDER — NITROGLYCERIN 0.4 MG/1
0.4 TABLET SUBLINGUAL
Status: CANCELLED | OUTPATIENT
Start: 2022-01-01

## 2022-01-01 RX ORDER — AMOXICILLIN 250 MG
2 CAPSULE ORAL NIGHTLY
Status: DISCONTINUED | OUTPATIENT
Start: 2022-01-01 | End: 2022-01-01 | Stop reason: HOSPADM

## 2022-01-01 RX ORDER — DEXTROSE MONOHYDRATE 25 G/50ML
25 INJECTION, SOLUTION INTRAVENOUS ONCE
Status: COMPLETED | OUTPATIENT
Start: 2022-01-01 | End: 2022-01-01

## 2022-01-01 RX ORDER — DIGOXIN 125 MCG
125 TABLET ORAL
Status: DISCONTINUED | OUTPATIENT
Start: 2022-01-01 | End: 2022-01-01

## 2022-01-01 RX ORDER — HYDROCODONE BITARTRATE AND ACETAMINOPHEN 10; 325 MG/1; MG/1
1 TABLET ORAL EVERY 8 HOURS PRN
Status: ACTIVE | OUTPATIENT
Start: 2022-01-01 | End: 2022-01-01

## 2022-01-01 RX ORDER — POTASSIUM CHLORIDE 750 MG/1
10 TABLET, FILM COATED, EXTENDED RELEASE ORAL 2 TIMES DAILY
Qty: 20 TABLET | Refills: 0 | Status: SHIPPED | OUTPATIENT
Start: 2022-01-01 | End: 2022-01-01

## 2022-01-01 RX ORDER — DILTIAZEM HCL IN NACL,ISO-OSM 125 MG/125
5 PLASTIC BAG, INJECTION (ML) INTRAVENOUS
Status: DISCONTINUED | OUTPATIENT
Start: 2022-01-01 | End: 2022-01-01 | Stop reason: HOSPADM

## 2022-01-01 RX ORDER — SODIUM CHLORIDE 0.9 % (FLUSH) 0.9 %
10 SYRINGE (ML) INJECTION AS NEEDED
Status: CANCELLED | OUTPATIENT
Start: 2022-01-01

## 2022-01-01 RX ORDER — ATORVASTATIN CALCIUM 20 MG/1
20 TABLET, FILM COATED ORAL DAILY
Qty: 30 TABLET | Refills: 0 | Status: SHIPPED | OUTPATIENT
Start: 2022-01-01 | End: 2022-01-01

## 2022-01-01 RX ORDER — IPRATROPIUM BROMIDE AND ALBUTEROL SULFATE 2.5; .5 MG/3ML; MG/3ML
SOLUTION RESPIRATORY (INHALATION)
Status: COMPLETED
Start: 2022-01-01 | End: 2022-01-01

## 2022-01-01 RX ORDER — HYDROCODONE BITARTRATE AND ACETAMINOPHEN 10; 325 MG/1; MG/1
1 TABLET ORAL EVERY 6 HOURS PRN
Status: DISCONTINUED | OUTPATIENT
Start: 2022-01-01 | End: 2022-01-01 | Stop reason: HOSPADM

## 2022-01-01 RX ORDER — FUROSEMIDE 10 MG/ML
20 INJECTION INTRAMUSCULAR; INTRAVENOUS EVERY 12 HOURS
Status: DISCONTINUED | OUTPATIENT
Start: 2022-01-01 | End: 2022-01-01

## 2022-01-01 RX ORDER — BUMETANIDE 2 MG/1
2 TABLET ORAL 2 TIMES DAILY
COMMUNITY
End: 2022-01-01 | Stop reason: HOSPADM

## 2022-01-01 RX ORDER — METOPROLOL SUCCINATE 50 MG/1
100 TABLET, EXTENDED RELEASE ORAL EVERY 12 HOURS SCHEDULED
Status: DISCONTINUED | OUTPATIENT
Start: 2022-01-01 | End: 2022-01-01 | Stop reason: HOSPADM

## 2022-01-01 RX ORDER — METOPROLOL SUCCINATE 50 MG/1
100 TABLET, EXTENDED RELEASE ORAL EVERY 12 HOURS SCHEDULED
Status: DISCONTINUED | OUTPATIENT
Start: 2022-01-01 | End: 2022-01-01

## 2022-01-01 RX ORDER — DEXTROSE MONOHYDRATE 25 G/50ML
INJECTION, SOLUTION INTRAVENOUS
Status: COMPLETED
Start: 2022-01-01 | End: 2022-01-01

## 2022-01-01 RX ORDER — MORPHINE SULFATE 2 MG/ML
2 INJECTION, SOLUTION INTRAMUSCULAR; INTRAVENOUS
Status: CANCELLED | OUTPATIENT
Start: 2022-01-01 | End: 2022-01-01

## 2022-01-01 RX ORDER — CEFTRIAXONE SODIUM 1 G/50ML
1 INJECTION, SOLUTION INTRAVENOUS ONCE
Status: COMPLETED | OUTPATIENT
Start: 2022-01-01 | End: 2022-01-01

## 2022-01-01 RX ORDER — AMOXICILLIN AND CLAVULANATE POTASSIUM 875; 125 MG/1; MG/1
1 TABLET, FILM COATED ORAL ONCE
Status: COMPLETED | OUTPATIENT
Start: 2022-01-01 | End: 2022-01-01

## 2022-01-01 RX ORDER — NALOXONE HCL 0.4 MG/ML
0.4 VIAL (ML) INJECTION
Status: CANCELLED | OUTPATIENT
Start: 2022-01-01

## 2022-01-01 RX ORDER — LIDOCAINE HYDROCHLORIDE 10 MG/ML
5 INJECTION, SOLUTION EPIDURAL; INFILTRATION; INTRACAUDAL; PERINEURAL ONCE
Status: COMPLETED | OUTPATIENT
Start: 2022-01-01 | End: 2022-01-01

## 2022-01-01 RX ORDER — POTASSIUM CHLORIDE 750 MG/1
20 CAPSULE, EXTENDED RELEASE ORAL DAILY
Status: DISCONTINUED | OUTPATIENT
Start: 2022-01-01 | End: 2022-01-01

## 2022-01-01 RX ORDER — METOPROLOL SUCCINATE 50 MG/1
50 TABLET, EXTENDED RELEASE ORAL DAILY
COMMUNITY
End: 2022-01-01 | Stop reason: HOSPADM

## 2022-01-01 RX ORDER — HYDROCODONE BITARTRATE AND ACETAMINOPHEN 5; 325 MG/1; MG/1
1 TABLET ORAL EVERY 4 HOURS PRN
Status: DISPENSED | OUTPATIENT
Start: 2022-01-01 | End: 2022-01-01

## 2022-01-01 RX ORDER — FUROSEMIDE 10 MG/ML
40 INJECTION INTRAMUSCULAR; INTRAVENOUS EVERY 12 HOURS
Status: DISCONTINUED | OUTPATIENT
Start: 2022-01-01 | End: 2022-01-01 | Stop reason: HOSPADM

## 2022-01-01 RX ORDER — METOPROLOL SUCCINATE 25 MG/1
25 TABLET, EXTENDED RELEASE ORAL 2 TIMES DAILY
Status: DISCONTINUED | OUTPATIENT
Start: 2022-01-01 | End: 2022-01-01

## 2022-01-01 RX ORDER — CEFTRIAXONE SODIUM 1 G/50ML
1 INJECTION, SOLUTION INTRAVENOUS DAILY
Status: DISCONTINUED | OUTPATIENT
Start: 2022-01-01 | End: 2022-01-01 | Stop reason: HOSPADM

## 2022-01-01 RX ORDER — METOLAZONE 5 MG/1
5 TABLET ORAL 2 TIMES DAILY
Status: DISCONTINUED | OUTPATIENT
Start: 2022-01-01 | End: 2022-01-01

## 2022-01-01 RX ORDER — GABAPENTIN 100 MG/1
100 CAPSULE ORAL EVERY 12 HOURS SCHEDULED
Qty: 30 CAPSULE | Refills: 0 | Status: ON HOLD | OUTPATIENT
Start: 2022-01-01 | End: 2022-01-01

## 2022-01-01 RX ORDER — ACETAMINOPHEN 325 MG/1
650 TABLET ORAL EVERY 8 HOURS PRN
Status: DISCONTINUED | OUTPATIENT
Start: 2022-01-01 | End: 2022-01-01 | Stop reason: HOSPADM

## 2022-01-01 RX ORDER — POLYETHYLENE GLYCOL 3350 17 G/17G
17 POWDER, FOR SOLUTION ORAL DAILY PRN
Status: DISCONTINUED | OUTPATIENT
Start: 2022-01-01 | End: 2022-01-01 | Stop reason: HOSPADM

## 2022-01-01 RX ORDER — ALPRAZOLAM 0.25 MG/1
0.25 TABLET ORAL EVERY 6 HOURS PRN
Status: CANCELLED | OUTPATIENT
Start: 2022-01-01 | End: 2022-01-01

## 2022-01-01 RX ORDER — ACETAMINOPHEN 325 MG/1
650 TABLET ORAL EVERY 6 HOURS PRN
Status: DISCONTINUED | OUTPATIENT
Start: 2022-01-01 | End: 2022-01-01 | Stop reason: HOSPADM

## 2022-01-01 RX ORDER — DEXTROSE AND SODIUM CHLORIDE 5; .9 G/100ML; G/100ML
100 INJECTION, SOLUTION INTRAVENOUS CONTINUOUS
Status: DISCONTINUED | OUTPATIENT
Start: 2022-01-01 | End: 2022-01-01

## 2022-01-01 RX ORDER — ATORVASTATIN CALCIUM 20 MG/1
20 TABLET, FILM COATED ORAL DAILY
COMMUNITY

## 2022-01-01 RX ORDER — MIDODRINE HYDROCHLORIDE 2.5 MG/1
5 TABLET ORAL
Status: DISCONTINUED | OUTPATIENT
Start: 2022-01-01 | End: 2022-01-01 | Stop reason: HOSPADM

## 2022-01-01 RX ORDER — CEFAZOLIN SODIUM 2 G/100ML
2 INJECTION, SOLUTION INTRAVENOUS EVERY 12 HOURS
Qty: 500 ML | Refills: 0
Start: 2022-01-01 | End: 2022-01-01

## 2022-01-01 RX ORDER — BUMETANIDE 0.25 MG/ML
1 INJECTION INTRAMUSCULAR; INTRAVENOUS EVERY 12 HOURS
Status: DISCONTINUED | OUTPATIENT
Start: 2022-01-01 | End: 2022-01-01 | Stop reason: HOSPADM

## 2022-01-01 RX ORDER — MIDODRINE HYDROCHLORIDE 10 MG/1
5 TABLET ORAL
Qty: 45 TABLET | Refills: 0 | Status: SHIPPED | OUTPATIENT
Start: 2022-01-01 | End: 2022-01-01

## 2022-01-01 RX ORDER — BUMETANIDE 2 MG/1
2 TABLET ORAL DAILY
Qty: 10 TABLET | Refills: 0 | Status: ON HOLD | OUTPATIENT
Start: 2022-01-01 | End: 2022-01-01

## 2022-01-01 RX ORDER — DULOXETIN HYDROCHLORIDE 30 MG/1
30 CAPSULE, DELAYED RELEASE ORAL DAILY
COMMUNITY
Start: 2022-01-01

## 2022-01-01 RX ORDER — LIDOCAINE HYDROCHLORIDE 20 MG/ML
INJECTION, SOLUTION EPIDURAL; INFILTRATION; INTRACAUDAL; PERINEURAL AS NEEDED
Status: DISCONTINUED | OUTPATIENT
Start: 2022-01-01 | End: 2022-01-01 | Stop reason: SURG

## 2022-01-01 RX ORDER — CETIRIZINE HYDROCHLORIDE 10 MG/1
10 TABLET ORAL DAILY
Status: DISCONTINUED | OUTPATIENT
Start: 2022-01-01 | End: 2022-01-01 | Stop reason: HOSPADM

## 2022-01-01 RX ORDER — TRAMADOL HYDROCHLORIDE 50 MG/1
TABLET ORAL
COMMUNITY
Start: 2022-01-01 | End: 2022-01-01 | Stop reason: HOSPADM

## 2022-01-01 RX ORDER — METOPROLOL SUCCINATE 50 MG/1
50 TABLET, EXTENDED RELEASE ORAL 2 TIMES DAILY
Status: DISCONTINUED | OUTPATIENT
Start: 2022-01-01 | End: 2022-01-01

## 2022-01-01 RX ORDER — HYDROCODONE BITARTRATE AND ACETAMINOPHEN 5; 325 MG/1; MG/1
1 TABLET ORAL EVERY 6 HOURS PRN
Status: ACTIVE | OUTPATIENT
Start: 2022-01-01 | End: 2022-01-01

## 2022-01-01 RX ORDER — METOPROLOL SUCCINATE 25 MG/1
25 TABLET, EXTENDED RELEASE ORAL EVERY 12 HOURS SCHEDULED
Qty: 60 TABLET | Refills: 0 | Status: SHIPPED | OUTPATIENT
Start: 2022-01-01 | End: 2022-01-01 | Stop reason: HOSPADM

## 2022-01-01 RX ORDER — DULOXETIN HYDROCHLORIDE 30 MG/1
30 CAPSULE, DELAYED RELEASE ORAL DAILY
Status: CANCELLED | OUTPATIENT
Start: 2022-01-01

## 2022-01-01 RX ORDER — AZITHROMYCIN 250 MG/1
250 TABLET, FILM COATED ORAL DAILY
Qty: 3 TABLET | Refills: 0 | Status: SHIPPED | OUTPATIENT
Start: 2022-01-01 | End: 2022-01-01

## 2022-01-01 RX ORDER — TRIAMCINOLONE ACETONIDE 1 MG/ML
LOTION TOPICAL EVERY OTHER DAY
Status: DISCONTINUED | OUTPATIENT
Start: 2022-01-01 | End: 2022-01-01 | Stop reason: HOSPADM

## 2022-01-01 RX ORDER — HYDROCODONE BITARTRATE AND ACETAMINOPHEN 5; 325 MG/1; MG/1
1 TABLET ORAL EVERY 4 HOURS PRN
Status: CANCELLED | OUTPATIENT
Start: 2022-01-01 | End: 2022-01-01

## 2022-01-01 RX ORDER — CEFEPIME 1 G/50ML
2 INJECTION, SOLUTION INTRAVENOUS EVERY 12 HOURS
Status: COMPLETED | OUTPATIENT
Start: 2022-01-01 | End: 2022-01-01

## 2022-01-01 RX ORDER — ALBUMIN (HUMAN) 12.5 G/50ML
25 SOLUTION INTRAVENOUS ONCE
Status: COMPLETED | OUTPATIENT
Start: 2022-01-01 | End: 2022-01-01

## 2022-01-01 RX ORDER — SODIUM CHLORIDE, SODIUM LACTATE, POTASSIUM CHLORIDE, CALCIUM CHLORIDE 600; 310; 30; 20 MG/100ML; MG/100ML; MG/100ML; MG/100ML
30 INJECTION, SOLUTION INTRAVENOUS CONTINUOUS
Status: DISCONTINUED | OUTPATIENT
Start: 2022-01-01 | End: 2022-01-01 | Stop reason: HOSPADM

## 2022-01-01 RX ORDER — FUROSEMIDE 10 MG/ML
60 INJECTION INTRAMUSCULAR; INTRAVENOUS ONCE
Status: COMPLETED | OUTPATIENT
Start: 2022-01-01 | End: 2022-01-01

## 2022-01-01 RX ORDER — SPIRONOLACTONE 25 MG/1
25 TABLET ORAL DAILY
COMMUNITY
End: 2022-01-01 | Stop reason: HOSPADM

## 2022-01-01 RX ORDER — ALPRAZOLAM 0.25 MG/1
0.25 TABLET ORAL EVERY 6 HOURS PRN
Status: DISPENSED | OUTPATIENT
Start: 2022-01-01 | End: 2022-01-01

## 2022-01-01 RX ORDER — GABAPENTIN 100 MG/1
100 CAPSULE ORAL EVERY 12 HOURS SCHEDULED
Status: CANCELLED | OUTPATIENT
Start: 2022-01-01

## 2022-01-01 RX ORDER — OXYCODONE AND ACETAMINOPHEN 10; 325 MG/1; MG/1
1 TABLET ORAL 3 TIMES DAILY
Status: COMPLETED | OUTPATIENT
Start: 2022-01-01 | End: 2022-01-01

## 2022-01-01 RX ORDER — ACETAMINOPHEN 325 MG/1
650 TABLET ORAL EVERY 4 HOURS PRN
COMMUNITY
End: 2022-01-01 | Stop reason: HOSPADM

## 2022-01-01 RX ORDER — METOPROLOL SUCCINATE 25 MG/1
25 TABLET, EXTENDED RELEASE ORAL EVERY 12 HOURS SCHEDULED
Qty: 60 TABLET | Refills: 0 | Status: SHIPPED | OUTPATIENT
Start: 2022-01-01 | End: 2022-01-01

## 2022-01-01 RX ORDER — DILTIAZEM HCL IN NACL,ISO-OSM 125 MG/125
5-15 PLASTIC BAG, INJECTION (ML) INTRAVENOUS
Status: DISCONTINUED | OUTPATIENT
Start: 2022-01-01 | End: 2022-01-01 | Stop reason: SDUPTHER

## 2022-01-01 RX ORDER — HYDROCODONE BITARTRATE AND ACETAMINOPHEN 5; 325 MG/1; MG/1
1 TABLET ORAL
Status: DISPENSED | OUTPATIENT
Start: 2022-01-01 | End: 2022-01-01

## 2022-01-01 RX ORDER — METOLAZONE 5 MG/1
5 TABLET ORAL 2 TIMES DAILY
Qty: 60 TABLET | Refills: 0 | Status: SHIPPED | OUTPATIENT
Start: 2022-01-01 | End: 2022-01-01 | Stop reason: HOSPADM

## 2022-01-01 RX ORDER — BUMETANIDE 1 MG/1
2 TABLET ORAL DAILY
Status: DISCONTINUED | OUTPATIENT
Start: 2022-01-01 | End: 2022-01-01

## 2022-01-01 RX ORDER — DIGOXIN 125 MCG
125 TABLET ORAL
Qty: 30 TABLET | Refills: 0 | Status: SHIPPED | OUTPATIENT
Start: 2022-01-01 | End: 2022-01-01

## 2022-01-01 RX ORDER — FUROSEMIDE 10 MG/ML
80 INJECTION INTRAMUSCULAR; INTRAVENOUS ONCE
Status: COMPLETED | OUTPATIENT
Start: 2022-01-01 | End: 2022-01-01

## 2022-01-01 RX ORDER — PANTOPRAZOLE SODIUM 40 MG/10ML
80 INJECTION, POWDER, LYOPHILIZED, FOR SOLUTION INTRAVENOUS
Status: DISCONTINUED | OUTPATIENT
Start: 2022-01-01 | End: 2022-01-01 | Stop reason: HOSPADM

## 2022-01-01 RX ORDER — TRIAMCINOLONE ACETONIDE 1 MG/ML
LOTION TOPICAL EVERY OTHER DAY
Status: CANCELLED | OUTPATIENT
Start: 2022-01-01

## 2022-01-01 RX ORDER — HYDROCODONE BITARTRATE AND ACETAMINOPHEN 5; 325 MG/1; MG/1
2 TABLET ORAL EVERY 4 HOURS PRN
Status: DISCONTINUED | OUTPATIENT
Start: 2022-01-01 | End: 2022-01-01

## 2022-01-01 RX ORDER — GABAPENTIN 100 MG/1
100 CAPSULE ORAL 2 TIMES DAILY
Status: ON HOLD | COMMUNITY
End: 2022-01-01

## 2022-01-01 RX ORDER — OXYMETAZOLINE HYDROCHLORIDE 0.05 G/100ML
2 SPRAY NASAL AS NEEDED
Status: DISCONTINUED | OUTPATIENT
Start: 2022-01-01 | End: 2022-01-01 | Stop reason: HOSPADM

## 2022-01-01 RX ORDER — PSEUDOEPHEDRINE HYDROCHLORIDE 60 MG/1
60 TABLET, FILM COATED ORAL EVERY 12 HOURS PRN
COMMUNITY
End: 2022-01-01 | Stop reason: HOSPADM

## 2022-01-01 RX ORDER — ENOXAPARIN SODIUM 100 MG/ML
40 INJECTION SUBCUTANEOUS EVERY 24 HOURS
Status: DISCONTINUED | OUTPATIENT
Start: 2022-01-01 | End: 2022-01-01

## 2022-01-01 RX ORDER — METOLAZONE 5 MG/1
5 TABLET ORAL 2 TIMES DAILY
Status: ON HOLD | COMMUNITY
Start: 2022-01-01 | End: 2022-01-01

## 2022-01-01 RX ORDER — CEFAZOLIN SODIUM 2 G/100ML
2 INJECTION, SOLUTION INTRAVENOUS EVERY 8 HOURS
Status: DISCONTINUED | OUTPATIENT
Start: 2022-01-01 | End: 2022-01-01 | Stop reason: DRUGHIGH

## 2022-01-01 RX ORDER — METOLAZONE 5 MG/1
10 TABLET ORAL DAILY
Status: DISCONTINUED | OUTPATIENT
Start: 2022-01-01 | End: 2022-01-01 | Stop reason: HOSPADM

## 2022-01-01 RX ORDER — FUROSEMIDE 10 MG/ML
40 INJECTION INTRAMUSCULAR; INTRAVENOUS DAILY
Status: CANCELLED | OUTPATIENT
Start: 2022-01-01

## 2022-01-01 RX ORDER — HYDROCODONE BITARTRATE AND ACETAMINOPHEN 7.5; 325 MG/1; MG/1
1 TABLET ORAL 3 TIMES DAILY
Status: DISCONTINUED | OUTPATIENT
Start: 2022-01-01 | End: 2022-01-01

## 2022-01-01 RX ORDER — DIGOXIN 125 MCG
125 TABLET ORAL
Qty: 30 TABLET | Refills: 0 | Status: ON HOLD | OUTPATIENT
Start: 2022-01-01 | End: 2022-01-01

## 2022-01-01 RX ORDER — NICOTINE POLACRILEX 4 MG
15 LOZENGE BUCCAL
Status: DISCONTINUED | OUTPATIENT
Start: 2022-01-01 | End: 2022-01-01 | Stop reason: HOSPADM

## 2022-01-01 RX ORDER — HYDROCODONE BITARTRATE AND ACETAMINOPHEN 7.5; 325 MG/1; MG/1
1 TABLET ORAL 4 TIMES DAILY
Qty: 12 TABLET | Refills: 0
Start: 2022-01-01 | End: 2022-01-01

## 2022-01-01 RX ORDER — BUMETANIDE 2 MG/1
2 TABLET ORAL 2 TIMES DAILY
Qty: 30 TABLET | Refills: 0 | Status: ON HOLD | OUTPATIENT
Start: 2022-01-01 | End: 2022-01-01

## 2022-01-01 RX ORDER — HYDROCODONE BITARTRATE AND ACETAMINOPHEN 5; 325 MG/1; MG/1
1 TABLET ORAL 3 TIMES DAILY
Status: DISCONTINUED | OUTPATIENT
Start: 2022-01-01 | End: 2022-01-01

## 2022-01-01 RX ORDER — METOLAZONE 5 MG/1
10 TABLET ORAL
Status: DISCONTINUED | OUTPATIENT
Start: 2022-01-01 | End: 2022-01-01 | Stop reason: HOSPADM

## 2022-01-01 RX ORDER — BISACODYL 5 MG/1
10 TABLET, DELAYED RELEASE ORAL DAILY PRN
Status: DISCONTINUED | OUTPATIENT
Start: 2022-01-01 | End: 2022-01-01 | Stop reason: HOSPADM

## 2022-01-01 RX ORDER — HYDROCODONE BITARTRATE AND ACETAMINOPHEN 7.5; 325 MG/1; MG/1
1 TABLET ORAL 4 TIMES DAILY
Status: DISCONTINUED | OUTPATIENT
Start: 2022-01-01 | End: 2022-01-01 | Stop reason: HOSPADM

## 2022-01-01 RX ADMIN — BUMETANIDE 2 MG: 0.25 INJECTION INTRAMUSCULAR; INTRAVENOUS at 23:48

## 2022-01-01 RX ADMIN — ACETAMINOPHEN 500 MG: 500 TABLET, FILM COATED ORAL at 08:01

## 2022-01-01 RX ADMIN — ACETAMINOPHEN 650 MG: 325 TABLET ORAL at 20:22

## 2022-01-01 RX ADMIN — APIXABAN 5 MG: 5 TABLET, FILM COATED ORAL at 08:38

## 2022-01-01 RX ADMIN — TRIAMCINOLONE ACETONIDE: 1 LOTION TOPICAL at 08:08

## 2022-01-01 RX ADMIN — MIDODRINE HYDROCHLORIDE 5 MG: 10 TABLET ORAL at 17:29

## 2022-01-01 RX ADMIN — APIXABAN 5 MG: 5 TABLET, FILM COATED ORAL at 21:40

## 2022-01-01 RX ADMIN — GABAPENTIN 100 MG: 100 CAPSULE ORAL at 20:46

## 2022-01-01 RX ADMIN — MORPHINE SULFATE 2 MG: 2 INJECTION, SOLUTION INTRAMUSCULAR; INTRAVENOUS at 13:18

## 2022-01-01 RX ADMIN — DILTIAZEM HYDROCHLORIDE 180 MG: 180 CAPSULE, COATED, EXTENDED RELEASE ORAL at 08:54

## 2022-01-01 RX ADMIN — APIXABAN 2.5 MG: 2.5 TABLET, FILM COATED ORAL at 20:59

## 2022-01-01 RX ADMIN — SALINE NASAL SPRAY 2 SPRAY: 1.5 SOLUTION NASAL at 16:07

## 2022-01-01 RX ADMIN — FUROSEMIDE 40 MG: 10 INJECTION, SOLUTION INTRAMUSCULAR; INTRAVENOUS at 09:40

## 2022-01-01 RX ADMIN — MIDODRINE HYDROCHLORIDE 10 MG: 10 TABLET ORAL at 11:37

## 2022-01-01 RX ADMIN — HYDROCODONE POLISTIREX AND CHLORPHENIRAMINE POLISTIREX 5 ML: 10; 8 SUSPENSION, EXTENDED RELEASE ORAL at 02:25

## 2022-01-01 RX ADMIN — SPIRONOLACTONE 25 MG: 25 TABLET ORAL at 08:41

## 2022-01-01 RX ADMIN — SENNOSIDES AND DOCUSATE SODIUM 1 TABLET: 50; 8.6 TABLET ORAL at 08:16

## 2022-01-01 RX ADMIN — METOPROLOL SUCCINATE 100 MG: 50 TABLET, EXTENDED RELEASE ORAL at 08:58

## 2022-01-01 RX ADMIN — OXYCODONE HYDROCHLORIDE AND ACETAMINOPHEN 1 TABLET: 10; 325 TABLET ORAL at 20:17

## 2022-01-01 RX ADMIN — APIXABAN 5 MG: 5 TABLET, FILM COATED ORAL at 20:28

## 2022-01-01 RX ADMIN — METOPROLOL SUCCINATE 100 MG: 50 TABLET, EXTENDED RELEASE ORAL at 08:19

## 2022-01-01 RX ADMIN — SENNOSIDES AND DOCUSATE SODIUM 1 TABLET: 50; 8.6 TABLET ORAL at 20:14

## 2022-01-01 RX ADMIN — TRIAMCINOLONE ACETONIDE 1 APPLICATION: 1 OINTMENT TOPICAL at 09:03

## 2022-01-01 RX ADMIN — SPIRONOLACTONE 25 MG: 25 TABLET, FILM COATED ORAL at 09:23

## 2022-01-01 RX ADMIN — GUAIFENESIN AND DEXTROMETHORPHAN 5 ML: 100; 10 SYRUP ORAL at 18:49

## 2022-01-01 RX ADMIN — CEFEPIME 2 G: 1 INJECTION, SOLUTION INTRAVENOUS at 09:07

## 2022-01-01 RX ADMIN — TRIAMCINOLONE ACETONIDE 1 APPLICATION: 1 OINTMENT TOPICAL at 23:52

## 2022-01-01 RX ADMIN — LIDOCAINE HYDROCHLORIDE 5 ML: 10 INJECTION, SOLUTION EPIDURAL; INFILTRATION; INTRACAUDAL; PERINEURAL at 00:40

## 2022-01-01 RX ADMIN — MIDODRINE HYDROCHLORIDE 10 MG: 10 TABLET ORAL at 17:01

## 2022-01-01 RX ADMIN — HYDROCODONE BITARTRATE AND ACETAMINOPHEN 1 TABLET: 7.5; 325 TABLET ORAL at 20:45

## 2022-01-01 RX ADMIN — OXYCODONE HYDROCHLORIDE AND ACETAMINOPHEN 1 TABLET: 10; 325 TABLET ORAL at 20:42

## 2022-01-01 RX ADMIN — MIDODRINE HYDROCHLORIDE 10 MG: 10 TABLET ORAL at 18:03

## 2022-01-01 RX ADMIN — CEFTRIAXONE SODIUM 1 G: 1 INJECTION, SOLUTION INTRAVENOUS at 08:13

## 2022-01-01 RX ADMIN — FAMOTIDINE 40 MG: 20 TABLET ORAL at 13:58

## 2022-01-01 RX ADMIN — APIXABAN 5 MG: 5 TABLET, FILM COATED ORAL at 20:20

## 2022-01-01 RX ADMIN — FLUTICASONE PROPIONATE 1 SPRAY: 50 SPRAY, METERED NASAL at 08:51

## 2022-01-01 RX ADMIN — GABAPENTIN 100 MG: 100 CAPSULE ORAL at 20:04

## 2022-01-01 RX ADMIN — SENNOSIDES AND DOCUSATE SODIUM 1 TABLET: 50; 8.6 TABLET ORAL at 08:19

## 2022-01-01 RX ADMIN — HYDROCODONE BITARTRATE AND ACETAMINOPHEN 1 TABLET: 7.5; 325 TABLET ORAL at 08:50

## 2022-01-01 RX ADMIN — FUROSEMIDE 40 MG: 40 TABLET ORAL at 17:42

## 2022-01-01 RX ADMIN — APIXABAN 5 MG: 5 TABLET, FILM COATED ORAL at 20:17

## 2022-01-01 RX ADMIN — FUROSEMIDE 40 MG: 40 TABLET ORAL at 08:39

## 2022-01-01 RX ADMIN — HYDROMORPHONE HYDROCHLORIDE 0.5 MG: 1 INJECTION, SOLUTION INTRAMUSCULAR; INTRAVENOUS; SUBCUTANEOUS at 06:24

## 2022-01-01 RX ADMIN — FUROSEMIDE 40 MG: 40 TABLET ORAL at 17:50

## 2022-01-01 RX ADMIN — FAMOTIDINE 40 MG: 20 TABLET ORAL at 09:13

## 2022-01-01 RX ADMIN — SENNOSIDES AND DOCUSATE SODIUM 1 TABLET: 50; 8.6 TABLET ORAL at 09:34

## 2022-01-01 RX ADMIN — Medication 10 ML: at 20:38

## 2022-01-01 RX ADMIN — OXYCODONE HYDROCHLORIDE AND ACETAMINOPHEN 1 TABLET: 10; 325 TABLET ORAL at 16:58

## 2022-01-01 RX ADMIN — IPRATROPIUM BROMIDE AND ALBUTEROL SULFATE 3 ML: 2.5; .5 SOLUTION RESPIRATORY (INHALATION) at 01:11

## 2022-01-01 RX ADMIN — FAMOTIDINE 40 MG: 20 TABLET ORAL at 08:48

## 2022-01-01 RX ADMIN — APIXABAN 5 MG: 5 TABLET, FILM COATED ORAL at 08:53

## 2022-01-01 RX ADMIN — FLUTICASONE PROPIONATE 1 SPRAY: 50 SPRAY, METERED NASAL at 22:18

## 2022-01-01 RX ADMIN — HYDROCODONE BITARTRATE AND ACETAMINOPHEN 1 TABLET: 5; 325 TABLET ORAL at 08:26

## 2022-01-01 RX ADMIN — FAMOTIDINE 40 MG: 20 TABLET ORAL at 08:53

## 2022-01-01 RX ADMIN — FUROSEMIDE 40 MG: 40 TABLET ORAL at 09:27

## 2022-01-01 RX ADMIN — HYDROMORPHONE HYDROCHLORIDE 0.5 MG: 1 INJECTION, SOLUTION INTRAMUSCULAR; INTRAVENOUS; SUBCUTANEOUS at 13:03

## 2022-01-01 RX ADMIN — METOPROLOL SUCCINATE 25 MG: 25 TABLET, EXTENDED RELEASE ORAL at 20:37

## 2022-01-01 RX ADMIN — IPRATROPIUM BROMIDE AND ALBUTEROL SULFATE 3 ML: 2.5; .5 SOLUTION RESPIRATORY (INHALATION) at 12:30

## 2022-01-01 RX ADMIN — FAMOTIDINE 40 MG: 20 TABLET ORAL at 08:39

## 2022-01-01 RX ADMIN — HYDROCODONE BITARTRATE AND ACETAMINOPHEN 1 TABLET: 10; 325 TABLET ORAL at 16:01

## 2022-01-01 RX ADMIN — ACETAMINOPHEN 500 MG: 500 TABLET, FILM COATED ORAL at 02:10

## 2022-01-01 RX ADMIN — FUROSEMIDE 40 MG: 40 TABLET ORAL at 08:35

## 2022-01-01 RX ADMIN — HYDROCODONE BITARTRATE AND ACETAMINOPHEN 2 TABLET: 5; 325 TABLET ORAL at 20:14

## 2022-01-01 RX ADMIN — APIXABAN 5 MG: 5 TABLET, FILM COATED ORAL at 08:49

## 2022-01-01 RX ADMIN — FUROSEMIDE 80 MG: 10 INJECTION, SOLUTION INTRAMUSCULAR; INTRAVENOUS at 13:21

## 2022-01-01 RX ADMIN — OXYCODONE HYDROCHLORIDE AND ACETAMINOPHEN 1 TABLET: 10; 325 TABLET ORAL at 20:05

## 2022-01-01 RX ADMIN — SPIRONOLACTONE 25 MG: 25 TABLET ORAL at 09:21

## 2022-01-01 RX ADMIN — BUMETANIDE 2 MG: 0.25 INJECTION INTRAMUSCULAR; INTRAVENOUS at 00:35

## 2022-01-01 RX ADMIN — SPIRONOLACTONE 25 MG: 25 TABLET ORAL at 09:13

## 2022-01-01 RX ADMIN — MIDODRINE HYDROCHLORIDE 5 MG: 10 TABLET ORAL at 12:57

## 2022-01-01 RX ADMIN — DULOXETINE 30 MG: 30 CAPSULE, DELAYED RELEASE ORAL at 11:12

## 2022-01-01 RX ADMIN — ACETAMINOPHEN 650 MG: 325 TABLET ORAL at 14:35

## 2022-01-01 RX ADMIN — SPIRONOLACTONE 25 MG: 25 TABLET ORAL at 08:02

## 2022-01-01 RX ADMIN — HYDROCODONE BITARTRATE AND ACETAMINOPHEN 1 TABLET: 7.5; 325 TABLET ORAL at 08:41

## 2022-01-01 RX ADMIN — SPIRONOLACTONE 25 MG: 25 TABLET ORAL at 10:42

## 2022-01-01 RX ADMIN — METOLAZONE 5 MG: 5 TABLET ORAL at 08:01

## 2022-01-01 RX ADMIN — METOLAZONE 5 MG: 5 TABLET ORAL at 09:22

## 2022-01-01 RX ADMIN — IPRATROPIUM BROMIDE AND ALBUTEROL SULFATE 3 ML: 2.5; .5 SOLUTION RESPIRATORY (INHALATION) at 06:40

## 2022-01-01 RX ADMIN — IPRATROPIUM BROMIDE AND ALBUTEROL SULFATE 3 ML: 2.5; .5 SOLUTION RESPIRATORY (INHALATION) at 19:09

## 2022-01-01 RX ADMIN — TRIAMCINOLONE ACETONIDE 1 APPLICATION: 1 OINTMENT TOPICAL at 23:13

## 2022-01-01 RX ADMIN — PANTOPRAZOLE SODIUM 80 MG: 40 INJECTION, POWDER, FOR SOLUTION INTRAVENOUS at 09:46

## 2022-01-01 RX ADMIN — SPIRONOLACTONE 25 MG: 25 TABLET, FILM COATED ORAL at 09:02

## 2022-01-01 RX ADMIN — HYDROCODONE BITARTRATE AND ACETAMINOPHEN 1 TABLET: 7.5; 325 TABLET ORAL at 09:38

## 2022-01-01 RX ADMIN — SENNOSIDES AND DOCUSATE SODIUM 1 TABLET: 50; 8.6 TABLET ORAL at 21:00

## 2022-01-01 RX ADMIN — IPRATROPIUM BROMIDE AND ALBUTEROL SULFATE 3 ML: 2.5; .5 SOLUTION RESPIRATORY (INHALATION) at 06:22

## 2022-01-01 RX ADMIN — CEPHALEXIN 500 MG: 500 CAPSULE ORAL at 06:18

## 2022-01-01 RX ADMIN — FUROSEMIDE 40 MG: 10 INJECTION, SOLUTION INTRAMUSCULAR; INTRAVENOUS at 08:49

## 2022-01-01 RX ADMIN — FLUTICASONE PROPIONATE 1 SPRAY: 50 SPRAY, METERED NASAL at 23:24

## 2022-01-01 RX ADMIN — FUROSEMIDE 40 MG: 10 INJECTION, SOLUTION INTRAMUSCULAR; INTRAVENOUS at 09:33

## 2022-01-01 RX ADMIN — HYDROCODONE BITARTRATE AND ACETAMINOPHEN 1 TABLET: 7.5; 325 TABLET ORAL at 20:26

## 2022-01-01 RX ADMIN — PANTOPRAZOLE SODIUM 80 MG: 40 INJECTION, POWDER, FOR SOLUTION INTRAVENOUS at 17:01

## 2022-01-01 RX ADMIN — HYDROCODONE BITARTRATE AND ACETAMINOPHEN 1 TABLET: 10; 325 TABLET ORAL at 09:07

## 2022-01-01 RX ADMIN — POTASSIUM CHLORIDE 20 MEQ: 750 CAPSULE, EXTENDED RELEASE ORAL at 08:53

## 2022-01-01 RX ADMIN — APIXABAN 5 MG: 5 TABLET, FILM COATED ORAL at 20:32

## 2022-01-01 RX ADMIN — SENNOSIDES AND DOCUSATE SODIUM 1 TABLET: 50; 8.6 TABLET ORAL at 08:20

## 2022-01-01 RX ADMIN — OXYCODONE HYDROCHLORIDE AND ACETAMINOPHEN 1 TABLET: 10; 325 TABLET ORAL at 15:50

## 2022-01-01 RX ADMIN — POTASSIUM CHLORIDE 40 MEQ: 750 CAPSULE, EXTENDED RELEASE ORAL at 17:29

## 2022-01-01 RX ADMIN — SENNOSIDES AND DOCUSATE SODIUM 1 TABLET: 50; 8.6 TABLET ORAL at 20:18

## 2022-01-01 RX ADMIN — BUMETANIDE 1 MG: 0.25 INJECTION INTRAMUSCULAR; INTRAVENOUS at 02:58

## 2022-01-01 RX ADMIN — VANCOMYCIN HYDROCHLORIDE 1000 MG: 5 INJECTION, POWDER, LYOPHILIZED, FOR SOLUTION INTRAVENOUS at 12:28

## 2022-01-01 RX ADMIN — HYDROMORPHONE HYDROCHLORIDE 1 MG: 1 INJECTION, SOLUTION INTRAMUSCULAR; INTRAVENOUS; SUBCUTANEOUS at 06:36

## 2022-01-01 RX ADMIN — Medication 10 ML: at 21:01

## 2022-01-01 RX ADMIN — SPIRONOLACTONE 25 MG: 25 TABLET, FILM COATED ORAL at 08:53

## 2022-01-01 RX ADMIN — METOLAZONE 5 MG: 5 TABLET ORAL at 21:57

## 2022-01-01 RX ADMIN — CEFAZOLIN SODIUM 2 G: 2 INJECTION, SOLUTION INTRAVENOUS at 04:18

## 2022-01-01 RX ADMIN — CEFEPIME HYDROCHLORIDE 1 G: 1 INJECTION, POWDER, FOR SOLUTION INTRAMUSCULAR; INTRAVENOUS at 21:32

## 2022-01-01 RX ADMIN — METOPROLOL SUCCINATE 100 MG: 50 TABLET, EXTENDED RELEASE ORAL at 08:52

## 2022-01-01 RX ADMIN — ACETAMINOPHEN 650 MG: 325 TABLET ORAL at 04:39

## 2022-01-01 RX ADMIN — IPRATROPIUM BROMIDE AND ALBUTEROL SULFATE 3 ML: 2.5; .5 SOLUTION RESPIRATORY (INHALATION) at 19:13

## 2022-01-01 RX ADMIN — ACETAMINOPHEN 650 MG: 325 TABLET ORAL at 09:34

## 2022-01-01 RX ADMIN — SPIRONOLACTONE 25 MG: 25 TABLET, FILM COATED ORAL at 08:59

## 2022-01-01 RX ADMIN — Medication 10 ML: at 21:05

## 2022-01-01 RX ADMIN — HYDROCODONE BITARTRATE AND ACETAMINOPHEN 1 TABLET: 5; 325 TABLET ORAL at 10:39

## 2022-01-01 RX ADMIN — SENNOSIDES AND DOCUSATE SODIUM 1 TABLET: 50; 8.6 TABLET ORAL at 08:40

## 2022-01-01 RX ADMIN — COCAINE HYDROCHLORIDE NASAL 160 MG: 40 SOLUTION TOPICAL at 00:12

## 2022-01-01 RX ADMIN — METOLAZONE 10 MG: 5 TABLET ORAL at 17:47

## 2022-01-01 RX ADMIN — FUROSEMIDE 40 MG: 40 TABLET ORAL at 17:29

## 2022-01-01 RX ADMIN — METOPROLOL SUCCINATE 100 MG: 50 TABLET, EXTENDED RELEASE ORAL at 10:42

## 2022-01-01 RX ADMIN — SENNOSIDES AND DOCUSATE SODIUM 1 TABLET: 50; 8.6 TABLET ORAL at 20:44

## 2022-01-01 RX ADMIN — Medication 10 ML: at 09:12

## 2022-01-01 RX ADMIN — HYDROMORPHONE HYDROCHLORIDE 0.5 MG: 1 INJECTION, SOLUTION INTRAMUSCULAR; INTRAVENOUS; SUBCUTANEOUS at 12:46

## 2022-01-01 RX ADMIN — MIDODRINE HYDROCHLORIDE 10 MG: 10 TABLET ORAL at 11:30

## 2022-01-01 RX ADMIN — FAMOTIDINE 40 MG: 20 TABLET ORAL at 08:51

## 2022-01-01 RX ADMIN — METOPROLOL SUCCINATE 100 MG: 50 TABLET, EXTENDED RELEASE ORAL at 09:34

## 2022-01-01 RX ADMIN — SENNOSIDES AND DOCUSATE SODIUM 1 TABLET: 50; 8.6 TABLET ORAL at 08:53

## 2022-01-01 RX ADMIN — MIDODRINE HYDROCHLORIDE 10 MG: 10 TABLET ORAL at 18:15

## 2022-01-01 RX ADMIN — GABAPENTIN 100 MG: 100 CAPSULE ORAL at 09:11

## 2022-01-01 RX ADMIN — FUROSEMIDE 40 MG: 40 TABLET ORAL at 08:45

## 2022-01-01 RX ADMIN — METOLAZONE 10 MG: 5 TABLET ORAL at 08:48

## 2022-01-01 RX ADMIN — METOPROLOL SUCCINATE 100 MG: 50 TABLET, EXTENDED RELEASE ORAL at 09:18

## 2022-01-01 RX ADMIN — APIXABAN 5 MG: 5 TABLET, FILM COATED ORAL at 21:33

## 2022-01-01 RX ADMIN — Medication 10 ML: at 20:19

## 2022-01-01 RX ADMIN — APIXABAN 5 MG: 5 TABLET, FILM COATED ORAL at 21:00

## 2022-01-01 RX ADMIN — METOPROLOL SUCCINATE 100 MG: 50 TABLET, EXTENDED RELEASE ORAL at 20:49

## 2022-01-01 RX ADMIN — CEPHALEXIN 500 MG: 500 CAPSULE ORAL at 21:09

## 2022-01-01 RX ADMIN — Medication 10 ML: at 20:18

## 2022-01-01 RX ADMIN — HYDROCODONE BITARTRATE AND ACETAMINOPHEN 1 TABLET: 7.5; 325 TABLET ORAL at 20:57

## 2022-01-01 RX ADMIN — TRIAMCINOLONE ACETONIDE 1 APPLICATION: 1 OINTMENT TOPICAL at 21:53

## 2022-01-01 RX ADMIN — MIDODRINE HYDROCHLORIDE 10 MG: 10 TABLET ORAL at 09:46

## 2022-01-01 RX ADMIN — GABAPENTIN 100 MG: 100 CAPSULE ORAL at 22:13

## 2022-01-01 RX ADMIN — Medication 10 ML: at 08:06

## 2022-01-01 RX ADMIN — APIXABAN 5 MG: 5 TABLET, FILM COATED ORAL at 08:19

## 2022-01-01 RX ADMIN — APIXABAN 5 MG: 5 TABLET, FILM COATED ORAL at 20:05

## 2022-01-01 RX ADMIN — SALINE NASAL SPRAY 2 SPRAY: 1.5 SOLUTION NASAL at 21:55

## 2022-01-01 RX ADMIN — SENNOSIDES AND DOCUSATE SODIUM 1 TABLET: 50; 8.6 TABLET ORAL at 20:05

## 2022-01-01 RX ADMIN — MORPHINE SULFATE 2 MG: 2 INJECTION, SOLUTION INTRAMUSCULAR; INTRAVENOUS at 15:44

## 2022-01-01 RX ADMIN — APIXABAN 5 MG: 5 TABLET, FILM COATED ORAL at 08:46

## 2022-01-01 RX ADMIN — SODIUM HYPOCHLORITE: 1.25 SOLUTION TOPICAL at 22:28

## 2022-01-01 RX ADMIN — SPIRONOLACTONE 25 MG: 25 TABLET ORAL at 09:12

## 2022-01-01 RX ADMIN — DEXTROSE AND SODIUM CHLORIDE 100 ML/HR: 5; 900 INJECTION, SOLUTION INTRAVENOUS at 06:16

## 2022-01-01 RX ADMIN — GABAPENTIN 100 MG: 100 CAPSULE ORAL at 08:26

## 2022-01-01 RX ADMIN — GABAPENTIN 100 MG: 100 CAPSULE ORAL at 21:33

## 2022-01-01 RX ADMIN — BUMETANIDE 2 MG: 0.25 INJECTION INTRAMUSCULAR; INTRAVENOUS at 12:23

## 2022-01-01 RX ADMIN — MIDODRINE HYDROCHLORIDE 10 MG: 10 TABLET ORAL at 13:25

## 2022-01-01 RX ADMIN — DULOXETINE HYDROCHLORIDE 30 MG: 30 CAPSULE, DELAYED RELEASE ORAL at 09:11

## 2022-01-01 RX ADMIN — HYDROCODONE BITARTRATE AND ACETAMINOPHEN 1 TABLET: 5; 325 TABLET ORAL at 06:56

## 2022-01-01 RX ADMIN — FUROSEMIDE 40 MG: 40 TABLET ORAL at 09:49

## 2022-01-01 RX ADMIN — METOPROLOL SUCCINATE 50 MG: 25 TABLET, EXTENDED RELEASE ORAL at 20:26

## 2022-01-01 RX ADMIN — CEFAZOLIN SODIUM 2 G: 2 INJECTION, SOLUTION INTRAVENOUS at 13:17

## 2022-01-01 RX ADMIN — METOLAZONE 5 MG: 5 TABLET ORAL at 22:44

## 2022-01-01 RX ADMIN — TRIAMCINOLONE ACETONIDE 1 APPLICATION: 1 OINTMENT TOPICAL at 23:24

## 2022-01-01 RX ADMIN — METOPROLOL SUCCINATE 75 MG: 50 TABLET, EXTENDED RELEASE ORAL at 20:14

## 2022-01-01 RX ADMIN — SENNOSIDES AND DOCUSATE SODIUM 1 TABLET: 50; 8.6 TABLET ORAL at 09:08

## 2022-01-01 RX ADMIN — APIXABAN 2.5 MG: 2.5 TABLET, FILM COATED ORAL at 20:14

## 2022-01-01 RX ADMIN — SODIUM CHLORIDE, POTASSIUM CHLORIDE, SODIUM LACTATE AND CALCIUM CHLORIDE 1000 ML: 600; 310; 30; 20 INJECTION, SOLUTION INTRAVENOUS at 18:18

## 2022-01-01 RX ADMIN — DULOXETINE HYDROCHLORIDE 30 MG: 30 CAPSULE, DELAYED RELEASE ORAL at 09:02

## 2022-01-01 RX ADMIN — APIXABAN 5 MG: 5 TABLET, FILM COATED ORAL at 08:10

## 2022-01-01 RX ADMIN — HYDROCODONE BITARTRATE AND ACETAMINOPHEN 1 TABLET: 5; 325 TABLET ORAL at 07:32

## 2022-01-01 RX ADMIN — HYDROMORPHONE HYDROCHLORIDE 0.5 MG: 1 INJECTION, SOLUTION INTRAMUSCULAR; INTRAVENOUS; SUBCUTANEOUS at 02:59

## 2022-01-01 RX ADMIN — ACETAMINOPHEN 650 MG: 325 TABLET ORAL at 20:46

## 2022-01-01 RX ADMIN — ACETAMINOPHEN 650 MG: 325 TABLET ORAL at 08:58

## 2022-01-01 RX ADMIN — VANCOMYCIN HYDROCHLORIDE 2500 MG: 5 INJECTION, POWDER, LYOPHILIZED, FOR SOLUTION INTRAVENOUS at 14:39

## 2022-01-01 RX ADMIN — MUPIROCIN: 20 OINTMENT TOPICAL at 08:52

## 2022-01-01 RX ADMIN — MIDODRINE HYDROCHLORIDE 10 MG: 10 TABLET ORAL at 08:41

## 2022-01-01 RX ADMIN — METOPROLOL TARTRATE 2.5 MG: 5 INJECTION INTRAVENOUS at 01:27

## 2022-01-01 RX ADMIN — APIXABAN 5 MG: 5 TABLET, FILM COATED ORAL at 19:50

## 2022-01-01 RX ADMIN — ALPRAZOLAM 0.25 MG: 0.25 TABLET ORAL at 14:50

## 2022-01-01 RX ADMIN — APIXABAN 5 MG: 5 TABLET, FILM COATED ORAL at 10:42

## 2022-01-01 RX ADMIN — FAMOTIDINE 40 MG: 20 TABLET ORAL at 09:18

## 2022-01-01 RX ADMIN — METOPROLOL SUCCINATE 100 MG: 50 TABLET, EXTENDED RELEASE ORAL at 10:00

## 2022-01-01 RX ADMIN — TRIAMCINOLONE ACETONIDE 1 APPLICATION: 1 OINTMENT TOPICAL at 15:18

## 2022-01-01 RX ADMIN — DEXTROSE MONOHYDRATE 25 G: 25 INJECTION, SOLUTION INTRAVENOUS at 19:17

## 2022-01-01 RX ADMIN — SENNOSIDES AND DOCUSATE SODIUM 1 TABLET: 50; 8.6 TABLET ORAL at 20:59

## 2022-01-01 RX ADMIN — FAMOTIDINE 40 MG: 20 TABLET ORAL at 09:03

## 2022-01-01 RX ADMIN — METOPROLOL SUCCINATE 100 MG: 50 TABLET, EXTENDED RELEASE ORAL at 08:46

## 2022-01-01 RX ADMIN — DEXTROSE MONOHYDRATE 50 ML: 25 INJECTION, SOLUTION INTRAVENOUS at 20:21

## 2022-01-01 RX ADMIN — POTASSIUM CHLORIDE 20 MEQ: 750 CAPSULE, EXTENDED RELEASE ORAL at 08:41

## 2022-01-01 RX ADMIN — Medication 10 ML: at 09:33

## 2022-01-01 RX ADMIN — ATENOLOL 50 MG: 50 TABLET ORAL at 12:12

## 2022-01-01 RX ADMIN — Medication 10 ML: at 08:08

## 2022-01-01 RX ADMIN — DULOXETINE HYDROCHLORIDE 30 MG: 30 CAPSULE, DELAYED RELEASE ORAL at 09:21

## 2022-01-01 RX ADMIN — CEFEPIME HYDROCHLORIDE 2 G: 2 INJECTION, POWDER, FOR SOLUTION INTRAVENOUS at 16:33

## 2022-01-01 RX ADMIN — MORPHINE SULFATE 2 MG: 2 INJECTION, SOLUTION INTRAMUSCULAR; INTRAVENOUS at 00:16

## 2022-01-01 RX ADMIN — DULOXETINE HYDROCHLORIDE 30 MG: 30 CAPSULE, DELAYED RELEASE ORAL at 08:31

## 2022-01-01 RX ADMIN — TRIAMCINOLONE ACETONIDE 1 APPLICATION: 1 OINTMENT TOPICAL at 13:10

## 2022-01-01 RX ADMIN — APIXABAN 5 MG: 5 TABLET, FILM COATED ORAL at 09:06

## 2022-01-01 RX ADMIN — TRIAMCINOLONE ACETONIDE 1 APPLICATION: 1 OINTMENT TOPICAL at 10:43

## 2022-01-01 RX ADMIN — GABAPENTIN 100 MG: 100 CAPSULE ORAL at 20:24

## 2022-01-01 RX ADMIN — METOPROLOL SUCCINATE 100 MG: 50 TABLET, EXTENDED RELEASE ORAL at 20:32

## 2022-01-01 RX ADMIN — CEFEPIME 2 G: 1 INJECTION, SOLUTION INTRAVENOUS at 09:42

## 2022-01-01 RX ADMIN — METOLAZONE 10 MG: 5 TABLET ORAL at 08:53

## 2022-01-01 RX ADMIN — SPIRONOLACTONE 25 MG: 25 TABLET, FILM COATED ORAL at 08:35

## 2022-01-01 RX ADMIN — TAZOBACTAM SODIUM AND PIPERACILLIN SODIUM 3.38 G: 375; 3 INJECTION, SOLUTION INTRAVENOUS at 08:57

## 2022-01-01 RX ADMIN — IPRATROPIUM BROMIDE AND ALBUTEROL SULFATE 3 ML: 2.5; .5 SOLUTION RESPIRATORY (INHALATION) at 07:23

## 2022-01-01 RX ADMIN — Medication: at 08:20

## 2022-01-01 RX ADMIN — GABAPENTIN 100 MG: 100 CAPSULE ORAL at 19:50

## 2022-01-01 RX ADMIN — CEFEPIME HYDROCHLORIDE 1 G: 1 INJECTION, POWDER, FOR SOLUTION INTRAMUSCULAR; INTRAVENOUS at 20:00

## 2022-01-01 RX ADMIN — DILTIAZEM HYDROCHLORIDE 180 MG: 180 CAPSULE, COATED, EXTENDED RELEASE ORAL at 08:40

## 2022-01-01 RX ADMIN — MORPHINE SULFATE 2 MG: 2 INJECTION, SOLUTION INTRAMUSCULAR; INTRAVENOUS at 12:15

## 2022-01-01 RX ADMIN — BUMETANIDE 2 MG: 0.25 INJECTION INTRAMUSCULAR; INTRAVENOUS at 00:34

## 2022-01-01 RX ADMIN — FAMOTIDINE 40 MG: 20 TABLET ORAL at 09:02

## 2022-01-01 RX ADMIN — FUROSEMIDE 40 MG: 40 TABLET ORAL at 17:13

## 2022-01-01 RX ADMIN — DIGOXIN 125 MCG: 125 TABLET ORAL at 11:23

## 2022-01-01 RX ADMIN — GABAPENTIN 100 MG: 100 CAPSULE ORAL at 08:51

## 2022-01-01 RX ADMIN — GABAPENTIN 100 MG: 100 CAPSULE ORAL at 09:34

## 2022-01-01 RX ADMIN — DIGOXIN 125 MCG: 125 TABLET ORAL at 12:38

## 2022-01-01 RX ADMIN — MIDODRINE HYDROCHLORIDE 5 MG: 2.5 TABLET ORAL at 11:16

## 2022-01-01 RX ADMIN — HYDROCODONE BITARTRATE AND ACETAMINOPHEN 1 TABLET: 7.5; 325 TABLET ORAL at 09:13

## 2022-01-01 RX ADMIN — VANCOMYCIN HYDROCHLORIDE 1250 MG: 5 INJECTION, POWDER, LYOPHILIZED, FOR SOLUTION INTRAVENOUS at 12:05

## 2022-01-01 RX ADMIN — HYDROCODONE BITARTRATE AND ACETAMINOPHEN 1 TABLET: 5; 325 TABLET ORAL at 15:10

## 2022-01-01 RX ADMIN — DULOXETINE 30 MG: 30 CAPSULE, DELAYED RELEASE ORAL at 08:14

## 2022-01-01 RX ADMIN — FUROSEMIDE 40 MG: 40 TABLET ORAL at 18:00

## 2022-01-01 RX ADMIN — CEFEPIME HYDROCHLORIDE 1 G: 1 INJECTION, POWDER, FOR SOLUTION INTRAMUSCULAR; INTRAVENOUS at 08:27

## 2022-01-01 RX ADMIN — HYDROCODONE BITARTRATE AND ACETAMINOPHEN 1 TABLET: 7.5; 325 TABLET ORAL at 16:22

## 2022-01-01 RX ADMIN — METOPROLOL SUCCINATE 100 MG: 50 TABLET, EXTENDED RELEASE ORAL at 08:26

## 2022-01-01 RX ADMIN — MIDODRINE HYDROCHLORIDE 5 MG: 2.5 TABLET ORAL at 08:02

## 2022-01-01 RX ADMIN — CEFAZOLIN SODIUM 2 G: 2 INJECTION, SOLUTION INTRAVENOUS at 20:06

## 2022-01-01 RX ADMIN — IPRATROPIUM BROMIDE AND ALBUTEROL SULFATE 3 ML: 2.5; .5 SOLUTION RESPIRATORY (INHALATION) at 13:52

## 2022-01-01 RX ADMIN — METOLAZONE 5 MG: 5 TABLET ORAL at 21:44

## 2022-01-01 RX ADMIN — GABAPENTIN 100 MG: 100 CAPSULE ORAL at 08:09

## 2022-01-01 RX ADMIN — MORPHINE SULFATE 2 MG: 2 INJECTION, SOLUTION INTRAMUSCULAR; INTRAVENOUS at 01:44

## 2022-01-01 RX ADMIN — DIGOXIN 125 MCG: 125 TABLET ORAL at 10:44

## 2022-01-01 RX ADMIN — VANCOMYCIN HYDROCHLORIDE 750 MG: 5 INJECTION, POWDER, LYOPHILIZED, FOR SOLUTION INTRAVENOUS at 15:44

## 2022-01-01 RX ADMIN — METOPROLOL SUCCINATE 100 MG: 50 TABLET, EXTENDED RELEASE ORAL at 21:42

## 2022-01-01 RX ADMIN — METOPROLOL SUCCINATE 100 MG: 50 TABLET, EXTENDED RELEASE ORAL at 23:24

## 2022-01-01 RX ADMIN — FUROSEMIDE 40 MG: 40 TABLET ORAL at 08:59

## 2022-01-01 RX ADMIN — Medication 10 ML: at 20:17

## 2022-01-01 RX ADMIN — GABAPENTIN 100 MG: 100 CAPSULE ORAL at 20:44

## 2022-01-01 RX ADMIN — HYDROCODONE BITARTRATE AND ACETAMINOPHEN 1 TABLET: 5; 325 TABLET ORAL at 13:16

## 2022-01-01 RX ADMIN — DIGOXIN 125 MCG: 125 TABLET ORAL at 12:20

## 2022-01-01 RX ADMIN — METOLAZONE 10 MG: 5 TABLET ORAL at 08:30

## 2022-01-01 RX ADMIN — SODIUM HYPOCHLORITE: 1.25 SOLUTION TOPICAL at 21:32

## 2022-01-01 RX ADMIN — CEFAZOLIN SODIUM 2 G: 2 INJECTION, SOLUTION INTRAVENOUS at 05:57

## 2022-01-01 RX ADMIN — DIGOXIN 125 MCG: 125 TABLET ORAL at 12:12

## 2022-01-01 RX ADMIN — GABAPENTIN 100 MG: 100 CAPSULE ORAL at 08:40

## 2022-01-01 RX ADMIN — HYDROMORPHONE HYDROCHLORIDE 0.5 MG: 1 INJECTION, SOLUTION INTRAMUSCULAR; INTRAVENOUS; SUBCUTANEOUS at 10:30

## 2022-01-01 RX ADMIN — METOPROLOL SUCCINATE 100 MG: 50 TABLET, EXTENDED RELEASE ORAL at 09:27

## 2022-01-01 RX ADMIN — VANCOMYCIN HYDROCHLORIDE 1250 MG: 5 INJECTION, POWDER, LYOPHILIZED, FOR SOLUTION INTRAVENOUS at 11:36

## 2022-01-01 RX ADMIN — ACETAMINOPHEN 650 MG: 325 TABLET ORAL at 13:30

## 2022-01-01 RX ADMIN — METOPROLOL SUCCINATE 75 MG: 50 TABLET, EXTENDED RELEASE ORAL at 09:34

## 2022-01-01 RX ADMIN — ACETAMINOPHEN 500 MG: 500 TABLET, FILM COATED ORAL at 17:05

## 2022-01-01 RX ADMIN — MUPIROCIN: 20 OINTMENT TOPICAL at 21:21

## 2022-01-01 RX ADMIN — TRIAMCINOLONE ACETONIDE 1 APPLICATION: 1 OINTMENT TOPICAL at 11:09

## 2022-01-01 RX ADMIN — METOPROLOL SUCCINATE 25 MG: 25 TABLET, EXTENDED RELEASE ORAL at 09:13

## 2022-01-01 RX ADMIN — GABAPENTIN 100 MG: 100 CAPSULE ORAL at 08:16

## 2022-01-01 RX ADMIN — MIDODRINE HYDROCHLORIDE 5 MG: 2.5 TABLET ORAL at 11:23

## 2022-01-01 RX ADMIN — GABAPENTIN 100 MG: 100 CAPSULE ORAL at 20:41

## 2022-01-01 RX ADMIN — MORPHINE SULFATE 2 MG: 2 INJECTION, SOLUTION INTRAMUSCULAR; INTRAVENOUS at 23:20

## 2022-01-01 RX ADMIN — APIXABAN 5 MG: 5 TABLET, FILM COATED ORAL at 09:02

## 2022-01-01 RX ADMIN — FAMOTIDINE 40 MG: 20 TABLET ORAL at 08:47

## 2022-01-01 RX ADMIN — HYDROCODONE BITARTRATE AND ACETAMINOPHEN 1 TABLET: 7.5; 325 TABLET ORAL at 17:01

## 2022-01-01 RX ADMIN — APIXABAN 5 MG: 5 TABLET, FILM COATED ORAL at 21:03

## 2022-01-01 RX ADMIN — METOPROLOL SUCCINATE 100 MG: 50 TABLET, EXTENDED RELEASE ORAL at 09:24

## 2022-01-01 RX ADMIN — HYDROCODONE BITARTRATE AND ACETAMINOPHEN 1 TABLET: 10; 325 TABLET ORAL at 20:40

## 2022-01-01 RX ADMIN — GABAPENTIN 100 MG: 100 CAPSULE ORAL at 10:42

## 2022-01-01 RX ADMIN — MORPHINE SULFATE 2 MG: 2 INJECTION, SOLUTION INTRAMUSCULAR; INTRAVENOUS at 22:31

## 2022-01-01 RX ADMIN — HYDROCODONE BITARTRATE AND ACETAMINOPHEN 1 TABLET: 7.5; 325 TABLET ORAL at 17:26

## 2022-01-01 RX ADMIN — AZITHROMYCIN 500 MG: 500 INJECTION, POWDER, LYOPHILIZED, FOR SOLUTION INTRAVENOUS at 20:24

## 2022-01-01 RX ADMIN — FAMOTIDINE 40 MG: 20 TABLET ORAL at 08:14

## 2022-01-01 RX ADMIN — APIXABAN 5 MG: 5 TABLET, FILM COATED ORAL at 08:50

## 2022-01-01 RX ADMIN — HYDROCODONE BITARTRATE AND ACETAMINOPHEN 1 TABLET: 10; 325 TABLET ORAL at 16:09

## 2022-01-01 RX ADMIN — TRIAMCINOLONE ACETONIDE 1 APPLICATION: 1 OINTMENT TOPICAL at 08:51

## 2022-01-01 RX ADMIN — METOPROLOL SUCCINATE 25 MG: 25 TABLET, EXTENDED RELEASE ORAL at 08:01

## 2022-01-01 RX ADMIN — DIGOXIN 125 MCG: 125 TABLET ORAL at 12:48

## 2022-01-01 RX ADMIN — APIXABAN 5 MG: 5 TABLET, FILM COATED ORAL at 09:34

## 2022-01-01 RX ADMIN — FAMOTIDINE 40 MG: 20 TABLET ORAL at 09:26

## 2022-01-01 RX ADMIN — GABAPENTIN 100 MG: 100 CAPSULE ORAL at 21:05

## 2022-01-01 RX ADMIN — IPRATROPIUM BROMIDE AND ALBUTEROL SULFATE 3 ML: 2.5; .5 SOLUTION RESPIRATORY (INHALATION) at 12:13

## 2022-01-01 RX ADMIN — APIXABAN 5 MG: 5 TABLET, FILM COATED ORAL at 09:27

## 2022-01-01 RX ADMIN — SODIUM HYPOCHLORITE: 1.25 SOLUTION TOPICAL at 09:51

## 2022-01-01 RX ADMIN — Medication 10 ML: at 09:30

## 2022-01-01 RX ADMIN — HYDROCODONE BITARTRATE AND ACETAMINOPHEN 1 TABLET: 10; 325 TABLET ORAL at 09:27

## 2022-01-01 RX ADMIN — GABAPENTIN 100 MG: 100 CAPSULE ORAL at 08:52

## 2022-01-01 RX ADMIN — Medication 10 ML: at 09:43

## 2022-01-01 RX ADMIN — HYDROCODONE BITARTRATE AND ACETAMINOPHEN 1 TABLET: 5; 325 TABLET ORAL at 21:33

## 2022-01-01 RX ADMIN — Medication 10 ML: at 20:55

## 2022-01-01 RX ADMIN — DIGOXIN 125 MCG: 125 TABLET ORAL at 12:37

## 2022-01-01 RX ADMIN — HYDROCODONE BITARTRATE AND ACETAMINOPHEN 1 TABLET: 7.5; 325 TABLET ORAL at 20:32

## 2022-01-01 RX ADMIN — DULOXETINE HYDROCHLORIDE 30 MG: 30 CAPSULE, DELAYED RELEASE ORAL at 09:28

## 2022-01-01 RX ADMIN — ACETAMINOPHEN 650 MG: 325 TABLET ORAL at 17:37

## 2022-01-01 RX ADMIN — GABAPENTIN 100 MG: 100 CAPSULE ORAL at 21:04

## 2022-01-01 RX ADMIN — DULOXETINE HYDROCHLORIDE 30 MG: 30 CAPSULE, DELAYED RELEASE ORAL at 10:42

## 2022-01-01 RX ADMIN — DULOXETINE HYDROCHLORIDE 30 MG: 30 CAPSULE, DELAYED RELEASE ORAL at 09:22

## 2022-01-01 RX ADMIN — GABAPENTIN 100 MG: 100 CAPSULE ORAL at 09:23

## 2022-01-01 RX ADMIN — METOLAZONE 10 MG: 5 TABLET ORAL at 09:08

## 2022-01-01 RX ADMIN — ACETAMINOPHEN 650 MG: 325 TABLET ORAL at 21:42

## 2022-01-01 RX ADMIN — METOPROLOL SUCCINATE 100 MG: 50 TABLET, EXTENDED RELEASE ORAL at 20:56

## 2022-01-01 RX ADMIN — METOPROLOL SUCCINATE 25 MG: 25 TABLET, EXTENDED RELEASE ORAL at 08:35

## 2022-01-01 RX ADMIN — METOPROLOL SUCCINATE 25 MG: 25 TABLET, EXTENDED RELEASE ORAL at 09:04

## 2022-01-01 RX ADMIN — HYDROCODONE BITARTRATE AND ACETAMINOPHEN 1 TABLET: 5; 325 TABLET ORAL at 19:46

## 2022-01-01 RX ADMIN — SPIRONOLACTONE 25 MG: 25 TABLET ORAL at 15:35

## 2022-01-01 RX ADMIN — GABAPENTIN 100 MG: 100 CAPSULE ORAL at 09:12

## 2022-01-01 RX ADMIN — Medication 10 ML: at 08:18

## 2022-01-01 RX ADMIN — GABAPENTIN 100 MG: 100 CAPSULE ORAL at 20:40

## 2022-01-01 RX ADMIN — MIDODRINE HYDROCHLORIDE 5 MG: 10 TABLET ORAL at 17:06

## 2022-01-01 RX ADMIN — METOPROLOL SUCCINATE 100 MG: 50 TABLET, EXTENDED RELEASE ORAL at 08:33

## 2022-01-01 RX ADMIN — METOPROLOL SUCCINATE 25 MG: 25 TABLET, EXTENDED RELEASE ORAL at 10:39

## 2022-01-01 RX ADMIN — METOPROLOL SUCCINATE 100 MG: 50 TABLET, EXTENDED RELEASE ORAL at 09:02

## 2022-01-01 RX ADMIN — BUMETANIDE 2 MG: 1 TABLET ORAL at 10:00

## 2022-01-01 RX ADMIN — HYDROCODONE BITARTRATE AND ACETAMINOPHEN 1 TABLET: 5; 325 TABLET ORAL at 12:13

## 2022-01-01 RX ADMIN — OXYCODONE HYDROCHLORIDE AND ACETAMINOPHEN 1 TABLET: 10; 325 TABLET ORAL at 09:34

## 2022-01-01 RX ADMIN — BUMETANIDE 1 MG: 0.25 INJECTION INTRAMUSCULAR; INTRAVENOUS at 03:01

## 2022-01-01 RX ADMIN — DULOXETINE HYDROCHLORIDE 30 MG: 30 CAPSULE, DELAYED RELEASE ORAL at 08:47

## 2022-01-01 RX ADMIN — DIGOXIN 125 MCG: 125 TABLET ORAL at 12:57

## 2022-01-01 RX ADMIN — SPIRONOLACTONE 25 MG: 25 TABLET, FILM COATED ORAL at 08:40

## 2022-01-01 RX ADMIN — MIDODRINE HYDROCHLORIDE 5 MG: 10 TABLET ORAL at 10:43

## 2022-01-01 RX ADMIN — HYDROMORPHONE HYDROCHLORIDE 0.5 MG: 1 INJECTION, SOLUTION INTRAMUSCULAR; INTRAVENOUS; SUBCUTANEOUS at 07:35

## 2022-01-01 RX ADMIN — TAZOBACTAM SODIUM AND PIPERACILLIN SODIUM 3.38 G: 375; 3 INJECTION, SOLUTION INTRAVENOUS at 01:39

## 2022-01-01 RX ADMIN — DIGOXIN 125 MCG: 125 TABLET ORAL at 12:13

## 2022-01-01 RX ADMIN — FAMOTIDINE 40 MG: 20 TABLET ORAL at 09:12

## 2022-01-01 RX ADMIN — METOPROLOL SUCCINATE 100 MG: 50 TABLET, EXTENDED RELEASE ORAL at 20:26

## 2022-01-01 RX ADMIN — DULOXETINE HYDROCHLORIDE 30 MG: 30 CAPSULE, DELAYED RELEASE ORAL at 09:08

## 2022-01-01 RX ADMIN — FUROSEMIDE 40 MG: 40 TABLET ORAL at 08:51

## 2022-01-01 RX ADMIN — Medication 10 ML: at 10:42

## 2022-01-01 RX ADMIN — FLUTICASONE PROPIONATE 1 SPRAY: 50 SPRAY, METERED NASAL at 23:49

## 2022-01-01 RX ADMIN — GABAPENTIN 100 MG: 100 CAPSULE ORAL at 20:18

## 2022-01-01 RX ADMIN — HYDROMORPHONE HYDROCHLORIDE 0.5 MG: 1 INJECTION, SOLUTION INTRAMUSCULAR; INTRAVENOUS; SUBCUTANEOUS at 02:17

## 2022-01-01 RX ADMIN — Medication: at 22:17

## 2022-01-01 RX ADMIN — FAMOTIDINE 40 MG: 20 TABLET ORAL at 09:34

## 2022-01-01 RX ADMIN — PANTOPRAZOLE SODIUM 80 MG: 40 INJECTION, POWDER, FOR SOLUTION INTRAVENOUS at 18:03

## 2022-01-01 RX ADMIN — SPIRONOLACTONE 25 MG: 25 TABLET ORAL at 08:16

## 2022-01-01 RX ADMIN — APIXABAN 5 MG: 5 TABLET, FILM COATED ORAL at 08:47

## 2022-01-01 RX ADMIN — MORPHINE SULFATE 2 MG: 2 INJECTION, SOLUTION INTRAMUSCULAR; INTRAVENOUS at 03:03

## 2022-01-01 RX ADMIN — MIDODRINE HYDROCHLORIDE 5 MG: 10 TABLET ORAL at 06:31

## 2022-01-01 RX ADMIN — CEFAZOLIN SODIUM 2 G: 2 INJECTION, SOLUTION INTRAVENOUS at 18:03

## 2022-01-01 RX ADMIN — APIXABAN 5 MG: 5 TABLET, FILM COATED ORAL at 20:01

## 2022-01-01 RX ADMIN — ACETAMINOPHEN 500 MG: 500 TABLET, FILM COATED ORAL at 13:20

## 2022-01-01 RX ADMIN — GABAPENTIN 100 MG: 100 CAPSULE ORAL at 09:35

## 2022-01-01 RX ADMIN — IPRATROPIUM BROMIDE AND ALBUTEROL SULFATE 3 ML: 2.5; .5 SOLUTION RESPIRATORY (INHALATION) at 06:53

## 2022-01-01 RX ADMIN — Medication 10 ML: at 08:12

## 2022-01-01 RX ADMIN — PANTOPRAZOLE SODIUM 40 MG: 40 INJECTION, POWDER, FOR SOLUTION INTRAVENOUS at 05:38

## 2022-01-01 RX ADMIN — Medication: at 20:24

## 2022-01-01 RX ADMIN — HYDROCODONE BITARTRATE AND ACETAMINOPHEN 1 TABLET: 5; 325 TABLET ORAL at 11:41

## 2022-01-01 RX ADMIN — SALINE NASAL SPRAY 2 SPRAY: 1.5 SOLUTION NASAL at 22:14

## 2022-01-01 RX ADMIN — GABAPENTIN 100 MG: 100 CAPSULE ORAL at 08:33

## 2022-01-01 RX ADMIN — APIXABAN 5 MG: 5 TABLET, FILM COATED ORAL at 08:55

## 2022-01-01 RX ADMIN — SODIUM HYPOCHLORITE: 1.25 SOLUTION TOPICAL at 10:11

## 2022-01-01 RX ADMIN — HYDROCODONE BITARTRATE AND ACETAMINOPHEN 1 TABLET: 5; 325 TABLET ORAL at 14:26

## 2022-01-01 RX ADMIN — DULOXETINE HYDROCHLORIDE 30 MG: 30 CAPSULE, DELAYED RELEASE ORAL at 08:11

## 2022-01-01 RX ADMIN — APIXABAN 5 MG: 5 TABLET, FILM COATED ORAL at 20:30

## 2022-01-01 RX ADMIN — ACETAMINOPHEN 500 MG: 500 TABLET, FILM COATED ORAL at 10:12

## 2022-01-01 RX ADMIN — GABAPENTIN 100 MG: 100 CAPSULE ORAL at 09:13

## 2022-01-01 RX ADMIN — SENNOSIDES AND DOCUSATE SODIUM 1 TABLET: 50; 8.6 TABLET ORAL at 21:05

## 2022-01-01 RX ADMIN — CEFAZOLIN SODIUM 2 G: 2 INJECTION, SOLUTION INTRAVENOUS at 06:16

## 2022-01-01 RX ADMIN — SENNOSIDES AND DOCUSATE SODIUM 1 TABLET: 50; 8.6 TABLET ORAL at 08:07

## 2022-01-01 RX ADMIN — SENNOSIDES AND DOCUSATE SODIUM 1 TABLET: 50; 8.6 TABLET ORAL at 20:30

## 2022-01-01 RX ADMIN — SODIUM HYPOCHLORITE: 1.25 SOLUTION TOPICAL at 22:39

## 2022-01-01 RX ADMIN — Medication 10 ML: at 21:30

## 2022-01-01 RX ADMIN — SPIRONOLACTONE 25 MG: 25 TABLET, FILM COATED ORAL at 08:47

## 2022-01-01 RX ADMIN — CEFAZOLIN SODIUM 2 G: 2 INJECTION, SOLUTION INTRAVENOUS at 13:31

## 2022-01-01 RX ADMIN — FUROSEMIDE 40 MG: 10 INJECTION, SOLUTION INTRAMUSCULAR; INTRAVENOUS at 08:21

## 2022-01-01 RX ADMIN — METOPROLOL SUCCINATE 100 MG: 50 TABLET, EXTENDED RELEASE ORAL at 20:18

## 2022-01-01 RX ADMIN — SPIRONOLACTONE 25 MG: 25 TABLET ORAL at 08:34

## 2022-01-01 RX ADMIN — SODIUM HYPOCHLORITE: 1.25 SOLUTION TOPICAL at 06:24

## 2022-01-01 RX ADMIN — MIDODRINE HYDROCHLORIDE 5 MG: 10 TABLET ORAL at 12:23

## 2022-01-01 RX ADMIN — APIXABAN 5 MG: 5 TABLET, FILM COATED ORAL at 08:33

## 2022-01-01 RX ADMIN — APIXABAN 5 MG: 5 TABLET, FILM COATED ORAL at 09:21

## 2022-01-01 RX ADMIN — METOPROLOL SUCCINATE 100 MG: 50 TABLET, EXTENDED RELEASE ORAL at 20:34

## 2022-01-01 RX ADMIN — SODIUM CHLORIDE, PRESERVATIVE FREE 10 ML: 5 INJECTION INTRAVENOUS at 09:04

## 2022-01-01 RX ADMIN — Medication 10 ML: at 08:41

## 2022-01-01 RX ADMIN — Medication 10 ML: at 08:52

## 2022-01-01 RX ADMIN — HYDROCODONE BITARTRATE AND ACETAMINOPHEN 1 TABLET: 7.5; 325 TABLET ORAL at 11:49

## 2022-01-01 RX ADMIN — HYDROMORPHONE HYDROCHLORIDE 0.5 MG: 1 INJECTION, SOLUTION INTRAMUSCULAR; INTRAVENOUS; SUBCUTANEOUS at 23:01

## 2022-01-01 RX ADMIN — Medication 10 ML: at 09:07

## 2022-01-01 RX ADMIN — GABAPENTIN 100 MG: 100 CAPSULE ORAL at 08:11

## 2022-01-01 RX ADMIN — Medication 10 ML: at 02:19

## 2022-01-01 RX ADMIN — Medication 10 ML: at 20:06

## 2022-01-01 RX ADMIN — MORPHINE SULFATE 2 MG: 2 INJECTION, SOLUTION INTRAMUSCULAR; INTRAVENOUS at 11:19

## 2022-01-01 RX ADMIN — Medication 10 ML: at 02:44

## 2022-01-01 RX ADMIN — CEFAZOLIN SODIUM 2 G: 2 INJECTION, SOLUTION INTRAVENOUS at 22:01

## 2022-01-01 RX ADMIN — SODIUM CHLORIDE, POTASSIUM CHLORIDE, SODIUM LACTATE AND CALCIUM CHLORIDE 1000 ML: 600; 310; 30; 20 INJECTION, SOLUTION INTRAVENOUS at 15:48

## 2022-01-01 RX ADMIN — Medication 2000 MG: at 20:59

## 2022-01-01 RX ADMIN — METOPROLOL SUCCINATE 25 MG: 25 TABLET, EXTENDED RELEASE ORAL at 08:26

## 2022-01-01 RX ADMIN — GABAPENTIN 100 MG: 100 CAPSULE ORAL at 08:50

## 2022-01-01 RX ADMIN — Medication: at 09:12

## 2022-01-01 RX ADMIN — CEFEPIME HYDROCHLORIDE 2 G: 2 INJECTION, POWDER, FOR SOLUTION INTRAMUSCULAR; INTRAVENOUS at 10:49

## 2022-01-01 RX ADMIN — Medication 10 ML: at 08:47

## 2022-01-01 RX ADMIN — FAMOTIDINE 40 MG: 20 TABLET ORAL at 08:19

## 2022-01-01 RX ADMIN — APIXABAN 5 MG: 5 TABLET, FILM COATED ORAL at 09:42

## 2022-01-01 RX ADMIN — TRIAMCINOLONE ACETONIDE 1 APPLICATION: 1 OINTMENT TOPICAL at 08:14

## 2022-01-01 RX ADMIN — ACETAMINOPHEN 650 MG: 325 TABLET ORAL at 20:30

## 2022-01-01 RX ADMIN — Medication 10 ML: at 20:14

## 2022-01-01 RX ADMIN — HYDROMORPHONE HYDROCHLORIDE 0.5 MG: 1 INJECTION, SOLUTION INTRAMUSCULAR; INTRAVENOUS; SUBCUTANEOUS at 02:11

## 2022-01-01 RX ADMIN — SPIRONOLACTONE 25 MG: 25 TABLET, FILM COATED ORAL at 08:19

## 2022-01-01 RX ADMIN — FAMOTIDINE 40 MG: 20 TABLET ORAL at 09:21

## 2022-01-01 RX ADMIN — APIXABAN 5 MG: 5 TABLET, FILM COATED ORAL at 20:44

## 2022-01-01 RX ADMIN — CETIRIZINE HYDROCHLORIDE 10 MG: 10 TABLET, FILM COATED ORAL at 09:12

## 2022-01-01 RX ADMIN — SENNOSIDES AND DOCUSATE SODIUM 1 TABLET: 50; 8.6 TABLET ORAL at 08:55

## 2022-01-01 RX ADMIN — METOLAZONE 10 MG: 5 TABLET ORAL at 09:28

## 2022-01-01 RX ADMIN — METOPROLOL SUCCINATE 100 MG: 50 TABLET, EXTENDED RELEASE ORAL at 09:21

## 2022-01-01 RX ADMIN — Medication: at 20:14

## 2022-01-01 RX ADMIN — MIDODRINE HYDROCHLORIDE 10 MG: 10 TABLET ORAL at 06:17

## 2022-01-01 RX ADMIN — SODIUM HYPOCHLORITE: 1.25 SOLUTION TOPICAL at 11:39

## 2022-01-01 RX ADMIN — Medication 10 ML: at 23:01

## 2022-01-01 RX ADMIN — GABAPENTIN 100 MG: 100 CAPSULE ORAL at 21:58

## 2022-01-01 RX ADMIN — DIGOXIN 125 MCG: 125 TABLET ORAL at 12:03

## 2022-01-01 RX ADMIN — SPIRONOLACTONE 25 MG: 25 TABLET ORAL at 08:53

## 2022-01-01 RX ADMIN — DULOXETINE HYDROCHLORIDE 30 MG: 30 CAPSULE, DELAYED RELEASE ORAL at 09:06

## 2022-01-01 RX ADMIN — FAMOTIDINE 40 MG: 20 TABLET ORAL at 10:42

## 2022-01-01 RX ADMIN — CETIRIZINE HYDROCHLORIDE 10 MG: 10 TABLET, FILM COATED ORAL at 09:28

## 2022-01-01 RX ADMIN — METOLAZONE 10 MG: 5 TABLET ORAL at 09:27

## 2022-01-01 RX ADMIN — POTASSIUM CHLORIDE 20 MEQ: 750 CAPSULE, EXTENDED RELEASE ORAL at 10:13

## 2022-01-01 RX ADMIN — CEFEPIME 2 G: 1 INJECTION, SOLUTION INTRAVENOUS at 20:16

## 2022-01-01 RX ADMIN — METOPROLOL SUCCINATE 100 MG: 50 TABLET, EXTENDED RELEASE ORAL at 08:50

## 2022-01-01 RX ADMIN — PANTOPRAZOLE SODIUM 80 MG: 40 INJECTION, POWDER, FOR SOLUTION INTRAVENOUS at 18:43

## 2022-01-01 RX ADMIN — MIDODRINE HYDROCHLORIDE 10 MG: 10 TABLET ORAL at 12:28

## 2022-01-01 RX ADMIN — DIGOXIN 125 MCG: 125 TABLET ORAL at 09:13

## 2022-01-01 RX ADMIN — HYDROCODONE BITARTRATE AND ACETAMINOPHEN 1 TABLET: 5; 325 TABLET ORAL at 08:10

## 2022-01-01 RX ADMIN — LIDOCAINE HYDROCHLORIDE 100 MG: 20 INJECTION, SOLUTION EPIDURAL; INFILTRATION; INTRACAUDAL; PERINEURAL at 13:50

## 2022-01-01 RX ADMIN — GABAPENTIN 100 MG: 100 CAPSULE ORAL at 22:16

## 2022-01-01 RX ADMIN — GABAPENTIN 100 MG: 100 CAPSULE ORAL at 09:26

## 2022-01-01 RX ADMIN — METOPROLOL SUCCINATE 100 MG: 50 TABLET, EXTENDED RELEASE ORAL at 21:00

## 2022-01-01 RX ADMIN — GABAPENTIN 100 MG: 100 CAPSULE ORAL at 20:42

## 2022-01-01 RX ADMIN — HYDROCODONE BITARTRATE AND ACETAMINOPHEN 1 TABLET: 5; 325 TABLET ORAL at 20:28

## 2022-01-01 RX ADMIN — CETIRIZINE HYDROCHLORIDE 10 MG: 10 TABLET, FILM COATED ORAL at 10:42

## 2022-01-01 RX ADMIN — DIGOXIN 125 MCG: 125 TABLET ORAL at 11:30

## 2022-01-01 RX ADMIN — OXYCODONE HYDROCHLORIDE AND ACETAMINOPHEN 1 TABLET: 10; 325 TABLET ORAL at 11:35

## 2022-01-01 RX ADMIN — SENNOSIDES AND DOCUSATE SODIUM 1 TABLET: 50; 8.6 TABLET ORAL at 09:06

## 2022-01-01 RX ADMIN — DIGOXIN 125 MCG: 125 TABLET ORAL at 11:57

## 2022-01-01 RX ADMIN — DULOXETINE HYDROCHLORIDE 30 MG: 30 CAPSULE, DELAYED RELEASE ORAL at 08:45

## 2022-01-01 RX ADMIN — AZITHROMYCIN 500 MG: 500 INJECTION, POWDER, LYOPHILIZED, FOR SOLUTION INTRAVENOUS at 20:12

## 2022-01-01 RX ADMIN — DIGOXIN 125 MCG: 125 TABLET ORAL at 12:06

## 2022-01-01 RX ADMIN — ACETAMINOPHEN 650 MG: 325 TABLET ORAL at 18:41

## 2022-01-01 RX ADMIN — SENNOSIDES AND DOCUSATE SODIUM 1 TABLET: 50; 8.6 TABLET ORAL at 09:35

## 2022-01-01 RX ADMIN — IPRATROPIUM BROMIDE AND ALBUTEROL SULFATE 3 ML: 2.5; .5 SOLUTION RESPIRATORY (INHALATION) at 00:17

## 2022-01-01 RX ADMIN — FAMOTIDINE 40 MG: 20 TABLET ORAL at 09:08

## 2022-01-01 RX ADMIN — MIDODRINE HYDROCHLORIDE 5 MG: 2.5 TABLET ORAL at 12:06

## 2022-01-01 RX ADMIN — DULOXETINE HYDROCHLORIDE 30 MG: 30 CAPSULE, DELAYED RELEASE ORAL at 09:13

## 2022-01-01 RX ADMIN — SENNOSIDES AND DOCUSATE SODIUM 1 TABLET: 50; 8.6 TABLET ORAL at 21:09

## 2022-01-01 RX ADMIN — ACETAMINOPHEN 650 MG: 325 TABLET ORAL at 11:49

## 2022-01-01 RX ADMIN — FLUTICASONE PROPIONATE 1 SPRAY: 50 SPRAY, METERED NASAL at 08:45

## 2022-01-01 RX ADMIN — APIXABAN 5 MG: 5 TABLET, FILM COATED ORAL at 08:00

## 2022-01-01 RX ADMIN — GABAPENTIN 100 MG: 100 CAPSULE ORAL at 20:25

## 2022-01-01 RX ADMIN — FUROSEMIDE 40 MG: 40 TABLET ORAL at 17:54

## 2022-01-01 RX ADMIN — FUROSEMIDE 40 MG: 10 INJECTION, SOLUTION INTRAMUSCULAR; INTRAVENOUS at 09:22

## 2022-01-01 RX ADMIN — SENNOSIDES AND DOCUSATE SODIUM 1 TABLET: 50; 8.6 TABLET ORAL at 09:41

## 2022-01-01 RX ADMIN — MIDODRINE HYDROCHLORIDE 10 MG: 10 TABLET ORAL at 06:46

## 2022-01-01 RX ADMIN — DIGOXIN 250 MCG: 250 TABLET ORAL at 12:12

## 2022-01-01 RX ADMIN — SPIRONOLACTONE 25 MG: 25 TABLET, FILM COATED ORAL at 09:24

## 2022-01-01 RX ADMIN — DEXTROSE MONOHYDRATE 25 G: 500 INJECTION PARENTERAL at 19:17

## 2022-01-01 RX ADMIN — METOPROLOL TARTRATE 25 MG: 25 TABLET, FILM COATED ORAL at 14:59

## 2022-01-01 RX ADMIN — HYDROCODONE BITARTRATE AND ACETAMINOPHEN 1 TABLET: 5; 325 TABLET ORAL at 15:20

## 2022-01-01 RX ADMIN — DEXTROSE AND SODIUM CHLORIDE 100 ML/HR: 5; 900 INJECTION, SOLUTION INTRAVENOUS at 11:18

## 2022-01-01 RX ADMIN — HYDROCODONE BITARTRATE AND ACETAMINOPHEN 1 TABLET: 10; 325 TABLET ORAL at 20:42

## 2022-01-01 RX ADMIN — SALINE NASAL SPRAY 2 SPRAY: 1.5 SOLUTION NASAL at 21:21

## 2022-01-01 RX ADMIN — APIXABAN 5 MG: 5 TABLET, FILM COATED ORAL at 20:49

## 2022-01-01 RX ADMIN — DULOXETINE HYDROCHLORIDE 30 MG: 30 CAPSULE, DELAYED RELEASE ORAL at 08:35

## 2022-01-01 RX ADMIN — PANTOPRAZOLE SODIUM 80 MG: 40 INJECTION, POWDER, FOR SOLUTION INTRAVENOUS at 10:11

## 2022-01-01 RX ADMIN — TRIAMCINOLONE ACETONIDE 1 APPLICATION: 1 OINTMENT TOPICAL at 08:52

## 2022-01-01 RX ADMIN — GABAPENTIN 100 MG: 100 CAPSULE ORAL at 20:30

## 2022-01-01 RX ADMIN — APIXABAN 5 MG: 5 TABLET, FILM COATED ORAL at 20:34

## 2022-01-01 RX ADMIN — ACETAMINOPHEN 650 MG: 325 TABLET ORAL at 00:38

## 2022-01-01 RX ADMIN — ACETAMINOPHEN 650 MG: 325 TABLET ORAL at 22:32

## 2022-01-01 RX ADMIN — CETIRIZINE HYDROCHLORIDE 10 MG: 10 TABLET, FILM COATED ORAL at 08:34

## 2022-01-01 RX ADMIN — SALINE NASAL SPRAY 2 SPRAY: 1.5 SOLUTION NASAL at 10:58

## 2022-01-01 RX ADMIN — METOPROLOL SUCCINATE 25 MG: 25 TABLET, EXTENDED RELEASE ORAL at 20:56

## 2022-01-01 RX ADMIN — DULOXETINE HYDROCHLORIDE 30 MG: 30 CAPSULE, DELAYED RELEASE ORAL at 09:05

## 2022-01-01 RX ADMIN — SPIRONOLACTONE 25 MG: 25 TABLET, FILM COATED ORAL at 09:18

## 2022-01-01 RX ADMIN — METOLAZONE 5 MG: 5 TABLET ORAL at 09:26

## 2022-01-01 RX ADMIN — TRIAMCINOLONE ACETONIDE 1 APPLICATION: 1 OINTMENT TOPICAL at 11:48

## 2022-01-01 RX ADMIN — HYDROCODONE BITARTRATE AND ACETAMINOPHEN 1 TABLET: 7.5; 325 TABLET ORAL at 21:52

## 2022-01-01 RX ADMIN — CEFTRIAXONE SODIUM 1 G: 1 INJECTION, SOLUTION INTRAVENOUS at 14:11

## 2022-01-01 RX ADMIN — CETIRIZINE HYDROCHLORIDE 10 MG: 10 TABLET, FILM COATED ORAL at 08:01

## 2022-01-01 RX ADMIN — METOPROLOL SUCCINATE 100 MG: 50 TABLET, EXTENDED RELEASE ORAL at 20:14

## 2022-01-01 RX ADMIN — HYDROCODONE BITARTRATE AND ACETAMINOPHEN 1 TABLET: 7.5; 325 TABLET ORAL at 20:53

## 2022-01-01 RX ADMIN — HYDROMORPHONE HYDROCHLORIDE 0.5 MG: 1 INJECTION, SOLUTION INTRAMUSCULAR; INTRAVENOUS; SUBCUTANEOUS at 18:18

## 2022-01-01 RX ADMIN — DILTIAZEM HYDROCHLORIDE 10 MG: 5 INJECTION, SOLUTION INTRAVENOUS at 22:00

## 2022-01-01 RX ADMIN — HYDROCODONE BITARTRATE AND ACETAMINOPHEN 1 TABLET: 5; 325 TABLET ORAL at 15:03

## 2022-01-01 RX ADMIN — METOPROLOL SUCCINATE 100 MG: 50 TABLET, EXTENDED RELEASE ORAL at 09:05

## 2022-01-01 RX ADMIN — METOPROLOL SUCCINATE 100 MG: 50 TABLET, EXTENDED RELEASE ORAL at 09:22

## 2022-01-01 RX ADMIN — METOLAZONE 10 MG: 5 TABLET ORAL at 17:29

## 2022-01-01 RX ADMIN — SPIRONOLACTONE 25 MG: 25 TABLET, FILM COATED ORAL at 09:27

## 2022-01-01 RX ADMIN — ACETAMINOPHEN 650 MG: 325 TABLET ORAL at 11:26

## 2022-01-01 RX ADMIN — IPRATROPIUM BROMIDE AND ALBUTEROL SULFATE 3 ML: 2.5; .5 SOLUTION RESPIRATORY (INHALATION) at 12:22

## 2022-01-01 RX ADMIN — Medication 10 ML: at 21:54

## 2022-01-01 RX ADMIN — Medication 10 ML: at 09:02

## 2022-01-01 RX ADMIN — DULOXETINE 30 MG: 30 CAPSULE, DELAYED RELEASE ORAL at 08:52

## 2022-01-01 RX ADMIN — SPIRONOLACTONE 25 MG: 25 TABLET, FILM COATED ORAL at 08:52

## 2022-01-01 RX ADMIN — FUROSEMIDE 40 MG: 40 TABLET ORAL at 09:13

## 2022-01-01 RX ADMIN — ACETAMINOPHEN 500 MG: 500 TABLET, FILM COATED ORAL at 20:32

## 2022-01-01 RX ADMIN — HYDROCODONE BITARTRATE AND ACETAMINOPHEN 1 TABLET: 7.5; 325 TABLET ORAL at 08:46

## 2022-01-01 RX ADMIN — MIDODRINE HYDROCHLORIDE 5 MG: 10 TABLET ORAL at 12:00

## 2022-01-01 RX ADMIN — BUMETANIDE 1 MG: 0.25 INJECTION INTRAMUSCULAR; INTRAVENOUS at 14:49

## 2022-01-01 RX ADMIN — CETIRIZINE HYDROCHLORIDE 10 MG: 10 TABLET, FILM COATED ORAL at 09:21

## 2022-01-01 RX ADMIN — DILTIAZEM HYDROCHLORIDE 180 MG: 180 CAPSULE, COATED, EXTENDED RELEASE ORAL at 11:39

## 2022-01-01 RX ADMIN — AZITHROMYCIN 500 MG: 500 INJECTION, POWDER, LYOPHILIZED, FOR SOLUTION INTRAVENOUS at 20:27

## 2022-01-01 RX ADMIN — ACETAMINOPHEN 500 MG: 500 TABLET, FILM COATED ORAL at 09:38

## 2022-01-01 RX ADMIN — METOPROLOL SUCCINATE 100 MG: 50 TABLET, EXTENDED RELEASE ORAL at 20:30

## 2022-01-01 RX ADMIN — DULOXETINE HYDROCHLORIDE 30 MG: 30 CAPSULE, DELAYED RELEASE ORAL at 08:10

## 2022-01-01 RX ADMIN — METOPROLOL SUCCINATE 100 MG: 50 TABLET, EXTENDED RELEASE ORAL at 08:54

## 2022-01-01 RX ADMIN — HYDROCODONE BITARTRATE AND ACETAMINOPHEN 1 TABLET: 5; 325 TABLET ORAL at 08:14

## 2022-01-01 RX ADMIN — HYDROCODONE BITARTRATE AND ACETAMINOPHEN 1 TABLET: 5; 325 TABLET ORAL at 14:53

## 2022-01-01 RX ADMIN — CEPHALEXIN 500 MG: 500 CAPSULE ORAL at 22:12

## 2022-01-01 RX ADMIN — TRIAMCINOLONE ACETONIDE 1 APPLICATION: 1 OINTMENT TOPICAL at 09:23

## 2022-01-01 RX ADMIN — IPRATROPIUM BROMIDE AND ALBUTEROL SULFATE 3 ML: 2.5; .5 SOLUTION RESPIRATORY (INHALATION) at 00:27

## 2022-01-01 RX ADMIN — DULOXETINE 30 MG: 30 CAPSULE, DELAYED RELEASE ORAL at 08:16

## 2022-01-01 RX ADMIN — IPRATROPIUM BROMIDE AND ALBUTEROL SULFATE 3 ML: 2.5; .5 SOLUTION RESPIRATORY (INHALATION) at 18:41

## 2022-01-01 RX ADMIN — FAMOTIDINE 40 MG: 20 TABLET ORAL at 08:31

## 2022-01-01 RX ADMIN — SPIRONOLACTONE 25 MG: 25 TABLET ORAL at 09:50

## 2022-01-01 RX ADMIN — DIGOXIN 125 MCG: 125 TABLET ORAL at 13:13

## 2022-01-01 RX ADMIN — IPRATROPIUM BROMIDE AND ALBUTEROL SULFATE 3 ML: 2.5; .5 SOLUTION RESPIRATORY (INHALATION) at 00:18

## 2022-01-01 RX ADMIN — CETIRIZINE HYDROCHLORIDE 10 MG: 10 TABLET, FILM COATED ORAL at 08:19

## 2022-01-01 RX ADMIN — ENOXAPARIN SODIUM 30 MG: 100 INJECTION SUBCUTANEOUS at 15:54

## 2022-01-01 RX ADMIN — FUROSEMIDE 40 MG: 10 INJECTION, SOLUTION INTRAMUSCULAR; INTRAVENOUS at 08:32

## 2022-01-01 RX ADMIN — CEFEPIME 2 G: 1 INJECTION, SOLUTION INTRAVENOUS at 20:17

## 2022-01-01 RX ADMIN — Medication 10 ML: at 08:22

## 2022-01-01 RX ADMIN — DULOXETINE HYDROCHLORIDE 30 MG: 30 CAPSULE, DELAYED RELEASE ORAL at 09:26

## 2022-01-01 RX ADMIN — SPIRONOLACTONE 25 MG: 25 TABLET ORAL at 08:50

## 2022-01-01 RX ADMIN — HYDROMORPHONE HYDROCHLORIDE 0.5 MG: 1 INJECTION, SOLUTION INTRAMUSCULAR; INTRAVENOUS; SUBCUTANEOUS at 02:07

## 2022-01-01 RX ADMIN — SENNOSIDES AND DOCUSATE SODIUM 1 TABLET: 50; 8.6 TABLET ORAL at 08:10

## 2022-01-01 RX ADMIN — DIGOXIN 125 MCG: 125 TABLET ORAL at 12:18

## 2022-01-01 RX ADMIN — HYDROCODONE BITARTRATE AND ACETAMINOPHEN 1 TABLET: 10; 325 TABLET ORAL at 21:00

## 2022-01-01 RX ADMIN — FUROSEMIDE 40 MG: 10 INJECTION, SOLUTION INTRAMUSCULAR; INTRAVENOUS at 08:41

## 2022-01-01 RX ADMIN — SPIRONOLACTONE 25 MG: 25 TABLET ORAL at 08:11

## 2022-01-01 RX ADMIN — OXYCODONE HYDROCHLORIDE AND ACETAMINOPHEN 1 TABLET: 10; 325 TABLET ORAL at 08:32

## 2022-01-01 RX ADMIN — GABAPENTIN 100 MG: 100 CAPSULE ORAL at 21:45

## 2022-01-01 RX ADMIN — GABAPENTIN 100 MG: 100 CAPSULE ORAL at 20:14

## 2022-01-01 RX ADMIN — FUROSEMIDE 40 MG: 40 TABLET ORAL at 09:23

## 2022-01-01 RX ADMIN — FUROSEMIDE 40 MG: 40 TABLET ORAL at 09:26

## 2022-01-01 RX ADMIN — SODIUM HYPOCHLORITE: 1.25 SOLUTION TOPICAL at 09:40

## 2022-01-01 RX ADMIN — GABAPENTIN 100 MG: 100 CAPSULE ORAL at 09:27

## 2022-01-01 RX ADMIN — IPRATROPIUM BROMIDE AND ALBUTEROL SULFATE 3 ML: 2.5; .5 SOLUTION RESPIRATORY (INHALATION) at 06:41

## 2022-01-01 RX ADMIN — MIDODRINE HYDROCHLORIDE 5 MG: 10 TABLET ORAL at 17:31

## 2022-01-01 RX ADMIN — TRIAMCINOLONE ACETONIDE 1 APPLICATION: 1 OINTMENT TOPICAL at 08:33

## 2022-01-01 RX ADMIN — GABAPENTIN 100 MG: 100 CAPSULE ORAL at 09:42

## 2022-01-01 RX ADMIN — Medication 10 ML: at 09:03

## 2022-01-01 RX ADMIN — APIXABAN 5 MG: 5 TABLET, FILM COATED ORAL at 09:04

## 2022-01-01 RX ADMIN — MIDODRINE HYDROCHLORIDE 5 MG: 2.5 TABLET ORAL at 17:22

## 2022-01-01 RX ADMIN — HYDROMORPHONE HYDROCHLORIDE 0.5 MG: 1 INJECTION, SOLUTION INTRAMUSCULAR; INTRAVENOUS; SUBCUTANEOUS at 13:21

## 2022-01-01 RX ADMIN — METOLAZONE 5 MG: 5 TABLET ORAL at 10:42

## 2022-01-01 RX ADMIN — FUROSEMIDE 40 MG: 10 INJECTION, SOLUTION INTRAMUSCULAR; INTRAVENOUS at 09:03

## 2022-01-01 RX ADMIN — METOLAZONE 10 MG: 5 TABLET ORAL at 08:19

## 2022-01-01 RX ADMIN — ACETAMINOPHEN 500 MG: 500 TABLET, FILM COATED ORAL at 20:36

## 2022-01-01 RX ADMIN — GABAPENTIN 100 MG: 100 CAPSULE ORAL at 20:32

## 2022-01-01 RX ADMIN — PANTOPRAZOLE SODIUM 80 MG: 40 INJECTION, POWDER, FOR SOLUTION INTRAVENOUS at 06:17

## 2022-01-01 RX ADMIN — APIXABAN 5 MG: 5 TABLET, FILM COATED ORAL at 20:22

## 2022-01-01 RX ADMIN — SPIRONOLACTONE 25 MG: 25 TABLET, FILM COATED ORAL at 09:13

## 2022-01-01 RX ADMIN — SENNOSIDES AND DOCUSATE SODIUM 1 TABLET: 50; 8.6 TABLET ORAL at 20:55

## 2022-01-01 RX ADMIN — APIXABAN 5 MG: 5 TABLET, FILM COATED ORAL at 22:17

## 2022-01-01 RX ADMIN — DULOXETINE HYDROCHLORIDE 30 MG: 30 CAPSULE, DELAYED RELEASE ORAL at 09:48

## 2022-01-01 RX ADMIN — ACETAMINOPHEN 500 MG: 500 TABLET, FILM COATED ORAL at 01:02

## 2022-01-01 RX ADMIN — CEFAZOLIN SODIUM 2 G: 2 INJECTION, SOLUTION INTRAVENOUS at 06:08

## 2022-01-01 RX ADMIN — FUROSEMIDE 40 MG: 10 INJECTION, SOLUTION INTRAMUSCULAR; INTRAVENOUS at 08:10

## 2022-01-01 RX ADMIN — LEVALBUTEROL HYDROCHLORIDE 1.25 MG: 1.25 SOLUTION RESPIRATORY (INHALATION) at 13:46

## 2022-01-01 RX ADMIN — GABAPENTIN 100 MG: 100 CAPSULE ORAL at 08:19

## 2022-01-01 RX ADMIN — METOPROLOL SUCCINATE 25 MG: 25 TABLET, EXTENDED RELEASE ORAL at 12:10

## 2022-01-01 RX ADMIN — METOPROLOL SUCCINATE 25 MG: 25 TABLET, EXTENDED RELEASE ORAL at 08:40

## 2022-01-01 RX ADMIN — POLYETHYLENE GLYCOL 3350 17 G: 17 POWDER, FOR SOLUTION ORAL at 12:16

## 2022-01-01 RX ADMIN — SPIRONOLACTONE 25 MG: 25 TABLET, FILM COATED ORAL at 09:49

## 2022-01-01 RX ADMIN — MIDODRINE HYDROCHLORIDE 10 MG: 10 TABLET ORAL at 11:16

## 2022-01-01 RX ADMIN — MIDODRINE HYDROCHLORIDE 5 MG: 10 TABLET ORAL at 08:20

## 2022-01-01 RX ADMIN — SENNOSIDES AND DOCUSATE SODIUM 1 TABLET: 50; 8.6 TABLET ORAL at 08:38

## 2022-01-01 RX ADMIN — DIGOXIN 250 MCG: 250 TABLET ORAL at 11:41

## 2022-01-01 RX ADMIN — ACETAMINOPHEN 500 MG: 500 TABLET, FILM COATED ORAL at 15:23

## 2022-01-01 RX ADMIN — SENNOSIDES AND DOCUSATE SODIUM 1 TABLET: 50; 8.6 TABLET ORAL at 20:46

## 2022-01-01 RX ADMIN — CEFAZOLIN SODIUM 2 G: 2 INJECTION, SOLUTION INTRAVENOUS at 22:28

## 2022-01-01 RX ADMIN — Medication 10 ML: at 21:33

## 2022-01-01 RX ADMIN — PANTOPRAZOLE SODIUM 80 MG: 40 INJECTION, POWDER, FOR SOLUTION INTRAVENOUS at 08:41

## 2022-01-01 RX ADMIN — METOPROLOL SUCCINATE 100 MG: 50 TABLET, EXTENDED RELEASE ORAL at 10:10

## 2022-01-01 RX ADMIN — IPRATROPIUM BROMIDE AND ALBUTEROL SULFATE 3 ML: 2.5; .5 SOLUTION RESPIRATORY (INHALATION) at 19:54

## 2022-01-01 RX ADMIN — TRIAMCINOLONE ACETONIDE 1 APPLICATION: 1 OINTMENT TOPICAL at 10:59

## 2022-01-01 RX ADMIN — PANTOPRAZOLE SODIUM 80 MG: 40 INJECTION, POWDER, FOR SOLUTION INTRAVENOUS at 08:45

## 2022-01-01 RX ADMIN — METOLAZONE 5 MG: 5 TABLET ORAL at 08:54

## 2022-01-01 RX ADMIN — DIGOXIN 125 MCG: 125 TABLET ORAL at 12:04

## 2022-01-01 RX ADMIN — GABAPENTIN 100 MG: 100 CAPSULE ORAL at 20:34

## 2022-01-01 RX ADMIN — APIXABAN 5 MG: 5 TABLET, FILM COATED ORAL at 20:14

## 2022-01-01 RX ADMIN — APIXABAN 2.5 MG: 2.5 TABLET, FILM COATED ORAL at 09:09

## 2022-01-01 RX ADMIN — Medication 10 ML: at 08:48

## 2022-01-01 RX ADMIN — APIXABAN 5 MG: 5 TABLET, FILM COATED ORAL at 09:24

## 2022-01-01 RX ADMIN — MIDODRINE HYDROCHLORIDE 5 MG: 2.5 TABLET ORAL at 16:45

## 2022-01-01 RX ADMIN — DEXTROSE MONOHYDRATE 25 G: 25 INJECTION, SOLUTION INTRAVENOUS at 08:06

## 2022-01-01 RX ADMIN — FUROSEMIDE 40 MG: 10 INJECTION, SOLUTION INTRAMUSCULAR; INTRAVENOUS at 09:25

## 2022-01-01 RX ADMIN — ACETAMINOPHEN 500 MG: 500 TABLET, FILM COATED ORAL at 18:45

## 2022-01-01 RX ADMIN — POTASSIUM CHLORIDE 20 MEQ: 750 CAPSULE, EXTENDED RELEASE ORAL at 17:41

## 2022-01-01 RX ADMIN — IPRATROPIUM BROMIDE AND ALBUTEROL SULFATE 3 ML: 2.5; .5 SOLUTION RESPIRATORY (INHALATION) at 01:06

## 2022-01-01 RX ADMIN — HYDROCODONE BITARTRATE AND ACETAMINOPHEN 2 TABLET: 5; 325 TABLET ORAL at 08:44

## 2022-01-01 RX ADMIN — GABAPENTIN 100 MG: 100 CAPSULE ORAL at 08:46

## 2022-01-01 RX ADMIN — SPIRONOLACTONE 25 MG: 25 TABLET ORAL at 08:14

## 2022-01-01 RX ADMIN — FAMOTIDINE 40 MG: 20 TABLET ORAL at 09:06

## 2022-01-01 RX ADMIN — HYDROCODONE BITARTRATE AND ACETAMINOPHEN 1 TABLET: 5; 325 TABLET ORAL at 20:48

## 2022-01-01 RX ADMIN — APIXABAN 5 MG: 5 TABLET, FILM COATED ORAL at 09:23

## 2022-01-01 RX ADMIN — FUROSEMIDE 40 MG: 40 TABLET ORAL at 17:12

## 2022-01-01 RX ADMIN — HYDROCODONE BITARTRATE AND ACETAMINOPHEN 1 TABLET: 7.5; 325 TABLET ORAL at 08:54

## 2022-01-01 RX ADMIN — MIDODRINE HYDROCHLORIDE 5 MG: 10 TABLET ORAL at 17:47

## 2022-01-01 RX ADMIN — FUROSEMIDE 40 MG: 10 INJECTION, SOLUTION INTRAMUSCULAR; INTRAVENOUS at 21:33

## 2022-01-01 RX ADMIN — DEXTROSE MONOHYDRATE 50 ML: 25 INJECTION, SOLUTION INTRAVENOUS at 19:48

## 2022-01-01 RX ADMIN — ACETAMINOPHEN 500 MG: 500 TABLET, FILM COATED ORAL at 12:46

## 2022-01-01 RX ADMIN — FAMOTIDINE 40 MG: 20 TABLET ORAL at 09:42

## 2022-01-01 RX ADMIN — MIDODRINE HYDROCHLORIDE 10 MG: 10 TABLET ORAL at 08:55

## 2022-01-01 RX ADMIN — SENNOSIDES AND DOCUSATE SODIUM 1 TABLET: 50; 8.6 TABLET ORAL at 08:59

## 2022-01-01 RX ADMIN — FUROSEMIDE 40 MG: 10 INJECTION, SOLUTION INTRAMUSCULAR; INTRAVENOUS at 21:52

## 2022-01-01 RX ADMIN — SODIUM CHLORIDE 125 MG: 9 INJECTION, SOLUTION INTRAVENOUS at 18:36

## 2022-01-01 RX ADMIN — GABAPENTIN 100 MG: 100 CAPSULE ORAL at 08:38

## 2022-01-01 RX ADMIN — METOPROLOL SUCCINATE 100 MG: 50 TABLET, EXTENDED RELEASE ORAL at 08:41

## 2022-01-01 RX ADMIN — BUMETANIDE 2 MG: 0.25 INJECTION INTRAMUSCULAR; INTRAVENOUS at 12:56

## 2022-01-01 RX ADMIN — OXYCODONE HYDROCHLORIDE AND ACETAMINOPHEN 1 TABLET: 10; 325 TABLET ORAL at 15:49

## 2022-01-01 RX ADMIN — ENOXAPARIN SODIUM 30 MG: 100 INJECTION SUBCUTANEOUS at 14:20

## 2022-01-01 RX ADMIN — IPRATROPIUM BROMIDE AND ALBUTEROL SULFATE 3 ML: 2.5; .5 SOLUTION RESPIRATORY (INHALATION) at 19:01

## 2022-01-01 RX ADMIN — FAMOTIDINE 40 MG: 20 TABLET ORAL at 08:11

## 2022-01-01 RX ADMIN — DEXTROSE AND SODIUM CHLORIDE 100 ML/HR: 5; 900 INJECTION, SOLUTION INTRAVENOUS at 02:05

## 2022-01-01 RX ADMIN — METOPROLOL SUCCINATE 100 MG: 50 TABLET, EXTENDED RELEASE ORAL at 09:38

## 2022-01-01 RX ADMIN — DULOXETINE HYDROCHLORIDE 30 MG: 30 CAPSULE, DELAYED RELEASE ORAL at 09:18

## 2022-01-01 RX ADMIN — CEFEPIME 2 G: 1 INJECTION, SOLUTION INTRAVENOUS at 09:24

## 2022-01-01 RX ADMIN — CEFEPIME HYDROCHLORIDE 2 G: 2 INJECTION, POWDER, FOR SOLUTION INTRAVENOUS at 17:05

## 2022-01-01 RX ADMIN — SENNOSIDES AND DOCUSATE SODIUM 1 TABLET: 50; 8.6 TABLET ORAL at 09:24

## 2022-01-01 RX ADMIN — APIXABAN 2.5 MG: 2.5 TABLET, FILM COATED ORAL at 21:30

## 2022-01-01 RX ADMIN — CEFAZOLIN SODIUM 2 G: 2 INJECTION, SOLUTION INTRAVENOUS at 21:14

## 2022-01-01 RX ADMIN — SODIUM HYPOCHLORITE: 1.25 SOLUTION TOPICAL at 21:20

## 2022-01-01 RX ADMIN — FUROSEMIDE 40 MG: 40 TABLET ORAL at 17:30

## 2022-01-01 RX ADMIN — TRIAMCINOLONE ACETONIDE: 1 LOTION TOPICAL at 09:08

## 2022-01-01 RX ADMIN — SPIRONOLACTONE 25 MG: 25 TABLET ORAL at 09:05

## 2022-01-01 RX ADMIN — AMOXICILLIN AND CLAVULANATE POTASSIUM 1 TABLET: 875; 125 TABLET, FILM COATED ORAL at 02:24

## 2022-01-01 RX ADMIN — MIDODRINE HYDROCHLORIDE 5 MG: 2.5 TABLET ORAL at 06:32

## 2022-01-01 RX ADMIN — METOPROLOL SUCCINATE 100 MG: 50 TABLET, EXTENDED RELEASE ORAL at 20:40

## 2022-01-01 RX ADMIN — IPRATROPIUM BROMIDE AND ALBUTEROL SULFATE 3 ML: 2.5; .5 SOLUTION RESPIRATORY (INHALATION) at 18:52

## 2022-01-01 RX ADMIN — Medication 10 ML: at 20:41

## 2022-01-01 RX ADMIN — AZITHROMYCIN 500 MG: 500 INJECTION, POWDER, LYOPHILIZED, FOR SOLUTION INTRAVENOUS at 20:05

## 2022-01-01 RX ADMIN — FAMOTIDINE 40 MG: 20 TABLET ORAL at 08:45

## 2022-01-01 RX ADMIN — AZITHROMYCIN 500 MG: 500 INJECTION, POWDER, LYOPHILIZED, FOR SOLUTION INTRAVENOUS at 10:00

## 2022-01-01 RX ADMIN — FUROSEMIDE 40 MG: 40 TABLET ORAL at 09:18

## 2022-01-01 RX ADMIN — SENNOSIDES AND DOCUSATE SODIUM 1 TABLET: 50; 8.6 TABLET ORAL at 09:07

## 2022-01-01 RX ADMIN — GABAPENTIN 100 MG: 100 CAPSULE ORAL at 09:24

## 2022-01-01 RX ADMIN — METOPROLOL SUCCINATE 25 MG: 25 TABLET, EXTENDED RELEASE ORAL at 21:58

## 2022-01-01 RX ADMIN — DULOXETINE HYDROCHLORIDE 30 MG: 30 CAPSULE, DELAYED RELEASE ORAL at 08:39

## 2022-01-01 RX ADMIN — METOPROLOL SUCCINATE 25 MG: 25 TABLET, EXTENDED RELEASE ORAL at 09:48

## 2022-01-01 RX ADMIN — METOLAZONE 10 MG: 5 TABLET ORAL at 08:50

## 2022-01-01 RX ADMIN — FUROSEMIDE 40 MG: 10 INJECTION, SOLUTION INTRAMUSCULAR; INTRAVENOUS at 08:02

## 2022-01-01 RX ADMIN — METOPROLOL SUCCINATE 100 MG: 50 TABLET, EXTENDED RELEASE ORAL at 20:27

## 2022-01-01 RX ADMIN — MIDODRINE HYDROCHLORIDE 10 MG: 10 TABLET ORAL at 11:49

## 2022-01-01 RX ADMIN — ATENOLOL 50 MG: 50 TABLET ORAL at 09:35

## 2022-01-01 RX ADMIN — PANTOPRAZOLE SODIUM 40 MG: 40 TABLET, DELAYED RELEASE ORAL at 21:40

## 2022-01-01 RX ADMIN — APIXABAN 5 MG: 5 TABLET, FILM COATED ORAL at 21:42

## 2022-01-01 RX ADMIN — METOLAZONE 10 MG: 5 TABLET ORAL at 09:23

## 2022-01-01 RX ADMIN — SENNOSIDES AND DOCUSATE SODIUM 1 TABLET: 50; 8.6 TABLET ORAL at 21:42

## 2022-01-01 RX ADMIN — CEPHALEXIN 500 MG: 500 CAPSULE ORAL at 05:28

## 2022-01-01 RX ADMIN — METOPROLOL SUCCINATE 50 MG: 50 TABLET, EXTENDED RELEASE ORAL at 09:07

## 2022-01-01 RX ADMIN — APIXABAN 5 MG: 5 TABLET, FILM COATED ORAL at 20:40

## 2022-01-01 RX ADMIN — DIGOXIN 500 MCG: 0.25 INJECTION INTRAMUSCULAR; INTRAVENOUS at 20:45

## 2022-01-01 RX ADMIN — METOPROLOL TARTRATE 5 MG: 1 INJECTION, SOLUTION INTRAVENOUS at 09:46

## 2022-01-01 RX ADMIN — CEPHALEXIN 500 MG: 500 CAPSULE ORAL at 14:32

## 2022-01-01 RX ADMIN — PANTOPRAZOLE SODIUM 80 MG: 40 INJECTION, POWDER, FOR SOLUTION INTRAVENOUS at 17:26

## 2022-01-01 RX ADMIN — METOPROLOL SUCCINATE 100 MG: 50 TABLET, EXTENDED RELEASE ORAL at 20:55

## 2022-01-01 RX ADMIN — IPRATROPIUM BROMIDE AND ALBUTEROL SULFATE 3 ML: 2.5; .5 SOLUTION RESPIRATORY (INHALATION) at 19:57

## 2022-01-01 RX ADMIN — DULOXETINE HYDROCHLORIDE 30 MG: 30 CAPSULE, DELAYED RELEASE ORAL at 08:54

## 2022-01-01 RX ADMIN — MIDODRINE HYDROCHLORIDE 5 MG: 10 TABLET ORAL at 07:34

## 2022-01-01 RX ADMIN — METOLAZONE 10 MG: 5 TABLET ORAL at 09:11

## 2022-01-01 RX ADMIN — FUROSEMIDE 40 MG: 10 INJECTION, SOLUTION INTRAMUSCULAR; INTRAVENOUS at 14:38

## 2022-01-01 RX ADMIN — SPIRONOLACTONE 25 MG: 25 TABLET, FILM COATED ORAL at 09:06

## 2022-01-01 RX ADMIN — SODIUM HYPOCHLORITE: 1.25 SOLUTION TOPICAL at 22:10

## 2022-01-01 RX ADMIN — IPRATROPIUM BROMIDE AND ALBUTEROL SULFATE 3 ML: 2.5; .5 SOLUTION RESPIRATORY (INHALATION) at 07:04

## 2022-01-01 RX ADMIN — GABAPENTIN 100 MG: 100 CAPSULE ORAL at 08:02

## 2022-01-01 RX ADMIN — IPRATROPIUM BROMIDE AND ALBUTEROL SULFATE 3 ML: 2.5; .5 SOLUTION RESPIRATORY (INHALATION) at 19:41

## 2022-01-01 RX ADMIN — GABAPENTIN 100 MG: 100 CAPSULE ORAL at 08:35

## 2022-01-01 RX ADMIN — IPRATROPIUM BROMIDE AND ALBUTEROL SULFATE 3 ML: 2.5; .5 SOLUTION RESPIRATORY (INHALATION) at 18:21

## 2022-01-01 RX ADMIN — Medication 10 ML: at 20:43

## 2022-01-01 RX ADMIN — Medication 10 ML: at 08:32

## 2022-01-01 RX ADMIN — HYDROCODONE BITARTRATE AND ACETAMINOPHEN 1 TABLET: 5; 325 TABLET ORAL at 12:06

## 2022-01-01 RX ADMIN — SPIRONOLACTONE 25 MG: 25 TABLET, FILM COATED ORAL at 08:26

## 2022-01-01 RX ADMIN — SPIRONOLACTONE 25 MG: 25 TABLET ORAL at 08:46

## 2022-01-01 RX ADMIN — FUROSEMIDE 40 MG: 40 TABLET ORAL at 08:10

## 2022-01-01 RX ADMIN — METOPROLOL SUCCINATE 100 MG: 50 TABLET, EXTENDED RELEASE ORAL at 21:32

## 2022-01-01 RX ADMIN — METOPROLOL SUCCINATE 100 MG: 50 TABLET, EXTENDED RELEASE ORAL at 21:58

## 2022-01-01 RX ADMIN — CEFAZOLIN SODIUM 2 G: 2 INJECTION, SOLUTION INTRAVENOUS at 12:53

## 2022-01-01 RX ADMIN — SENNOSIDES AND DOCUSATE SODIUM 1 TABLET: 50; 8.6 TABLET ORAL at 09:27

## 2022-01-01 RX ADMIN — MORPHINE SULFATE 2 MG: 2 INJECTION, SOLUTION INTRAMUSCULAR; INTRAVENOUS at 15:32

## 2022-01-01 RX ADMIN — APIXABAN 5 MG: 5 TABLET, FILM COATED ORAL at 09:22

## 2022-01-01 RX ADMIN — DULOXETINE 30 MG: 30 CAPSULE, DELAYED RELEASE ORAL at 09:12

## 2022-01-01 RX ADMIN — SENNOSIDES AND DOCUSATE SODIUM 1 TABLET: 50; 8.6 TABLET ORAL at 08:14

## 2022-01-01 RX ADMIN — HYDROCODONE BITARTRATE AND ACETAMINOPHEN 1 TABLET: 5; 325 TABLET ORAL at 12:12

## 2022-01-01 RX ADMIN — TRIAMCINOLONE ACETONIDE 1 APPLICATION: 1 OINTMENT TOPICAL at 10:11

## 2022-01-01 RX ADMIN — METOLAZONE 5 MG: 5 TABLET ORAL at 21:33

## 2022-01-01 RX ADMIN — DULOXETINE HYDROCHLORIDE 30 MG: 30 CAPSULE, DELAYED RELEASE ORAL at 08:19

## 2022-01-01 RX ADMIN — POTASSIUM CHLORIDE 40 MEQ: 750 CAPSULE, EXTENDED RELEASE ORAL at 09:18

## 2022-01-01 RX ADMIN — APIXABAN 5 MG: 5 TABLET, FILM COATED ORAL at 21:32

## 2022-01-01 RX ADMIN — MORPHINE SULFATE 2 MG: 2 INJECTION, SOLUTION INTRAMUSCULAR; INTRAVENOUS at 04:19

## 2022-01-01 RX ADMIN — HYDROCODONE BITARTRATE AND ACETAMINOPHEN 1 TABLET: 10; 325 TABLET ORAL at 08:52

## 2022-01-01 RX ADMIN — POTASSIUM CHLORIDE 40 MEQ: 750 CAPSULE, EXTENDED RELEASE ORAL at 12:57

## 2022-01-01 RX ADMIN — APIXABAN 5 MG: 5 TABLET, FILM COATED ORAL at 08:51

## 2022-01-01 RX ADMIN — TRIAMCINOLONE ACETONIDE 1 APPLICATION: 1 LOTION TOPICAL at 08:20

## 2022-01-01 RX ADMIN — HYDROCODONE BITARTRATE AND ACETAMINOPHEN 1 TABLET: 5; 325 TABLET ORAL at 10:48

## 2022-01-01 RX ADMIN — METOLAZONE 10 MG: 5 TABLET ORAL at 08:45

## 2022-01-01 RX ADMIN — ENOXAPARIN SODIUM 40 MG: 100 INJECTION SUBCUTANEOUS at 14:27

## 2022-01-01 RX ADMIN — MORPHINE SULFATE 2 MG: 2 INJECTION, SOLUTION INTRAMUSCULAR; INTRAVENOUS at 10:13

## 2022-01-01 RX ADMIN — FUROSEMIDE 40 MG: 40 TABLET ORAL at 08:47

## 2022-01-01 RX ADMIN — HYDROMORPHONE HYDROCHLORIDE 0.5 MG: 1 INJECTION, SOLUTION INTRAMUSCULAR; INTRAVENOUS; SUBCUTANEOUS at 21:00

## 2022-01-01 RX ADMIN — SENNOSIDES AND DOCUSATE SODIUM 1 TABLET: 50; 8.6 TABLET ORAL at 21:33

## 2022-01-01 RX ADMIN — MIDODRINE HYDROCHLORIDE 5 MG: 2.5 TABLET ORAL at 06:48

## 2022-01-01 RX ADMIN — TRIAMCINOLONE ACETONIDE 1 APPLICATION: 1 OINTMENT TOPICAL at 22:13

## 2022-01-01 RX ADMIN — MORPHINE SULFATE 2 MG: 2 INJECTION, SOLUTION INTRAMUSCULAR; INTRAVENOUS at 15:26

## 2022-01-01 RX ADMIN — TRIAMCINOLONE ACETONIDE 1 APPLICATION: 1 OINTMENT TOPICAL at 21:32

## 2022-01-01 RX ADMIN — HYDROCODONE BITARTRATE AND ACETAMINOPHEN 1 TABLET: 7.5; 325 TABLET ORAL at 10:10

## 2022-01-01 RX ADMIN — BUMETANIDE 1 MG: 0.25 INJECTION INTRAMUSCULAR; INTRAVENOUS at 01:56

## 2022-01-01 RX ADMIN — FUROSEMIDE 40 MG: 40 TABLET ORAL at 17:55

## 2022-01-01 RX ADMIN — SENNOSIDES AND DOCUSATE SODIUM 1 TABLET: 50; 8.6 TABLET ORAL at 20:17

## 2022-01-01 RX ADMIN — SODIUM HYPOCHLORITE: 1.25 SOLUTION TOPICAL at 23:01

## 2022-01-01 RX ADMIN — METOPROLOL SUCCINATE 100 MG: 50 TABLET, EXTENDED RELEASE ORAL at 21:04

## 2022-01-01 RX ADMIN — PANTOPRAZOLE SODIUM 80 MG: 40 INJECTION, POWDER, FOR SOLUTION INTRAVENOUS at 08:55

## 2022-01-01 RX ADMIN — METOPROLOL SUCCINATE 50 MG: 25 TABLET, EXTENDED RELEASE ORAL at 09:46

## 2022-01-01 RX ADMIN — SODIUM HYPOCHLORITE: 1.25 SOLUTION TOPICAL at 11:09

## 2022-01-01 RX ADMIN — HYDROCODONE BITARTRATE AND ACETAMINOPHEN 1 TABLET: 10; 325 TABLET ORAL at 12:04

## 2022-01-01 RX ADMIN — HYDROCODONE BITARTRATE AND ACETAMINOPHEN 1 TABLET: 10; 325 TABLET ORAL at 20:18

## 2022-01-01 RX ADMIN — FAMOTIDINE 40 MG: 20 TABLET ORAL at 08:55

## 2022-01-01 RX ADMIN — DIGOXIN 125 MCG: 125 TABLET ORAL at 11:50

## 2022-01-01 RX ADMIN — OXYCODONE HYDROCHLORIDE AND ACETAMINOPHEN 1 TABLET: 10; 325 TABLET ORAL at 22:14

## 2022-01-01 RX ADMIN — DULOXETINE HYDROCHLORIDE 30 MG: 30 CAPSULE, DELAYED RELEASE ORAL at 09:34

## 2022-01-01 RX ADMIN — CETIRIZINE HYDROCHLORIDE 10 MG: 10 TABLET, FILM COATED ORAL at 08:50

## 2022-01-01 RX ADMIN — BUMETANIDE 2 MG: 0.25 INJECTION INTRAMUSCULAR; INTRAVENOUS at 11:55

## 2022-01-01 RX ADMIN — METOPROLOL SUCCINATE 100 MG: 50 TABLET, EXTENDED RELEASE ORAL at 08:51

## 2022-01-01 RX ADMIN — APIXABAN 5 MG: 5 TABLET, FILM COATED ORAL at 20:12

## 2022-01-01 RX ADMIN — DIGOXIN 250 MCG: 0.25 INJECTION INTRAMUSCULAR; INTRAVENOUS at 12:29

## 2022-01-01 RX ADMIN — FLUTICASONE PROPIONATE 1 SPRAY: 50 SPRAY, METERED NASAL at 02:20

## 2022-01-01 RX ADMIN — POTASSIUM CHLORIDE 30 MEQ: 750 CAPSULE, EXTENDED RELEASE ORAL at 18:15

## 2022-01-01 RX ADMIN — IPRATROPIUM BROMIDE AND ALBUTEROL SULFATE 3 ML: 2.5; .5 SOLUTION RESPIRATORY (INHALATION) at 06:33

## 2022-01-01 RX ADMIN — APIXABAN 5 MG: 5 TABLET, FILM COATED ORAL at 20:18

## 2022-01-01 RX ADMIN — SENNOSIDES AND DOCUSATE SODIUM 1 TABLET: 50; 8.6 TABLET ORAL at 08:51

## 2022-01-01 RX ADMIN — METOPROLOL TARTRATE 5 MG: 1 INJECTION, SOLUTION INTRAVENOUS at 10:42

## 2022-01-01 RX ADMIN — ACETAMINOPHEN 650 MG: 325 TABLET ORAL at 11:59

## 2022-01-01 RX ADMIN — METOLAZONE 5 MG: 5 TABLET ORAL at 23:23

## 2022-01-01 RX ADMIN — MORPHINE SULFATE 2 MG: 2 INJECTION, SOLUTION INTRAMUSCULAR; INTRAVENOUS at 11:37

## 2022-01-01 RX ADMIN — GABAPENTIN 100 MG: 100 CAPSULE ORAL at 08:56

## 2022-01-01 RX ADMIN — PANTOPRAZOLE SODIUM 80 MG: 40 INJECTION, POWDER, FOR SOLUTION INTRAVENOUS at 16:52

## 2022-01-01 RX ADMIN — FAMOTIDINE 40 MG: 20 TABLET ORAL at 09:23

## 2022-01-01 RX ADMIN — MIDODRINE HYDROCHLORIDE 10 MG: 10 TABLET ORAL at 10:12

## 2022-01-01 RX ADMIN — SPIRONOLACTONE 25 MG: 25 TABLET, FILM COATED ORAL at 09:34

## 2022-01-01 RX ADMIN — SODIUM HYPOCHLORITE: 1.25 SOLUTION TOPICAL at 15:18

## 2022-01-01 RX ADMIN — Medication 10 ML: at 09:13

## 2022-01-01 RX ADMIN — SALINE NASAL SPRAY 2 SPRAY: 1.5 SOLUTION NASAL at 08:14

## 2022-01-01 RX ADMIN — SODIUM HYPOCHLORITE: 1.25 SOLUTION TOPICAL at 23:52

## 2022-01-01 RX ADMIN — MIDODRINE HYDROCHLORIDE 10 MG: 10 TABLET ORAL at 06:35

## 2022-01-01 RX ADMIN — FUROSEMIDE 40 MG: 40 TABLET ORAL at 17:43

## 2022-01-01 RX ADMIN — HYDROMORPHONE HYDROCHLORIDE 0.5 MG: 1 INJECTION, SOLUTION INTRAMUSCULAR; INTRAVENOUS; SUBCUTANEOUS at 02:41

## 2022-01-01 RX ADMIN — ATENOLOL 50 MG: 50 TABLET ORAL at 09:02

## 2022-01-01 RX ADMIN — GABAPENTIN 100 MG: 100 CAPSULE ORAL at 09:21

## 2022-01-01 RX ADMIN — APIXABAN 5 MG: 5 TABLET, FILM COATED ORAL at 20:41

## 2022-01-01 RX ADMIN — ACETAMINOPHEN 650 MG: 325 TABLET ORAL at 21:29

## 2022-01-01 RX ADMIN — DULOXETINE HYDROCHLORIDE 30 MG: 30 CAPSULE, DELAYED RELEASE ORAL at 09:40

## 2022-01-01 RX ADMIN — PANTOPRAZOLE SODIUM 80 MG: 40 INJECTION, POWDER, FOR SOLUTION INTRAVENOUS at 06:46

## 2022-01-01 RX ADMIN — HYDROMORPHONE HYDROCHLORIDE 0.5 MG: 1 INJECTION, SOLUTION INTRAMUSCULAR; INTRAVENOUS; SUBCUTANEOUS at 13:07

## 2022-01-01 RX ADMIN — METOPROLOL SUCCINATE 100 MG: 50 TABLET, EXTENDED RELEASE ORAL at 22:12

## 2022-01-01 RX ADMIN — METOPROLOL SUCCINATE 100 MG: 50 TABLET, EXTENDED RELEASE ORAL at 20:59

## 2022-01-01 RX ADMIN — APIXABAN 5 MG: 5 TABLET, FILM COATED ORAL at 21:58

## 2022-01-01 RX ADMIN — Medication 10 ML: at 20:42

## 2022-01-01 RX ADMIN — FAMOTIDINE 40 MG: 20 TABLET ORAL at 08:10

## 2022-01-01 RX ADMIN — IPRATROPIUM BROMIDE AND ALBUTEROL SULFATE 3 ML: 2.5; .5 SOLUTION RESPIRATORY (INHALATION) at 13:03

## 2022-01-01 RX ADMIN — GABAPENTIN 100 MG: 100 CAPSULE ORAL at 08:47

## 2022-01-01 RX ADMIN — MIDODRINE HYDROCHLORIDE 5 MG: 2.5 TABLET ORAL at 11:33

## 2022-01-01 RX ADMIN — SPIRONOLACTONE 25 MG: 25 TABLET ORAL at 09:35

## 2022-01-01 RX ADMIN — DULOXETINE HYDROCHLORIDE 30 MG: 30 CAPSULE, DELAYED RELEASE ORAL at 08:49

## 2022-01-01 RX ADMIN — HYDROMORPHONE HYDROCHLORIDE 0.5 MG: 1 INJECTION, SOLUTION INTRAMUSCULAR; INTRAVENOUS; SUBCUTANEOUS at 13:49

## 2022-01-01 RX ADMIN — DIGOXIN 125 MCG: 125 TABLET ORAL at 12:10

## 2022-01-01 RX ADMIN — ACETAMINOPHEN 650 MG: 325 TABLET ORAL at 08:22

## 2022-01-01 RX ADMIN — DEXTROSE AND SODIUM CHLORIDE 100 ML/HR: 5; 900 INJECTION, SOLUTION INTRAVENOUS at 10:52

## 2022-01-01 RX ADMIN — MIDODRINE HYDROCHLORIDE 5 MG: 10 TABLET ORAL at 17:16

## 2022-01-01 RX ADMIN — DIGOXIN 125 MCG: 125 TABLET ORAL at 12:23

## 2022-01-01 RX ADMIN — METOPROLOL SUCCINATE 25 MG: 25 TABLET, EXTENDED RELEASE ORAL at 10:07

## 2022-01-01 RX ADMIN — METOPROLOL SUCCINATE 50 MG: 50 TABLET, EXTENDED RELEASE ORAL at 13:21

## 2022-01-01 RX ADMIN — METOLAZONE 5 MG: 5 TABLET ORAL at 08:50

## 2022-01-01 RX ADMIN — Medication 10 ML: at 22:45

## 2022-01-01 RX ADMIN — METOPROLOL SUCCINATE 100 MG: 50 TABLET, EXTENDED RELEASE ORAL at 21:52

## 2022-01-01 RX ADMIN — HYDROMORPHONE HYDROCHLORIDE 0.5 MG: 1 INJECTION, SOLUTION INTRAMUSCULAR; INTRAVENOUS; SUBCUTANEOUS at 15:24

## 2022-01-01 RX ADMIN — IPRATROPIUM BROMIDE AND ALBUTEROL SULFATE 3 ML: 2.5; .5 SOLUTION RESPIRATORY (INHALATION) at 12:00

## 2022-01-01 RX ADMIN — METOPROLOL SUCCINATE 75 MG: 50 TABLET, EXTENDED RELEASE ORAL at 20:41

## 2022-01-01 RX ADMIN — MIDODRINE HYDROCHLORIDE 10 MG: 10 TABLET ORAL at 12:30

## 2022-01-01 RX ADMIN — METOPROLOL SUCCINATE 25 MG: 25 TABLET, EXTENDED RELEASE ORAL at 20:13

## 2022-01-01 RX ADMIN — GABAPENTIN 100 MG: 100 CAPSULE ORAL at 08:59

## 2022-01-01 RX ADMIN — SENNOSIDES AND DOCUSATE SODIUM 1 TABLET: 50; 8.6 TABLET ORAL at 08:52

## 2022-01-01 RX ADMIN — CEFAZOLIN SODIUM 2 G: 2 INJECTION, SOLUTION INTRAVENOUS at 17:27

## 2022-01-01 RX ADMIN — AZITHROMYCIN 500 MG: 500 INJECTION, POWDER, LYOPHILIZED, FOR SOLUTION INTRAVENOUS at 10:42

## 2022-01-01 RX ADMIN — HYDROMORPHONE HYDROCHLORIDE 0.5 MG: 1 INJECTION, SOLUTION INTRAMUSCULAR; INTRAVENOUS; SUBCUTANEOUS at 05:50

## 2022-01-01 RX ADMIN — MIDODRINE HYDROCHLORIDE 10 MG: 10 TABLET ORAL at 16:52

## 2022-01-01 RX ADMIN — METOPROLOL SUCCINATE 100 MG: 50 TABLET, EXTENDED RELEASE ORAL at 09:23

## 2022-01-01 RX ADMIN — Medication 10 ML: at 09:22

## 2022-01-01 RX ADMIN — CEPHALEXIN 500 MG: 500 CAPSULE ORAL at 14:49

## 2022-01-01 RX ADMIN — HYDROCODONE BITARTRATE AND ACETAMINOPHEN 1 TABLET: 7.5; 325 TABLET ORAL at 17:05

## 2022-01-01 RX ADMIN — FAMOTIDINE 40 MG: 20 TABLET ORAL at 08:25

## 2022-01-01 RX ADMIN — OXYCODONE HYDROCHLORIDE AND ACETAMINOPHEN 1 TABLET: 10; 325 TABLET ORAL at 09:04

## 2022-01-01 RX ADMIN — DIGOXIN 250 MCG: 250 TABLET ORAL at 12:22

## 2022-01-01 RX ADMIN — MIDODRINE HYDROCHLORIDE 5 MG: 2.5 TABLET ORAL at 17:25

## 2022-01-01 RX ADMIN — HYDROCODONE BITARTRATE AND ACETAMINOPHEN 1 TABLET: 10; 325 TABLET ORAL at 15:49

## 2022-01-01 RX ADMIN — APIXABAN 5 MG: 5 TABLET, FILM COATED ORAL at 09:05

## 2022-01-01 RX ADMIN — MORPHINE SULFATE 2 MG: 2 INJECTION, SOLUTION INTRAMUSCULAR; INTRAVENOUS at 11:39

## 2022-01-01 RX ADMIN — GABAPENTIN 100 MG: 100 CAPSULE ORAL at 09:48

## 2022-01-01 RX ADMIN — DULOXETINE HYDROCHLORIDE 30 MG: 30 CAPSULE, DELAYED RELEASE ORAL at 08:51

## 2022-01-01 RX ADMIN — Medication 10 ML: at 08:20

## 2022-01-01 RX ADMIN — MORPHINE SULFATE 2 MG: 2 INJECTION, SOLUTION INTRAMUSCULAR; INTRAVENOUS at 23:53

## 2022-01-01 RX ADMIN — POTASSIUM CHLORIDE 20 MEQ: 750 CAPSULE, EXTENDED RELEASE ORAL at 17:53

## 2022-01-01 RX ADMIN — MIDODRINE HYDROCHLORIDE 10 MG: 10 TABLET ORAL at 08:40

## 2022-01-01 RX ADMIN — Medication 10 ML: at 09:15

## 2022-01-01 RX ADMIN — CEFEPIME 2 G: 1 INJECTION, SOLUTION INTRAVENOUS at 20:32

## 2022-01-01 RX ADMIN — APIXABAN 5 MG: 5 TABLET, FILM COATED ORAL at 21:45

## 2022-01-01 RX ADMIN — SPIRONOLACTONE 25 MG: 25 TABLET ORAL at 09:30

## 2022-01-01 RX ADMIN — GABAPENTIN 100 MG: 100 CAPSULE ORAL at 20:35

## 2022-01-01 RX ADMIN — METOLAZONE 10 MG: 5 TABLET ORAL at 09:25

## 2022-01-01 RX ADMIN — SODIUM HYPOCHLORITE: 1.25 SOLUTION TOPICAL at 10:43

## 2022-01-01 RX ADMIN — MORPHINE SULFATE 2 MG: 2 INJECTION, SOLUTION INTRAMUSCULAR; INTRAVENOUS at 21:20

## 2022-01-01 RX ADMIN — Medication 10 ML: at 09:11

## 2022-01-01 RX ADMIN — IPRATROPIUM BROMIDE AND ALBUTEROL SULFATE 3 ML: 2.5; .5 SOLUTION RESPIRATORY (INHALATION) at 00:13

## 2022-01-01 RX ADMIN — HYDROCODONE BITARTRATE AND ACETAMINOPHEN 1 TABLET: 5; 325 TABLET ORAL at 08:18

## 2022-01-01 RX ADMIN — APIXABAN 5 MG: 5 TABLET, FILM COATED ORAL at 23:24

## 2022-01-01 RX ADMIN — SPIRONOLACTONE 25 MG: 25 TABLET ORAL at 09:27

## 2022-01-01 RX ADMIN — OXYCODONE HYDROCHLORIDE AND ACETAMINOPHEN 1 TABLET: 10; 325 TABLET ORAL at 08:52

## 2022-01-01 RX ADMIN — FUROSEMIDE 40 MG: 40 TABLET ORAL at 09:24

## 2022-01-01 RX ADMIN — APIXABAN 5 MG: 5 TABLET, FILM COATED ORAL at 08:11

## 2022-01-01 RX ADMIN — TRIAMCINOLONE ACETONIDE 1 APPLICATION: 1 OINTMENT TOPICAL at 09:51

## 2022-01-01 RX ADMIN — DIGOXIN 125 MCG: 125 TABLET ORAL at 08:14

## 2022-01-01 RX ADMIN — DULOXETINE HYDROCHLORIDE 30 MG: 30 CAPSULE, DELAYED RELEASE ORAL at 20:41

## 2022-01-01 RX ADMIN — METOPROLOL SUCCINATE 100 MG: 50 TABLET, EXTENDED RELEASE ORAL at 21:05

## 2022-01-01 RX ADMIN — FUROSEMIDE 40 MG: 40 TABLET ORAL at 18:07

## 2022-01-01 RX ADMIN — HYDROCODONE BITARTRATE AND ACETAMINOPHEN 1 TABLET: 10; 325 TABLET ORAL at 15:33

## 2022-01-01 RX ADMIN — ENOXAPARIN SODIUM 30 MG: 100 INJECTION SUBCUTANEOUS at 14:31

## 2022-01-01 RX ADMIN — FLUTICASONE PROPIONATE 1 SPRAY: 50 SPRAY, METERED NASAL at 08:32

## 2022-01-01 RX ADMIN — CEFAZOLIN SODIUM 2 G: 2 INJECTION, SOLUTION INTRAVENOUS at 05:00

## 2022-01-01 RX ADMIN — DIGOXIN 125 MCG: 125 TABLET ORAL at 11:33

## 2022-01-01 RX ADMIN — DULOXETINE HYDROCHLORIDE 30 MG: 30 CAPSULE, DELAYED RELEASE ORAL at 09:25

## 2022-01-01 RX ADMIN — METOPROLOL SUCCINATE 75 MG: 25 TABLET, EXTENDED RELEASE ORAL at 10:11

## 2022-01-01 RX ADMIN — ACETAMINOPHEN 650 MG: 325 TABLET ORAL at 05:39

## 2022-01-01 RX ADMIN — HYDROCODONE BITARTRATE AND ACETAMINOPHEN 1 TABLET: 5; 325 TABLET ORAL at 11:06

## 2022-01-01 RX ADMIN — APIXABAN 5 MG: 5 TABLET, FILM COATED ORAL at 20:35

## 2022-01-01 RX ADMIN — METOPROLOL SUCCINATE 100 MG: 50 TABLET, EXTENDED RELEASE ORAL at 20:22

## 2022-01-01 RX ADMIN — HYDROMORPHONE HYDROCHLORIDE 0.5 MG: 1 INJECTION, SOLUTION INTRAMUSCULAR; INTRAVENOUS; SUBCUTANEOUS at 22:54

## 2022-01-01 RX ADMIN — ACETAMINOPHEN 500 MG: 500 TABLET, FILM COATED ORAL at 20:26

## 2022-01-01 RX ADMIN — Medication 10 ML: at 02:08

## 2022-01-01 RX ADMIN — CEFAZOLIN SODIUM 2 G: 2 INJECTION, SOLUTION INTRAVENOUS at 06:26

## 2022-01-01 RX ADMIN — FAMOTIDINE 40 MG: 20 TABLET ORAL at 09:49

## 2022-01-01 RX ADMIN — DIGOXIN 125 MCG: 125 TABLET ORAL at 12:53

## 2022-01-01 RX ADMIN — APIXABAN 5 MG: 5 TABLET, FILM COATED ORAL at 20:53

## 2022-01-01 RX ADMIN — HYDROMORPHONE HYDROCHLORIDE 0.5 MG: 1 INJECTION, SOLUTION INTRAMUSCULAR; INTRAVENOUS; SUBCUTANEOUS at 03:17

## 2022-01-01 RX ADMIN — MIDODRINE HYDROCHLORIDE 10 MG: 10 TABLET ORAL at 08:46

## 2022-01-01 RX ADMIN — DIGOXIN 125 MCG: 125 TABLET ORAL at 08:52

## 2022-01-01 RX ADMIN — SALINE NASAL SPRAY 2 SPRAY: 1.5 SOLUTION NASAL at 15:54

## 2022-01-01 RX ADMIN — APIXABAN 5 MG: 5 TABLET, FILM COATED ORAL at 20:04

## 2022-01-01 RX ADMIN — CEFTRIAXONE SODIUM 1 G: 1 INJECTION, SOLUTION INTRAVENOUS at 09:38

## 2022-01-01 RX ADMIN — METOLAZONE 10 MG: 5 TABLET ORAL at 08:26

## 2022-01-01 RX ADMIN — SODIUM CHLORIDE 250 ML: 9 INJECTION, SOLUTION INTRAVENOUS at 20:57

## 2022-01-01 RX ADMIN — APIXABAN 5 MG: 5 TABLET, FILM COATED ORAL at 08:59

## 2022-01-01 RX ADMIN — ACETAMINOPHEN 650 MG: 325 TABLET ORAL at 13:49

## 2022-01-01 RX ADMIN — MIDODRINE HYDROCHLORIDE 10 MG: 10 TABLET ORAL at 09:13

## 2022-01-01 RX ADMIN — IPRATROPIUM BROMIDE AND ALBUTEROL SULFATE 3 ML: .5; 3 SOLUTION RESPIRATORY (INHALATION) at 06:41

## 2022-01-01 RX ADMIN — SENNOSIDES AND DOCUSATE SODIUM 1 TABLET: 50; 8.6 TABLET ORAL at 08:45

## 2022-01-01 RX ADMIN — DULOXETINE HYDROCHLORIDE 30 MG: 30 CAPSULE, DELAYED RELEASE ORAL at 09:24

## 2022-01-01 RX ADMIN — MIDODRINE HYDROCHLORIDE 5 MG: 10 TABLET ORAL at 09:28

## 2022-01-01 RX ADMIN — GABAPENTIN 100 MG: 100 CAPSULE ORAL at 20:12

## 2022-01-01 RX ADMIN — ENOXAPARIN SODIUM 40 MG: 40 INJECTION SUBCUTANEOUS at 13:58

## 2022-01-01 RX ADMIN — CEFEPIME 2 G: 1 INJECTION, SOLUTION INTRAVENOUS at 20:41

## 2022-01-01 RX ADMIN — SENNOSIDES AND DOCUSATE SODIUM 1 TABLET: 50; 8.6 TABLET ORAL at 09:49

## 2022-01-01 RX ADMIN — HYDROCODONE BITARTRATE AND ACETAMINOPHEN 1 TABLET: 5; 325 TABLET ORAL at 20:14

## 2022-01-01 RX ADMIN — PANTOPRAZOLE SODIUM 80 MG: 40 INJECTION, POWDER, FOR SOLUTION INTRAVENOUS at 18:15

## 2022-01-01 RX ADMIN — SPIRONOLACTONE 25 MG: 25 TABLET ORAL at 08:19

## 2022-01-01 RX ADMIN — TRIAMCINOLONE ACETONIDE 1 APPLICATION: 1 OINTMENT TOPICAL at 20:57

## 2022-01-01 RX ADMIN — APIXABAN 5 MG: 5 TABLET, FILM COATED ORAL at 08:26

## 2022-01-01 RX ADMIN — FUROSEMIDE 40 MG: 10 INJECTION, SOLUTION INTRAMUSCULAR; INTRAVENOUS at 22:40

## 2022-01-01 RX ADMIN — METOPROLOL SUCCINATE 25 MG: 25 TABLET, EXTENDED RELEASE ORAL at 21:40

## 2022-01-01 RX ADMIN — HYDROCODONE BITARTRATE AND ACETAMINOPHEN 1 TABLET: 5; 325 TABLET ORAL at 14:41

## 2022-01-01 RX ADMIN — SENNOSIDES AND DOCUSATE SODIUM 1 TABLET: 50; 8.6 TABLET ORAL at 09:02

## 2022-01-01 RX ADMIN — HYDROCODONE BITARTRATE AND ACETAMINOPHEN 1 TABLET: 10; 325 TABLET ORAL at 20:34

## 2022-01-01 RX ADMIN — GABAPENTIN 100 MG: 100 CAPSULE ORAL at 22:40

## 2022-01-01 RX ADMIN — Medication 10 ML: at 22:15

## 2022-01-01 RX ADMIN — FAMOTIDINE 40 MG: 20 TABLET ORAL at 08:35

## 2022-01-01 RX ADMIN — HYDROCODONE BITARTRATE AND ACETAMINOPHEN 1 TABLET: 5; 325 TABLET ORAL at 20:43

## 2022-01-01 RX ADMIN — HYDROCODONE BITARTRATE AND ACETAMINOPHEN 1 TABLET: 5; 325 TABLET ORAL at 09:12

## 2022-01-01 RX ADMIN — APIXABAN 5 MG: 5 TABLET, FILM COATED ORAL at 09:28

## 2022-01-01 RX ADMIN — HYDROCODONE BITARTRATE AND ACETAMINOPHEN 1 TABLET: 10; 325 TABLET ORAL at 16:29

## 2022-01-01 RX ADMIN — MIDODRINE HYDROCHLORIDE 10 MG: 10 TABLET ORAL at 13:18

## 2022-01-01 RX ADMIN — METOPROLOL SUCCINATE 100 MG: 50 TABLET, EXTENDED RELEASE ORAL at 08:31

## 2022-01-01 RX ADMIN — ACETAMINOPHEN 500 MG: 500 TABLET, FILM COATED ORAL at 02:38

## 2022-01-01 RX ADMIN — FLUTICASONE PROPIONATE 1 SPRAY: 50 SPRAY, METERED NASAL at 02:19

## 2022-01-01 RX ADMIN — APIXABAN 5 MG: 5 TABLET, FILM COATED ORAL at 20:27

## 2022-01-01 RX ADMIN — Medication 10 ML: at 09:21

## 2022-01-01 RX ADMIN — MUPIROCIN: 20 OINTMENT TOPICAL at 22:13

## 2022-01-01 RX ADMIN — SENNOSIDES AND DOCUSATE SODIUM 1 TABLET: 50; 8.6 TABLET ORAL at 20:41

## 2022-01-01 RX ADMIN — METOPROLOL SUCCINATE 25 MG: 25 TABLET, EXTENDED RELEASE ORAL at 08:19

## 2022-01-01 RX ADMIN — HYDROCODONE BITARTRATE AND ACETAMINOPHEN 1 TABLET: 7.5; 325 TABLET ORAL at 21:00

## 2022-01-01 RX ADMIN — DIGOXIN 125 MCG: 125 TABLET ORAL at 12:56

## 2022-01-01 RX ADMIN — FUROSEMIDE 40 MG: 40 TABLET ORAL at 17:51

## 2022-01-01 RX ADMIN — ONDANSETRON 4 MG: 2 INJECTION INTRAMUSCULAR; INTRAVENOUS at 01:22

## 2022-01-01 RX ADMIN — HYDROCODONE BITARTRATE AND ACETAMINOPHEN 1 TABLET: 7.5; 325 TABLET ORAL at 11:16

## 2022-01-01 RX ADMIN — ACETAMINOPHEN 650 MG: 325 TABLET ORAL at 23:51

## 2022-01-01 RX ADMIN — TRIAMCINOLONE ACETONIDE 1 APPLICATION: 1 OINTMENT TOPICAL at 08:53

## 2022-01-01 RX ADMIN — TRIAMCINOLONE ACETONIDE 1 APPLICATION: 1 OINTMENT TOPICAL at 08:47

## 2022-01-01 RX ADMIN — IPRATROPIUM BROMIDE AND ALBUTEROL SULFATE 3 ML: 2.5; .5 SOLUTION RESPIRATORY (INHALATION) at 07:41

## 2022-01-01 RX ADMIN — TRIAMCINOLONE ACETONIDE 1 APPLICATION: 1 OINTMENT TOPICAL at 10:12

## 2022-01-01 RX ADMIN — Medication 5 MG/HR: at 15:14

## 2022-01-01 RX ADMIN — SPIRONOLACTONE 25 MG: 25 TABLET, FILM COATED ORAL at 08:27

## 2022-01-01 RX ADMIN — METOLAZONE 10 MG: 5 TABLET ORAL at 09:05

## 2022-01-01 RX ADMIN — SENNOSIDES AND DOCUSATE SODIUM 1 TABLET: 50; 8.6 TABLET ORAL at 09:13

## 2022-01-01 RX ADMIN — MIDODRINE HYDROCHLORIDE 5 MG: 2.5 TABLET ORAL at 17:03

## 2022-01-01 RX ADMIN — APIXABAN 2.5 MG: 2.5 TABLET, FILM COATED ORAL at 08:41

## 2022-01-01 RX ADMIN — Medication 5 MG/HR: at 00:04

## 2022-01-01 RX ADMIN — HYDROCODONE BITARTRATE AND ACETAMINOPHEN 1 TABLET: 5; 325 TABLET ORAL at 20:19

## 2022-01-01 RX ADMIN — GABAPENTIN 100 MG: 100 CAPSULE ORAL at 09:18

## 2022-01-01 RX ADMIN — CEFEPIME 2 G: 1 INJECTION, SOLUTION INTRAVENOUS at 09:39

## 2022-01-01 RX ADMIN — METOLAZONE 10 MG: 5 TABLET ORAL at 09:18

## 2022-01-01 RX ADMIN — TRIAMCINOLONE ACETONIDE 1 APPLICATION: 1 OINTMENT TOPICAL at 10:00

## 2022-01-01 RX ADMIN — MORPHINE SULFATE 2 MG: 2 INJECTION, SOLUTION INTRAMUSCULAR; INTRAVENOUS at 05:29

## 2022-01-01 RX ADMIN — CEFEPIME HYDROCHLORIDE 2 G: 2 INJECTION, POWDER, FOR SOLUTION INTRAVENOUS at 16:52

## 2022-01-01 RX ADMIN — HYDROCODONE BITARTRATE AND ACETAMINOPHEN 1 TABLET: 10; 325 TABLET ORAL at 09:24

## 2022-01-01 RX ADMIN — METOPROLOL SUCCINATE 25 MG: 25 TABLET, EXTENDED RELEASE ORAL at 20:35

## 2022-01-01 RX ADMIN — GABAPENTIN 100 MG: 100 CAPSULE ORAL at 08:31

## 2022-01-01 RX ADMIN — APIXABAN 5 MG: 5 TABLET, FILM COATED ORAL at 22:14

## 2022-01-01 RX ADMIN — APIXABAN 5 MG: 5 TABLET, FILM COATED ORAL at 08:45

## 2022-01-01 RX ADMIN — Medication 10 ML: at 08:25

## 2022-01-01 RX ADMIN — PANTOPRAZOLE SODIUM 80 MG: 40 INJECTION, POWDER, FOR SOLUTION INTRAVENOUS at 17:05

## 2022-01-01 RX ADMIN — CEFEPIME HYDROCHLORIDE 1 G: 1 INJECTION, POWDER, FOR SOLUTION INTRAMUSCULAR; INTRAVENOUS at 08:39

## 2022-01-01 RX ADMIN — METOLAZONE 10 MG: 5 TABLET ORAL at 09:02

## 2022-01-01 RX ADMIN — MIDODRINE HYDROCHLORIDE 10 MG: 10 TABLET ORAL at 11:39

## 2022-01-01 RX ADMIN — SENNOSIDES AND DOCUSATE SODIUM 1 TABLET: 50; 8.6 TABLET ORAL at 08:41

## 2022-01-01 RX ADMIN — HYDROMORPHONE HYDROCHLORIDE 0.5 MG: 1 INJECTION, SOLUTION INTRAMUSCULAR; INTRAVENOUS; SUBCUTANEOUS at 02:44

## 2022-01-01 RX ADMIN — FUROSEMIDE 40 MG: 40 TABLET ORAL at 17:41

## 2022-01-01 RX ADMIN — GABAPENTIN 100 MG: 100 CAPSULE ORAL at 09:08

## 2022-01-01 RX ADMIN — HYDROCODONE BITARTRATE AND ACETAMINOPHEN 1 TABLET: 10; 325 TABLET ORAL at 16:02

## 2022-01-01 RX ADMIN — DULOXETINE HYDROCHLORIDE 30 MG: 30 CAPSULE, DELAYED RELEASE ORAL at 08:53

## 2022-01-01 RX ADMIN — PROPOFOL 50 MG: 10 INJECTION, EMULSION INTRAVENOUS at 13:50

## 2022-01-01 RX ADMIN — FUROSEMIDE 40 MG: 40 TABLET ORAL at 08:26

## 2022-01-01 RX ADMIN — FAMOTIDINE 40 MG: 20 TABLET ORAL at 08:16

## 2022-01-01 RX ADMIN — MIDODRINE HYDROCHLORIDE 5 MG: 10 TABLET ORAL at 13:13

## 2022-01-01 RX ADMIN — METOPROLOL SUCCINATE 100 MG: 50 TABLET, EXTENDED RELEASE ORAL at 20:01

## 2022-01-01 RX ADMIN — HYDROCODONE BITARTRATE AND ACETAMINOPHEN 1 TABLET: 10; 325 TABLET ORAL at 16:43

## 2022-01-01 RX ADMIN — FUROSEMIDE 40 MG: 10 INJECTION, SOLUTION INTRAMUSCULAR; INTRAVENOUS at 08:51

## 2022-01-01 RX ADMIN — APIXABAN 5 MG: 5 TABLET, FILM COATED ORAL at 09:03

## 2022-01-01 RX ADMIN — METOPROLOL SUCCINATE 100 MG: 50 TABLET, EXTENDED RELEASE ORAL at 09:12

## 2022-01-01 RX ADMIN — ACETAMINOPHEN 650 MG: 325 TABLET ORAL at 02:01

## 2022-01-01 RX ADMIN — CETIRIZINE HYDROCHLORIDE 10 MG: 10 TABLET, FILM COATED ORAL at 09:22

## 2022-01-01 RX ADMIN — CEPHALEXIN 500 MG: 500 CAPSULE ORAL at 21:05

## 2022-01-01 RX ADMIN — BUMETANIDE 1 MG: 0.25 INJECTION INTRAMUSCULAR; INTRAVENOUS at 03:26

## 2022-01-01 RX ADMIN — SODIUM HYPOCHLORITE: 1.25 SOLUTION TOPICAL at 22:01

## 2022-01-01 RX ADMIN — METOLAZONE 10 MG: 5 TABLET ORAL at 17:16

## 2022-01-01 RX ADMIN — METOPROLOL SUCCINATE 50 MG: 50 TABLET, EXTENDED RELEASE ORAL at 20:17

## 2022-01-01 RX ADMIN — CEFAZOLIN SODIUM 2 G: 2 INJECTION, SOLUTION INTRAVENOUS at 20:53

## 2022-01-01 RX ADMIN — TRIAMCINOLONE ACETONIDE 1 APPLICATION: 1 OINTMENT TOPICAL at 22:01

## 2022-01-01 RX ADMIN — METOPROLOL SUCCINATE 100 MG: 50 TABLET, EXTENDED RELEASE ORAL at 21:09

## 2022-01-01 RX ADMIN — IPRATROPIUM BROMIDE AND ALBUTEROL SULFATE 3 ML: 2.5; .5 SOLUTION RESPIRATORY (INHALATION) at 18:50

## 2022-01-01 RX ADMIN — SALINE NASAL SPRAY 2 SPRAY: 1.5 SOLUTION NASAL at 08:53

## 2022-01-01 RX ADMIN — ACETAMINOPHEN 650 MG: 325 TABLET ORAL at 08:07

## 2022-01-01 RX ADMIN — FLUTICASONE PROPIONATE 1 SPRAY: 50 SPRAY, METERED NASAL at 22:44

## 2022-01-01 RX ADMIN — DIGOXIN 125 MCG: 125 TABLET ORAL at 18:35

## 2022-01-01 RX ADMIN — GABAPENTIN 100 MG: 100 CAPSULE ORAL at 09:22

## 2022-01-01 RX ADMIN — GABAPENTIN 100 MG: 100 CAPSULE ORAL at 09:33

## 2022-01-01 RX ADMIN — SENNOSIDES AND DOCUSATE SODIUM 1 TABLET: 50; 8.6 TABLET ORAL at 20:04

## 2022-01-01 RX ADMIN — HYDROMORPHONE HYDROCHLORIDE 0.5 MG: 1 INJECTION, SOLUTION INTRAMUSCULAR; INTRAVENOUS; SUBCUTANEOUS at 02:25

## 2022-01-01 RX ADMIN — FUROSEMIDE 40 MG: 10 INJECTION, SOLUTION INTRAMUSCULAR; INTRAVENOUS at 09:02

## 2022-01-01 RX ADMIN — FUROSEMIDE 40 MG: 40 TABLET ORAL at 10:42

## 2022-01-01 RX ADMIN — DIGOXIN 250 MCG: 250 INJECTION, SOLUTION INTRAMUSCULAR; INTRAVENOUS; PARENTERAL at 17:21

## 2022-01-01 RX ADMIN — HYDROCODONE BITARTRATE AND ACETAMINOPHEN 1 TABLET: 10; 325 TABLET ORAL at 08:56

## 2022-01-01 RX ADMIN — METOPROLOL SUCCINATE 50 MG: 50 TABLET, EXTENDED RELEASE ORAL at 21:30

## 2022-01-01 RX ADMIN — MUPIROCIN: 20 OINTMENT TOPICAL at 08:14

## 2022-01-01 RX ADMIN — GABAPENTIN 100 MG: 100 CAPSULE ORAL at 20:48

## 2022-01-01 RX ADMIN — METOPROLOL SUCCINATE 100 MG: 50 TABLET, EXTENDED RELEASE ORAL at 20:12

## 2022-01-01 RX ADMIN — SODIUM CHLORIDE 125 MG: 9 INJECTION, SOLUTION INTRAVENOUS at 10:12

## 2022-01-01 RX ADMIN — METOPROLOL SUCCINATE 100 MG: 50 TABLET, EXTENDED RELEASE ORAL at 20:05

## 2022-01-01 RX ADMIN — PANTOPRAZOLE SODIUM 80 MG: 40 INJECTION, POWDER, FOR SOLUTION INTRAVENOUS at 08:51

## 2022-01-01 RX ADMIN — GABAPENTIN 100 MG: 100 CAPSULE ORAL at 20:28

## 2022-01-01 RX ADMIN — METOPROLOL SUCCINATE 100 MG: 50 TABLET, EXTENDED RELEASE ORAL at 20:06

## 2022-01-01 RX ADMIN — GABAPENTIN 100 MG: 100 CAPSULE ORAL at 21:42

## 2022-01-01 RX ADMIN — FUROSEMIDE 40 MG: 40 TABLET ORAL at 17:49

## 2022-01-01 RX ADMIN — IPRATROPIUM BROMIDE AND ALBUTEROL SULFATE 3 ML: 2.5; .5 SOLUTION RESPIRATORY (INHALATION) at 07:31

## 2022-01-01 RX ADMIN — CEFAZOLIN SODIUM 2 G: 2 INJECTION, SOLUTION INTRAVENOUS at 14:26

## 2022-01-01 RX ADMIN — SODIUM CHLORIDE, POTASSIUM CHLORIDE, SODIUM LACTATE AND CALCIUM CHLORIDE: 600; 310; 30; 20 INJECTION, SOLUTION INTRAVENOUS at 13:48

## 2022-01-01 RX ADMIN — Medication 10 ML: at 08:03

## 2022-01-01 RX ADMIN — FUROSEMIDE 60 MG: 10 INJECTION, SOLUTION INTRAMUSCULAR; INTRAVENOUS at 13:25

## 2022-01-01 RX ADMIN — POTASSIUM CHLORIDE 20 MEQ: 750 CAPSULE, EXTENDED RELEASE ORAL at 17:04

## 2022-01-01 RX ADMIN — METOLAZONE 10 MG: 5 TABLET ORAL at 17:06

## 2022-01-01 RX ADMIN — TRIAMCINOLONE ACETONIDE 1 APPLICATION: 1 OINTMENT TOPICAL at 21:21

## 2022-01-01 RX ADMIN — MIDODRINE HYDROCHLORIDE 10 MG: 10 TABLET ORAL at 08:50

## 2022-01-01 RX ADMIN — GABAPENTIN 100 MG: 100 CAPSULE ORAL at 08:49

## 2022-01-01 RX ADMIN — FUROSEMIDE 40 MG: 40 TABLET ORAL at 08:27

## 2022-01-01 RX ADMIN — APIXABAN 5 MG: 5 TABLET, FILM COATED ORAL at 08:07

## 2022-01-01 RX ADMIN — METOPROLOL SUCCINATE 75 MG: 25 TABLET, EXTENDED RELEASE ORAL at 20:32

## 2022-01-01 RX ADMIN — GABAPENTIN 100 MG: 100 CAPSULE ORAL at 09:02

## 2022-01-01 RX ADMIN — Medication 10 ML: at 21:58

## 2022-01-01 RX ADMIN — SODIUM CHLORIDE 1000 ML: 9 INJECTION, SOLUTION INTRAVENOUS at 19:20

## 2022-01-01 RX ADMIN — METOPROLOL SUCCINATE 100 MG: 50 TABLET, EXTENDED RELEASE ORAL at 22:38

## 2022-01-01 RX ADMIN — HYDROCODONE BITARTRATE AND ACETAMINOPHEN 1 TABLET: 7.5; 325 TABLET ORAL at 10:11

## 2022-01-01 RX ADMIN — Medication 10 ML: at 09:09

## 2022-01-01 RX ADMIN — GABAPENTIN 100 MG: 100 CAPSULE ORAL at 08:14

## 2022-01-01 RX ADMIN — Medication 10 ML: at 20:27

## 2022-01-01 RX ADMIN — Medication 5 MG/HR: at 19:20

## 2022-01-01 RX ADMIN — TRIAMCINOLONE ACETONIDE 1 APPLICATION: 1 OINTMENT TOPICAL at 09:12

## 2022-01-01 RX ADMIN — DIGOXIN 125 MCG: 125 TABLET ORAL at 08:16

## 2022-01-01 RX ADMIN — CEPHALEXIN 500 MG: 500 CAPSULE ORAL at 06:03

## 2022-01-01 RX ADMIN — TRIAMCINOLONE ACETONIDE 1 APPLICATION: 1 OINTMENT TOPICAL at 22:10

## 2022-01-01 RX ADMIN — HYDROMORPHONE HYDROCHLORIDE 0.5 MG: 1 INJECTION, SOLUTION INTRAMUSCULAR; INTRAVENOUS; SUBCUTANEOUS at 04:03

## 2022-01-01 RX ADMIN — CETIRIZINE HYDROCHLORIDE 10 MG: 10 TABLET, FILM COATED ORAL at 08:46

## 2022-01-01 RX ADMIN — APIXABAN 2.5 MG: 2.5 TABLET, FILM COATED ORAL at 09:06

## 2022-01-01 RX ADMIN — CEFEPIME 2 G: 1 INJECTION, SOLUTION INTRAVENOUS at 09:49

## 2022-01-01 RX ADMIN — HYDROCODONE BITARTRATE AND ACETAMINOPHEN 1 TABLET: 5; 325 TABLET ORAL at 09:06

## 2022-01-01 RX ADMIN — MIDODRINE HYDROCHLORIDE 5 MG: 10 TABLET ORAL at 08:22

## 2022-01-01 RX ADMIN — OXYCODONE HYDROCHLORIDE AND ACETAMINOPHEN 1 TABLET: 10; 325 TABLET ORAL at 15:52

## 2022-01-01 RX ADMIN — TAZOBACTAM SODIUM AND PIPERACILLIN SODIUM 3.38 G: 375; 3 INJECTION, SOLUTION INTRAVENOUS at 19:38

## 2022-01-01 RX ADMIN — CEFTRIAXONE SODIUM 1 G: 1 INJECTION, SOLUTION INTRAVENOUS at 09:22

## 2022-01-01 RX ADMIN — CEFEPIME HYDROCHLORIDE 1 G: 1 INJECTION, POWDER, FOR SOLUTION INTRAMUSCULAR; INTRAVENOUS at 08:18

## 2022-01-01 RX ADMIN — Medication 10 ML: at 08:40

## 2022-01-01 RX ADMIN — OXYCODONE HYDROCHLORIDE AND ACETAMINOPHEN 1 TABLET: 10; 325 TABLET ORAL at 20:26

## 2022-01-01 RX ADMIN — ACETAMINOPHEN 650 MG: 325 TABLET ORAL at 22:54

## 2022-01-01 RX ADMIN — FUROSEMIDE 40 MG: 40 TABLET ORAL at 08:38

## 2022-01-01 RX ADMIN — FUROSEMIDE 20 MG: 10 INJECTION, SOLUTION INTRAMUSCULAR; INTRAVENOUS at 13:57

## 2022-01-01 RX ADMIN — FUROSEMIDE 40 MG: 10 INJECTION, SOLUTION INTRAMUSCULAR; INTRAVENOUS at 23:06

## 2022-01-01 RX ADMIN — TRIAMCINOLONE ACETONIDE 1 APPLICATION: 1 OINTMENT TOPICAL at 08:16

## 2022-01-01 RX ADMIN — APIXABAN 5 MG: 5 TABLET, FILM COATED ORAL at 20:24

## 2022-01-01 RX ADMIN — IPRATROPIUM BROMIDE AND ALBUTEROL SULFATE 3 ML: 2.5; .5 SOLUTION RESPIRATORY (INHALATION) at 00:38

## 2022-01-01 RX ADMIN — HYDROCODONE BITARTRATE AND ACETAMINOPHEN 1 TABLET: 7.5; 325 TABLET ORAL at 18:03

## 2022-01-01 RX ADMIN — MIDODRINE HYDROCHLORIDE 10 MG: 10 TABLET ORAL at 16:50

## 2022-01-01 RX ADMIN — HYDROCODONE BITARTRATE AND ACETAMINOPHEN 1 TABLET: 5; 325 TABLET ORAL at 04:27

## 2022-01-01 RX ADMIN — POTASSIUM CHLORIDE 20 MEQ: 750 CAPSULE, EXTENDED RELEASE ORAL at 14:39

## 2022-01-01 RX ADMIN — METOPROLOL SUCCINATE 100 MG: 50 TABLET, EXTENDED RELEASE ORAL at 08:45

## 2022-01-01 RX ADMIN — Medication 10 ML: at 20:36

## 2022-01-01 RX ADMIN — VANCOMYCIN HYDROCHLORIDE 750 MG: 5 INJECTION, POWDER, LYOPHILIZED, FOR SOLUTION INTRAVENOUS at 06:27

## 2022-01-01 RX ADMIN — APIXABAN 5 MG: 5 TABLET, FILM COATED ORAL at 21:05

## 2022-01-01 RX ADMIN — GABAPENTIN 100 MG: 100 CAPSULE ORAL at 21:00

## 2022-01-01 RX ADMIN — HYDROCODONE BITARTRATE AND ACETAMINOPHEN 1 TABLET: 10; 325 TABLET ORAL at 09:34

## 2022-01-01 RX ADMIN — METOPROLOL SUCCINATE 100 MG: 50 TABLET, EXTENDED RELEASE ORAL at 09:26

## 2022-01-01 RX ADMIN — HYDROCODONE BITARTRATE AND ACETAMINOPHEN 1 TABLET: 7.5; 325 TABLET ORAL at 20:36

## 2022-01-01 RX ADMIN — HYDROCODONE BITARTRATE AND ACETAMINOPHEN 1 TABLET: 7.5; 325 TABLET ORAL at 15:23

## 2022-01-01 RX ADMIN — ACETAMINOPHEN 500 MG: 500 TABLET, FILM COATED ORAL at 20:45

## 2022-01-01 RX ADMIN — PANTOPRAZOLE SODIUM 80 MG: 40 INJECTION, POWDER, FOR SOLUTION INTRAVENOUS at 16:55

## 2022-01-01 RX ADMIN — GABAPENTIN 100 MG: 100 CAPSULE ORAL at 20:52

## 2022-01-01 RX ADMIN — IPRATROPIUM BROMIDE AND ALBUTEROL SULFATE 3 ML: 2.5; .5 SOLUTION RESPIRATORY (INHALATION) at 00:51

## 2022-01-01 RX ADMIN — SENNOSIDES AND DOCUSATE SODIUM 1 TABLET: 50; 8.6 TABLET ORAL at 09:03

## 2022-01-01 RX ADMIN — POTASSIUM CHLORIDE 20 MEQ: 750 CAPSULE, EXTENDED RELEASE ORAL at 08:14

## 2022-01-01 RX ADMIN — HYDROMORPHONE HYDROCHLORIDE 0.5 MG: 1 INJECTION, SOLUTION INTRAMUSCULAR; INTRAVENOUS; SUBCUTANEOUS at 16:22

## 2022-01-01 RX ADMIN — DIGOXIN 125 MCG: 125 TABLET ORAL at 11:45

## 2022-01-01 RX ADMIN — APIXABAN 5 MG: 5 TABLET, FILM COATED ORAL at 10:07

## 2022-01-01 RX ADMIN — SENNOSIDES AND DOCUSATE SODIUM 1 TABLET: 50; 8.6 TABLET ORAL at 20:32

## 2022-01-01 RX ADMIN — BUMETANIDE 1 MG: 0.25 INJECTION INTRAMUSCULAR; INTRAVENOUS at 14:39

## 2022-01-01 RX ADMIN — METOPROLOL SUCCINATE 100 MG: 50 TABLET, EXTENDED RELEASE ORAL at 08:40

## 2022-01-01 RX ADMIN — SENNOSIDES AND DOCUSATE SODIUM 1 TABLET: 50; 8.6 TABLET ORAL at 09:26

## 2022-01-01 RX ADMIN — HYDROCODONE BITARTRATE AND ACETAMINOPHEN 1 TABLET: 7.5; 325 TABLET ORAL at 11:41

## 2022-01-01 RX ADMIN — HYDROMORPHONE HYDROCHLORIDE 0.5 MG: 1 INJECTION, SOLUTION INTRAMUSCULAR; INTRAVENOUS; SUBCUTANEOUS at 23:11

## 2022-01-01 RX ADMIN — APIXABAN 5 MG: 5 TABLET, FILM COATED ORAL at 20:37

## 2022-01-01 RX ADMIN — BUMETANIDE 2 MG: 0.25 INJECTION INTRAMUSCULAR; INTRAVENOUS at 13:13

## 2022-01-01 RX ADMIN — Medication 10 ML: at 09:42

## 2022-01-01 RX ADMIN — APIXABAN 5 MG: 5 TABLET, FILM COATED ORAL at 08:02

## 2022-01-01 RX ADMIN — HYDROCODONE BITARTRATE AND ACETAMINOPHEN 1 TABLET: 5; 325 TABLET ORAL at 15:35

## 2022-01-01 RX ADMIN — DILTIAZEM HYDROCHLORIDE 180 MG: 180 CAPSULE, COATED, EXTENDED RELEASE ORAL at 08:46

## 2022-01-01 RX ADMIN — DULOXETINE HYDROCHLORIDE 30 MG: 30 CAPSULE, DELAYED RELEASE ORAL at 08:26

## 2022-01-01 RX ADMIN — TRIAMCINOLONE ACETONIDE 1 APPLICATION: 1 OINTMENT TOPICAL at 09:07

## 2022-01-01 RX ADMIN — MIDODRINE HYDROCHLORIDE 5 MG: 2.5 TABLET ORAL at 09:21

## 2022-01-01 RX ADMIN — METOPROLOL SUCCINATE 100 MG: 50 TABLET, EXTENDED RELEASE ORAL at 08:16

## 2022-01-01 RX ADMIN — METOPROLOL SUCCINATE 100 MG: 50 TABLET, EXTENDED RELEASE ORAL at 20:46

## 2022-01-01 RX ADMIN — APIXABAN 5 MG: 5 TABLET, FILM COATED ORAL at 08:35

## 2022-01-01 RX ADMIN — CEFAZOLIN SODIUM 2 G: 2 INJECTION, SOLUTION INTRAVENOUS at 13:26

## 2022-01-01 RX ADMIN — SPIRONOLACTONE 25 MG: 25 TABLET ORAL at 09:06

## 2022-01-01 RX ADMIN — OXYCODONE HYDROCHLORIDE AND ACETAMINOPHEN 1 TABLET: 10; 325 TABLET ORAL at 15:58

## 2022-01-01 RX ADMIN — DILTIAZEM HYDROCHLORIDE 10 MG: 5 INJECTION INTRAVENOUS at 17:29

## 2022-01-01 RX ADMIN — HYDROCODONE BITARTRATE AND ACETAMINOPHEN 1 TABLET: 7.5; 325 TABLET ORAL at 09:45

## 2022-01-01 RX ADMIN — SENNOSIDES AND DOCUSATE SODIUM 1 TABLET: 50; 8.6 TABLET ORAL at 08:12

## 2022-01-01 RX ADMIN — CEFTRIAXONE SODIUM 1 G: 1 INJECTION, SOLUTION INTRAVENOUS at 08:19

## 2022-01-01 RX ADMIN — APIXABAN 5 MG: 5 TABLET, FILM COATED ORAL at 20:46

## 2022-01-01 RX ADMIN — DIGOXIN 125 MCG: 125 TABLET ORAL at 12:19

## 2022-01-01 RX ADMIN — DIGOXIN 125 MCG: 125 TABLET ORAL at 11:18

## 2022-01-01 RX ADMIN — DIGOXIN 125 MCG: 125 TABLET ORAL at 13:08

## 2022-01-01 RX ADMIN — METOPROLOL SUCCINATE 100 MG: 50 TABLET, EXTENDED RELEASE ORAL at 08:02

## 2022-01-01 RX ADMIN — CEFEPIME HYDROCHLORIDE 1 G: 1 INJECTION, POWDER, FOR SOLUTION INTRAMUSCULAR; INTRAVENOUS at 13:49

## 2022-01-01 RX ADMIN — HYDROMORPHONE HYDROCHLORIDE 0.5 MG: 1 INJECTION, SOLUTION INTRAMUSCULAR; INTRAVENOUS; SUBCUTANEOUS at 14:24

## 2022-01-01 RX ADMIN — OXYCODONE HYDROCHLORIDE AND ACETAMINOPHEN 1 TABLET: 10; 325 TABLET ORAL at 15:11

## 2022-01-01 RX ADMIN — METOPROLOL TARTRATE 2.5 MG: 5 INJECTION INTRAVENOUS at 01:23

## 2022-01-01 RX ADMIN — APIXABAN 5 MG: 5 TABLET, FILM COATED ORAL at 09:48

## 2022-01-01 RX ADMIN — CEFTRIAXONE SODIUM 1 G: 1 INJECTION, SOLUTION INTRAVENOUS at 08:48

## 2022-01-01 RX ADMIN — TRIAMCINOLONE ACETONIDE 1 APPLICATION: 1 OINTMENT TOPICAL at 12:12

## 2022-01-01 RX ADMIN — BUMETANIDE 2 MG: 0.25 INJECTION INTRAMUSCULAR; INTRAVENOUS at 12:01

## 2022-01-01 RX ADMIN — CEPHALEXIN 500 MG: 500 CAPSULE ORAL at 05:30

## 2022-01-01 RX ADMIN — BUMETANIDE 1 MG: 0.25 INJECTION INTRAMUSCULAR; INTRAVENOUS at 14:18

## 2022-01-01 RX ADMIN — GABAPENTIN 100 MG: 100 CAPSULE ORAL at 08:07

## 2022-01-01 RX ADMIN — METOPROLOL SUCCINATE 100 MG: 50 TABLET, EXTENDED RELEASE ORAL at 10:21

## 2022-01-01 RX ADMIN — METOPROLOL SUCCINATE 50 MG: 50 TABLET, EXTENDED RELEASE ORAL at 08:51

## 2022-01-01 RX ADMIN — MIDODRINE HYDROCHLORIDE 10 MG: 10 TABLET ORAL at 11:41

## 2022-01-01 RX ADMIN — ACETAMINOPHEN 500 MG: 500 TABLET, FILM COATED ORAL at 09:13

## 2022-01-01 RX ADMIN — Medication 10 ML: at 20:59

## 2022-01-01 RX ADMIN — TRIAMCINOLONE ACETONIDE: 1 LOTION TOPICAL at 11:17

## 2022-01-01 RX ADMIN — GABAPENTIN 100 MG: 100 CAPSULE ORAL at 20:55

## 2022-01-01 RX ADMIN — CEFTRIAXONE SODIUM 1 G: 1 INJECTION, SOLUTION INTRAVENOUS at 09:11

## 2022-01-01 RX ADMIN — SPIRONOLACTONE 25 MG: 25 TABLET ORAL at 08:52

## 2022-01-01 RX ADMIN — SPIRONOLACTONE 25 MG: 25 TABLET, FILM COATED ORAL at 08:45

## 2022-01-01 RX ADMIN — DIGOXIN 125 MCG: 125 TABLET ORAL at 12:00

## 2022-01-01 RX ADMIN — DIGOXIN 125 MCG: 125 TABLET ORAL at 10:58

## 2022-01-01 RX ADMIN — HYDROCODONE BITARTRATE AND ACETAMINOPHEN 1 TABLET: 7.5; 325 TABLET ORAL at 18:15

## 2022-01-01 RX ADMIN — Medication 10 ML: at 20:45

## 2022-01-01 RX ADMIN — MORPHINE SULFATE 2 MG: 2 INJECTION, SOLUTION INTRAMUSCULAR; INTRAVENOUS at 15:35

## 2022-01-01 RX ADMIN — IPRATROPIUM BROMIDE AND ALBUTEROL SULFATE 3 ML: 2.5; .5 SOLUTION RESPIRATORY (INHALATION) at 12:06

## 2022-01-01 RX ADMIN — TRIAMCINOLONE ACETONIDE 1 APPLICATION: 1 OINTMENT TOPICAL at 23:01

## 2022-01-01 RX ADMIN — PANTOPRAZOLE SODIUM 40 MG: 40 INJECTION, POWDER, FOR SOLUTION INTRAVENOUS at 06:39

## 2022-01-01 RX ADMIN — ACETAMINOPHEN 650 MG: 325 TABLET ORAL at 21:05

## 2022-01-01 RX ADMIN — TRIAMCINOLONE ACETONIDE 1 APPLICATION: 1 OINTMENT TOPICAL at 21:10

## 2022-01-01 RX ADMIN — APIXABAN 5 MG: 5 TABLET, FILM COATED ORAL at 08:30

## 2022-01-01 RX ADMIN — METOPROLOL SUCCINATE 25 MG: 25 TABLET, EXTENDED RELEASE ORAL at 08:10

## 2022-01-01 RX ADMIN — METOPROLOL SUCCINATE 100 MG: 50 TABLET, EXTENDED RELEASE ORAL at 22:13

## 2022-01-01 RX ADMIN — VANCOMYCIN HYDROCHLORIDE 1250 MG: 5 INJECTION, POWDER, LYOPHILIZED, FOR SOLUTION INTRAVENOUS at 12:12

## 2022-01-01 RX ADMIN — HYDROCODONE BITARTRATE AND ACETAMINOPHEN 1 TABLET: 7.5; 325 TABLET ORAL at 15:35

## 2022-01-01 RX ADMIN — DULOXETINE HYDROCHLORIDE 30 MG: 30 CAPSULE, DELAYED RELEASE ORAL at 08:55

## 2022-01-01 RX ADMIN — DULOXETINE HYDROCHLORIDE 30 MG: 30 CAPSULE, DELAYED RELEASE ORAL at 08:38

## 2022-01-01 RX ADMIN — ENOXAPARIN SODIUM 30 MG: 100 INJECTION SUBCUTANEOUS at 15:19

## 2022-01-01 RX ADMIN — METOPROLOL SUCCINATE 100 MG: 50 TABLET, EXTENDED RELEASE ORAL at 08:38

## 2022-01-01 RX ADMIN — SODIUM HYPOCHLORITE: 1.25 SOLUTION TOPICAL at 10:12

## 2022-01-01 RX ADMIN — GABAPENTIN 100 MG: 100 CAPSULE ORAL at 20:17

## 2022-01-01 RX ADMIN — APIXABAN 5 MG: 5 TABLET, FILM COATED ORAL at 08:39

## 2022-01-01 RX ADMIN — SPIRONOLACTONE 25 MG: 25 TABLET ORAL at 09:22

## 2022-01-01 RX ADMIN — GABAPENTIN 100 MG: 100 CAPSULE ORAL at 09:03

## 2022-01-01 RX ADMIN — Medication 10 ML: at 10:11

## 2022-01-01 RX ADMIN — FUROSEMIDE 40 MG: 40 TABLET ORAL at 09:06

## 2022-01-01 RX ADMIN — HYDROCODONE BITARTRATE AND ACETAMINOPHEN 1 TABLET: 7.5; 325 TABLET ORAL at 08:40

## 2022-01-01 RX ADMIN — SPIRONOLACTONE 25 MG: 25 TABLET ORAL at 09:41

## 2022-01-01 RX ADMIN — GABAPENTIN 100 MG: 100 CAPSULE ORAL at 20:27

## 2022-01-01 RX ADMIN — PANTOPRAZOLE SODIUM 80 MG: 40 INJECTION, POWDER, FOR SOLUTION INTRAVENOUS at 09:14

## 2022-01-01 RX ADMIN — ACETAMINOPHEN 500 MG: 500 TABLET, FILM COATED ORAL at 02:11

## 2022-01-01 RX ADMIN — METOPROLOL SUCCINATE 25 MG: 25 TABLET, EXTENDED RELEASE ORAL at 20:45

## 2022-01-01 RX ADMIN — METOPROLOL SUCCINATE 100 MG: 50 TABLET, EXTENDED RELEASE ORAL at 20:35

## 2022-01-01 RX ADMIN — OXYCODONE HYDROCHLORIDE AND ACETAMINOPHEN 1 TABLET: 10; 325 TABLET ORAL at 16:54

## 2022-01-01 RX ADMIN — HYDROCODONE BITARTRATE AND ACETAMINOPHEN 1 TABLET: 5; 325 TABLET ORAL at 09:58

## 2022-01-01 RX ADMIN — PANTOPRAZOLE SODIUM 80 MG: 40 INJECTION, POWDER, FOR SOLUTION INTRAVENOUS at 18:38

## 2022-01-01 RX ADMIN — TRIAMCINOLONE ACETONIDE 1 APPLICATION: 1 OINTMENT TOPICAL at 22:50

## 2022-01-01 RX ADMIN — FAMOTIDINE 40 MG: 20 TABLET ORAL at 09:04

## 2022-01-01 RX ADMIN — TRIAMCINOLONE ACETONIDE 1 APPLICATION: 1 OINTMENT TOPICAL at 21:20

## 2022-01-01 RX ADMIN — TRIAMCINOLONE ACETONIDE 1 APPLICATION: 1 OINTMENT TOPICAL at 09:39

## 2022-01-01 RX ADMIN — SPIRONOLACTONE 25 MG: 25 TABLET ORAL at 09:02

## 2022-01-01 RX ADMIN — METOPROLOL SUCCINATE 25 MG: 25 TABLET, EXTENDED RELEASE ORAL at 10:00

## 2022-01-01 RX ADMIN — FAMOTIDINE 40 MG: 20 TABLET ORAL at 08:27

## 2022-01-01 RX ADMIN — ALBUMIN (HUMAN) 25 G: 0.25 INJECTION, SOLUTION INTRAVENOUS at 18:04

## 2022-01-01 RX ADMIN — METOLAZONE 10 MG: 5 TABLET ORAL at 17:27

## 2022-01-01 RX ADMIN — GABAPENTIN 100 MG: 100 CAPSULE ORAL at 20:19

## 2022-01-01 RX ADMIN — OXYCODONE HYDROCHLORIDE AND ACETAMINOPHEN 1 TABLET: 10; 325 TABLET ORAL at 08:41

## 2022-01-01 RX ADMIN — ENOXAPARIN SODIUM 30 MG: 100 INJECTION SUBCUTANEOUS at 14:27

## 2022-01-01 RX ADMIN — APIXABAN 5 MG: 5 TABLET, FILM COATED ORAL at 09:13

## 2022-01-01 RX ADMIN — CEFEPIME 2 G: 1 INJECTION, SOLUTION INTRAVENOUS at 09:13

## 2022-01-01 RX ADMIN — FAMOTIDINE 40 MG: 20 TABLET ORAL at 09:27

## 2022-01-01 RX ADMIN — GABAPENTIN 100 MG: 100 CAPSULE ORAL at 08:45

## 2022-01-01 RX ADMIN — APIXABAN 5 MG: 5 TABLET, FILM COATED ORAL at 09:12

## 2022-01-01 RX ADMIN — DEXTROSE AND SODIUM CHLORIDE 100 ML/HR: 5; 900 INJECTION, SOLUTION INTRAVENOUS at 09:49

## 2022-01-01 RX ADMIN — Medication 10 ML: at 20:33

## 2022-01-01 RX ADMIN — Medication 10 ML: at 09:35

## 2022-01-01 RX ADMIN — CEPHALEXIN 500 MG: 500 CAPSULE ORAL at 21:20

## 2022-01-01 RX ADMIN — DIGOXIN 125 MCG: 125 TABLET ORAL at 12:25

## 2022-01-01 RX ADMIN — HYDROCODONE BITARTRATE AND ACETAMINOPHEN 1 TABLET: 10; 325 TABLET ORAL at 21:04

## 2022-01-01 RX ADMIN — DEXTROSE AND SODIUM CHLORIDE 100 ML/HR: 5; 900 INJECTION, SOLUTION INTRAVENOUS at 22:09

## 2022-01-01 RX ADMIN — CEFEPIME 2 G: 1 INJECTION, SOLUTION INTRAVENOUS at 20:35

## 2022-01-01 RX ADMIN — Medication 10 ML: at 13:58

## 2022-01-01 RX ADMIN — DULOXETINE HYDROCHLORIDE 30 MG: 30 CAPSULE, DELAYED RELEASE ORAL at 08:07

## 2022-01-01 RX ADMIN — Medication 10 ML: at 21:00

## 2022-01-01 RX ADMIN — IPRATROPIUM BROMIDE AND ALBUTEROL SULFATE 3 ML: 2.5; .5 SOLUTION RESPIRATORY (INHALATION) at 00:47

## 2022-01-01 RX ADMIN — CEFAZOLIN SODIUM 2 G: 2 INJECTION, SOLUTION INTRAVENOUS at 20:36

## 2022-01-01 RX ADMIN — METOPROLOL SUCCINATE 100 MG: 50 TABLET, EXTENDED RELEASE ORAL at 21:20

## 2022-01-01 RX ADMIN — FAMOTIDINE 40 MG: 20 TABLET ORAL at 08:07

## 2022-01-01 RX ADMIN — HYDROCODONE BITARTRATE AND ACETAMINOPHEN 1 TABLET: 7.5; 325 TABLET ORAL at 20:06

## 2022-01-01 RX ADMIN — CEPHALEXIN 500 MG: 500 CAPSULE ORAL at 14:17

## 2022-01-01 RX ADMIN — APIXABAN 5 MG: 5 TABLET, FILM COATED ORAL at 09:18

## 2022-01-01 RX ADMIN — HYDROCODONE BITARTRATE AND ACETAMINOPHEN 1 TABLET: 5; 325 TABLET ORAL at 08:40

## 2022-01-01 RX ADMIN — MIDODRINE HYDROCHLORIDE 10 MG: 10 TABLET ORAL at 08:01

## 2022-01-01 RX ADMIN — CEFEPIME HYDROCHLORIDE 1 G: 1 INJECTION, POWDER, FOR SOLUTION INTRAMUSCULAR; INTRAVENOUS at 20:41

## 2022-01-01 RX ADMIN — Medication 10 ML: at 09:44

## 2022-01-01 RX ADMIN — SPIRONOLACTONE 25 MG: 25 TABLET, FILM COATED ORAL at 09:08

## 2022-01-01 RX ADMIN — HYDROCODONE BITARTRATE AND ACETAMINOPHEN 1 TABLET: 7.5; 325 TABLET ORAL at 20:12

## 2022-01-01 RX ADMIN — DEXTROSE AND SODIUM CHLORIDE 100 ML/HR: 5; 900 INJECTION, SOLUTION INTRAVENOUS at 11:38

## 2022-01-01 RX ADMIN — METOLAZONE 10 MG: 5 TABLET ORAL at 08:54

## 2022-01-01 RX ADMIN — MIDODRINE HYDROCHLORIDE 10 MG: 10 TABLET ORAL at 17:26

## 2022-01-01 RX ADMIN — METOPROLOL SUCCINATE 100 MG: 50 TABLET, EXTENDED RELEASE ORAL at 08:14

## 2022-01-01 RX ADMIN — IPRATROPIUM BROMIDE AND ALBUTEROL SULFATE 3 ML: 2.5; .5 SOLUTION RESPIRATORY (INHALATION) at 12:14

## 2022-01-01 RX ADMIN — HYDROCODONE BITARTRATE AND ACETAMINOPHEN 1 TABLET: 7.5; 325 TABLET ORAL at 21:32

## 2022-01-01 RX ADMIN — CETIRIZINE HYDROCHLORIDE 10 MG: 10 TABLET, FILM COATED ORAL at 08:49

## 2022-01-01 RX ADMIN — ACETAMINOPHEN 650 MG: 325 TABLET ORAL at 20:53

## 2022-01-01 RX ADMIN — FUROSEMIDE 40 MG: 10 INJECTION, SOLUTION INTRAMUSCULAR; INTRAVENOUS at 21:57

## 2022-01-01 RX ADMIN — SPIRONOLACTONE 25 MG: 25 TABLET, FILM COATED ORAL at 08:10

## 2022-01-01 RX ADMIN — ACETAMINOPHEN 650 MG: 325 TABLET ORAL at 14:24

## 2022-01-01 RX ADMIN — TRIAMCINOLONE ACETONIDE 1 APPLICATION: 1 OINTMENT TOPICAL at 09:13

## 2022-01-01 RX ADMIN — FUROSEMIDE 40 MG: 40 TABLET ORAL at 18:19

## 2022-01-01 RX ADMIN — FUROSEMIDE 40 MG: 40 TABLET ORAL at 09:04

## 2022-01-01 RX ADMIN — IPRATROPIUM BROMIDE AND ALBUTEROL SULFATE 3 ML: 2.5; .5 SOLUTION RESPIRATORY (INHALATION) at 07:59

## 2022-01-01 RX ADMIN — GABAPENTIN 100 MG: 100 CAPSULE ORAL at 02:19

## 2022-01-01 RX ADMIN — Medication 10 ML: at 22:18

## 2022-01-01 RX ADMIN — FAMOTIDINE 40 MG: 20 TABLET ORAL at 08:38

## 2022-01-01 RX ADMIN — MIDODRINE HYDROCHLORIDE 5 MG: 2.5 TABLET ORAL at 06:33

## 2022-01-01 RX ADMIN — SPIRONOLACTONE 25 MG: 25 TABLET, FILM COATED ORAL at 08:56

## 2022-01-01 RX ADMIN — MORPHINE SULFATE 2 MG: 2 INJECTION, SOLUTION INTRAMUSCULAR; INTRAVENOUS at 22:42

## 2022-01-01 RX ADMIN — PANTOPRAZOLE SODIUM 80 MG: 40 INJECTION, POWDER, FOR SOLUTION INTRAVENOUS at 08:01

## 2022-01-01 RX ADMIN — FUROSEMIDE 40 MG: 40 TABLET ORAL at 09:00

## 2022-01-01 RX ADMIN — METOPROLOL SUCCINATE 100 MG: 50 TABLET, EXTENDED RELEASE ORAL at 09:35

## 2022-01-01 RX ADMIN — Medication 0.08 MCG/KG/MIN: at 01:39

## 2022-01-01 RX ADMIN — FUROSEMIDE 80 MG: 10 INJECTION, SOLUTION INTRAMUSCULAR; INTRAVENOUS at 02:14

## 2022-01-01 RX ADMIN — METOPROLOL SUCCINATE 100 MG: 50 TABLET, EXTENDED RELEASE ORAL at 20:19

## 2022-01-01 RX ADMIN — GABAPENTIN 100 MG: 100 CAPSULE ORAL at 21:20

## 2022-01-01 RX ADMIN — SODIUM HYPOCHLORITE: 1.25 SOLUTION TOPICAL at 13:10

## 2022-01-01 RX ADMIN — APIXABAN 5 MG: 5 TABLET, FILM COATED ORAL at 20:42

## 2022-01-01 RX ADMIN — SENNOSIDES AND DOCUSATE SODIUM 1 TABLET: 50; 8.6 TABLET ORAL at 08:35

## 2022-01-01 RX ADMIN — CEFAZOLIN SODIUM 2 G: 2 INJECTION, SOLUTION INTRAVENOUS at 04:39

## 2022-01-01 RX ADMIN — OXYCODONE HYDROCHLORIDE AND ACETAMINOPHEN 1 TABLET: 10; 325 TABLET ORAL at 08:19

## 2022-01-01 RX ADMIN — SENNOSIDES AND DOCUSATE SODIUM 1 TABLET: 50; 8.6 TABLET ORAL at 20:53

## 2022-01-01 RX ADMIN — FUROSEMIDE 40 MG: 40 TABLET ORAL at 09:08

## 2022-01-01 RX ADMIN — HYDROCODONE BITARTRATE AND ACETAMINOPHEN 1 TABLET: 5; 325 TABLET ORAL at 14:59

## 2022-01-01 RX ADMIN — PROPOFOL 100 MCG/KG/MIN: 10 INJECTION, EMULSION INTRAVENOUS at 13:50

## 2022-01-01 RX ADMIN — SPIRONOLACTONE 25 MG: 25 TABLET, FILM COATED ORAL at 09:05

## 2022-01-01 RX ADMIN — MIDODRINE HYDROCHLORIDE 5 MG: 2.5 TABLET ORAL at 12:25

## 2022-01-01 RX ADMIN — FUROSEMIDE 40 MG: 40 TABLET ORAL at 09:21

## 2022-01-01 RX ADMIN — Medication 0.16 MCG/KG/MIN: at 16:23

## 2022-01-01 RX ADMIN — MORPHINE SULFATE 2 MG: 2 INJECTION, SOLUTION INTRAMUSCULAR; INTRAVENOUS at 03:02

## 2022-01-01 RX ADMIN — IPRATROPIUM BROMIDE AND ALBUTEROL SULFATE 3 ML: 2.5; .5 SOLUTION RESPIRATORY (INHALATION) at 01:15

## 2022-01-01 RX ADMIN — Medication 10 ML: at 08:54

## 2022-01-01 RX ADMIN — TRIAMCINOLONE ACETONIDE 1 APPLICATION: 1 OINTMENT TOPICAL at 06:25

## 2022-01-01 RX ADMIN — PANTOPRAZOLE SODIUM 80 MG: 40 INJECTION, POWDER, FOR SOLUTION INTRAVENOUS at 16:50

## 2022-01-01 RX ADMIN — FAMOTIDINE 40 MG: 20 TABLET ORAL at 08:41

## 2022-01-01 RX ADMIN — FUROSEMIDE 40 MG: 40 TABLET ORAL at 17:23

## 2022-01-01 RX ADMIN — SODIUM HYPOCHLORITE: 1.25 SOLUTION TOPICAL at 21:52

## 2022-01-01 RX ADMIN — APIXABAN 5 MG: 5 TABLET, FILM COATED ORAL at 20:26

## 2022-01-01 RX ADMIN — FUROSEMIDE 40 MG: 40 TABLET ORAL at 09:34

## 2022-01-01 RX ADMIN — METOPROLOL SUCCINATE 100 MG: 50 TABLET, EXTENDED RELEASE ORAL at 08:47

## 2022-01-01 RX ADMIN — Medication 10 ML: at 20:00

## 2022-01-01 RX ADMIN — FUROSEMIDE 40 MG: 40 TABLET ORAL at 08:19

## 2022-01-01 RX ADMIN — SENNOSIDES AND DOCUSATE SODIUM 1 TABLET: 50; 8.6 TABLET ORAL at 08:26

## 2022-01-01 RX ADMIN — SENNOSIDES AND DOCUSATE SODIUM 1 TABLET: 50; 8.6 TABLET ORAL at 20:13

## 2022-01-01 RX ADMIN — CEPHALEXIN 500 MG: 500 CAPSULE ORAL at 14:39

## 2022-01-01 RX ADMIN — Medication 10 MG/HR: at 21:42

## 2022-01-01 RX ADMIN — GABAPENTIN 100 MG: 100 CAPSULE ORAL at 20:37

## 2022-01-01 RX ADMIN — METOPROLOL SUCCINATE 25 MG: 25 TABLET, EXTENDED RELEASE ORAL at 20:18

## 2022-01-01 RX ADMIN — FUROSEMIDE 40 MG: 10 INJECTION, SOLUTION INTRAMUSCULAR; INTRAVENOUS at 09:09

## 2022-01-01 RX ADMIN — HYDROCODONE BITARTRATE AND ACETAMINOPHEN 1 TABLET: 5; 325 TABLET ORAL at 20:36

## 2022-01-01 RX ADMIN — GUAIFENESIN AND DEXTROMETHORPHAN 5 ML: 100; 10 SYRUP ORAL at 23:40

## 2022-01-01 RX ADMIN — BUMETANIDE 2 MG: 0.25 INJECTION INTRAMUSCULAR; INTRAVENOUS at 23:16

## 2022-01-01 RX ADMIN — OXYCODONE HYDROCHLORIDE AND ACETAMINOPHEN 1 TABLET: 10; 325 TABLET ORAL at 22:17

## 2022-01-01 RX ADMIN — SPIRONOLACTONE 25 MG: 25 TABLET, FILM COATED ORAL at 08:38

## 2022-01-01 RX ADMIN — SPIRONOLACTONE 25 MG: 25 TABLET, FILM COATED ORAL at 08:31

## 2022-01-01 RX ADMIN — METOPROLOL SUCCINATE 100 MG: 50 TABLET, EXTENDED RELEASE ORAL at 20:41

## 2022-01-01 RX ADMIN — ACETAMINOPHEN 650 MG: 325 TABLET ORAL at 12:12

## 2022-01-01 RX ADMIN — POTASSIUM CHLORIDE 20 MEQ: 750 CAPSULE, EXTENDED RELEASE ORAL at 17:30

## 2022-01-01 RX ADMIN — METOPROLOL SUCCINATE 25 MG: 25 TABLET, EXTENDED RELEASE ORAL at 20:42

## 2022-01-01 RX ADMIN — HYDROMORPHONE HYDROCHLORIDE 0.5 MG: 1 INJECTION, SOLUTION INTRAMUSCULAR; INTRAVENOUS; SUBCUTANEOUS at 04:23

## 2022-01-01 RX ADMIN — Medication 10 ML: at 09:49

## 2022-01-01 RX ADMIN — GABAPENTIN 100 MG: 100 CAPSULE ORAL at 09:06

## 2022-01-01 RX ADMIN — HYDROMORPHONE HYDROCHLORIDE 0.5 MG: 1 INJECTION, SOLUTION INTRAMUSCULAR; INTRAVENOUS; SUBCUTANEOUS at 04:37

## 2022-01-01 RX ADMIN — DEXTROSE MONOHYDRATE 25 G: 25 INJECTION, SOLUTION INTRAVENOUS at 04:19

## 2022-01-01 RX ADMIN — CEFEPIME 2 G: 1 INJECTION, SOLUTION INTRAVENOUS at 20:40

## 2022-01-01 RX ADMIN — POLYETHYLENE GLYCOL 3350 17 G: 17 POWDER, FOR SOLUTION ORAL at 10:29

## 2022-01-01 RX ADMIN — DIGOXIN 125 MCG: 125 TABLET ORAL at 14:47

## 2022-01-01 RX ADMIN — APIXABAN 5 MG: 5 TABLET, FILM COATED ORAL at 09:08

## 2022-01-01 RX ADMIN — SENNOSIDES AND DOCUSATE SODIUM 1 TABLET: 50; 8.6 TABLET ORAL at 09:18

## 2022-01-01 RX ADMIN — FUROSEMIDE 40 MG: 40 TABLET ORAL at 09:02

## 2022-01-01 RX ADMIN — METOLAZONE 5 MG: 5 TABLET ORAL at 09:21

## 2022-01-01 RX ADMIN — SENNOSIDES AND DOCUSATE SODIUM 1 TABLET: 50; 8.6 TABLET ORAL at 20:37

## 2022-01-01 RX ADMIN — DULOXETINE HYDROCHLORIDE 30 MG: 30 CAPSULE, DELAYED RELEASE ORAL at 08:50

## 2022-01-01 RX ADMIN — ACETAMINOPHEN 650 MG: 325 TABLET ORAL at 17:13

## 2022-01-01 RX ADMIN — MIDODRINE HYDROCHLORIDE 10 MG: 10 TABLET ORAL at 16:55

## 2022-01-01 RX ADMIN — CEFEPIME HYDROCHLORIDE 2 G: 2 INJECTION, POWDER, FOR SOLUTION INTRAMUSCULAR; INTRAVENOUS at 19:53

## 2022-01-01 RX ADMIN — FUROSEMIDE 40 MG: 40 TABLET ORAL at 08:32

## 2022-01-01 RX ADMIN — DIGOXIN 125 MCG: 125 TABLET ORAL at 12:08

## 2022-01-01 RX ADMIN — MORPHINE SULFATE 2 MG: 2 INJECTION, SOLUTION INTRAMUSCULAR; INTRAVENOUS at 07:14

## 2022-01-01 RX ADMIN — GABAPENTIN 100 MG: 100 CAPSULE ORAL at 22:12

## 2022-01-01 RX ADMIN — GABAPENTIN 100 MG: 100 CAPSULE ORAL at 08:53

## 2022-01-01 RX ADMIN — DULOXETINE HYDROCHLORIDE 30 MG: 30 CAPSULE, DELAYED RELEASE ORAL at 09:33

## 2022-01-01 RX ADMIN — HYDROCODONE BITARTRATE AND ACETAMINOPHEN 1 TABLET: 5; 325 TABLET ORAL at 20:44

## 2022-01-01 RX ADMIN — METOPROLOL SUCCINATE 25 MG: 25 TABLET, EXTENDED RELEASE ORAL at 12:35

## 2022-01-01 RX ADMIN — METOPROLOL SUCCINATE 100 MG: 50 TABLET, EXTENDED RELEASE ORAL at 09:11

## 2022-01-01 RX ADMIN — HYDROCODONE BITARTRATE AND ACETAMINOPHEN 1 TABLET: 5; 325 TABLET ORAL at 20:18

## 2022-01-01 RX ADMIN — CEFAZOLIN SODIUM 2 G: 2 INJECTION, SOLUTION INTRAVENOUS at 18:38

## 2022-01-01 RX ADMIN — METOPROLOL SUCCINATE 100 MG: 50 TABLET, EXTENDED RELEASE ORAL at 19:49

## 2022-01-01 RX ADMIN — FAMOTIDINE 40 MG: 20 TABLET ORAL at 08:59

## 2022-01-01 RX ADMIN — METOPROLOL SUCCINATE 100 MG: 50 TABLET, EXTENDED RELEASE ORAL at 09:08

## 2022-01-01 RX ADMIN — HYDROCODONE BITARTRATE AND ACETAMINOPHEN 1 TABLET: 10; 325 TABLET ORAL at 16:26

## 2022-01-01 RX ADMIN — CEPHALEXIN 500 MG: 500 CAPSULE ORAL at 05:20

## 2022-01-01 RX ADMIN — SPIRONOLACTONE 25 MG: 25 TABLET ORAL at 08:49

## 2022-01-01 RX ADMIN — ACETAMINOPHEN 500 MG: 500 TABLET, FILM COATED ORAL at 14:24

## 2022-01-01 RX ADMIN — POTASSIUM CHLORIDE 20 MEQ: 750 CAPSULE, EXTENDED RELEASE ORAL at 09:08

## 2022-01-01 RX ADMIN — CEFEPIME HYDROCHLORIDE 1 G: 1 INJECTION, POWDER, FOR SOLUTION INTRAMUSCULAR; INTRAVENOUS at 20:18

## 2022-01-01 RX ADMIN — IPRATROPIUM BROMIDE AND ALBUTEROL SULFATE 3 ML: 2.5; .5 SOLUTION RESPIRATORY (INHALATION) at 19:20

## 2022-01-01 RX ADMIN — DEXTROSE MONOHYDRATE 50 ML: 500 INJECTION PARENTERAL at 01:24

## 2022-01-01 RX ADMIN — DEXTROSE MONOHYDRATE 25 G: 25 INJECTION, SOLUTION INTRAVENOUS at 18:32

## 2022-01-01 RX ADMIN — CEFAZOLIN SODIUM 2 G: 2 INJECTION, SOLUTION INTRAVENOUS at 15:25

## 2022-01-01 RX ADMIN — GABAPENTIN 100 MG: 100 CAPSULE ORAL at 20:01

## 2022-01-01 RX ADMIN — Medication 5 MG/HR: at 23:19

## 2022-01-01 RX ADMIN — MIDODRINE HYDROCHLORIDE 10 MG: 10 TABLET ORAL at 11:38

## 2022-01-01 RX ADMIN — METOPROLOL SUCCINATE 100 MG: 50 TABLET, EXTENDED RELEASE ORAL at 22:39

## 2022-01-01 RX ADMIN — DEXTROSE AND SODIUM CHLORIDE 100 ML/HR: 5; 900 INJECTION, SOLUTION INTRAVENOUS at 15:50

## 2022-01-01 RX ADMIN — PANTOPRAZOLE SODIUM 40 MG: 40 TABLET, DELAYED RELEASE ORAL at 20:56

## 2022-01-01 RX ADMIN — SENNOSIDES AND DOCUSATE SODIUM 1 TABLET: 50; 8.6 TABLET ORAL at 21:29

## 2022-01-01 RX ADMIN — DIGOXIN 125 MCG: 125 TABLET ORAL at 11:16

## 2022-01-01 RX ADMIN — OXYCODONE HYDROCHLORIDE AND ACETAMINOPHEN 1 TABLET: 10; 325 TABLET ORAL at 20:59

## 2022-01-01 RX ADMIN — METOPROLOL SUCCINATE 50 MG: 50 TABLET, EXTENDED RELEASE ORAL at 09:06

## 2022-01-01 RX ADMIN — METOLAZONE 10 MG: 5 TABLET ORAL at 09:34

## 2022-01-01 RX ADMIN — MIDODRINE HYDROCHLORIDE 5 MG: 10 TABLET ORAL at 17:27

## 2022-01-01 RX ADMIN — HYDROCODONE BITARTRATE AND ACETAMINOPHEN 1 TABLET: 5; 325 TABLET ORAL at 20:04

## 2022-01-01 RX ADMIN — SENNOSIDES AND DOCUSATE SODIUM 1 TABLET: 50; 8.6 TABLET ORAL at 09:23

## 2022-01-01 RX ADMIN — MIDODRINE HYDROCHLORIDE 10 MG: 10 TABLET ORAL at 18:38

## 2022-01-01 RX ADMIN — METOPROLOL SUCCINATE 100 MG: 50 TABLET, EXTENDED RELEASE ORAL at 08:49

## 2022-01-01 RX ADMIN — ACETAMINOPHEN 500 MG: 500 TABLET, FILM COATED ORAL at 09:45

## 2022-01-01 RX ADMIN — TRIAMCINOLONE ACETONIDE: 1 LOTION TOPICAL at 09:20

## 2022-01-01 RX ADMIN — METOLAZONE 10 MG: 5 TABLET ORAL at 08:38

## 2022-01-01 RX ADMIN — METOPROLOL SUCCINATE 100 MG: 50 TABLET, EXTENDED RELEASE ORAL at 21:44

## 2022-01-01 RX ADMIN — DULOXETINE HYDROCHLORIDE 30 MG: 30 CAPSULE, DELAYED RELEASE ORAL at 09:27

## 2022-01-01 RX ADMIN — METOPROLOL SUCCINATE 100 MG: 50 TABLET, EXTENDED RELEASE ORAL at 22:17

## 2022-01-01 RX ADMIN — MIDODRINE HYDROCHLORIDE 5 MG: 10 TABLET ORAL at 11:55

## 2022-01-01 RX ADMIN — ACETAMINOPHEN 500 MG: 500 TABLET, FILM COATED ORAL at 13:25

## 2022-01-01 RX ADMIN — DIGOXIN 125 MCG: 125 TABLET ORAL at 11:27

## 2022-01-01 RX ADMIN — HYDROMORPHONE HYDROCHLORIDE 0.5 MG: 1 INJECTION, SOLUTION INTRAMUSCULAR; INTRAVENOUS; SUBCUTANEOUS at 10:13

## 2022-01-01 RX ADMIN — FAMOTIDINE 40 MG: 20 TABLET ORAL at 09:28

## 2022-01-01 RX ADMIN — DEXTROSE MONOHYDRATE 50 ML: 25 INJECTION, SOLUTION INTRAVENOUS at 01:24

## 2022-01-01 RX ADMIN — Medication 10 ML: at 09:38

## 2022-01-01 RX ADMIN — TRIAMCINOLONE ACETONIDE 1 APPLICATION: 1 OINTMENT TOPICAL at 11:40

## 2022-01-01 RX ADMIN — DIGOXIN 125 MCG: 125 TABLET ORAL at 12:54

## 2022-01-01 RX ADMIN — FAMOTIDINE 40 MG: 20 TABLET ORAL at 09:24

## 2022-01-01 RX ADMIN — METOPROLOL SUCCINATE 100 MG: 50 TABLET, EXTENDED RELEASE ORAL at 20:24

## 2022-01-01 RX ADMIN — MIDODRINE HYDROCHLORIDE 10 MG: 10 TABLET ORAL at 18:35

## 2022-01-01 RX ADMIN — DIGOXIN 250 MCG: 0.25 INJECTION INTRAMUSCULAR; INTRAVENOUS at 21:58

## 2022-01-01 RX ADMIN — METOPROLOL SUCCINATE 100 MG: 50 TABLET, EXTENDED RELEASE ORAL at 08:53

## 2022-01-01 RX ADMIN — DILTIAZEM HYDROCHLORIDE 180 MG: 180 CAPSULE, COATED, EXTENDED RELEASE ORAL at 08:41

## 2022-01-01 RX ADMIN — DIGOXIN 250 MCG: 250 TABLET ORAL at 13:20

## 2022-01-01 RX ADMIN — METOLAZONE 5 MG: 5 TABLET ORAL at 19:49

## 2022-01-01 RX ADMIN — MORPHINE SULFATE 2 MG: 2 INJECTION, SOLUTION INTRAMUSCULAR; INTRAVENOUS at 12:23

## 2022-01-01 RX ADMIN — HYDROCODONE BITARTRATE AND ACETAMINOPHEN 1 TABLET: 5; 325 TABLET ORAL at 10:41

## 2022-01-01 RX ADMIN — HYDROMORPHONE HYDROCHLORIDE 0.5 MG: 1 INJECTION, SOLUTION INTRAMUSCULAR; INTRAVENOUS; SUBCUTANEOUS at 23:29

## 2022-01-01 RX ADMIN — MIDODRINE HYDROCHLORIDE 10 MG: 10 TABLET ORAL at 11:09

## 2022-01-01 RX ADMIN — MORPHINE SULFATE 2 MG: 2 INJECTION, SOLUTION INTRAMUSCULAR; INTRAVENOUS at 04:00

## 2022-01-01 RX ADMIN — TRIAMCINOLONE ACETONIDE 1 APPLICATION: 1 OINTMENT TOPICAL at 09:26

## 2022-01-01 RX ADMIN — APIXABAN 5 MG: 5 TABLET, FILM COATED ORAL at 22:40

## 2022-01-01 RX ADMIN — FUROSEMIDE 40 MG: 10 INJECTION, SOLUTION INTRAMUSCULAR; INTRAVENOUS at 08:07

## 2022-01-01 RX ADMIN — HYDROCODONE BITARTRATE AND ACETAMINOPHEN 1 TABLET: 5; 325 TABLET ORAL at 17:28

## 2022-01-01 RX ADMIN — SPIRONOLACTONE 25 MG: 25 TABLET, FILM COATED ORAL at 08:07

## 2022-01-01 RX ADMIN — DIGOXIN 125 MCG: 125 TABLET ORAL at 11:59

## 2022-01-01 RX ADMIN — METOPROLOL SUCCINATE 100 MG: 50 TABLET, EXTENDED RELEASE ORAL at 20:04

## 2022-01-01 RX ADMIN — MIDODRINE HYDROCHLORIDE 10 MG: 10 TABLET ORAL at 16:32

## 2022-01-01 RX ADMIN — METOPROLOL SUCCINATE 50 MG: 50 TABLET, EXTENDED RELEASE ORAL at 20:40

## 2022-01-01 RX ADMIN — GABAPENTIN 100 MG: 100 CAPSULE ORAL at 23:24

## 2022-01-01 RX ADMIN — SPIRONOLACTONE 25 MG: 25 TABLET, FILM COATED ORAL at 09:33

## 2022-01-01 RX ADMIN — Medication 10 ML: at 04:03

## 2022-01-01 RX ADMIN — GABAPENTIN 100 MG: 100 CAPSULE ORAL at 20:13

## 2022-01-01 RX ADMIN — TRIAMCINOLONE ACETONIDE 1 APPLICATION: 1 OINTMENT TOPICAL at 10:34

## 2022-01-01 RX ADMIN — CEFAZOLIN SODIUM 2 G: 2 INJECTION, SOLUTION INTRAVENOUS at 04:37

## 2022-01-01 RX ADMIN — SENNOSIDES AND DOCUSATE SODIUM 2 TABLET: 50; 8.6 TABLET ORAL at 21:40

## 2022-01-01 RX ADMIN — GABAPENTIN 100 MG: 100 CAPSULE ORAL at 20:05

## 2022-01-01 RX ADMIN — APIXABAN 5 MG: 5 TABLET, FILM COATED ORAL at 20:56

## 2022-01-01 RX ADMIN — METOLAZONE 10 MG: 5 TABLET ORAL at 08:47

## 2022-01-01 RX ADMIN — METOPROLOL SUCCINATE 100 MG: 50 TABLET, EXTENDED RELEASE ORAL at 08:11

## 2022-01-01 RX ADMIN — FUROSEMIDE 40 MG: 40 TABLET ORAL at 08:53

## 2022-01-01 RX ADMIN — TRIAMCINOLONE ACETONIDE: 1 LOTION TOPICAL at 08:18

## 2022-01-01 RX ADMIN — BUMETANIDE 1 MG: 0.25 INJECTION INTRAMUSCULAR; INTRAVENOUS at 14:32

## 2022-01-01 RX ADMIN — MIDODRINE HYDROCHLORIDE 10 MG: 10 TABLET ORAL at 17:05

## 2022-01-01 RX ADMIN — DIGOXIN 250 MCG: 250 TABLET ORAL at 12:06

## 2022-01-01 RX ADMIN — METOLAZONE 10 MG: 5 TABLET ORAL at 17:25

## 2022-01-01 RX ADMIN — SODIUM CHLORIDE, POTASSIUM CHLORIDE, SODIUM LACTATE AND CALCIUM CHLORIDE 75 ML/HR: 600; 310; 30; 20 INJECTION, SOLUTION INTRAVENOUS at 01:11

## 2022-01-01 RX ADMIN — Medication 10 MG/HR: at 07:59

## 2022-01-01 RX ADMIN — GABAPENTIN 100 MG: 100 CAPSULE ORAL at 21:09

## 2022-01-21 NOTE — TELEPHONE ENCOUNTER
Pt called to advise she is currently sick and needs to reschedule procedure. She has now been moved to 3.7.22 @ 9:00.   07-Mar-2019 23:09

## 2022-02-07 PROBLEM — I48.91 ATRIAL FIBRILLATION (HCC): Status: ACTIVE | Noted: 2022-01-01

## 2022-02-07 NOTE — ED PROVIDER NOTES
Subjective     Shortness of Breath  Severity:  Mild  Onset quality:  Sudden  Duration:  2 days  Timing:  Constant  Progression:  Waxing and waning  Chronicity:  New  Context: activity    Relieved by:  Rest  Worsened by:  Exertion  Ineffective treatments:  None tried  Associated symptoms: no chest pain, no cough, no fever, no hemoptysis, no rash, no sputum production and no wheezing    Associated symptoms comment:  Palpitations      Review of Systems   Constitutional: Negative for fever.   Respiratory: Positive for shortness of breath. Negative for cough, hemoptysis, sputum production and wheezing.    Cardiovascular: Negative for chest pain.   Skin: Negative for rash.   All other systems reviewed and are negative.      Past Medical History:   Diagnosis Date   • Aneurysm (HCC)    • Arthritis    • Hypertension        Allergies   Allergen Reactions   • Contrast Dye Rash   • Lotrel [Amlodipine Besy-Benazepril Hcl] Unknown - Low Severity       Past Surgical History:   Procedure Laterality Date   • COLONOSCOPY     • HYSTERECTOMY         Family History   Problem Relation Age of Onset   • Hypertension Mother    • Colon cancer Neg Hx        Social History     Socioeconomic History   • Marital status:    Tobacco Use   • Smoking status: Never Smoker   • Smokeless tobacco: Never Used   Vaping Use   • Vaping Use: Never used   Substance and Sexual Activity   • Alcohol use: Not Currently   • Drug use: Never   • Sexual activity: Defer           Objective   Physical Exam  Vitals and nursing note reviewed.   Constitutional:       Appearance: She is not ill-appearing.   HENT:      Head: Normocephalic and atraumatic.   Neck:      Vascular: No JVD.   Cardiovascular:      Rate and Rhythm: Tachycardia present. Rhythm irregular.   Pulmonary:      Effort: Pulmonary effort is normal.      Breath sounds: Normal breath sounds.   Chest:      Chest wall: No tenderness.   Abdominal:      Palpations: Abdomen is soft.      Tenderness: There  is no abdominal tenderness.   Musculoskeletal:      Right lower leg: No tenderness.      Left lower leg: No tenderness.   Skin:     General: Skin is warm and dry.   Neurological:      Mental Status: She is alert and oriented to person, place, and time.   Psychiatric:         Behavior: Behavior normal.         Procedures           ED Course                                                 MDM  Number of Diagnoses or Management Options     Amount and/or Complexity of Data Reviewed  Clinical lab tests: reviewed  Tests in the radiology section of CPT®: reviewed  Decide to obtain previous medical records or to obtain history from someone other than the patient: yes  Review and summarize past medical records: yes  Discuss the patient with other providers: yes      This is a 77-year-old female who denies prior significant cardiopulmonary history who presents for evaluation of exertional dyspnea and is found to be in atrial fibrillation with rapid ventricular response new onset.  We will give a bolus of Cardizem and if the patient's heart rate responds well we will continue p.o. Cardizem and anticoagulation.  If the patient's heart rate does not significantly improve may need to initiate a Cardizem drip and admit the patient.      Critical care time:  35 minutes of critical care provided. This time excludes other billable procedures. Time does include preparation of documents, medical consultations, review of old records, and direct bedside care. Patient was at high risk for life-threatening deterioration due to cardiovascular system failure with new onset atrial fibrillation and rapid ventricular response treated with Cardizem bolus and drip..     Final diagnoses:   Atrial fibrillation with rapid ventricular response (HCC)   Atrial fibrillation, new onset (HCC)       ED Disposition  ED Disposition     ED Disposition Condition Comment    Decision to Admit  Level of Care: Telemetry [5]   Diagnosis: Atrial fibrillation (HCC)  [427.31.ICD-9-CM]   Admitting Physician: BRIGITTE GARCÍA [[id number]]   Attending Physician: BRIGITTE GARCÍA [[id number]]            No follow-up provider specified.       Medication List      No changes were made to your prescriptions during this visit.          Pool Scott DO  02/07/22 2337       Pool Scott DO  02/07/22 2338

## 2022-02-08 NOTE — PLAN OF CARE
Goal Outcome Evaluation:  Plan of Care Reviewed With: patient           Outcome Summary: Patient A&Ox4, vitals stable.  Currently on a cardizem drip at 10mL with heartrate hanging out in the 90's or low 100's.  No titrations needed this shift. Will continue to monitor.

## 2022-02-08 NOTE — CONSULTS
Cardiology Consult Note  [facility] CARE UNIT          Patient Identification:  Lauren Redd      [id number]  77 y.o.        female  1945         Reason for Consultation: Atrial fibrillation    PCP: Dennis Kohler MD    History of Present Illness:     Patient is 77-year-old female came for evaluation of rapid and irregular heartbeat as well as shortness of breath.  She is feeling these irregular heartbeats for last couple of weeks.  No significant chest chest pain.  She does have some shortness of breath but no swelling of the feet.  No history of any cardiac issues in the past.    Patient has history of hypertension.  No history of diabetes or myocardial infarction.  No history of congestive heart failure.    Past History:  Past Medical History:   Diagnosis Date   • Aneurysm (HCC)    • Arthritis    • Hypertension      Past Surgical History:   Procedure Laterality Date   • COLONOSCOPY     • HYSTERECTOMY       Allergies   Allergen Reactions   • Contrast Dye Rash   • Lotrel [Amlodipine Besy-Benazepril Hcl] Unknown - Low Severity     Social History     Socioeconomic History   • Marital status:    Tobacco Use   • Smoking status: Never Smoker   • Smokeless tobacco: Never Used   Vaping Use   • Vaping Use: Never used   Substance and Sexual Activity   • Alcohol use: Not Currently   • Drug use: Never   • Sexual activity: Defer     Family History   Problem Relation Age of Onset   • Hypertension Mother    • Colon cancer Neg Hx          Medications:  Prior to Admission medications    Medication Sig Start Date End Date Taking? Authorizing Provider   Candesartan Cilexetil-HCTZ 32-25 MG tablet Take 1 tablet by mouth Daily.    Provider, MD Colton   fluticasone (Flonase) 50 MCG/ACT nasal spray 1 spray into the nostril(s) as directed by provider Daily. 12/7/21   Emergency, Nurse Adwoa, RN   Loratadine 10 MG capsule Take 10 mg by mouth Daily.    Provider, MD Colton   vitamin D  "(ERGOCALCIFEROL) 1.25 MG (18416 UT) capsule capsule Take 50,000 Units by mouth 2 (Two) Times a Week.    Provider, MD Colton      Current medications:  apixaban, 5 mg, Oral, Q12H  metoprolol succinate XL, 25 mg, Oral, Q12H      Current IV drips:  dilTIAZem, 5-15 mg/hr, Last Rate: 10 mg/hr (02/08/22 6508)          Review of Systems  Review of Systems   Constitutional: Negative for appetite change, fatigue and fever.   HENT: Negative for congestion.    Respiratory: Negative for apnea, choking, chest tightness, shortness of breath and wheezing.    Cardiovascular: Negative for chest pain, palpitations and leg swelling.   Gastrointestinal: Negative for diarrhea, nausea and vomiting.   Genitourinary: Negative for dysuria, frequency and hematuria.   Musculoskeletal: Positive for arthralgias. Negative for myalgias.   Skin: Negative for pallor.   Neurological: Negative for dizziness, tremors, syncope and weakness.   Psychiatric/Behavioral: Negative for agitation and confusion.         Physical Exam    /85 (BP Location: Left arm, Patient Position: Lying)   Pulse 99   Temp 98.3 °F (36.8 °C) (Oral)   Resp 19   Ht 172.7 cm (68\")   Wt 115 kg (252 lb 6.8 oz)   LMP  (LMP Unknown)   SpO2 93%   BMI 38.38 kg/m²  Body mass index is 38.38 kg/m².   Oxygen saturation   @FLOWAN(10::1)@ SpO2  Min: 82 %  Max: 99 %    General Appearance:   · no acute distress  · Alert and oriented x3  HENT:   · lips not cyanotic  · Atraumatic  Neck:  · thyroid not enlarged  · supple  Respiratory:  · no respiratory distress  · normal breath sounds  · no rales  Cardiovascular:  · no jugular venous distention  · regular rhythm  · apical impulse normal  · S1 normal, S2 normal  · no S3, no S4   · no murmur  · no rub, no thrill  · no carotid bruit  · pedal pulses normal  · lower extremity edema: none    Gastrointestinal:   · bowel sounds normal  · non-tender  · no hepatomegaly, no splenomegaly  Musculoskeletal:  · no clubbing of fingers. "   · normocephalic, head atraumatic  Skin:   · warm, dry  · no rashes  Neuro/Psychiatric:  · normal mood and affect  · judgement and insight appropriate      Cardiographics:     ECG  (personally reviewed) EKG: Atrial fibrillation with rapid rate and minor ST-T changes.   Telemetry:  (personally reviewed) atrial fibrillation with rapid rate          No results found for this or any previous visit.      Cardiolite (Tc-99m Sestamibi) stress test   Lab Review:       CBC    CBC 3/3/21 2/7/22   WBC 7.18 7.58   RBC 4.42 4.52   Hemoglobin 13.1 13.7   Hematocrit 40.9 43.5   MCV 92.5 96.2   MCH 29.6 30.3   MCHC 32.0 (A) 31.5   RDW 13.8 17.1 (A)   Platelets 216 184   (A) Abnormal value                CMP    CMP 3/3/21 2/7/22   Glucose 99 108 (A)   BUN 20 57 (A)   Creatinine 0.91 (A) 1.46 (A)   eGFR  Am  42 (A)   Sodium 146 144   Potassium 3.6 4.3   Chloride 104 105   Calcium 10.3 9.6   Albumin 4.1 3.30 (A)   Total Bilirubin 0.38 0.8   Alkaline Phosphatase 83 105   AST (SGOT) 21 63 (A)   ALT (SGPT) 22 169 (A)   (A) Abnormal value               CARDIAC LABS:      Lab 02/07/22  1618   PROBNP 2,019.0*   PROTIME 11.4   INR 1.10*      No results found for: DIGOXIN   No results found for: TSH        Invalid input(s): LDLCALC  No results found for: POCTROP  No components found for: DDIMERQUAN  Lab Results   Component Value Date    MG 2.5 (H) 02/07/2022                 Assessment:  Atrial fibrillation with rapid rate  Hypertension  Obesity      Plan:  Start on Toprol-XL and try to wean off the Cardizem drip  Agree with Eliquis  Echocardiogram to evaluate the LV function  Thyroid function test  Troponins to rule out myocardial infarction      Thank you for allowing me to share in [facility].        Lisa Leung MD   2/8/2022    09:39 EST

## 2022-02-08 NOTE — CASE MANAGEMENT/SOCIAL WORK
Discharge Planning Assessment  MARLA Kraus     Patient Name: Lauren Redd  MRN: [id number]  Today's Date: 2/8/2022    Admit Date: 2/7/2022     Discharge Needs Assessment     Row Name 02/08/22 1152       Living Environment    Lives With spouse    Name(s) of Who Lives With Patient  Cipriano    Current Living Arrangements home/apartment/condo    Primary Care Provided by self    Family Caregiver if Needed none    Quality of Family Relationships supportive; helpful; involved    Able to Return to Prior Arrangements yes       Resource/Environmental Concerns    Resource/Environmental Concerns none    Transportation Concerns car, none       Transition Planning    Patient/Family Anticipates Transition to home with family    Patient/Family Anticipated Services at Transition none    Transportation Anticipated family or friend will provide       Discharge Needs Assessment    Readmission Within the Last 30 Days no previous admission in last 30 days    Equipment Currently Used at Home none    Concerns to be Addressed medication    Concerns Comments Pt will d/c on Eliquis    Anticipated Changes Related to Illness none    Equipment Needed After Discharge none               Discharge Plan     Row Name 02/08/22 1154       Plan    Plan Patient denies transportation or medication assistance at this time. Patient usually picks up medications at [facility].    Row Name 02/08/22 1153       Plan    Plan Cm spoke with patient at bedside, Patient is independent at home- lives with , Patient admitted for new onset atrial fibrillation. Patient new medication Eliquis              Continued Care and Services - Admitted Since 2/7/2022    Coordination has not been started for this encounter.          Demographic Summary     Row Name 02/08/22 114       General Information    Admission Type observation    Arrived From urgent care    Referral Source admission list    Preferred Language English    General Information Comments hard of  hearing- no hearing aids       Contact Information    Permission Granted to Share Info With family/designee               Functional Status     Row Name 02/08/22 1151       Functional Status    Usual Activity Tolerance excellent    Current Activity Tolerance excellent       Functional Status, IADL    Medications independent    Meal Preparation independent    Housekeeping independent    Laundry independent    Shopping independent       Mental Status    General Appearance WDL WDL       Mental Status Summary    Recent Changes in Mental Status/Cognitive Functioning no changes       Employment/    Employment Status retired    Current or Previous Occupation desk job               Psychosocial    No documentation.                Abuse/Neglect    No documentation.                Legal     Row Name 02/08/22 1151       Financial/Legal    Source of Income pension/FCI       Legal    Criminal Activity/Legal Involvement none               Substance Abuse    No documentation.                Patient Forms    No documentation.             Patient currently on Cardizem. Patient started complaining of left hand becoming numb and breathing became more deliberate. RN notified.       Nancy Diaz RN

## 2022-02-08 NOTE — H&P
[facility]   HISTORY AND PHYSICAL    Patient Name: Lauren Redd  : 1945  MRN: [id number]  Primary Care Physician:  Dennis Kohler MD  Date of admission: 2022    Subjective   Subjective     Chief Complaint:   *Shortness of breath    HPI:    Lauren Redd is a 77 y.o. female admitted to hospital earlier this morning for rapid A. fib, new onset along with symptoms of shortness of breath..  Patient came to emergency room with shortness of breath of few weeks duration.  Work-up in the ER revealed rapid A. fib, started on Cardizem bolus and drip.  Patient admitted to medical service, when I saw patient earlier this morning she is awake alert, does not have much symptoms except for exertional dyspnea.  Denies any chest pain.    Review of Systems:  Fatigue and weakness.  No chest pain.  Shortness of breath.  Palpitations.  No nausea vomiting or diarrhea  .    Personal History     Past Medical History:   Diagnosis Date   • Aneurysm (HCC)    • Arthritis    • Hypertension        Past Surgical History:   Procedure Laterality Date   • COLONOSCOPY     • HYSTERECTOMY         Family History: family history includes Hypertension in her mother. Otherwise pertinent FHx was reviewed and not pertinent to current issue.    Social History:  reports that she has never smoked. She has never used smokeless tobacco. She reports previous alcohol use. She reports that she does not use drugs.    Home Medications:  Candesartan Cilexetil-HCTZ, fluticasone, loratadine, pseudoephedrine, and spironolactone      Allergies:  Allergies   Allergen Reactions   • Contrast Dye Rash   • Lotrel [Amlodipine Besy-Benazepril Hcl] Unknown - Low Severity       Objective   Objective     Vitals:   Temp:  [97.3 °F (36.3 °C)-98.3 °F (36.8 °C)] 97.3 °F (36.3 °C)  Heart Rate:  [] 90  Resp:  [18-22] 19  BP: ()/(61-97) 97/64    Physical Exam  Elderly female obese not in acute distress.  HEENT unremarkable.  Neck without any JVD.  Heart  irregular.  Lungs diminished breath sounds but clear  Abdomen soft and obese  Extremities with trace of edema      I have personally reviewed the results from the time of this admission to 2/8/2022 19:33 EST and agree with these findings:  [x]  Laboratory  []  Microbiology  [x]  Radiology  [x]  EKG/Telemetry   []  Cardiology/Vascular   []  Pathology  []  Old records  []  Other:    CBC:    WBC   Date Value Ref Range Status   02/07/2022 7.58 3.40 - 10.80 10*3/mm3 Final     RBC   Date Value Ref Range Status   02/07/2022 4.52 3.77 - 5.28 10*6/mm3 Final     Hemoglobin   Date Value Ref Range Status   02/07/2022 13.7 12.0 - 15.9 g/dL Final     Hematocrit   Date Value Ref Range Status   02/07/2022 43.5 34.0 - 46.6 % Final     MCV   Date Value Ref Range Status   02/07/2022 96.2 79.0 - 97.0 fL Final     MCH   Date Value Ref Range Status   02/07/2022 30.3 26.6 - 33.0 pg Final     MCHC   Date Value Ref Range Status   02/07/2022 31.5 31.5 - 35.7 g/dL Final     RDW   Date Value Ref Range Status   02/07/2022 17.1 (H) 12.3 - 15.4 % Final     RDW-SD   Date Value Ref Range Status   02/07/2022 60.1 (H) 37.0 - 54.0 fl Final     MPV   Date Value Ref Range Status   02/07/2022 11.6 6.0 - 12.0 fL Final     Platelets   Date Value Ref Range Status   02/07/2022 184 140 - 450 10*3/mm3 Final     Neutrophil %   Date Value Ref Range Status   02/07/2022 64.5 42.7 - 76.0 % Final     Lymphocyte %   Date Value Ref Range Status   02/07/2022 23.7 19.6 - 45.3 % Final     Monocyte %   Date Value Ref Range Status   02/07/2022 9.5 5.0 - 12.0 % Final     Eosinophil %   Date Value Ref Range Status   02/07/2022 1.6 0.3 - 6.2 % Final     Basophil %   Date Value Ref Range Status   02/07/2022 0.4 0.0 - 1.5 % Final     Immature Grans %   Date Value Ref Range Status   02/07/2022 0.3 0.0 - 0.5 % Final     Neutrophils, Absolute   Date Value Ref Range Status   02/07/2022 4.89 1.70 - 7.00 10*3/mm3 Final     Lymphocytes, Absolute   Date Value Ref Range Status    02/07/2022 1.80 0.70 - 3.10 10*3/mm3 Final     Monocytes, Absolute   Date Value Ref Range Status   02/07/2022 0.72 0.10 - 0.90 10*3/mm3 Final     Eosinophils, Absolute   Date Value Ref Range Status   02/07/2022 0.12 0.00 - 0.40 10*3/mm3 Final     Basophils, Absolute   Date Value Ref Range Status   02/07/2022 0.03 0.00 - 0.20 10*3/mm3 Final     Immature Grans, Absolute   Date Value Ref Range Status   02/07/2022 0.02 0.00 - 0.05 10*3/mm3 Final     nRBC   Date Value Ref Range Status   02/07/2022 0.0 0.0 - 0.2 /100 WBC Final        BMP:    Lab Results   Component Value Date    GLUCOSE 108 (H) 02/07/2022    BUN 57 (H) 02/07/2022    CREATININE 1.46 (H) 02/07/2022    EGFRIFAFRI 42 (L) 02/07/2022    BCR 39.0 (H) 02/07/2022    K 4.3 02/07/2022    CO2 27.9 02/07/2022    CALCIUM 9.6 02/07/2022    ALBUMIN 3.30 (L) 02/07/2022    LABIL2 1.2 (L) 03/03/2021    AST 63 (H) 02/07/2022     (H) 02/07/2022        XR Chest 1 View    Result Date: 2/7/2022    1. Cardiomegaly. 2. Right perihilar and basilar interstitial densities which may be on the basis of an atypical infection.  There is a questionable tiny right pleural effusion.       SHERICE DEL REAL MD       Electronically Signed and Approved By: SHERICE DEL REAL MD on 2/07/2022 at 16:53                      Assessment/Plan   Assessment / Plan       Current Diagnosis:  Active Hospital Problems    Diagnosis    • **Atrial fibrillation with RVR (HCC)      Plan:   Patient admitted with A. fib with rapid heart rate.  New onset.  Started on Cardizem drip.  Ordered echo.  Cardiologist consulted.  Will see input from cardiologist.  Repeat labs in a.m.      DVT prophylaxis:  Medical DVT prophylaxis orders are present.    GI Prophylaxis:    Pepcid    CODE STATUS:    Level Of Support Discussed With: Patient  Code Status (Patient has no pulse and is not breathing): CPR (Attempt to Resuscitate)  Medical Interventions (Patient has pulse or is breathing): Full Support    Admission Status:  I believe  this patient meets observation status.             I have dictated this note utilizing Dragon Dictation.             Please note that portions of this note were completed with a voice recognition program.             Part of this note may be an electronic transcription/translation of spoken language to printed text         using the Dragon Dictation System.       Electronically signed by Pablo Fajardo MD, 02/08/22, 8:36 AM EST.    Total time spent with in evaluation and management: 23 minutes

## 2022-02-09 NOTE — PROGRESS NOTES
CARDIOLOGY  INPATIENT PROGRESS NOTE                [facility] CARE UNIT    2/9/2022      PATIENT IDENTIFICATION:   Name:  Lauren Redd      MRN:  [id number]     77 y.o.  female                 SUBJECTIVE:   Continues to have atrial fibrillation with controlled rate  Blood pressure is running low 80/50 and she is slightly dizzy when she stands up  Echocardiogram shows markedly dilated right and left atria and moderate mitral tricuspid and aortic regurgitation    OBJECTIVE:  Vitals:    02/09/22 1305 02/09/22 1400 02/09/22 1500 02/09/22 1600   BP: 102/62  (!) 85/57 (!) 81/62   BP Location:   Left arm    Patient Position:   Lying    Pulse:  77 73 74   Resp:   20    Temp:   97.9 °F (36.6 °C)    TempSrc:   Oral    SpO2:   97%    Weight:       Height:               Body mass index is 38.38 kg/m².    Intake/Output Summary (Last 24 hours) at 2/9/2022 1808  Last data filed at 2/9/2022 1339  Gross per 24 hour   Intake 720 ml   Output --   Net 720 ml       Telemetry:     Physical Exam  General Appearance:   · no acute distress  · Alert and oriented x3  HENT:   · lips not cyanotic  · Atraumatic  Neck:  · No jvd   · supple  Respiratory:  · no respiratory distress  · normal breath sounds  · no rales  Cardiovascular:    · regular rhythm  · no S3, no S4   · no murmur  · no rub  · lower extremity edema: none    Skin:   · warm, dry  · No rashes      Allergies   Allergen Reactions   • Contrast Dye Rash   • Lotrel [Amlodipine Besy-Benazepril Hcl] Unknown - Low Severity       Scheduled meds:  apixaban, 5 mg, Oral, Q12H  digoxin, 125 mcg, Oral, Daily  metoprolol succinate XL, 25 mg, Oral, Q12H  midodrine, 10 mg, Oral, TID AC  pantoprazole, 40 mg, Oral, Nightly  senna-docusate sodium, 2 tablet, Oral, Nightly      IV meds:                           Data Review:  CBC    CBC 3/3/21 2/7/22 2/9/22   WBC 7.18 7.58 7.90   RBC 4.42 4.52 4.45   Hemoglobin 13.1 13.7 13.5   Hematocrit 40.9 43.5 44.1   MCV 92.5 96.2 99.1 (A)    MCH 29.6 30.3 30.3   MCHC 32.0 (A) 31.5 30.6 (A)   RDW 13.8 17.1 (A) 16.8 (A)   Platelets 216 184 156   (A) Abnormal value            CMP    CMP 3/3/21 2/7/22 2/9/22   Glucose 99 108 (A) 127 (A)   BUN 20 57 (A) 55 (A)   Creatinine 0.91 (A) 1.46 (A) 1.68 (A)   eGFR  Am  42 (A) 36 (A)   Sodium 146 144 140   Potassium 3.6 4.3 4.5   Chloride 104 105 106   Calcium 10.3 9.6 8.9   Albumin 4.1 3.30 (A) 2.90 (A)   Total Bilirubin 0.38 0.8 0.4   Alkaline Phosphatase 83 105 95   AST (SGOT) 21 63 (A) 38 (A)   ALT (SGPT) 22 169 (A) 118 (A)   (A) Abnormal value             CARDIAC LABS:      Lab 02/08/22  2218 02/08/22  1616 02/08/22  1017 02/07/22  1618   PROBNP  --   --   --  2,019.0*   TROPONIN T 0.011 <0.010 0.010  --    PROTIME  --   --   --  11.4   INR  --   --   --  1.10*        No results found for: DIGOXIN   Lab Results   Component Value Date    TSH 1.440 02/08/2022           Invalid input(s): LDLCALC  No results found for: POCTROP  Lab Results   Component Value Date    TROPONINT 0.011 02/08/2022   (  Lab Results   Component Value Date    MG 2.5 (H) 02/07/2022     Results for orders placed during the hospital encounter of 02/07/22    Adult Transthoracic Echo Complete W/ Cont if Necessary Per Protocol    Interpretation Summary  · Estimated right ventricular systolic pressure from tricuspid regurgitation is mildly elevated (35-45 mmHg). Calculated right ventricular systolic pressure from tricuspid regurgitation is 40 mmHg.  · The left ventricular cavity is mild to moderately dilated.  · Calculated left ventricular EF = 65% Estimated left ventricular EF was in agreement with the calculated left ventricular EF. Left ventricular systolic function is normal.  · The right atrial cavity is mild to moderately dilated.  · Moderate mitral valve regurgitation is present.  · Moderate tricuspid valve regurgitation is present.  · Mild pulmonary hypertension is present.           ASSESSMENT:    Atrial fibrillation with RVR  (HCC)  Hypotension  Abnormal echocardiogram with moderate mitral tricuspid aortic regurgitations      PLAN:     Patient is only on Toprol-XL 25 g twice daily very small dose.  With her blood pressure running low probably should start her on midodrine 10 mg 3 times daily and add digoxin 0.125 mg once a day to control atrial fibrillation.  If she continues to have atrial fibrillation in a month on anticoagulation she may need electrical cardioversion          Lisa Leung MD  2/9/2022    18:08 EST

## 2022-02-09 NOTE — PLAN OF CARE
Goal Outcome Evaluation: Low BP all shift. Cardizem drip stopped at 1723. Medications added to MAR. Patient has been asymptomatic. No complaints op pain, nausea. Call light within reach.

## 2022-02-09 NOTE — SIGNIFICANT NOTE
02/09/22 1351   Wound 02/09/22 1235 Right lower leg Blisters   Placement Date/Time: 02/09/22 1235   Present on Hospital Admission: Yes  Side: Right  Orientation: lower  Location: leg  Primary Wound Type: Blisters   Wound Image    Dressing Appearance open to air   Base pink; moist   Periwound intact; edematous; swelling   Periwound Temperature warm   Periwound Skin Turgor soft   Edges rolled/closed   Wound Length (cm) 2.5 cm   Wound Width (cm) 3 cm   Drainage Amount none   Care, Wound cleansed with; sterile normal saline   Dressing Care dressing applied; border dressing; non-adherent; petroleum-based; silicone   Periwound Care absorptive dressing applied   Wound consult: patient admitted with Afib RVR. Hx of Aneurysm, Arthritis, HTN. Patient with edema to BLE with tight skin. Serous filled blister noted to RLE. No drainage noted with cleansing. Area protected with non-adherent dressing and silicone border dressing. Recommending every other day change. Patient was complaining of pain to buttocks. Shifted weight and turned for assessment. Tissue intact with no breakdown. Repositioned in bed. Bilateral feet with edema. Heels intact. Skin protection measures recommended.  Sylvia Lara RN

## 2022-02-09 NOTE — PLAN OF CARE
Goal Outcome Evaluation:              Outcome Summary: Patient continues on cardizem drip. Patient titrated from 10ml to 5ml at 0400 due to decreasing blood pressure and heart rate in 70's. Patient complained of SOA, nausea, and cough overnight. Patient put on 2L NC, given IV Zofran, and guafenisen. All symptoms improved and patient rested the rest of the night. Will continue to monitor.

## 2022-02-10 NOTE — SIGNIFICANT NOTE
02/10/22 1404   Plan   Final Discharge Disposition Code 01 - home or self-care   Final Note Provided patient witha 10 day eliquis card, given at bedside and activated for patient. Denies any other needs.

## 2022-02-10 NOTE — PROGRESS NOTES
[facility]     Progress Note    Patient Name: Lauren Redd  : 1945  MRN: [id number]  Primary Care Physician:  Denins Kohler MD  Date of admission: 2022      Subjective   Brief summary.  Follow-up on A. fib with RVR      HPI:  *Patient admitted with rapid A. Fib.  Rate controlled on Cardizem, Toprol added.  Blood pressure slightly low  Patient denies any symptom    Review of Systems     Chest pain or shortness of breath.  Feeling weak  No dizziness    Objective     Vitals:   Temp:  [97.3 °F (36.3 °C)-98.5 °F (36.9 °C)] 97.3 °F (36.3 °C)  Heart Rate:  [] 93  Resp:  [18-22] 18  BP: ()/(30-83) 96/83    Physical Exam :     *Elderly obese female not in acute distress.  Heart irregular regular.  Lungs clear.  Abdomen soft.  Extremities no edema      Result Review:  I have personally reviewed the results from the time of this admission to 2022 20:25 EST and agree with these findings:  [x]  Laboratory  []  Microbiology  []  Radiology  []  EKG/Telemetry   []  Cardiology/Vascular   []  Pathology  []  Old records  []  Other:           Assessment / Plan       Active Hospital Problems:  Active Hospital Problems    Diagnosis    • **Atrial fibrillation with RVR (HCC)        Plan:   Patient mildly hypotensive.  Added midodrine.  We will give 250 cc normal saline bolus.  Discontinue Cardizem drip as rate control.  Increase activity with assistance.  Check labs.  Echo without any significant issues.  Discussed with cardiologist.  Monitor for 1 more day       DVT prophylaxis:  Medical DVT prophylaxis orders are present.    CODE STATUS:   Level Of Support Discussed With: Patient  Code Status (Patient has no pulse and is not breathing): CPR (Attempt to Resuscitate)  Medical Interventions (Patient has pulse or is breathing): Full Support            Electronically signed by Pablo Fajardo MD, 22, 8:25 PM EST.

## 2022-02-10 NOTE — PROGRESS NOTES
CARDIOLOGY  INPATIENT PROGRESS NOTE                [facility] ER UNIT    2/10/2022      PATIENT IDENTIFICATION:   Name:  Lauren Redd      MRN:  [id number]     77 y.o.  female                 SUBJECTIVE:   Dizziness is improved  Blood pressure is better  Continues to be in atrial fibrillation with controlled rate    OBJECTIVE:  Vitals:    02/10/22 0715 02/10/22 0730 02/10/22 0824 02/10/22 1155   BP:   95/65 96/63   BP Location:   Left arm Left arm   Patient Position:   Sitting Sitting   Pulse: 84 91 106 93   Resp:   20 16   Temp:   97.4 °F (36.3 °C) 97.3 °F (36.3 °C)   TempSrc:   Oral Oral   SpO2:   98% 94%   Weight:       Height:               Body mass index is 38.38 kg/m².    Intake/Output Summary (Last 24 hours) at 2/10/2022 1622  Last data filed at 2/9/2022 1823  Gross per 24 hour   Intake 240 ml   Output --   Net 240 ml       Telemetry:     Physical Exam  General Appearance:   · no acute distress  · Alert and oriented x3  HENT:   · lips not cyanotic  · Atraumatic  Neck:  · No jvd   · supple  Respiratory:  · no respiratory distress  · normal breath sounds  · no rales  Cardiovascular:    · regular rhythm  · no S3, no S4   · no murmur  · no rub  · lower extremity edema: none    Skin:   · warm, dry  · No rashes      Allergies   Allergen Reactions   • Contrast Dye Rash   • Lotrel [Amlodipine Besy-Benazepril Hcl] Unknown - Low Severity       Scheduled meds:  apixaban, 5 mg, Oral, Q12H  digoxin, 125 mcg, Oral, Daily  metoprolol succinate XL, 25 mg, Oral, Q12H  midodrine, 10 mg, Oral, TID AC  pantoprazole, 40 mg, Oral, Nightly  senna-docusate sodium, 2 tablet, Oral, Nightly      IV meds:                           Data Review:  CBC    CBC 3/3/21 2/7/22 2/9/22   WBC 7.18 7.58 7.90   RBC 4.42 4.52 4.45   Hemoglobin 13.1 13.7 13.5   Hematocrit 40.9 43.5 44.1   MCV 92.5 96.2 99.1 (A)   MCH 29.6 30.3 30.3   MCHC 32.0 (A) 31.5 30.6 (A)   RDW 13.8 17.1 (A) 16.8 (A)   Platelets 216 184 156   (A)  Abnormal value            CMP    CMP 3/3/21 2/7/22 2/9/22   Glucose 99 108 (A) 127 (A)   BUN 20 57 (A) 55 (A)   Creatinine 0.91 (A) 1.46 (A) 1.68 (A)   eGFR  Am  42 (A) 36 (A)   Sodium 146 144 140   Potassium 3.6 4.3 4.5   Chloride 104 105 106   Calcium 10.3 9.6 8.9   Albumin 4.1 3.30 (A) 2.90 (A)   Total Bilirubin 0.38 0.8 0.4   Alkaline Phosphatase 83 105 95   AST (SGOT) 21 63 (A) 38 (A)   ALT (SGPT) 22 169 (A) 118 (A)   (A) Abnormal value             CARDIAC LABS:      Lab 02/08/22  2218 02/08/22  1616 02/08/22  1017 02/07/22  1618   PROBNP  --   --   --  2,019.0*   TROPONIN T 0.011 <0.010 0.010  --    PROTIME  --   --   --  11.4   INR  --   --   --  1.10*        No results found for: DIGOXIN   Lab Results   Component Value Date    TSH 1.440 02/08/2022           Invalid input(s): LDLCALC  No results found for: POCTROP  Lab Results   Component Value Date    TROPONINT 0.011 02/08/2022   (  Lab Results   Component Value Date    MG 2.5 (H) 02/07/2022     Results for orders placed during the hospital encounter of 02/07/22    Adult Transthoracic Echo Complete W/ Cont if Necessary Per Protocol    Interpretation Summary  · Estimated right ventricular systolic pressure from tricuspid regurgitation is mildly elevated (35-45 mmHg). Calculated right ventricular systolic pressure from tricuspid regurgitation is 40 mmHg.  · The left ventricular cavity is mild to moderately dilated.  · Calculated left ventricular EF = 65% Estimated left ventricular EF was in agreement with the calculated left ventricular EF. Left ventricular systolic function is normal.  · The right atrial cavity is mild to moderately dilated.  · Moderate mitral valve regurgitation is present.  · Moderate tricuspid valve regurgitation is present.  · Mild pulmonary hypertension is present.           ASSESSMENT:    Atrial fibrillation with RVR (HCC)  Hypotension  Abnormal echocardiogram with moderate mitral tricuspid aortic regurgitations        PLAN:    Okay to discharge home on present medication  Follow-up in office in 1 to 2 weeks            Lisa Leung MD  2/10/2022    16:22 EST

## 2022-02-10 NOTE — DISCHARGE SUMMARY
[facility]         DISCHARGE SUMMARY    Patient Name: Lauren Redd  : 1945  MRN: [id number]    Date of Admission: 2022  Date of Discharge: 2/10/2022  Primary Care Physician: Dennis Kohler MD    Consults     Date and Time Order Name Status Description    2022  9:40 AM Inpatient Cardiology Consult      2022  8:36 PM Inpatient Cardiology Consult      2022  6:58 PM IP General Consult (Use specialty-specific consult if known)            Presenting Problem:   Atrial fibrillation (HCC) [I48.91]    Active and Resolved Hospital Problems:  Active Hospital Problems    Diagnosis POA   • **Atrial fibrillation with RVR (HCC) [I48.91] Yes      Resolved Hospital Problems   No resolved problems to display.         Hospital Course     Hospital Course:  Laurne Redd is a 77 y.o. female admitted to hospital for shortness of breath, work-up showed increased heart rate and rapid A. fib.  Patient was admitted to hospital started on IV Cardizem.  Consultant cardiologist Dr. Grey was consulted for new onset A. fib.  Please see H&P for details    Patient was continued on Cardizem, Dr. Grey started patient on beta-blocker and Cardizem was tapered patient had mild hypotension which was treated with fluids patient improved with this measures, subsequently cardiologist added ProAmatine, patient tolerated this she is feeling better  She does not have any symptoms of chest pain shortness of breath or dizziness  She feels comfortable going home  She was cleared by cardiologist for discharge          DISCHARGE Follow Up Recommendations for labs and diagnostics:   Patient stable ready for discharge.  Advised to take medications as prescribed on discharge.  Recommended follow-up with consultants as advised.  Patient advised to return to ER if symptoms get worse.  Patient advised to follow-up with /PCP post discharge in 1 week.      Day of Discharge     Vital Signs:  Temp:  [97.3 °F (36.3  °C)-98 °F (36.7 °C)] 97.3 °F (36.3 °C)  Heart Rate:  [] 93  Resp:  [16-20] 16  BP: ()/() 96/63    Physical Exam:    Elderly female not in acute distress  Heart irregular.  Lungs clear  Abdomen soft obese  Extremities without any edema      Pertinent  and/or Most Recent Results     LAB RESULTS:      Lab 02/09/22  0453 02/07/22  1618   WBC 7.90 7.58   HEMOGLOBIN 13.5 13.7   HEMATOCRIT 44.1 43.5   PLATELETS 156 184   NEUTROS ABS 5.30 4.89   IMMATURE GRANS (ABS) 0.03 0.02   LYMPHS ABS 1.32 1.80   MONOS ABS 0.89 0.72   EOS ABS 0.33 0.12   MCV 99.1* 96.2   CRP  --  1.15*   LACTATE  --  1.8   PROTIME  --  11.4   APTT  --  22.6         Lab 02/09/22 0453 02/08/22  1017 02/07/22  1618   SODIUM 140  --  144   POTASSIUM 4.5  --  4.3   CHLORIDE 106  --  105   CO2 23.1  --  27.9   ANION GAP 10.9  --  11.1   BUN 55*  --  57*   CREATININE 1.68*  --  1.46*   GLUCOSE 127*  --  108*   CALCIUM 8.9  --  9.6   IONIZED CALCIUM  --   --  1.16   MAGNESIUM  --   --  2.5*   PHOSPHORUS  --   --  4.1   TSH  --  1.440  --          Lab 02/09/22  0453 02/07/22  1618   TOTAL PROTEIN 5.6* 5.7*   ALBUMIN 2.90* 3.30*   GLOBULIN 2.7 2.4   ALT (SGPT) 118* 169*   AST (SGOT) 38* 63*   BILIRUBIN 0.4 0.8   ALK PHOS 95 105         Lab 02/08/22  2218 02/08/22  1616 02/08/22  1017 02/07/22  1618   PROBNP  --   --   --  2,019.0*   TROPONIN T 0.011 <0.010 0.010  --    PROTIME  --   --   --  11.4   INR  --   --   --  1.10*                 Brief Urine Lab Results  (Last result in the past 365 days)      Color   Clarity   Blood   Leuk Est   Nitrite   Protein   CREAT   Urine HCG        02/08/22 1045 Yellow   Clear   Negative   Negative   Negative   Negative               Microbiology Results (last 10 days)     Procedure Component Value - Date/Time    Blood Culture - Blood, Arm, Right [[id number]]  (Normal) Collected: 02/07/22 1618    Lab Status: Preliminary result Specimen: Blood from Arm, Right Updated: 02/09/22 1645     Blood Culture No growth at  2 days          XR Chest 1 View    Result Date: 2/7/2022  Impression:   1. Cardiomegaly. 2. Right perihilar and basilar interstitial densities which may be on the basis of an atypical infection.  There is a questionable tiny right pleural effusion.       SHERICE DEL REAL MD       Electronically Signed and Approved By: SHERICE DEL REAL MD on 2/07/2022 at 16:53                       Results for orders placed during the hospital encounter of 02/07/22    Adult Transthoracic Echo Complete W/ Cont if Necessary Per Protocol    Interpretation Summary  · Estimated right ventricular systolic pressure from tricuspid regurgitation is mildly elevated (35-45 mmHg). Calculated right ventricular systolic pressure from tricuspid regurgitation is 40 mmHg.  · The left ventricular cavity is mild to moderately dilated.  · Calculated left ventricular EF = 65% Estimated left ventricular EF was in agreement with the calculated left ventricular EF. Left ventricular systolic function is normal.  · The right atrial cavity is mild to moderately dilated.  · Moderate mitral valve regurgitation is present.  · Moderate tricuspid valve regurgitation is present.  · Mild pulmonary hypertension is present.      Labs Pending at Discharge:  Pending Labs     Order Current Status    Blood Culture - Blood, Arm, Right Preliminary result            Discharge Details        Discharge Medications      New Medications      Instructions Start Date   apixaban 5 MG tablet tablet  Commonly known as: ELIQUIS   5 mg, Oral, Every 12 Hours Scheduled      digoxin 125 MCG tablet  Commonly known as: LANOXIN   125 mcg, Oral, Daily Digoxin   Start Date: February 11, 2022     metoprolol succinate XL 25 MG 24 hr tablet  Commonly known as: TOPROL-XL   25 mg, Oral, Every 12 Hours Scheduled      midodrine 10 MG tablet  Commonly known as: PROAMATINE   5 mg, Oral, 3 Times Daily Before Meals         Continue These Medications      Instructions Start Date   Flonase 50 MCG/ACT nasal  spray  Generic drug: fluticasone   1 spray, Nasal, Every 24 Hours      loratadine 10 MG tablet  Commonly known as: CLARITIN   10 mg, Oral, Daily         Stop These Medications    Candesartan Cilexetil-HCTZ 32-25 MG tablet     pseudoephedrine 60 MG tablet  Commonly known as: SUDAFED     spironolactone 25 MG tablet  Commonly known as: ALDACTONE            Allergies   Allergen Reactions   • Contrast Dye Rash   • Lotrel [Amlodipine Besy-Benazepril Hcl] Unknown - Low Severity         Discharge Disposition:    Home or Self Care    Diet:        Discharge Activity:     Activity Instructions     Activity as Tolerated              No future appointments.    Additional Instructions for the Follow-ups that You Need to Schedule     Discharge Follow-up with PCP   As directed       Currently Documented PCP:    Dennis Kohler MD    PCP Phone Number:    [phone]     Follow Up Details: Next week         Discharge Follow-up with Specified Provider: Dr Grey; 2 Weeks   As directed      To: Dr Grey    Follow Up: 2 Weeks         Discharge Follow-up with Specified Provider: Dr. Leung; 1 Week   As directed      To: Dr. Leung    Follow Up: 1 Week        Additional information on Labs and Follow-ups:    Dr Leung appt 2/17/22 @ 12:45           Time spent on Discharge including face to face service: 21 minutes.            I have dictated this note utilizing Dragon Dictation.             Please note that portions of this note were completed with a voice recognition program.             Part of this note may be an electronic transcription/translation of spoken language to printed text         using the Dragon Dictation System.       Electronically signed by Pablo Fajardo MD, 02/10/22, 1:34 PM EST.

## 2022-02-11 NOTE — OUTREACH NOTE
Prep Survey      Responses   [facility] patient discharged from? [facility]   Is LACE score < 7 ? Yes   Emergency Room discharge w/ pulse ox? No   Eligibility Not Eligible   What are the reasons patient is not eligible? Other   Does the patient have one of the following disease processes/diagnoses(primary or secondary)? Other   Prep survey completed? Yes          Silvia Dubon RN

## 2022-02-20 NOTE — ED NOTES
Wound debrided and dressed with adaptic, kerlix and ace wrap     Yakelin Aaron RN  02/19/22 3880

## 2022-02-20 NOTE — ED PROVIDER NOTES
Subjective   Pt reports weeping wound to her right lower leg that started when she was recently admitted for CHF. She reports there is tape that is stuck to the wound and she cannot get it off.       History provided by:  Patient      Review of Systems   Constitutional: Negative for chills and fever.   HENT: Negative for congestion, ear pain and sore throat.    Eyes: Negative for pain.   Respiratory: Negative for cough, chest tightness and shortness of breath.    Cardiovascular: Negative for chest pain.   Gastrointestinal: Negative for abdominal pain, diarrhea, nausea and vomiting.   Genitourinary: Negative for flank pain and hematuria.   Musculoskeletal: Negative for joint swelling.   Skin: Positive for wound. Negative for pallor.   Neurological: Negative for seizures and headaches.   All other systems reviewed and are negative.      Past Medical History:   Diagnosis Date   • Aneurysm (HCC)    • Arthritis    • Hypertension        Allergies   Allergen Reactions   • Contrast Dye Rash   • Lotrel [Amlodipine Besy-Benazepril Hcl] Unknown - Low Severity       Past Surgical History:   Procedure Laterality Date   • COLONOSCOPY     • HYSTERECTOMY         Family History   Problem Relation Age of Onset   • Hypertension Mother    • Colon cancer Neg Hx        Social History     Socioeconomic History   • Marital status:    Tobacco Use   • Smoking status: Never Smoker   • Smokeless tobacco: Never Used   Vaping Use   • Vaping Use: Never used   Substance and Sexual Activity   • Alcohol use: Not Currently   • Drug use: Never   • Sexual activity: Defer           Objective   Physical Exam  Vitals and nursing note reviewed.   Constitutional:       General: She is not in acute distress.     Appearance: Normal appearance. She is not toxic-appearing.   HENT:      Head: Normocephalic and atraumatic.      Mouth/Throat:      Mouth: Mucous membranes are moist.   Eyes:      General: No scleral icterus.  Cardiovascular:      Rate and  Rhythm: Normal rate and regular rhythm.      Pulses: Normal pulses.      Heart sounds: Normal heart sounds.   Pulmonary:      Effort: Pulmonary effort is normal. No respiratory distress.      Breath sounds: Normal breath sounds.   Abdominal:      General: Abdomen is flat.      Palpations: Abdomen is soft.      Tenderness: There is no abdominal tenderness.   Musculoskeletal:         General: Normal range of motion.      Cervical back: Normal range of motion and neck supple.      Right lower leg: Edema present.      Left lower leg: Edema present.   Skin:     General: Skin is warm and dry.      Findings: Wound present.          Neurological:      Mental Status: She is alert and oriented to person, place, and time. Mental status is at baseline.         Procedures           ED Course                                                 MDM  Number of Diagnoses or Management Options  Wound of right lower extremity, initial encounter: minor  Diagnosis management comments: Referral to wound clinic made. Area of skin tear removed and bandage applied.     I have spoken with the patient. I have explained the patient´s condition, diagnoses and treatment plan based on the information available to me at this time. I have answered the patient's questions and addressed any concerns. The patient has a good  understanding of the patient´s diagnosis, condition, and treatment plan as can be expected at this point. The vital signs have been stable. The patient´s condition is stable and appropriate for discharge from the emergency department.      The patient will pursue further outpatient evaluation with the primary care physician or other designated or consulting physician as outlined in the discharge instructions. They are agreeable to this plan of care and follow-up instructions have been explained in detail. The patient has received these instructions in written format and have expressed an understanding of the discharge instructions.  The patient is aware that any significant change in condition or worsening of symptoms should prompt an immediate return to this or the closest emergency department or call to 911.    Risk of Complications, Morbidity, and/or Mortality  Presenting problems: minimal  Diagnostic procedures: minimal  Management options: minimal    Patient Progress  Patient progress: stable      Final diagnoses:   Wound of right lower extremity, initial encounter       ED Disposition  ED Disposition     ED Disposition Condition Comment    Discharge Stable           Dennis Kohler MD  [address] [facility] [address]  [phone]    In 3 days      [facility] [place] [address] [place] [address] [phone]             Medication List      No changes were made to your prescriptions during this visit.          Omer Wong, APRN  02/19/22 8637

## 2022-02-23 NOTE — SIGNIFICANT NOTE
Epithelial %: 50-75%    Exposed Bone: no    Exposed Tendon: no    Impression:  FIRST VISIT    Short Term Goals: INCREASE GRANULATION, DECREASE SIZE

## 2022-02-23 NOTE — PROGRESS NOTES
Chief Complaint  Wound Check (BLE ULCERATIONS/SWELLING; PATIENT WAS CT'ED LAST WEEK FROM HOSPITAL AND HAD INCREASED SWELLING DURING THAT HOSPITALIZATION)    Subjective        History of Present Illness    Patient is a pleasant, obese 77-year-old female who was admitted to the hospital from 02/07/2022-02/10/2022. with new onset atrial fibrillation and CHF.  While in the hospital she developed a blister on the RLE that subsequently broke open and she has had a wound there that is weeping clear fluid.  She was seen in the ED on 02/19 and referred to wound care for further evaluation and treatment.  She is not a diabetic.    She says she is supposed to be on 2 diuretics but needs to go to the pharmacy today to  the Bumex.  She does not know about elevating her legs to help with the swelling and has not been wrapping her legs.  She also states that her toenails are very thick and she is not able to cut them and she would like help with that.    Allergies:  Contrast dye and Lotrel [amlodipine besy-benazepril hcl]      Current Outpatient Medications:   •  apixaban (ELIQUIS) 5 MG tablet tablet, Take 1 tablet by mouth Every 12 (Twelve) Hours for 30 days. Indications: Atrial Fibrillation, Disp: 60 tablet, Rfl: 0  •  bumetanide (BUMEX) 1 MG tablet, , Disp: , Rfl:   •  digoxin (LANOXIN) 125 MCG tablet, Take 1 tablet by mouth Daily for 30 days., Disp: 30 tablet, Rfl: 0  •  fluticasone (Flonase) 50 MCG/ACT nasal spray, 1 spray into the nostril(s) as directed by provider Daily., Disp: , Rfl:   •  loratadine (CLARITIN) 10 MG tablet, Take 10 mg by mouth Daily., Disp: , Rfl:   •  metoprolol succinate XL (TOPROL-XL) 25 MG 24 hr tablet, Take 1 tablet by mouth Every 12 (Twelve) Hours for 30 days., Disp: 60 tablet, Rfl: 0  •  midodrine (PROAMATINE) 10 MG tablet, Take 0.5 tablets by mouth 3 (Three) Times a Day Before Meals for 30 days., Disp: 45 tablet, Rfl: 0  •  mineral oil-hydrophilic petrolatum (AQUAPHOR) ointment, Apply 1  "application topically to the appropriate area as directed As Needed for Dry Skin., Disp: 396 g, Rfl: 1  •  spironolactone (ALDACTONE) 25 MG tablet, Take 25 mg by mouth Daily., Disp: , Rfl:     Past Medical History:   Diagnosis Date   • Aneurysm (HCC)    • Arthritis    • Hypertension    • Irregular heart beat      Past Surgical History:   Procedure Laterality Date   • COLONOSCOPY     • HYSTERECTOMY       Social History     Socioeconomic History   • Marital status:    Tobacco Use   • Smoking status: Never Smoker   • Smokeless tobacco: Never Used   Vaping Use   • Vaping Use: Never used   Substance and Sexual Activity   • Alcohol use: Not Currently   • Drug use: Never   • Sexual activity: Defer     Family History   Problem Relation Age of Onset   • Hypertension Mother    • Colon cancer Neg Hx          Objective     Vitals:    02/23/22 0842   BP: 130/76   BP Location: Left arm   Patient Position: Sitting   Pulse: 72   Resp: 20   Temp: 97.2 °F (36.2 °C)   TempSrc: Temporal   Weight: 120 kg (265 lb)   Height: 172.7 cm (68\")   PainSc: 0-No pain     Body mass index is 40.29 kg/m².    STEADI Fall Risk Assessment has not been completed.     Review of Systems     ROS:  No fevers, chills, sweats, or body aches. No shortness of breath.     I have reviewed the HPI and ROS as documented by MA/RN. Jazzmine Hillman MD    Physical Exam     NAD, well dressed, well nourished  Normocephalic, atraumatic  EOMI, no scleral icterus  No respiratory distress, no cough  AAOx3, pleasant, cooperative  Readily dopplerable DP and PT pulses BLE, irregularly irregular rate and rhythm consistent with A. Fib,  RLE: Large shallow anterior ulceration, 2+ edema diffusely, constant slow weeping of clear serous drainage.  LLE: Blister anterior medial leg with small opening anteriorly.  The blister continues to refill with clear fluid that readily drains from the small opening.  Diffuse clear serous weeping noted, less than RLE.  2+ edema.    See " photos for details.    RLE:        LLE:            Result Review :  The following data was reviewed by: Jazzmine Hillman MD on 02/23/2022:    Recent ED notes, admission notes, discharge summary, labs, imaging.    Wound Avatar Documentation     Active Wound LDAs        Wound 02/09/22 1235 Right lower leg Blisters    Placement date 02/09/22   -FL 02/09/22 1351      Placement time 1235   -FL 02/09/22 1351 Days 13    Present on Hospital Admission: Y   -FL 02/09/22 1351 Side: Right   -FL 02/09/22 1351    Orientation: lower   -FL 02/09/22 1351 Location: leg   -FL 02/09/22 1351    Primary Wound Type: Blisters   -FL 02/09/22 1351      Assessments       02/23/22 0933 02/10/22 1254 02/10/22 0748 02/09/22 1351          Wound Image  View All Images View Images   - 02/23/22 0934    --  --  View Images   -FL 02/09/22 1355      Dressing Appearance copious drainage; intact   - 02/23/22 0934 dry; intact   - 02/10/22 1255 intact   - 02/10/22 0953 open to air   -FL 02/09/22 1355   Closure --  --  --  --    Base --  --  --  pink; moist   -FL 02/09/22 1355   Black (%), Wound Tissue Color --  --  --  --    Red (%), Wound Tissue Color 100   - 02/23/22 0934 --  --  --    Yellow (%), Wound Tissue Color --  --  --  --    Periwound intact; blanchable; edematous   - 02/23/22 0934 intact; edematous   - 02/10/22 1255 --  intact; edematous; swelling   -FL 02/09/22 1355   Periwound Temperature warm   - 02/23/22 0934 warm   - 02/10/22 1255 --  warm   -FL 02/09/22 1355   Periwound Skin Turgor soft   - 02/23/22 0934 firm   - 02/10/22 1255 --  soft   -FL 02/09/22 1355   Edges open   - 02/23/22 0934 --  --  rolled/closed   -FL 02/09/22 1355   Wound Length (cm) 6.7 cm   - 02/23/22 0934 --  --  2.5 cm   -FL 02/09/22 1355   Wound Width (cm) 6.5 cm   - 02/23/22 0934 --  --  3 cm   -FL 02/09/22 1355   Wound Depth (cm) 0.1 cm   - 02/23/22 0934 --  --  --    Tunneling [Depth (cm)/Location] --  --  --  --    Undermining [Depth  (cm)/Location] --  --  --  --    Drainage Characteristics/Odor serous   - 02/23/22 0934 --  --  --    Drainage Amount large   - 02/23/22 0934 none   - 02/10/22 1255 --  none   -FL 02/09/22 1355   Care, Wound cleansed with; sterile normal saline   - 02/23/22 0934 cleansed with; sterile normal saline   - 02/10/22 1255 --  cleansed with; sterile normal saline   -FL 02/09/22 1355   Dressing Care dressing applied; abdominal pad; elastic bandage; other (see comments)  UNNA BOOT, ABD PAD, ACE WRAP x2   - 02/23/22 0934 dressing changed; non-adherent; petroleum-based; silicone; border dressing   - 02/10/22 1255 --  dressing applied; border dressing; non-adherent; petroleum-based; silicone   -FL 02/09/22 1355   Periwound Care --  --  --  absorptive dressing applied   -FL 02/09/22 1355   Adhesive Closure Strips --  --  --  --    Number of Adhesive Closure Strips --  --  --  --    Sutures Removed Intact --  --  --  --    Number of Sutures Removed --  --  --  --    Staples Removed Intact --  --  --  --    Number of Staples Removed --  --  --  --    Wound Output (mL) --  --  --  --                 Wound 02/23/22 0934 Left lower leg    Placement date 02/23/22   - 02/23/22 0934      Placement time 0934   - 02/23/22 0934 Days less than 1    Present on Hospital Admission: Y   - 02/23/22 0934 Side: Left   - 02/23/22 0934    Orientation: lower   - 02/23/22 0934 Location: leg   - 02/23/22 0934    Assessments       02/23/22 0934       Wound Image  View All Images View Images   - 02/23/22 0936      Dressing Appearance open to air; moist drainage   - 02/23/22 0936   Closure --    Base --    Black (%), Wound Tissue Color --    Red (%), Wound Tissue Color --    Yellow (%), Wound Tissue Color --    Periwound intact; edematous; redness   - 02/23/22 0936   Periwound Temperature hot   - 02/23/22 0936   Periwound Skin Turgor soft   - 02/23/22 0936   Edges open   - 02/23/22 0936   Wound Length (cm) --   PARTIALLY INTACT WATER BLISTER; <25 CM   - 02/23/22 0936   Wound Width (cm) --    Wound Depth (cm) --    Tunneling [Depth (cm)/Location] --    Undermining [Depth (cm)/Location] --    Drainage Characteristics/Odor serous   - 02/23/22 0936   Drainage Amount moderate   - 02/23/22 0936   Care, Wound cleansed with; sterile normal saline   - 02/23/22 0936   Dressing Care dressing applied; abdominal pad; elastic bandage; other (see comments)  UNNA BOOT, ABD PAD, ACE WRAP x2   - 02/23/22 0936   Periwound Care --    Adhesive Closure Strips --    Number of Adhesive Closure Strips --    Sutures Removed Intact --    Number of Sutures Removed --    Staples Removed Intact --    Number of Staples Removed --    Wound Output (mL) --                    User Key  (r) = Recorded By, (t) = Taken By, (c) = Cosigned By    Initials Name Effective Dates Provider Type Discipline     Hui Mo RN 06/16/21 -  Registered Nurse Nurse    Sylvia Olson, RN 06/16/21 -  Registered Nurse Nurse    Swathi Yuen RN 06/09/21 -  Registered Nurse Nurse    Mya Ontiveros RNA 12/16/21 - 02/16/22 Registered Nurse Nurse                         Assessment and Plan   Diagnoses and all orders for this visit:    1. Venous stasis ulcer of left lower leg with edema of left lower leg (HCC) (Primary)  -     Bandage UNNA Boot With / ZINC With / O CALAMINE LF 4IN 10YD    2. Venous stasis ulcer of right lower leg with edema of right lower leg (HCC)  -     Bandage UNNA Boot With / ZINC With / O CALAMINE LF 4IN 10YD    3. Atrial fibrillation with RVR ([facility])    4. Bilateral lower extremity edema    5. Onychomadesis of toenail  -     Ambulatory Referral to Podiatry    6. Asteatotic dermatitis  -     mineral oil-hydrophilic petrolatum (AQUAPHOR) ointment; Apply 1 application topically to the appropriate area as directed As Needed for Dry Skin.  Dispense: 396 g; Refill: 1        BLE Unna boots twice weekly.  Patient is instructed to pick  up her Bumex prescription and take it as directed.  Elevate BLE.  Podiatry referral for problems with her toenails.  Aggressive moisturizing for severe dry skin and asteatotic dermatitis.    Recheck 2 weeks.    Patient was given instructions and counseling regarding their condition or for health maintenance advice, as well as the wound care plan and recommendations. They understand and agree with the plan.  They will follow back up here in the clinic but are instructed to contact us in the interim should they have any new, returning, or worsening symptoms or concerns. Please see specific information pulled into the AVS if appropriate.       Follow Up   Return in about 2 weeks (around 3/9/2022) for Recheck.      Jazzmine Hillman MD

## 2022-02-28 NOTE — PROGRESS NOTES
UNNA BOOTS REMOVED FROM BOTH LEGS AND BOTH FEET AND LEGS WERE WASHED WITH SOAP AND WATER AND PATTED DRY. UNNA BOOTS APPLIED FROM BASE OF TOES TO JUST BELOW THE KNEE. ABD PADS WERE PLACED OVER THE WOUND AREAS ON RIGHT AND LEFT LEGS. ACE WRAPS APPLIED FROM BASE OF TOES TO JUST BELOW THE KNEE. PATIENT TOLERATED WELL.

## 2022-02-28 NOTE — SIGNIFICANT NOTE
Epithelial %: 0%    Exposed Bone: no    Exposed Tendon: no    Impression: unchanged    Short Term Goals: INCREASE GRANULATION AND DECREASE SIZE

## 2022-03-02 NOTE — TELEPHONE ENCOUNTER
Pt called to cancel scope. States Dr. Gates recommended she not proceed with scope due to current cardiovascular issues. Pt informed of risks and will call back to r/s.

## 2022-03-03 NOTE — PROGRESS NOTES
Patient was seen for nurse visit today. Vital signs were obtained and charted. Wound was cleansed with soap/water and patted dry. Wound measurements and photo was obtained and documented. Unna boot was applied from base of toes to base of knees and covered with ace wrap from base of toes to base of knees. Patient tolerated dressing change well.

## 2022-03-03 NOTE — SIGNIFICANT NOTE
Epithelial %: 50-75%    Exposed Bone: no    Exposed Tendon: no    Impression: unchanged    Short Term Goals: INCREASE GRANULATION, DECREASE SIZE

## 2022-03-08 NOTE — PROGRESS NOTES
Chief Complaint  Wound Check (BLE ULCERS)    Subjective        History of Present Illness    Patient is a pleasant, obese 77-year-old female who was admitted to the hospital from 02/07/2022-02/10/2022. with new onset atrial fibrillation and CHF.  While in the hospital she developed a blister on the RLE that subsequently broke open and she has had a wound there that is weeping clear fluid.  She was seen in the ED on 02/19 and referred to wound care for further evaluation and treatment.  She is not a diabetic.    Patient is on Bumex 2 mg twice daily.  It also appears that she is on 25 mg of spironolactone daily but the patient is not sure if this is correct.  She does not know which medications she actually takes.    She has been getting Unna boots but has continued to have a tremendous amount of drainage which has gotten worse.  She says the bandages are always soaked from all the drainage.    Allergies:  Contrast dye and Lotrel [amlodipine besy-benazepril hcl]      Current Outpatient Medications:   •  apixaban (ELIQUIS) 5 MG tablet tablet, Take 1 tablet by mouth Every 12 (Twelve) Hours for 30 days. Indications: Atrial Fibrillation, Disp: 60 tablet, Rfl: 0  •  bumetanide (BUMEX) 1 MG tablet, Take 2 mg by mouth 2 (Two) Times a Day., Disp: , Rfl:   •  digoxin (LANOXIN) 125 MCG tablet, Take 1 tablet by mouth Daily for 30 days., Disp: 30 tablet, Rfl: 0  •  fluticasone (Flonase) 50 MCG/ACT nasal spray, 1 spray into the nostril(s) as directed by provider Daily., Disp: , Rfl:   •  loratadine (CLARITIN) 10 MG tablet, Take 10 mg by mouth Daily., Disp: , Rfl:   •  metoprolol succinate XL (TOPROL-XL) 25 MG 24 hr tablet, Take 1 tablet by mouth Every 12 (Twelve) Hours for 30 days., Disp: 60 tablet, Rfl: 0  •  midodrine (PROAMATINE) 10 MG tablet, Take 0.5 tablets by mouth 3 (Three) Times a Day Before Meals for 30 days., Disp: 45 tablet, Rfl: 0  •  mineral oil-hydrophilic petrolatum (AQUAPHOR) ointment, Apply 1 application  "topically to the appropriate area as directed As Needed for Dry Skin., Disp: 396 g, Rfl: 1  •  spironolactone (ALDACTONE) 25 MG tablet, Take 25 mg by mouth Daily., Disp: , Rfl:     Past Medical History:   Diagnosis Date   • Aneurysm (HCC)    • Arthritis    • Hypertension    • Irregular heart beat      Past Surgical History:   Procedure Laterality Date   • COLONOSCOPY     • HYSTERECTOMY       Social History     Socioeconomic History   • Marital status:    Tobacco Use   • Smoking status: Never Smoker   • Smokeless tobacco: Never Used   Vaping Use   • Vaping Use: Never used   Substance and Sexual Activity   • Alcohol use: Not Currently   • Drug use: Never   • Sexual activity: Defer     Family History   Problem Relation Age of Onset   • Hypertension Mother    • Colon cancer Neg Hx          Objective     Vitals:    03/08/22 0831   BP: 124/78   BP Location: Left arm   Patient Position: Sitting   Pulse: 80   Resp: 18   Temp: 98 °F (36.7 °C)   TempSrc: Temporal   Weight: 120 kg (265 lb)   Height: 172.7 cm (68\")   PainSc: 10-Worst pain ever     Body mass index is 40.29 kg/m².    STEADI Fall Risk Assessment has not been completed.     Review of Systems     ROS:  No fevers, chills, sweats, or body aches. No shortness of breath.     I have reviewed the HPI and ROS as documented by MA/RN. Jazzmine Hillman MD    Physical Exam     NAD, well dressed, well nourished  Normocephalic, atraumatic  EOMI, no scleral icterus  No respiratory distress, no cough  AAOx3, pleasant, cooperative  Readily dopplerable DP and PT pulses BLE, irregularly irregular rate and rhythm consistent with A. Fib,  RLE: Large shallow anterior ulceration, 2+ edema diffusely, constant steady weeping of clear and faintly yellow serous drainage.  LLE: Blister anterior medial leg with small opening anteriorly.  The blister continues to refill with clear fluid that readily drains from the small opening.  Diffuse clear serous weeping noted, less than RLE.  2+ " edema.    See photos for details.    RLE:                      LLE:                  Result Review :  The following data was reviewed by: Jazzmine Hillman MD on 03/08/2022:    Recent labs, prior notes, prior images.  I have reached out to the patient's PCP, Dr. Kohler, as well as her cardiologist, Dr. Leung, regarding her diuretic management, but have not heard back yet.                 Assessment and Plan   Diagnoses and all orders for this visit:    1. Venous stasis ulcer of left lower leg with edema of left lower leg (HCC) (Primary)  -     Bandage UNNA Boot With / ZINC With / O CALAMINE LF 4IN 10YD  -     Ambulatory Referral to Lymphedema Clinic    2. Venous stasis ulcer of right lower leg with edema of right lower leg (HCC)  -     Bandage UNNA Boot With / ZINC With / O CALAMINE LF 4IN 10YD  -     Ambulatory Referral to Lymphedema Clinic    3. Bilateral lower extremity edema  -     Ambulatory Referral to Lymphedema Clinic    4. Maceration of skin    5. Longstanding persistent atrial fibrillation (HCC)        Patient has severe maceration on the bottom of her feet due to the tremendous amount of drainage she is having from her lower extremities that is pooling on her wraps.  We have arranged for her to get an appointment with lymphedema clinic on Friday and we will see her back the next 2 days to change the wraps.  Thick padding was placed on the bottoms of both feet prior to applying the Unna boots.  Additional large ABD pads were applied over top of the Unna boots prior to wrapping with Ace bandage to try and absorb as much of the fluid as possible.  She is instructed to take her diuretics as prescribed and to restrict her overall fluid intake to 6 glasses that her doctor put her on.    Elevate BLE.      Recheck 2 weeks.    Patient was given instructions and counseling regarding their condition or for health maintenance advice, as well as the wound care plan and recommendations. They understand and agree  with the plan.  They will follow back up here in the clinic but are instructed to contact us in the interim should they have any new, returning, or worsening symptoms or concerns. Please see specific information pulled into the AVS if appropriate.       Follow Up   Return in about 2 weeks (around 3/22/2022) for Recheck.      Jazzmine Hillman MD

## 2022-03-09 NOTE — PROGRESS NOTES
UNNA BOOTS REMOVED FROM BOTH FEET AND LEGS. WASHED BOTH FEET AND LEGS WITH SOAP AND WATER AND PATTED DRY. ABD PADS PLACED TO BOTH PLANTAR FEET, PRIOR TO APPLYING UNNA BOOTS. UNNA BOOT APPLIED FROM BASE OF TOES TO JUST BELOW THE KNEE. ABD PADS PLACED OVER WOUND AREAS TO BOTH LEGS. ACE WRAPS APPLIED FROM BASE OF TOES TO JUST BELOW THE KNEE. PATIENT TOLERATED WELL.

## 2022-03-10 NOTE — PROGRESS NOTES
UNNA BOOTS REMOVED FROM BOTH FEET AND LEGS. BOTH FEET AND LEGS WERE WASHED WITH SOAP AND WATER AND PATTED DRY. ABD PADS PLACED TO BOTH PLANTAR FEET. UNNA BOOT APPLIED FROM BASE OF TOES TO JUST BELOW THE KNEE. ABD PADS PLACED OVER WOUND AREAS ON BOTH LEGS. ACE WRAPS APPLIED FROM BASE OF TOES TO JUST BELOW THE KNEE. PATIENT TOLERATED WELL.

## 2022-03-10 NOTE — SIGNIFICANT NOTE
Epithelial %: 0%    Exposed Bone: no    Exposed Tendon: no    Impression: improved    Short Term Goals: MAINTAIN SKIN INTEGRITY

## 2022-03-11 NOTE — THERAPY EVALUATION
"Outpatient Occupational Therapy Lymphedema Initial Evaluation   [facility]     Patient Name: Lauren Redd  : 1945  MRN: [id number]  Today's Date: 3/11/2022      Visit Date: 2022    Patient Active Problem List   Diagnosis   • Positive colorectal cancer screening using Cologuard test   • Atrial fibrillation with RVR (HCC)        Past Medical History:   Diagnosis Date   • Aneurysm (HCC)    • Arthritis    • Hypertension    • Irregular heart beat         Past Surgical History:   Procedure Laterality Date   • COLONOSCOPY     • HYSTERECTOMY           Visit Dx:     ICD-10-CM ICD-9-CM   1. Lymphedema  I89.0 457.1   2. Venous stasis ulcer of right lower extremity (HCC)  I83.019 454.0    L97.919    3. Venous stasis ulcer of left lower extremity (HCC)  I83.029 454.0    L97.929         Patient History     Row Name 22 1300             History    Chief Complaint Pain;Swelling;Other 1 (comment)  -MP      Hx Chief Complaint Comment 1  Patient states pain and swelling with open wounds since being in the hospital, she states she became swollen when in the hospital  -MP            User Key  (r) = Recorded By, (t) = Taken By, (c) = Cosigned By    Initials Name Provider Type    MP Jeri Husain OT Occupational Therapist                 Lymphedema     Row Name 22 1300             Subjective Pain    Able to rate subjective pain? yes  -MP      Pre-Treatment Pain Level 8  -MP      Post-Treatment Pain Level 8  -MP      Subjective Pain Comment Pain is constant and fluctuates between 8-10. \"Just pain\". Unable to describe  -MP              Lymphedema Assessment    Lymphedema Classification stage 2 (Spontaneously Irreversible)  -MP              LLIS - Physical Concerns    The amount of pain associated with my lymphedema is: 4  -MP      The amount of limb heaviness associated with my lymphedema is: 3  -MP      The amount of skin tightness associated with my lymphedema is: 2  -MP      The size of my swollen limb(s) " "seems: 2  -MP      Lymphedema affects the movement of my swollen limb(s): 3  -MP      The strength in my swollen limb(s) is: 0  -MP              LLIS - Psychosocial Concerns    Lymphedema affects my body image (i.e., \"how I think I look\"). 3  -MP      Lymphedema affects my socializing with others. 1  -MP      Lymphedema affects my intimate relations with spouse or partner (rate 0 if not applicable 0  -MP      Lymphedema \"gets me down\" (i.e., depression, frustration, or anger) 0  -MP      I must rely on others for help due to my lymphedema. 1  -MP      I know what to do to manage my lymphedema 4  -MP              LLIS - Functional Concerns    Lymphedema affects my ability to perform self-care activities (i.e. eating, dressing, hygiene) 1  -MP      Lymphedema affects my ability to perform routine home or work-related activities. 1  -MP      Lymphedema affects my performance of preferred leisure activities. 0  -MP      Lymphedema affects proper fit of clothing/shoes 2  -MP      Lymphedema affects my sleep 1  -MP              Posture/Observations    Posture- WNL Posture is WNL  -MP              General ROM    GENERAL ROM COMMENTS severely limited ankle motion for dorsiflexion  -MP              Lymphedema Edema Assessment    Stemmer Sign bilateral:;positive  -MP      Milam Hump bilateral:;positive  -MP              Skin Changes/Observations    Skin Observations Comment open wounds bilaterally.  See photos taken yesterday at wound care center[facility]  -MP              Lymphedema Sensation    Lymphedema Sensation Comments no concerns  -MP              Lymphedema Pulses/Capillary Refill    Lymphedema Pulses/Capillary Refill capillary refill  -MP      Capillary Refill lower extremity capillary refill  -MP      Lower Extremity Capillary Refill less than 3 seconds  -MP              Lymphedema Measurements    Measurement Type(s) Circumferential  -MP      Lymphedema Measurements Comments see attached  -MP              Lymphedema Life " Impact Scale Totals    A.  Total Q1 - Q17 (Do not include Q18) 28  -MP      B.  Total number of questions answered (Q1-Q17) 17  -MP      C. Divide A by B 1.65  -MP      D. Multiple C by 25 41.25  -MP            User Key  (r) = Recorded By, (t) = Taken By, (c) = Cosigned By    Initials Name Provider Type    Jeri Arzola, OT Occupational Therapist               Circumferential Measurements:            Baseline: R: 2995.06 ml L: 2812.74 ml  % difference in volume: R: --%   L: -- %      Therapy Education  Education Details: Ms. Redd was educated regarding etiology and treatment for lymphedema.  Educated regarding importance of elevation, exercise and compression for management of lymphedema.  She verbalizes knowledge and is agreeable to treatment.  Given: Symptoms/condition management  Program: New  How Provided: Verbal  Provided to: Patient  Level of Understanding: Verbalized  02955 - OT Self Care/Mgmt Minutes: 10         OT Goals     Row Name 03/11/22 1738          Time Calculation    OT Goal Re-Cert Due Date 04/10/22  -           User Key  (r) = Recorded By, (t) = Taken By, (c) = Cosigned By    Initials Name Provider Type    Jeri Arzola, OT Occupational Therapist                 OT Assessment/Plan     Row Name 03/11/22 1734 03/11/22 1731       OT Assessment    Functional Limitations -- Decreased safety during functional activities;Performance in self-care ADL;Impaired locomotion;Limitations in community activities  -    Impairments -- Balance;Impaired flexibility;Impaired lymphatic circulation;Impaired venous circulation  -MP    Assessment Comments Ms. Redd presents with significant edema bilateral lower extremities, open wounds, decreased mobility and ability to perform activities of daily living.  She is appropriate for lymphedema treatment in order to reduce lymph volume and assist with improvement in bilateral lower extremity status, mobility and ability to perform activities of daily  living.  -MP --    OT Diagnosis Lymphedema  -MP --    OT Rehab Potential Good  -MP --    Patient/caregiver participated in establishment of treatment plan and goals Yes  -MP --    Patient would benefit from skilled therapy intervention Yes  -MP --       OT Plan    OT Frequency 2x/week  -MP --    Predicted Duration of Therapy Intervention (OT) 2 times a week  -MP --    Planned CPT's? OT EVAL MOD COMPLEXITY: 63983;OT RE-EVAL: 87557;OT THER PROC EA 15 MIN: 09905BR;OT SELF CARE/MGMT/TRAIN 15 MIN: 29894;OT MANUAL THERAPY EA 15 MIN: 06585;OT ORTHOTIC MGMT/TRAIN EA 15 MIN: 08187;OT WC MGMT EA 15 MIN: 24625  -MP --          User Key  (r) = Recorded By, (t) = Taken By, (c) = Cosigned By    Initials Name Provider Type    Jeri Arzola OT Occupational Therapist              Goals:  1. Uncontrolled lymphedema of bilateral lower extremity/lower quadrant.  LTG1:  90 days:  Volumetric measurements of bilateral lower extremity/lower quadrant will be decreased by 10% or until plateau in reduction is achieved to improve functional mobility.           STATUS:  New   STG1a:  30 days:  Volumetric measurements of bilateral lower extremity/lower quadrant will be decreased by 5% bilaterally to improve functional mobility.    STATUS:  New  TREATMENT:  Manual Therapy, Therapeutic exercise, ADL/self-care therapy, Bioimpedence Fluid Analysis, Orthotic management and training, Therapeutic Activity, wound dressing as needed, Modalities: TENS, NMES, Ultrasound, Fluidotherapy,  and Patient and family/caregiver education.    2. Patient with decreased ADL/Self-Care routine for lymphedema management.   LTG 2:  90 days:  Patient/caregiver will independently verbalize/demonstrate ADL/Self-Care routine for lymphedema management.           STATUS:  New   STG 2a:  30 days:  Patient/caregiver will independently perform or verbally dictate compression wrapping technique of the lower extremity/lower quadrant.           STATUS:  New   STG2b:  30 days:   Patient will independently verbalize signs and symptoms of infection.                    STATUS:  New   STG2c:  30 days:  Patient will independently demonstrate/verbalize proper skin care routine to prevent infection and or wound development.            STATUS:  New  STG2d:  30 days:  Patient will independently perform teach back of HEP routine.           STATUS: New  STG2e:  30 days:  Patient/caregiver will obtain properly fitting compression orthosis, will demonstrate don/doff skill of compression orthosis with modified independence, and independently verbalize wear schedule.          STATUS: New   STG2f: 30 days: Patient/caregiver will independently perform self-manual lymphatic drainage of the lower extremity/lower quadrant.          STATUS: New  TREATMENT:  Therapeutic Activity, Patient/Caregiver Education, ADL retraining, Manual Therapy, Orthotic Management and training, Modalities: TENS, NMES, Ultrasound, Fluidotherapy Wound dressing if necessary,  Therapeutic Exercise, Modalities     3. Self-Care Limitations     LTG 3: 90 days:  The patient will demonstrate improved quality of life by achieving a score of 25 or less on the Lymphedema Life Impact Scale.           STATUS:  New   STG 3a: 30 days:  The patient will demonstrate improved quality of life by achieving a score of 35 or less on the Lymphedema Life Impact Scale.           STATUS:  New  TREATMENT:  Manual therapy, therapeutic exercise, therapeutic activity, ADL retraining, Patient/caregiver education, Modalities: TENS, NMES, Ultrasound, Fluidotherapy Orthotic Management and Training, Wound dressing as needed.    OT eval complexity:   Examination:   Performance Deficits include: Bathing/showering, functional mobility, health maintenance, home maintenance   # deficits affecting performance: 4  Decision Making:   Treatment Options Considered : Health maintenance, remediation/restoration, prevention, maintain  # Treatment options considered :  4  Modification Made for: None  Level of Task Modification: Non-  Comorbidity Affect Performance: Cardiac conditions  How is performance affected: Decreased activity tolerance  Evaluation Level Determined: Moderate          Time Calculation:   Timed Charges  68424 - OT Self Care/Mgmt Minutes: 10  Untimed Charges  OT Eval/Re-eval Minutes: 60  Total Minutes  Timed Charges Total Minutes: 10  Untimed Charges Total Minutes: 60   Total Minutes: 70     Therapy Charges for Today     Code Description Service Date Service Provider Modifiers Qty    46760804326 HC OT SELF CARE/MGMT/TRAIN EA 15 MIN 3/11/2022 Jeri Husain, OT GO 1    [id number]  OT EVAL MOD COMPLEXITY 4 3/11/2022 Jeri Husain OT GO 1                    Jeri Husain OT  3/11/2022

## 2022-03-14 PROBLEM — I50.33 ACUTE ON CHRONIC DIASTOLIC CHF (CONGESTIVE HEART FAILURE) (HCC): Status: ACTIVE | Noted: 2022-01-01

## 2022-03-14 PROBLEM — I48.91 ATRIAL FIBRILLATION WITH RVR (HCC): Chronic | Status: ACTIVE | Noted: 2022-01-01

## 2022-03-14 PROBLEM — I50.9 CHF (CONGESTIVE HEART FAILURE): Status: ACTIVE | Noted: 2022-01-01

## 2022-03-14 PROBLEM — L03.119 CELLULITIS OF ANTERIOR LOWER LEG: Chronic | Status: ACTIVE | Noted: 2022-01-01

## 2022-03-14 NOTE — PROGRESS NOTES
Chief Complaint  Wound Check (BLE WOUNDS)    Subjective        Wound Check        Patient is a pleasant, obese 77-year-old female who was admitted to the hospital from 02/07/2022-02/10/2022. with new onset atrial fibrillation and CHF.  While in the hospital she developed a blister on the RLE that subsequently broke open and she has had a wound there that is weeping clear fluid.  She was seen in the ED on 02/19 and referred to wound care for further evaluation and treatment.  She is not a diabetic.    Patient is on Bumex 2 mg twice daily.  It also appears that she is on 25 mg of spironolactone daily but the patient is not sure if this is correct.  She does not know which medications she actually takes.    She had been getting Unna boots but has continued to have a significant amount of drainage which has gotten worse.  At her visit last week she had a tremendous amount of maceration on the bottom of her feet because of the drainage saturating the Unna boots.  We did daily Unna boot changes last week and she saw lymphedema clinic on Friday.  They applied bandages which remained in place over the weekend.  She is complaining of severe pain and her wounds are much much worse today.  She has new areas of wounds and is crying due to the severe pain.    Allergies:  Contrast dye and Lotrel [amlodipine besy-benazepril hcl]      Current Outpatient Medications:   •  bumetanide (BUMEX) 1 MG tablet, Take 2 mg by mouth 2 (Two) Times a Day., Disp: , Rfl:   •  digoxin (LANOXIN) 125 MCG tablet, Take 1 tablet by mouth Daily for 30 days., Disp: 30 tablet, Rfl: 0  •  fluticasone (Flonase) 50 MCG/ACT nasal spray, 1 spray into the nostril(s) as directed by provider Daily., Disp: , Rfl:   •  loratadine (CLARITIN) 10 MG tablet, Take 10 mg by mouth Daily., Disp: , Rfl:   •  metoprolol succinate XL (TOPROL-XL) 25 MG 24 hr tablet, Take 1 tablet by mouth Every 12 (Twelve) Hours for 30 days., Disp: 60 tablet, Rfl: 0  •  midodrine (PROAMATINE)  "10 MG tablet, Take 0.5 tablets by mouth 3 (Three) Times a Day Before Meals for 30 days., Disp: 45 tablet, Rfl: 0  •  mineral oil-hydrophilic petrolatum (AQUAPHOR) ointment, Apply 1 application topically to the appropriate area as directed As Needed for Dry Skin., Disp: 396 g, Rfl: 1  •  spironolactone (ALDACTONE) 25 MG tablet, Take 25 mg by mouth Daily., Disp: , Rfl:   •  traMADol (ULTRAM) 50 MG tablet, , Disp: , Rfl:     Past Medical History:   Diagnosis Date   • Aneurysm (HCC)    • Arthritis    • Hypertension    • Irregular heart beat      Past Surgical History:   Procedure Laterality Date   • COLONOSCOPY     • HYSTERECTOMY       Social History     Socioeconomic History   • Marital status:    Tobacco Use   • Smoking status: Never Smoker   • Smokeless tobacco: Never Used   Vaping Use   • Vaping Use: Never used   Substance and Sexual Activity   • Alcohol use: Not Currently   • Drug use: Never   • Sexual activity: Defer     Family History   Problem Relation Age of Onset   • Hypertension Mother    • Colon cancer Neg Hx          Objective     Vitals:    03/14/22 0931   BP: 134/86   BP Location: Right arm   Patient Position: Sitting   Pulse: 68   Resp: 18   Temp: 98.6 °F (37 °C)   TempSrc: Temporal   Weight: 120 kg (265 lb)   Height: 172.7 cm (68\")   PainSc: 10-Worst pain ever     Body mass index is 40.29 kg/m².    STEADI Fall Risk Assessment has not been completed.     Review of Systems     ROS:  No fevers, chills, sweats, or body aches. No shortness of breath.     I have reviewed the HPI and ROS as documented by MA/RN. Jazzmine Hillman MD    Physical Exam     Well dressed, well nourished, very uncomfortable, crying d/t pain  Normocephalic, atraumatic  EOMI, no scleral icterus  No respiratory distress, no cough  AAOx3, pleasant, cooperative  Readily dopplerable DP and PT pulses BLE, irregularly irregular rate and rhythm consistent with A. Fib,  RLE: Large shallow anterior ulceration, much worse, with increased " slough, purulence noted that is new today, increased depth, 2+ edema diffusely, severe TTP, diffuse erythema, culture performed  LLE: Several new shallow ulcerations noted particularly laterally.  Severe TTP, diffuse erythema 2+ edema, erythema.    See photos for details.    RLE:              LLE:              Result Review :  The following data was reviewed by: Jazzmine Hillman MD on 03/14/2022:    Recent notes and images.                 Assessment and Plan   Diagnoses and all orders for this visit:    1. Wound infection (Primary)  -     Wound Culture - Wound, Leg, Right    2. Venous stasis ulcer of right lower leg with edema of right lower leg (HCC)    3. Venous stasis ulcer of left lower leg with edema of left lower leg (HCC)    4. Bilateral lower extremity edema    5. Maceration of skin    6. Longstanding persistent atrial fibrillation (HCC)    7. Cellulitis of right lower extremity    8. Chronic congestive heart failure, unspecified heart failure type (HCC)        Patient with evidence of significant worsening of the wounds as well as infection and continued significant edema.  Culture performed of large wound RLE.  I spoke with Dr. ANNY Fajardo who agrees to admit the patient to the hospital for further evaluation including antibiotics, pain control, and diuresis.    I will see her back within 1-2 weeks of being discharged.    Patient was given instructions and counseling regarding their condition or for health maintenance advice, as well as the wound care plan and recommendations. They understand and agree with the plan.  They will follow back up here in the clinic but are instructed to contact us in the interim should they have any new, returning, or worsening symptoms or concerns. Please see specific information pulled into the AVS if appropriate.       Follow Up   Return in about 2 weeks (around 3/28/2022) for Recheck.      Jazzmine Hillman MD

## 2022-03-23 PROBLEM — I50.33 ACUTE ON CHRONIC DIASTOLIC CHF (CONGESTIVE HEART FAILURE) (HCC): Status: RESOLVED | Noted: 2022-01-01 | Resolved: 2022-01-01

## 2022-03-23 PROBLEM — I48.91 ATRIAL FIBRILLATION WITH RVR (HCC): Chronic | Status: RESOLVED | Noted: 2022-01-01 | Resolved: 2022-01-01

## 2022-03-23 PROBLEM — R53.1 GENERALIZED WEAKNESS: Status: ACTIVE | Noted: 2022-01-01

## 2022-03-24 PROBLEM — I48.91 ATRIAL FIBRILLATION (HCC): Chronic | Status: ACTIVE | Noted: 2022-01-01

## 2022-04-01 NOTE — THERAPY TREATMENT NOTE
SNF - Occupational Therapy Progress Note   Nayana    Patient Name: Lauren Redd  : 1945    MRN: [id number]                              Today's Date: 2022       Admit Date: 3/23/2022    Visit Dx:     ICD-10-CM ICD-9-CM   1. Decreased activities of daily living (ADL)  Z78.9 V49.89   2. Difficulty walking  R26.2 719.7     Patient Active Problem List   Diagnosis   • Positive colorectal cancer screening using Cologuard test   • Atrial fibrillation with RVR (HCC)   • CHF (congestive heart failure) (HCC)   • Cellulitis of anterior lower leg   • Generalized weakness   • Atrial fibrillation (HCC)     Past Medical History:   Diagnosis Date   • Afib (HCC)    • Aneurysm (HCC)    • Arthritis    • GERD (gastroesophageal reflux disease)    • Hypertension    • Irregular heart beat      Past Surgical History:   Procedure Laterality Date   • COLONOSCOPY     • HYSTERECTOMY        General Information     Row Name 22 1133          OT Time and Intention    Document Type therapy note (daily note)  -KN     Mode of Treatment individual therapy;occupational therapy  -MARCEL     Row Name 22 1133          General Information    Patient Profile Reviewed yes  -KN     Existing Precautions/Restrictions fall;oxygen therapy device and L/min  -MARCEL     Row Name 22 1133          Safety Issues, Functional Mobility    Impairments Affecting Function (Mobility) balance;endurance/activity tolerance;pain;strength  -KN           User Key  (r) = Recorded By, (t) = Taken By, (c) = Cosigned By    Initials Name Provider Type    KN Koby Bass OT Occupational Therapist                 Mobility/ADL's     Row Name 22 1140          Transfers    Transfers sit-stand transfer;stand-sit transfer;toilet transfer  -MARCEL     Comment, (Transfers) Safety cues needed to decrease fall risk.  -KN     Sit-Stand [place] (Transfers) verbal cues;contact guard  -KN     Stand-Sit [place] (Transfers) verbal cues;contact guard  -KN      [place] Level (Toilet Transfer) verbal cues;contact guard;1 person assist;standby assist  -     Assistive Device (Toilet Transfer) commode, 3-in-1  -KN     [facility] Name 04/01/22 1140          Sit-Stand Transfer    Assistive Device (Sit-Stand Transfers) --  none  -[facility] Name 04/01/22 1140          Toilet Transfer    Type (Toilet Transfer) sit-stand;stand-sit  -[facility] Name 04/01/22 1140          Activities of Daily Living    BADL Assessment/Intervention lower body dressing  -[facility] Name 04/01/22 1140          Stand-Sit Transfer    Assistive Device (Stand-Sit Transfers) --  none  -[facility] Name 04/01/22 1140          Lower Body Dressing Assessment/Training    [place] Level (Lower Body Dressing) lower body dressing skills;maximum assist (25% patient effort);moderate assist (50% patient effort)  -[facility] Name 04/01/22 1140          Toileting Assessment/Training    [place] Level (Toileting) toileting skills;contact guard assist  -     Assistive Devices (Toileting) commode, 3-in-1  -KN           User Key  (r) = Recorded By, (t) = Taken By, (c) = Cosigned By    Initials Name Provider Type    Koby Card OT Occupational Therapist               Obj/Interventions     [facility] Name 04/01/22 1144          Balance    Balance Assessment standing dynamic balance  -     Dynamic Standing Balance supervision  -           User Key  (r) = Recorded By, (t) = Taken By, (c) = Cosigned By    Initials Name Provider Type    Koby Card OT Occupational Therapist               Goals/Plan    No documentation.                Clinical Impression     Row Name 04/01/22 1146          Pain Assessment    Pretreatment Pain Rating 7/10  -KN     Posttreatment Pain Rating 7/10  -KN     Pain Location - Side/Orientation Bilateral  -KN     Pain Location lower  -     Pain Intervention(s) Nursing Notified  -KN     Row Name 04/01/22 1146          Plan of Care Review    Progress no change  -KN     Outcome Evaluation Pt  continues to deal with pain in LB. Pt had no change in functional status this session. Pt will continue with skilled occupational therapy services to maximize ind. with ADLs.  -KN     Row Name 04/01/22 1146          Vital Signs    Pre SpO2 (%) 97  -KN     O2 Delivery Pre Treatment nasal cannula  -KN     Intra SpO2 (%) 91  -KN     O2 Delivery Intra Treatment nasal cannula  -KN     Post SpO2 (%) 96  -KN     O2 Delivery Post Treatment nasal cannula  -KN           User Key  (r) = Recorded By, (t) = Taken By, (c) = Cosigned By    Initials Name Provider Type    Koby Card, OT Occupational Therapist               Outcome Measures     Row Name 04/01/22 1149          How much help from another is currently needed...    Putting on and taking off regular lower body clothing? 2  -KN     Bathing (including washing, rinsing, and drying) 3  -KN     Toileting (which includes using toilet bed pan or urinal) 3  -KN     Putting on and taking off regular upper body clothing 4  -KN     Taking care of personal grooming (such as brushing teeth) 4  -KN     Eating meals 4  -KN     AM-PAC 6 Clicks Score (OT) 20  -KN     Row Name 04/01/22 1055 04/01/22 1000       How much help from another person do you currently need...    Turning from your back to your side while in flat bed without using bedrails? 3  -CS 3  -LH    Moving from lying on back to sitting on the side of a flat bed without bedrails? 3  -CS 3  -LH    Moving to and from a bed to a chair (including a wheelchair)? 3  -CS 3  -LH    Standing up from a chair using your arms (e.g., wheelchair, bedside chair)? 3  -CS 4  -LH    Climbing 3-5 steps with a railing? 2  -CS 2  -LH    To walk in hospital room? 3  -CS 3  -LH    AM-PAC 6 Clicks Score (PT) 17  -CS 18  -LH    Row Name 04/01/22 1055          Functional Assessment    Outcome Measure Options AM-PAC 6 Clicks Basic Mobility (PT)  -CS     Row Name 04/01/22 1149          Optimal Instrument    Bending/Stooping 3  -KN     Standing 1   -KN     Reaching 1  -KN           User Key  (r) = Recorded By, (t) = Taken By, (c) = Cosigned By    Initials Name Provider Type     Rosa Montes, RN Registered Nurse    Jojo Tejada, PT Physical Therapist    Koby Card, OT Occupational Therapist              Section G              Section GG                         Occupational Therapy Education                 Title: PT OT SLP Therapies (Done)     Topic: Occupational Therapy (Done)     Point: ADL training (Done)     Description:   Instruct learner(s) on proper safety adaptation and remediation techniques during self care or transfers.   Instruct in proper use of assistive devices.              Learning Progress Summary           Patient Acceptance, E, VU by  at 4/1/2022 1149    Acceptance, E, VU,NR by  at 3/24/2022 1149                   Point: Home exercise program (Done)     Description:   Instruct learner(s) on appropriate technique for monitoring, assisting and/or progressing therapeutic exercises/activities.              Learning Progress Summary           Patient Acceptance, E, VU by  at 4/1/2022 1149    Acceptance, E, VU,NR by  at 3/24/2022 1149                   Point: Precautions (Done)     Description:   Instruct learner(s) on prescribed precautions during self-care and functional transfers.              Learning Progress Summary           Patient Acceptance, E, VU by  at 4/1/2022 1149    Acceptance, E, VU,NR by  at 3/24/2022 1149                   Point: Body mechanics (Done)     Description:   Instruct learner(s) on proper positioning and spine alignment during self-care, functional mobility activities and/or exercises.              Learning Progress Summary           Patient Acceptance, E, VU by  at 4/1/2022 1149    Acceptance, E, VU,NR by  at 3/24/2022 1149                               User Key     Initials Effective Dates Name Provider Type [facility] 06/16/21 -  Rosalba Christianson, OT Occupational Therapist OT      12/20/21 -  Koby Bass OT Occupational Therapist OT              OT Recommendation and Plan     Plan of Care Review  Progress: no change  Outcome Evaluation: Pt continues to deal with pain in LB. Pt had no change in functional status this session. Pt will continue with skilled occupational therapy services to maximize ind. with ADLs.     Time Calculation:    Time Calculation- OT     Row Name 04/01/22 1150 04/01/22 1045          Time Calculation- OT    OT Received On 04/01/22  -KN --            Timed Charges    33185 - Gait Training Minutes  -- 10  -CS     27046 - OT Self Care/Mgmt Minutes 45  -KN --            SNF Occupational Therapy Minutes    Skilled Minutes- OT 45 min  -KN --            Total Minutes    Timed Charges Total Minutes 45  -KN 10  -CS      Total Minutes 45  -KN 10  -CS           User Key  (r) = Recorded By, (t) = Taken By, (c) = Cosigned By    Initials Name Provider Type    CS Jojo Ramirez, PT Physical Therapist    KN Koby Bass OT Occupational Therapist              Therapy Charges for Today     Code Description Service Date Service Provider Modifiers Qty    32322449965 HC OT SELF CARE/MGMT/TRAIN EA 15 MIN 4/1/2022 Koby Bass OT GO 3               Koby Bass OT  4/1/2022

## 2022-04-01 NOTE — PLAN OF CARE
Goal Outcome Evaluation:  Plan of Care Reviewed With: patient        Progress: no change   Medicated with the scheduled norco with adequate pain relief.  Stayed up in chair most of the shift, able to transfer self to bedside commode.  [place]a with exertion noted.

## 2022-04-01 NOTE — PLAN OF CARE
Goal Outcome Evaluation:  Plan of Care Reviewed With: patient        Progress: improving  Patient is AO x 4 and Koi but calls for assistance when needed. She is a 1 assist with a walker and gait belt to the bedside commode. She can be continent and incontinent with urine d/t urgency. She has been medicated twice this shift because of bilateral leg pain. Will continue with her plan of care.

## 2022-04-01 NOTE — THERAPY TREATMENT NOTE
SNF - Physical Therapy Treatment Note   [facility]     Patient Name: Lauren Redd  : 1945  MRN: [id number]  Today's Date: 2022      Visit Dx:    ICD-10-CM ICD-9-CM   1. Decreased activities of daily living (ADL)  Z78.9 V49.89   2. Difficulty walking  R26.2 719.7     Patient Active Problem List   Diagnosis   • Positive colorectal cancer screening using Cologuard test   • Atrial fibrillation with RVR (HCC)   • CHF (congestive heart failure) (HCC)   • Cellulitis of anterior lower leg   • Generalized weakness   • Atrial fibrillation (HCC)     Past Medical History:   Diagnosis Date   • Afib (HCC)    • Aneurysm (HCC)    • Arthritis    • GERD (gastroesophageal reflux disease)    • Hypertension    • Irregular heart beat      Past Surgical History:   Procedure Laterality Date   • COLONOSCOPY     • HYSTERECTOMY         PT Assessment (last 12 hours)     PT Evaluation and Treatment     Row Name 22 1047          Physical Therapy Time and Intention    Subjective Information no complaints  -CS     Document Type therapy note (daily note)  -CS     Mode of Treatment individual therapy;physical therapy  -CS     Patient Effort good  -     Row Name 22 1047          Pain    Pretreatment Pain Rating 7/10  -CS     Posttreatment Pain Rating 7/10  -CS     Row Name 22 1047          Bed Mobility    Comment, (Bed Mobility) The patient was already seated  -     Row Name 22 1047          Transfers    Transfers sit-stand transfer;stand-sit transfer  -CS     Sit-Stand [place] (Transfers) verbal cues;contact guard  -     Stand-Sit [place] (Transfers) verbal cues;contact guard  -     Row Name 22 1047          Sit-Stand Transfer    Assistive Device (Sit-Stand Transfers) walker, front-wheeled  -     Row Name 22 1047          Stand-Sit Transfer    Assistive Device (Stand-Sit Transfers) walker, front-wheeled  -     Row Name 22 1047          Gait/Stairs (Locomotion)    Gait/Stairs  Locomotion gait/ambulation independence;gait/ambulation assistive device;distance ambulated;gait pattern  -CS     [place] (Gait) verbal cues;contact guard  -CS     Assistive Device (Gait) walker, front-wheeled  -CS     Distance in Feet (Gait) 20  -CS     Pattern (Gait) 4-point;step-to  -CS     Deviations/Abnormal Patterns (Gait) festinating/shuffling;nathalia decreased;gait speed decreased  -     Bilateral Gait Deviations forward flexed posture  -     Row Name 04/01/22 1047          Motor Skills    Therapeutic Exercise hip;knee;ankle  -     Row Name 04/01/22 1047          Hip (Therapeutic Exercise)    Hip (Therapeutic Exercise) isometric exercises;strengthening exercise  -     Hip Isometrics (Therapeutic Exercise) bilateral;sitting;gluteal sets;aDduction;other (see comments)  2 sets of 20 repetitions  -     Hip Strengthening (Therapeutic Exercise) bilateral;sitting;aBduction;flexion;marching while seated;20 repititions  -     Row Name 04/01/22 1047          Knee (Therapeutic Exercise)    Knee (Therapeutic Exercise) isometric exercises;strengthening exercise  -     Knee Isometrics (Therapeutic Exercise) bilateral;sitting;quad sets;other (see comments)  2 sets of 20 repetitions  -     Knee Strengthening (Therapeutic Exercise) bilateral;sitting;LAQ (long arc quad);20 repititions  -     Row Name 04/01/22 1047          Ankle (Therapeutic Exercise)    Ankle (Therapeutic Exercise) strengthening exercise  -     Ankle Strengthening (Therapeutic Exercise) bilateral;sitting;dorsiflexion;plantarflexion;2 sets;20 repititions  -     Row Name             Wound 02/09/22 1235 Right lower leg Blisters    Wound - Properties Group Placement Date: 02/09/22 -FL Placement Time: 1235  -FL Present on Hospital Admission: Y  -FL Side: Right  -FL Orientation: lower  -FL Location: leg  -FL Primary Wound Type: Blisters  -FL     Retired Wound - Properties Group Placement Date: 02/09/22 -FL Placement Time: 1235   -FL Present on Hospital Admission: Y  -FL Side: Right  -FL Orientation: lower  -FL Location: leg  -FL Primary Wound Type: Blisters  -FL     Retired Wound - Properties Group Date first assessed: 02/09/22  -FL Time first assessed: 1235  -FL Present on Hospital Admission: Y  -FL Side: Right  -FL Location: leg  -FL Primary Wound Type: Blisters  -FL     Row Name             Wound 02/23/22 0934 Left lower leg    Wound - Properties Group Placement Date: 02/23/22  -BA Placement Time: 0934  -BA Present on Hospital Admission: Y  -BA Side: Left  -BA Orientation: lower  -BA Location: leg  -BA     Retired Wound - Properties Group Placement Date: 02/23/22  -BA Placement Time: 0934  -BA Present on Hospital Admission: Y  -BA Side: Left  -BA Orientation: lower  -BA Location: leg  -BA     Retired Wound - Properties Group Date first assessed: 02/23/22  -BA Time first assessed: 0934  -BA Present on Hospital Admission: Y  -BA Side: Left  -BA Location: leg  -BA     Row Name             Wound 02/28/22 1019 Right anterior foot    Wound - Properties Group Placement Date: 02/28/22  -ASA Placement Time: 1019  -ASA Present on Hospital Admission: Y  -ASA Side: Right  -ASA Orientation: anterior  -ASA Location: foot  -ASA     Retired Wound - Properties Group Placement Date: 02/28/22  -ASA Placement Time: 1019  -ASA Present on Hospital Admission: Y  -ASA Side: Right  -ASA Orientation: anterior  -ASA Location: foot  -ASA     Retired Wound - Properties Group Date first assessed: 02/28/22  -ASA Time first assessed: 1019  -ASA Present on Hospital Admission: Y  -ASA Side: Right  -ASA Location: foot  -ASA     Row Name             Wound 03/08/22 0900 Right posterior foot    Wound - Properties Group Placement Date: 03/08/22  -ASA Placement Time: 0900  -ASA Present on Hospital Admission: Y  -ASA Side: Right  -ASA Orientation: posterior  -ASA Location: foot  -ASA     Retired Wound - Properties Group Placement Date: 03/08/22  -ASA Placement Time: 0900  -ASA Present on Hospital  Admission: Y  -ASA Side: Right  -ASA Orientation: posterior  -ASA Location: foot  -ASA     Retired Wound - Properties Group Date first assessed: 03/08/22  -ASA Time first assessed: 0900  -ASA Present on Hospital Admission: Y  -ASA Side: Right  -ASA Location: foot  -ASA     Row Name             Wound 03/08/22 0923 Left posterior foot    Wound - Properties Group Placement Date: 03/08/22  -ASA Placement Time: 0923  -ASA Present on Hospital Admission: Y  -ASA Side: Left  -ASA Orientation: posterior  -ASA Location: foot  -ASA     Retired Wound - Properties Group Placement Date: 03/08/22  -ASA Placement Time: 0923  -ASA Present on Hospital Admission: Y  -ASA Side: Left  -ASA Orientation: posterior  -ASA Location: foot  -ASA     Retired Wound - Properties Group Date first assessed: 03/08/22  -ASA Time first assessed: 0923  -ASA Present on Hospital Admission: Y  -ASA Side: Left  -ASA Location: foot  -ASA     Row Name             Wound 03/28/22 1558 Right gluteal    Wound - Properties Group Placement Date: 03/28/22  -FH Placement Time: 1558  -FH Side: Right  -FH Location: gluteal  -FH     Retired Wound - Properties Group Placement Date: 03/28/22  -FH Placement Time: 1558  -FH Side: Right  -FH Location: gluteal  -FH     Retired Wound - Properties Group Date first assessed: 03/28/22  -FH Time first assessed: 1558  -FH Side: Right  -FH Location: gluteal  -FH     Row Name 04/01/22 1047          Plan of Care Review    Plan of Care Reviewed With patient  -CS     Progress no change  -CS     Outcome Evaluation There are no significant changes of note.  The patient tolerated treatment well.  Continue with current plan of care.  -CS     Row Name 04/01/22 1047          Vital Signs    Pre SpO2 (%) 98  -CS     O2 Delivery Pre Treatment nasal cannula  1 liter  -CS     Intra SpO2 (%) 85  -CS     O2 Delivery Intra Treatment nasal cannula  1 liter  -CS     Post SpO2 (%) 92  -CS     O2 Delivery Post Treatment nasal cannula  1 liter  -CS     Row Name 04/01/22 1047           Positioning and Restraints    Pre-Treatment Position sitting in chair/recliner  -CS     Post Treatment Position chair  -CS     In Chair reclined;call light within reach;encouraged to call for assist  -CS     Row Name 04/01/22 2237          Progress Summary (PT)    Progress Toward Functional Goals (PT) progress toward functional goals is good  -CS           User Key  (r) = Recorded By, (t) = Taken By, (c) = Cosigned By    Initials Name Provider Type     Sarah Church, RN Registered Nurse    Sylvia Olson, RN Registered Nurse    Swathi Yuen, RN Registered Nurse    Dayanara Burrell RN Registered Nurse    Jojo Tejada, PT Physical Therapist              Section G  Mobility  Bed mobility - self performance: activity did not occur  Bed mobility support/assistance: Activity did not occur  Transfer - self performance: limited assistance (staff provide guided maneuvering of limbs or other non-weight bearing assistance)  Transfer support/assistance: One person assist  Walking in room - self performance: limited assistance (staff provide guided maneuvering of limbs or other non-weight bearing assistance)  Walking in room support/assistance: One person assist  Walking in corridors/hallway - self performance: activity did not occur  Walking in corridors/hallway support/assistance: Activity did not occur  Locomotion on unit - self performance: activity did not occur  Locomotion on unit support/assistance: Activity did not occur  Locomotion off unit - self performance: activity did not occur  Locomotion off unit support/assistance: Activity did not occur     Balance  Balance during transitions & walking: Not steady, requires assist to steady  Moving from seated to standing position: Not steady, requires assist to steady  Walking: Not steady, requires assist to steady  Turning around while walking: Not steady, requires assist to steady  Moving on and off toilet: Not steady, requires assist to  steady  Surface-to-surface transfer: Not steady, requires assist to steady  Mobility devices: Cane/crutch     Section GG  SectionGG: Functional Ability/Goals, Adm  Self Care, Prior Functioning (JJ8989T): 3. Independent  Indoor Mobility - Ambulation, Prior Function (LP0879Q): 3. Independent  Stairs, Prior Function (YK2295K): 3. Independent  Functional Cognition, Prior Functioning (MB1548D): 3. Independent  Prior Device Use (OH0719): none of the above (Z)     Mobility, Admission Performance (IG9337)  Roll Left & Right: Mobility Admission Performance (ZB8458G4): partial/moderate assistance (03)  Sit to Lying: Mobility Admission Performance (ON9200J4): partial/moderate assistance (03)  Lying to Sitting, Side of Bed: Mobility Admission Performance (NR0919O9): partial/moderate assistance (03)  Sit to Stand: Mobility Admission Performance (SZ1361D0): partial/moderate assistance (03)  Chair/Bed-Chair Transfer: Mobility Admission Performance (BZ5435U0): partial/moderate assistance (03)  Toilet Transfer: Mobility Admission Performance (VD1212L6): partial/moderate assistance (03)  Car Transfer: Mobility Admission Performance (JD7038Z0): not applicable (09)  Walk 10 Feet: Mobility Admission Performance (EP1667N1): not attempted, medical condition/safety concern (88)  Walk 50 Feet With Two Turns: Mobility Admission Performance (CV9657M3): not attempted, medical condition/safety concern (88)  Walk 150 Feet: Mobility Admission Performance (BY5839J4): not attempted, medical condition/safety concern (88)  Walk 10 Ft, Uneven Surfaces: Mobility Admission Performance (FG6237C7): not applicable (09)  1 Step/Curb: Mobility Admission Performance (EA4792V2): not attempted, medical condition/safety concern (88)  4 Steps: Mobility Admission Performance (XE7690B1): not attempted, medical condition/safety concern (88)  12 Steps: Mobility Admission Performance (BK4924J7): not attempted, medical condition/safety concern (88)  Picking up object:  Mobility Admission Performance (RJ4340S8): not attempted, medical condition/safety concern (88)  Use a Wheelchair and/or Scooter: Mobility (FW5128M9): no (0)     Mobility (GG), Discharge Goal (ZI7131)  Roll Left & Right: Mobility Discharge Goal (QW3436T3): independent (06)  Sit to Lying: Mobility Discharge Goal (EI6696X6): independent (06)  Lying to Sitting, Side of Bed: Mobility Discharge Goal (LO2367X8): independent (06)  Sit to Stand: Mobility Discharge Goal (NA5298W8): independent (06)  Chair/Bed-Chair Transfer: Mobility Discharge Goal (RD2453O1): independent (06)  Toilet Transfer: Mobility Discharge Goal (QL5637F5): independent (06)  Car Transfer: Mobility Discharge Goal (JD1264L1): not applicable (09)  Walk 10 Feet: Mobility Discharge Goal (YF0048B6): independent (06)  Walk 50 Feet With Two Turns: Mobility Discharge Goal (XI8231G6): independent (06)  Walk 150 Feet: Mobility Discharge Goal (XE5368I2): independent (06)  Walk 10 Ft, Uneven Surfaces: Mobility Discharge Goal (XF5002Q4): not applicable (09)  1 Step/Curb: Mobility Discharge Goal (WU3186Y4): independent (06)  4 Steps: Mobility Discharge Goal (ZS5106Q6): independent (06)  12 Steps: Mobility Discharge Goal (OF9217Y0): independent (06)  Picking up object: Mobility Discharge Goal (MX2028M1): independent (06)  Use a Wheelchair and/or Scooter: Mobility (SA4227A3): no (0)  Wheel 50 Ft Two Turns: Mobility Discharge Goal (IB2273S3): not applicable (09)  Wheel 150 Feet: Mobility Discharge Goal (OE4497E9): not applicable (09)     Mobility, Discharge Performance (MD4996)  Use a Wheelchair and/or Scooter: Mobility (PL6896L6): no (0)  Physical Therapy Education                 Title: PT OT SLP Therapies (Done)     Topic: Physical Therapy (Done)     Point: Mobility training (Done)     Learning Progress Summary           Patient Acceptance, E,TB, VU by CS at 3/24/2022 1607                   Point: Home exercise program (Done)     Learning Progress Summary            Patient Acceptance, E,TB, VU by  at 3/24/2022 1607                   Point: Body mechanics (Done)     Learning Progress Summary           Patient Acceptance, E,TB, VU by  at 3/24/2022 1607                   Point: Precautions (Done)     Learning Progress Summary           Patient Acceptance, E,TB, VU by  at 3/24/2022 1607                               User Key     Initials Effective Dates Name Provider Type Discipline     04/25/21 -  Jojo Ramirez, PT Physical Therapist PT              PT Recommendation and Plan  Anticipated Discharge Disposition (PT): home with home health, home with assist  Planned Therapy Interventions (PT): balance training, bed mobility training, gait training, stair training, strengthening, stretching, transfer training, patient/family education  Therapy Frequency (PT): 6 times/wk  Progress Summary (PT)  Progress Toward Functional Goals (PT): progress toward functional goals is good  Plan of Care Reviewed With: patient  Progress: no change  Outcome Evaluation: There are no significant changes of note.  The patient tolerated treatment well.  Continue with current plan of care.   Outcome Measures     Row Name 04/01/22 1055 03/31/22 1154 03/30/22 0813       How much help from another person do you currently need...    Turning from your back to your side while in flat bed without using bedrails? 3  -CS 3  -CS 3  -CS    Moving from lying on back to sitting on the side of a flat bed without bedrails? 3  -CS 3  -CS 3  -CS    Moving to and from a bed to a chair (including a wheelchair)? 3  -CS 3  -CS 3  -CS    Standing up from a chair using your arms (e.g., wheelchair, bedside chair)? 3  -CS 3  -CS 3  -CS    Climbing 3-5 steps with a railing? 2  -CS 2  -CS 2  -CS    To walk in hospital room? 3  -CS 3  -CS 3  -CS    AM-PAC 6 Clicks Score (PT) 17  -CS 17  -CS 17  -CS       Functional Assessment    Outcome Measure Options AM-PAC 6 Clicks Basic Mobility (PT)  -CS AM-PAC 6 Clicks Basic Mobility  (PT)  -CS AM-PAC 6 Clicks Basic Mobility (PT)  -CS    Row Name 03/29/22 1543             How much help from another person do you currently need...    Turning from your back to your side while in flat bed without using bedrails? 3  -CS      Moving from lying on back to sitting on the side of a flat bed without bedrails? 3  -CS      Moving to and from a bed to a chair (including a wheelchair)? 3  -CS      Standing up from a chair using your arms (e.g., wheelchair, bedside chair)? 3  -CS      Climbing 3-5 steps with a railing? 2  -CS      To walk in hospital room? 3  -CS      AM-PAC 6 Clicks Score (PT) 17  -CS              Functional Assessment    Outcome Measure Options AM-PAC 6 Clicks Basic Mobility (PT)  -CS            User Key  (r) = Recorded By, (t) = Taken By, (c) = Cosigned By    Initials Name Provider Type    CS Jojo Ramirez, PT Physical Therapist                 Time Calculation:    PT Charges     Row Name 04/01/22 1045             Time Calculation    PT Received On 04/01/22  -CS              Timed Charges    96877 - PT Therapeutic Exercise Minutes 15  -CS      37575 - Gait Training Minutes  10  -CS              SNF Physical Therapy Minutes    Skilled Minutes- PT 25 min  -CS              Total Minutes    Timed Charges Total Minutes 25  -CS       Total Minutes 25  -CS            User Key  (r) = Recorded By, (t) = Taken By, (c) = Cosigned By    Initials Name Provider Type    CS Jojo Ramirez, PT Physical Therapist              Therapy Charges for Today     Code Description Service Date Service Provider Modifiers Qty    39286029438 HC PT THER PROC EA 15 MIN 3/31/2022 Jojo Ramirez, PT GP 1    90254652567 HC GAIT TRAINING EA 15 MIN 3/31/2022 Jojo Ramirez, PT GP 1    79380302652 HC PT THER PROC EA 15 MIN 4/1/2022 Jojo Ramirez, PT GP 1    11695438102 HC GAIT TRAINING EA 15 MIN 4/1/2022 Jojo Ramirez, PT GP 1          PT G-Codes  Outcome Measure Options: AM-PAC 6 Clicks Basic Mobility (PT)  AM-PAC 6  Clicks Score (PT): 17  AM-PAC 6 Clicks Score (OT): 20    Jojo Ramirez, PT  4/1/2022

## 2022-04-01 NOTE — SIGNIFICANT NOTE
[facility]   Wound Evaluation / Progress Note       Patient Name: Lauren Redd    : 1945    MRN: [id number]  Today's Date: 2022                     Admit Date: 3/23/2022    Visit Dx:    ICD-10-CM ICD-9-CM   1. Decreased activities of daily living (ADL)  Z78.9 V49.89   2. Difficulty walking  R26.2 719.7       Patient Active Problem List   Diagnosis   • Positive colorectal cancer screening using Cologuard test   • Atrial fibrillation with RVR (HCC)   • CHF (congestive heart failure) (HCC)   • Cellulitis of anterior lower leg   • Generalized weakness   • Atrial fibrillation (HCC)        Past Medical History:   Diagnosis Date   • Afib (HCC)    • Aneurysm (HCC)    • Arthritis    • GERD (gastroesophageal reflux disease)    • Hypertension    • Irregular heart beat         Past Surgical History:   Procedure Laterality Date   • COLONOSCOPY     • HYSTERECTOMY           Physical Assessment:  Wound 22 1235 Right lower leg Blisters (Active)   Wound Image    22 1203   Dressing Appearance intact;moist drainage 22 1203   Base moist;red 22 1203   Red (%), Wound Tissue Color 100 22 1203   Periwound intact;moist;swelling 22 1203   Periwound Temperature warm 22 1203   Periwound Skin Turgor soft 22 1203   Edges callused 22 1203   Drainage Characteristics/Odor yellow 22 1203   Drainage Amount small 22 1203   Care, Wound cleansed with;wound cleanser 22 1203   Dressing Care dressing changed;foam;abdominal pad;gauze;other (see comments) 22 1203   Periwound Care topical treatment applied 22 1203       Wound 22 0934 Left lower leg (Active)   Wound Image     22 1205   Dressing Appearance moist drainage;intact 22 1205   Base red;moist 22 1205   Red (%), Wound Tissue Color 100 22 1205   Periwound intact;swelling;warm;dry 22 1205   Periwound Temperature warm 22 1205   Periwound Skin Turgor soft  04/01/22 1205   Edges callused 04/01/22 1205   Drainage Characteristics/Odor yellow 04/01/22 1205   Drainage Amount small 04/01/22 1205   Care, Wound cleansed with;wound cleanser 04/01/22 1205   Dressing Care dressing changed;foam;abdominal pad;gauze;other (see comments) 04/01/22 1205   Periwound Care topical treatment applied 04/01/22 1205       Wound 03/28/22 1558 Right gluteal (Active)   Wound Image   04/01/22 1201   Dressing Appearance intact;moist drainage 04/01/22 1201   Closure None 04/01/22 1201   Base blanchable;pink;moist 04/01/22 1201   Red (%), Wound Tissue Color 100 04/01/22 1201   Periwound intact;blanchable;pink 04/01/22 1201   Periwound Temperature warm 04/01/22 1201   Periwound Skin Turgor soft 04/01/22 1201   Edges open 04/01/22 1201   Drainage Characteristics/Odor serous 04/01/22 1201   Drainage Amount scant 04/01/22 1201   Care, Wound cleansed with;sterile normal saline 04/01/22 1201   Dressing Care dressing changed;silver impregnated;hydrofiber;hydrocolloid 04/01/22 1201        Wound Check / Follow-up:  Seen today on nursing facility for routine wound RN follow-up. Multiple open wounds on bilateral lower extremities with progression from last weeks pictures noted. Beefy red granulation and pink noted to wound beds with no evidence of slough. Remaining intact skin of marivel legs with edema and dry flaking skin noted and some removed with washing. Open areas cleansed with wound cleanser and patted dry. Hydrafera Blue ready placed to wound beds and secured with abdominal pads, gauze roll, and [place] wraps from toes to knee under moderate compression.    Pressure injury remains to right gluteal buttocks and wound care was provided as ordered with silver impregnated hydrofiber and secured with a hydrocolloid dressing.    Impression: Bilateral lower leg wounds with progression and granulation noted, improved wound to right gluteal.    Short term goals:  Regain tissue integrity    Isaac Foy RN     4/1/2022    12:08 EDT

## 2022-04-02 NOTE — PROGRESS NOTES
[facility]     Progress Note    Patient Name: Lauren Redd  : 1945  MRN: [id number]  Primary Care Physician:  Dennis Kohler MD  Date of admission: 3/23/2022      Subjective   Brief summary.  Follow-up on A. fib and cellulitis of leg  (This is a late entry note patient was seen yesterday)    HPI:  Patient heart rate still running high.  No chest pain.      Review of Systems     Leg swelling persist  Denies any chest pain or palpitation      Objective     Vitals:   Temp:  [97.7 °F (36.5 °C)-98.1 °F (36.7 °C)] 97.7 °F (36.5 °C)  Heart Rate:  [107-113] 107  Resp:  [16-18] 16  BP: (119-124)/(85-87) 124/87  Flow (L/min):  [1] 1    Physical Exam :     Elderly female not in acute distress, heart was tachycardic around 110 220  Lungs clear diminished breath sounds        Result Review:  I have personally reviewed the results from the time of this admission to 2022 12:43 EDT and agree with these findings:  []  Laboratory  []  Microbiology  []  Radiology  []  EKG/Telemetry   []  Cardiology/Vascular   []  Pathology  []  Old records  []  Other:           Assessment / Plan       Active Hospital Problems:  Active Hospital Problems    Diagnosis    • Atrial fibrillation (HCC)    • Generalized weakness    • CHF (congestive heart failure) (HCC)    • Cellulitis of anterior lower leg        Plan:   We will increase Toprol to 50 twice daily  Monitor heart rate, continue digoxin       DVT prophylaxis:  Medical DVT prophylaxis orders are present.    CODE STATUS:   Code Status (Patient has no pulse and is not breathing): CPR (Attempt to Resuscitate)  Medical Interventions (Patient has pulse or is breathing): Full Support            Electronically signed by Pablo Fajardo MD, 22, 12:43 PM EDT.

## 2022-04-02 NOTE — PLAN OF CARE
Goal Outcome Evaluation:           Progress: no change  Outcome Evaluation: Pain medication administered at HS. Short of air with exertion. Alert and oriented. One person assist to bedside commode. Continues to be tachycardic. Continues IV antibiotic therapy.     Dr. Fajardo notified of 8.7kg increase in weight.

## 2022-04-02 NOTE — PROGRESS NOTES
[facility]     Progress Note    Patient Name: Lauren Redd  : 1945  MRN: [id number]  Primary Care Physician:  Dennis Kohler MD  Date of admission: 3/23/2022      Subjective   Brief summary.  Patient admitted to rehab post cellulitis of the leg/CHF and A. fib      HPI:  Follow-up on leg pain/cellulitis and A. Fib.  Heart rate slightly better.  Night shift RN reports patient has gained 8 kgs in last 1 week (notified me around 5 AM).  Patient does complain of occasional shortness of breath with exertion.  No chest pain    Review of Systems     No fever chills  Swelling in the leg persist  No chest pain or palpitations now  Shortness of breath on and off      Objective     Vitals:   Temp:  [97.7 °F (36.5 °C)-98.1 °F (36.7 °C)] 97.7 °F (36.5 °C)  Heart Rate:  [107-113] 107  Resp:  [16-18] 16  BP: (119-124)/(85-87) 124/87  Flow (L/min):  [1] 1    Physical Exam :      Elderly female not in acute distress tired looking.  Neck supple no JVD.  Heart irregular.  Lungs diminished breath sounds.  Abdomen soft and obese.  Extremities with 2-3+ edema with wraps on the legs      Result Review:  I have personally reviewed the results from the time of this admission to 2022 12:40 EDT and agree with these findings:  [x]  Laboratory  []  Microbiology  []  Radiology  []  EKG/Telemetry   []  Cardiology/Vascular   []  Pathology  []  Old records  []  Other:           Assessment / Plan       Active Hospital Problems:  Active Hospital Problems    Diagnosis    • Atrial fibrillation (HCC)    • Generalized weakness    • CHF (congestive heart failure) (HCC)    • Cellulitis of anterior lower leg        Plan:   Increase Toprol to 75.  Increase Lasix to twice daily.  Check a chest x-ray.  Neck labs in a.m.         DVT prophylaxis:  Medical DVT prophylaxis orders are present.    CODE STATUS:   Code Status (Patient has no pulse and is not breathing): CPR (Attempt to Resuscitate)  Medical Interventions (Patient has pulse or is  breathing): Full Support                Electronically signed by Pablo Fajardo MD, 04/02/22, 12:42 PM EDT.

## 2022-04-02 NOTE — THERAPY TREATMENT NOTE
SNF - Physical Therapy Progress Note   [facility]     Patient Name: Lauren Redd  : 1945  MRN: [id number]  Today's Date: 2022      Visit Dx:    ICD-10-CM ICD-9-CM   1. Decreased activities of daily living (ADL)  Z78.9 V49.89   2. Difficulty walking  R26.2 719.7     Patient Active Problem List   Diagnosis   • Positive colorectal cancer screening using Cologuard test   • Atrial fibrillation with RVR (HCC)   • CHF (congestive heart failure) (HCC)   • Cellulitis of anterior lower leg   • Generalized weakness   • Atrial fibrillation (HCC)     Past Medical History:   Diagnosis Date   • Afib (HCC)    • Aneurysm (HCC)    • Arthritis    • GERD (gastroesophageal reflux disease)    • Hypertension    • Irregular heart beat      Past Surgical History:   Procedure Laterality Date   • COLONOSCOPY     • HYSTERECTOMY         PT Assessment (last 12 hours)     PT Evaluation and Treatment     Row Name 22 1200          Physical Therapy Time and Intention    Subjective Information no complaints  -CS     Document Type therapy note (daily note)  -CS     Mode of Treatment individual therapy;physical therapy  -CS     Patient Effort good  -CS     Symptoms Noted During/After Treatment none  -CS     Row Name 22 1200          Bed Mobility    Scooting/Bridging Sherman (Bed Mobility) contact guard  -CS     Supine-Sit [place] (Bed Mobility) contact guard  -CS     Assistive Device (Bed Mobility) bed rails  -CS     Row Name 22 1200          Transfers    Sit-Stand [place] (Transfers) contact guard;1 person assist  -CS     Stand-Sit [place] (Transfers) contact guard;1 person assist  -CS     Row Name 22 1200          Sit-Stand Transfer    Assistive Device (Sit-Stand Transfers) walker, front-wheeled  -CS     Row Name 22 1200          Stand-Sit Transfer    Assistive Device (Stand-Sit Transfers) walker, front-wheeled  -CS     Row Name 22 1200          Gait/Stairs (Locomotion)     Gait/Stairs Locomotion gait/ambulation independence;gait/ambulation assistive device;distance ambulated;gait pattern  -CS     [place] Level (Gait) contact guard;1 person to manage equipment  -CS     Assistive Device (Gait) walker, front-wheeled  -CS     Distance in Feet (Gait) 200  -CS     Pattern (Gait) 4-point;step-through  -CS     Deviations/Abnormal Patterns (Gait) gait speed decreased;stride length decreased  -CS     Bilateral Gait Deviations forward flexed posture  -CS     Row Name 04/02/22 1200          Hip (Therapeutic Exercise)    Hip AROM (Therapeutic Exercise) bilateral;flexion;extension;aBduction;aDduction;sitting;20 repititions  -CS     Row Name 04/02/22 1200          Knee (Therapeutic Exercise)    Knee AROM (Therapeutic Exercise) bilateral;LAQ (long arc quad);sitting;20 repititions  -CS     Row Name 04/02/22 1200          Ankle (Therapeutic Exercise)    Ankle AROM (Therapeutic Exercise) bilateral;dorsiflexion;plantarflexion;20 repititions  -CS     Row Name             Wound 02/09/22 1235 Right lower leg Blisters    Wound - Properties Group Placement Date: 02/09/22  -FL Placement Time: 1235  -FL Present on Hospital Admission: Y  -FL Side: Right  -FL Orientation: lower  -FL Location: leg  -FL Primary Wound Type: Blisters  -FL     Retired Wound - Properties Group Placement Date: 02/09/22  -FL Placement Time: 1235  -FL Present on Hospital Admission: Y  -FL Side: Right  -FL Orientation: lower  -FL Location: leg  -FL Primary Wound Type: Blisters  -FL     Retired Wound - Properties Group Date first assessed: 02/09/22  -FL Time first assessed: 1235  -FL Present on Hospital Admission: Y  -FL Side: Right  -FL Location: leg  -FL Primary Wound Type: Blisters  -FL     Row Name             Wound 02/23/22 0934 Left lower leg    Wound - Properties Group Placement Date: 02/23/22  -BA Placement Time: 0934  -BA Present on Hospital Admission: Y  -BA Side: Left  -BA Orientation: lower  -BA Location: leg  -BA      Retired Wound - Properties Group Placement Date: 02/23/22  -BA Placement Time: 0934  -BA Present on Hospital Admission: Y  -BA Side: Left  -BA Orientation: lower  -BA Location: leg  -BA     Retired Wound - Properties Group Date first assessed: 02/23/22  -BA Time first assessed: 0934  -BA Present on Hospital Admission: Y  -BA Side: Left  -BA Location: leg  -BA     Row Name             Wound 03/28/22 1558 Right gluteal    Wound - Properties Group Placement Date: 03/28/22  -FH Placement Time: 1558  -FH Side: Right  -FH Location: gluteal  -FH     Retired Wound - Properties Group Placement Date: 03/28/22  -FH Placement Time: 1558  -FH Side: Right  -FH Location: gluteal  -FH     Retired Wound - Properties Group Date first assessed: 03/28/22  -FH Time first assessed: 1558  -FH Side: Right  -FH Location: gluteal  -FH     Row Name 04/02/22 1200          Progress Summary (PT)    Daily Progress Summary (PT) Pt. ambulated for increased distance in hallway today without loss of balance, safely returning to room afterwards.  Pt. performed seated exercises while sitting in the chair as per outlined in this progress note.  Pt. was then reclined in the chair to elevate LE's.  Call button was provided within reach and all pt needs met.  -CS           User Key  (r) = Recorded By, (t) = Taken By, (c) = Cosigned By    Initials Name Provider Type     Sarah Church RN Registered Nurse    Sylvia Olson, RN Registered Nurse    Swathi Yuen, RN Registered Nurse    Fan Alarcon PTA Physical Therapist Assistant              Section G              Section GG                       Physical Therapy Education                 Title: PT OT SLP Therapies (Done)     Topic: Physical Therapy (Done)     Point: Mobility training (Done)     Learning Progress Summary           Patient Acceptance, E, VU by MARCEL at 4/1/2022 1149    Acceptance, E,TB, VU by MICHAEL at 3/24/2022 1607                   Point: Home exercise program (Done)      Learning Progress Summary           Patient Acceptance, E, VU by KN at 4/1/2022 1149    Acceptance, E,TB, VU by  at 3/24/2022 1607                   Point: Body mechanics (Done)     Learning Progress Summary           Patient Acceptance, E, VU by KN at 4/1/2022 1149    Acceptance, E,TB, VU by CS at 3/24/2022 1607                   Point: Precautions (Done)     Learning Progress Summary           Patient Acceptance, E, VU by KN at 4/1/2022 1149    Acceptance, E,TB, VU by CS at 3/24/2022 1607                               User Key     Initials Effective Dates Name Provider Type Discipline     04/25/21 -  Jojo Ramirez, PT Physical Therapist PT     12/20/21 -  Koby Bass OT Occupational Therapist OT              PT Recommendation and Plan     Progress Summary (PT)  Daily Progress Summary (PT): Pt. ambulated for increased distance in hallway today without loss of balance, safely returning to room afterwards.  Pt. performed seated exercises while sitting in the chair as per outlined in this progress note.  Pt. was then reclined in the chair to elevate LE's.  Call button was provided within reach and all pt needs met.   Outcome Measures     Row Name 04/02/22 1200 04/01/22 1055 03/31/22 1154       How much help from another person do you currently need...    Turning from your back to your side while in flat bed without using bedrails? 4  -CS 3  -CSA 3  -CSA    Moving from lying on back to sitting on the side of a flat bed without bedrails? 3  -CS 3  -CSA 3  -CSA    Moving to and from a bed to a chair (including a wheelchair)? 3  -CS 3  -CSA 3  -CSA    Standing up from a chair using your arms (e.g., wheelchair, bedside chair)? 4  -CS 3  -CSA 3  -CSA    Climbing 3-5 steps with a railing? 3  -CS 2  -CSA 2  -CSA    To walk in hospital room? 3  -CS 3  -CSA 3  -CSA    AM-PAC 6 Clicks Score (PT) 20  -CS 17  -CSA 17  -CSA       Functional Assessment    Outcome Measure Options AM-PAC 6 Clicks Basic Mobility (PT)  -CS  AM-PAC 6 Clicks Basic Mobility (PT)  -ED AM-PAC 6 Clicks Basic Mobility (PT)  -ED          User Key  (r) = Recorded By, (t) = Taken By, (c) = Cosigned By    Initials Name Provider Type    CSA Jojo Ramirez, PT Physical Therapist    CS Fan Negron PTA Physical Therapist Assistant                 Time Calculation:    PT Charges     Row Name 04/02/22 1232             Time Calculation    Start Time 1108  -CS      PT Received On 04/02/22  -CS              Timed Charges    46819 - PT Therapeutic Exercise Minutes 7  -CS      14142 - PT Therapeutic Activity Minutes 18  -CS              SNF Physical Therapy Minutes    Skilled Minutes- PT 25 min  -CS              Total Minutes    Timed Charges Total Minutes 25  -CS       Total Minutes 25  -CS            User Key  (r) = Recorded By, (t) = Taken By, (c) = Cosigned By    Initials Name Provider Type    CS Fan Negron PTA Physical Therapist Assistant              Therapy Charges for Today     Code Description Service Date Service Provider Modifiers Qty    54299390290 HC PT THER PROC EA 15 MIN 4/2/2022 Fan Negron PTA GP 1    85823019607 HC PT THERAPEUTIC ACT EA 15 MIN 4/2/2022 Fan Negron PTA GP 1          PT G-Codes  Outcome Measure Options: AM-PAC 6 Clicks Basic Mobility (PT)  AM-PAC 6 Clicks Score (PT): 20  AM-PAC 6 Clicks Score (OT): 20    Fan Negron PTA  4/2/2022

## 2022-04-02 NOTE — PLAN OF CARE
Goal Outcome Evaluation:  Plan of Care Reviewed With: patient        Progress: improving  Outcome Evaluation: Resident alert, oriented, able to make needs known to staff. Transfers with assist of one to BSC. Remained incontinent today, but still attempted to use BSC afterwards. Dressings intact, in place, due to b changed tomorrow. receives routine pain meds, effective. Resident pleasant and cooperative.

## 2022-04-03 NOTE — PROGRESS NOTES
[facility]     Progress Note    Patient Name: Lauren Redd  : 1945  MRN: [id number]  Primary Care Physician:  Dennis Kohler MD  Date of admission: 3/23/2022      Subjective   Brief summary.  Admitted to rehab for cellulitis, CHF and A. fib and weakness      HPI:  Follow-up on cellulitis and shortness of breath, continues to complain of shortness of breath with exertion.  No shortness of breath at rest, palpitations better.  No chest pain    Review of Systems     Fatigue and weakness, no chest pain, shortness of breath with exertion, no fever      Objective     Vitals:   Temp:  [97.7 °F (36.5 °C)] 97.7 °F (36.5 °C)  Heart Rate:  [] 113  Resp:  [18-22] 22  BP: (116-142)/(87-99) 142/99  Flow (L/min):  [1-2] 2    Physical Exam :     Elderly female not in acute distress.  Tired looking.  Heart regular, slightly tachycardic.  Lungs diminished breath sounds.  Abdomen soft.  Both lower extremities with ulcers and edema healing, edema slightly better.  Neurologically awake alert and oriented.      Result Review:  I have personally reviewed the results from the time of this admission to 4/3/2022 12:54 EDT and agree with these findings:  []  Laboratory  []  Microbiology  []  Radiology  []  EKG/Telemetry   []  Cardiology/Vascular   []  Pathology  []  Old records  []  Other:           Assessment / Plan       Active Hospital Problems:  Active Hospital Problems    Diagnosis    • Atrial fibrillation (HCC)    • Generalized weakness    • CHF (congestive heart failure) (HCC)    • Cellulitis of anterior lower leg        Plan:   Patient's chest x-ray reviewed, CHF.  BUN climbing up.  Creatinine much higher now.  Will hold Lasix.  Will reconsult cardiologist for opinion.       DVT prophylaxis:  Medical DVT prophylaxis orders are present.    CODE STATUS:   Code Status (Patient has no pulse and is not breathing): CPR (Attempt to Resuscitate)  Medical Interventions (Patient has pulse or is breathing): Full  Support            Electronically signed by Pablo Fajardo MD, 04/03/22, 12:54 PM EDT.

## 2022-04-03 NOTE — PLAN OF CARE
Goal Outcome Evaluation:           Progress: no change  Outcome Evaluation: Vital signs stable. Continues to be short of air with exertion. Alert and oriented. Scheduled pain medication given at HS for bilateral legs. Transfers to bedside commode with one person assistance. Weight increase of 1 kg from yesterday. Standing scale used due to inconsistencies with bed scale.Rested well most the night.

## 2022-04-03 NOTE — CONSULTS
Cardiology Consult Note  [facility]          Patient Identification:  Lauren Redd      [id number]  77 y.o.        female  1945         Reason for Consultation: Shortness of breath    PCP: Dennis Kohler MD    History of Present Illness:     Patient is a 77-year-old female who has history of chronic atrial fibrillation and also chronic diastolic congestive heart failure with moderate tricuspid regurgitation and pulmonary hypertension.  She also has a chronic swelling of her lower extremities and weeping edema and cellulitis and has been hospitalized for some time.  She did improve but recently she is feeling bad again with increasing shortness of breath atrial fibrillation with rapid rate  .    Past History:  Past Medical History:   Diagnosis Date   • Afib (HCC)    • Aneurysm (HCC)    • Arthritis    • GERD (gastroesophageal reflux disease)    • Hypertension    • Irregular heart beat      Past Surgical History:   Procedure Laterality Date   • COLONOSCOPY     • HYSTERECTOMY       Allergies   Allergen Reactions   • Contrast Dye Rash   • Lotrel [Amlodipine Besy-Benazepril Hcl] Unknown - Low Severity     Social History     Socioeconomic History   • Marital status:    Tobacco Use   • Smoking status: Never Smoker   • Smokeless tobacco: Never Used   Vaping Use   • Vaping Use: Never used   Substance and Sexual Activity   • Alcohol use: Not Currently   • Drug use: Never   • Sexual activity: Defer     Family History   Problem Relation Age of Onset   • Hypertension Mother    • Colon cancer Neg Hx          Medications:  Prior to Admission medications    Medication Sig Start Date End Date Taking? Authorizing Provider   apixaban (ELIQUIS) 5 MG tablet tablet Take 5 mg by mouth 2 (Two) Times a Day.    ProviderColton MD   bumetanide (BUMEX) 1 MG tablet Take 2 mg by mouth 2 (Two) Times a Day. 2/23/22   ProviderColton MD   digoxin (LANOXIN) 125 MCG tablet Take 1 tablet by mouth  "Daily for 30 days. 2/11/22 3/13/22  Pablo Fajardo MD   fluticasone (FLONASE) 50 MCG/ACT nasal spray 1 spray into the nostril(s) as directed by provider Daily. 12/7/21   Emergency, Nurse Adwoa, RN   loratadine (CLARITIN) 10 MG tablet Take 10 mg by mouth Daily.    Colton Evans MD   metOLazone (ZAROXOLYN) 5 MG tablet Take 5 mg by mouth 2 (Two) Times a Day. 3/2/22   Colton Evans MD   metoprolol succinate XL (TOPROL-XL) 25 MG 24 hr tablet Take 1 tablet by mouth Every 12 (Twelve) Hours for 30 days.  Patient taking differently: Take 50 mg by mouth Daily. 2/10/22 3/12/22  Pablo Fajardo MD   midodrine (PROAMATINE) 10 MG tablet Take 0.5 tablets by mouth 3 (Three) Times a Day Before Meals for 30 days. 2/10/22 3/12/22  Pablo Fajardo MD   mineral oil-hydrophilic petrolatum (AQUAPHOR) ointment Apply 1 application topically to the appropriate area as directed As Needed for Dry Skin. 2/23/22   Jazzmine Hillman MD   spironolactone (ALDACTONE) 25 MG tablet Take 25 mg by mouth Daily. 2/14/22   Colton Evans MD   traMADol (ULTRAM) 50 MG tablet  3/8/22   Colton Evans MD      Current medications:  apixaban, 5 mg, Oral, BID  cefepime, 2 g, Intravenous, Q12H  digoxin, 125 mcg, Oral, Daily  DULoxetine, 30 mg, Oral, Daily  famotidine, 40 mg, Oral, Daily  furosemide, 80 mg, Intravenous, Once  furosemide, 40 mg, Oral, BID  gabapentin, 100 mg, Oral, Q12H  HYDROcodone-acetaminophen, 1 tablet, Oral, TID  metoprolol succinate XL, 100 mg, Oral, BID  metoprolol succinate XL, 50 mg, Oral, Once  senna-docusate sodium, 1 tablet, Oral, BID  sodium chloride, 10 mL, Intravenous, Q12H  spironolactone, 25 mg, Oral, Daily  triamcinolone, , Topical, Every Other Day      Current IV drips:  Pharmacy to Dose Cefepime,           Review of Systems  Review of Systems      Physical Exam    /99 (BP Location: Left arm, Patient Position: Lying)   Pulse 113   Temp 97.7 °F (36.5 °C) (Oral)   Resp 22   Ht 172.7 cm (68\")   Wt 106 " kg (233 lb 7.5 oz)   LMP  (LMP Unknown)   SpO2 96%   BMI 35.50 kg/m²  Body mass index is 35.5 kg/m².   Oxygen saturation   @FLOWAN(10::1)@ SpO2  Min: 93 %  Max: 96 %    General Appearance:   · no acute distress  · Alert and oriented x3  HENT:   · lips not cyanotic  · Atraumatic  Neck:  · thyroid not enlarged  · supple  Respiratory:  · no respiratory distress  · normal breath sounds  · no rales  Cardiovascular:  · no jugular venous distention  · regular rhythm  · apical impulse normal  · S1 normal, S2 normal  · no S3, no S4   · no murmur  · no rub, no thrill  · no carotid bruit  · pedal pulses normal  · lower extremity edema: none    Gastrointestinal:   · bowel sounds normal  · non-tender  · no hepatomegaly, no splenomegaly  Musculoskeletal:  · no clubbing of fingers.   · normocephalic, head atraumatic  Skin:   · warm, dry  · no rashes  Neuro/Psychiatric:  · normal mood and affect  · judgement and insight appropriate      Cardiographics:     ECG  (personally reviewed) EKG: Atrial fibrillation with rapid rate minor ST-T changes   Telemetry:   Results for orders placed during the hospital encounter of 02/07/22    Adult Transthoracic Echo Complete W/ Cont if Necessary Per Protocol    Interpretation Summary  · Estimated right ventricular systolic pressure from tricuspid regurgitation is mildly elevated (35-45 mmHg). Calculated right ventricular systolic pressure from tricuspid regurgitation is 40 mmHg.  · The left ventricular cavity is mild to moderately dilated.  · Calculated left ventricular EF = 65% Estimated left ventricular EF was in agreement with the calculated left ventricular EF. Left ventricular systolic function is normal.  · The right atrial cavity is mild to moderately dilated.  · Moderate mitral valve regurgitation is present.  · Moderate tricuspid valve regurgitation is present.  · Mild pulmonary hypertension is present.         No results found for this or any previous visit.      Cardiolite (Tc-99m  Sestamibi) stress test   Lab Review:       CBC    CBC 3/22/22 4/2/22 4/3/22   WBC 12.18 (A) 11.03 (A) 13.12 (A)   RBC 4.27 3.99 3.84   Hemoglobin 12.8 12.0 11.5 (A)   Hematocrit 40.2 38.3 36.3   MCV 94.1 96.0 94.5   MCH 30.0 30.1 29.9   MCHC 31.8 31.3 (A) 31.7   RDW 15.7 (A) 16.0 (A) 15.9 (A)   Platelets 312 226 219   (A) Abnormal value                CMP    CMP 3/28/22 4/2/22 4/3/22   Glucose 109 (A) 113 (A) 93   BUN 27 (A) 47 (A) 51 (A)   Creatinine 1.07 (A) 1.62 (A) 1.77 (A)   Sodium 138 136 138   Potassium 3.8 4.3 4.6   Chloride 98 99 99   Calcium 9.4 9.8 9.9   Albumin  2.80 (A) 3.00 (A)   Total Bilirubin  0.5 0.6   Alkaline Phosphatase  162 (A) 176 (A)   AST (SGOT)  36 (A) 67 (A)   ALT (SGPT)  33 53 (A)   (A) Abnormal value               CARDIAC LABS:      Lab 04/02/22  1324   PROBNP 5,043.0*      Lab Results   Component Value Date    DIGOXIN 0.61 03/14/2022      Lab Results   Component Value Date    TSH 2.380 02/17/2022           Invalid input(s): LDLCALC  No results found for: POCTROP  No components found for: DDIMERQUAN  Lab Results   Component Value Date    MG 2.1 03/26/2022                 Assessment:  Atrial fibrillation with rapid rate  Congestive heart failure acute on chronic diastolic  Hypertension  Severe swelling and draining wounds of both lower extremities  Probable cellulitis of lower extremities  Obesity    Plan:    Check a dig level and adjust accordingly  Lasix 80 mg IV 1 dose to see her response  Increase the Toprol-XL to 100 mg twice daily and given extra 50 mg of Toprol now  Check her BNP and BMP tomorrow    Thank you for allowing me to share in [facility].        Lisa Leung MD   4/3/2022    12:48 EDT

## 2022-04-03 NOTE — PLAN OF CARE
Goal Outcome Evaluation:  Plan of Care Reviewed With: patient        Progress: improving  Outcome Evaluation: resident alert, oriented, able to make needs known to staff. transfers with assist of one to BSC, had increased soa earlier this shift. held am Lasix per MD instructions d/t increased BUN and Creatinine. Cardiology consult ordered per MD. Dr. Leung saw patient and wrote orders for IV push Lasix and labs. Respiratory status improved this afternoon, after receiving IV Lasix. resident receives routine hydrocodone for pain management. Dressings to BL LE changed as ordered. Improvement noted with dressing change. O2 titrated earlier today to 2 liters. Resident remains on 2 liters at this time with Sats in low 90's. IV ro rt FA patent. IV ABO infusing without difficulty. Resident pleasant and cooperative.

## 2022-04-04 NOTE — PLAN OF CARE
Goal Outcome Evaluation:   Pt remains alert and oriented and pleasant with staff this shift. X1 assist for transfers and ambulation. Scheduled pain medication continues to be effective. Relief of symptoms noted. Pt continues on IV abx, with no adverse effects noted. Significant weight gain noted, new orders for labs in AM. Dressing to bilat Lower extremities clean dry and intact. Currently sitting up in recliner, call light in reach.

## 2022-04-04 NOTE — PLAN OF CARE
Goal Outcome Evaluation:           Progress: improving  Outcome Evaluation: Respirations 22/minute. Other vital signs stable with patient on 2L O2 via nasal cannula. Continues with shortness of air. Alert and oriented with exertion.. Able to to transfer to bedside commode with one person assist. Scheduled [place] administered at HS. Patient reported pain level of 6 out of 10 in bilateral lower extremities. Continues IV antibiotic therapy.

## 2022-04-04 NOTE — PROGRESS NOTES
CARDIOLOGY  INPATIENT PROGRESS NOTE                [facility]    4/4/2022      PATIENT IDENTIFICATION:   Name:  Lauren Redd      MRN:  [id number]     77 y.o.  female                 SUBJECTIVE:   Atrial fibrillation still rapid rate    OBJECTIVE:  Vitals:    04/03/22 2002 04/03/22 2100 04/04/22 0710 04/04/22 1237   BP: 124/82  109/81    BP Location: Left arm  Left arm    Patient Position: Lying  Lying    Pulse: 78 90 112 101   Resp: 22 22 20    Temp: 97.7 °F (36.5 °C)  97.2 °F (36.2 °C)    TempSrc: Oral  Oral    SpO2: 94% 94% 92%    Weight:       Height:               Body mass index is 35.5 kg/m².    Intake/Output Summary (Last 24 hours) at 4/4/2022 1645  Last data filed at 4/4/2022 1327  Gross per 24 hour   Intake 200 ml   Output 525 ml   Net -325 ml       Telemetry:     Physical Exam  General Appearance:   · no acute distress  · Alert and oriented x3  HENT:   · lips not cyanotic  · Atraumatic  Neck:  · No jvd   · supple  Respiratory:  · no respiratory distress  · normal breath sounds  · no rales  Cardiovascular:    · regular rhythm  · no S3, no S4   · no murmur  · no rub  · lower extremity edema: none    Skin:   · warm, dry  · No rashes      Allergies   Allergen Reactions   • Contrast Dye Rash   • Lotrel [Amlodipine Besy-Benazepril Hcl] Unknown - Low Severity       Scheduled meds:  apixaban, 5 mg, Oral, BID  cefepime, 2 g, Intravenous, Q12H  digoxin, 250 mcg, Intravenous, Once  digoxin, 125 mcg, Oral, Daily  DULoxetine, 30 mg, Oral, Daily  famotidine, 40 mg, Oral, Daily  furosemide, 40 mg, Oral, BID  gabapentin, 100 mg, Oral, Q12H  HYDROcodone-acetaminophen, 1 tablet, Oral, TID  metoprolol succinate XL, 100 mg, Oral, BID  senna-docusate sodium, 1 tablet, Oral, BID  sodium chloride, 10 mL, Intravenous, Q12H  spironolactone, 25 mg, Oral, Daily  triamcinolone, 1 application, Topical, Every Other Day      IV meds:                      Pharmacy to Dose Cefepime,         Data  Review:  CBC    CBC 3/22/22 4/2/22 4/3/22   WBC 12.18 (A) 11.03 (A) 13.12 (A)   RBC 4.27 3.99 3.84   Hemoglobin 12.8 12.0 11.5 (A)   Hematocrit 40.2 38.3 36.3   MCV 94.1 96.0 94.5   MCH 30.0 30.1 29.9   MCHC 31.8 31.3 (A) 31.7   RDW 15.7 (A) 16.0 (A) 15.9 (A)   Platelets 312 226 219   (A) Abnormal value            CMP    CMP 4/2/22 4/3/22 4/4/22   Glucose 113 (A) 93 104 (A)   BUN 47 (A) 51 (A) 55 (A)   Creatinine 1.62 (A) 1.77 (A) 1.89 (A)   Sodium 136 138 137   Potassium 4.3 4.6 4.5   Chloride 99 99 100   Calcium 9.8 9.9 9.9   Albumin 2.80 (A) 3.00 (A) 2.80 (A)   Total Bilirubin 0.5 0.6 0.5   Alkaline Phosphatase 162 (A) 176 (A) 176 (A)   AST (SGOT) 36 (A) 67 (A) 50 (A)   ALT (SGPT) 33 53 (A) 51 (A)   (A) Abnormal value             CARDIAC LABS:      Lab 04/02/22  1324   PROBNP 5,043.0*        Lab Results   Component Value Date    DIGOXIN 1.18 04/03/2022      Lab Results   Component Value Date    TSH 2.380 02/17/2022           Invalid input(s): LDLCALC  No results found for: POCTROP  Lab Results   Component Value Date    TROPONINT 0.011 02/08/2022   (  Lab Results   Component Value Date    MG 2.1 03/26/2022     Results for orders placed during the hospital encounter of 02/07/22    Adult Transthoracic Echo Complete W/ Cont if Necessary Per Protocol    Interpretation Summary  · Estimated right ventricular systolic pressure from tricuspid regurgitation is mildly elevated (35-45 mmHg). Calculated right ventricular systolic pressure from tricuspid regurgitation is 40 mmHg.  · The left ventricular cavity is mild to moderately dilated.  · Calculated left ventricular EF = 65% Estimated left ventricular EF was in agreement with the calculated left ventricular EF. Left ventricular systolic function is normal.  · The right atrial cavity is mild to moderately dilated.  · Moderate mitral valve regurgitation is present.  · Moderate tricuspid valve regurgitation is present.  · Mild pulmonary hypertension is present.            ASSESSMENT:    Atrial fibrillation with rapid rate  Congestive heart failure acute on chronic diastolic  Hypertension  Severe swelling and draining wounds of both lower extremities  Probable cellulitis of lower extremities  Obesity    PLAN:     Digoxin 0.25 mg IV now  Zaroxolyn 10 mg p.o. today  CMP and BNP in a.m.          Lisa Leung MD  4/4/2022    16:45 EDT

## 2022-04-04 NOTE — THERAPY TREATMENT NOTE
SNF - Physical Therapy Treatment Note   [facility]     Patient Name: Lauren Redd  : 1945  MRN: [id number]  Today's Date: 2022      Visit Dx:    ICD-10-CM ICD-9-CM   1. Decreased activities of daily living (ADL)  Z78.9 V49.89   2. Difficulty walking  R26.2 719.7     Patient Active Problem List   Diagnosis   • Positive colorectal cancer screening using Cologuard test   • Atrial fibrillation with RVR (HCC)   • CHF (congestive heart failure) (HCC)   • Cellulitis of anterior lower leg   • Generalized weakness   • Atrial fibrillation (HCC)     Past Medical History:   Diagnosis Date   • Afib (HCC)    • Aneurysm (HCC)    • Arthritis    • GERD (gastroesophageal reflux disease)    • Hypertension    • Irregular heart beat      Past Surgical History:   Procedure Laterality Date   • COLONOSCOPY     • HYSTERECTOMY         PT Assessment (last 12 hours)     PT Evaluation and Treatment     Row Name 22 1516          Physical Therapy Time and Intention    Subjective Information complains of;pain  -CS     Document Type therapy note (daily note)  -CS     Mode of Treatment individual therapy;physical therapy  -CS     Patient Effort good  -CS     Row Name 22 1516          Pain    Pretreatment Pain Rating 6/10  -CS     Posttreatment Pain Rating 6/10  -CS     Row Name 22 1516          Transfers    Transfers sit-stand transfer;stand-sit transfer  -CS     Sit-Stand [place] (Transfers) contact guard  -CS     Stand-Sit [place] (Transfers) contact guard  -CS     Row Name 22 1516          Sit-Stand Transfer    Assistive Device (Sit-Stand Transfers) walker, front-wheeled  -CS     Row Name 22 1516          Stand-Sit Transfer    Assistive Device (Stand-Sit Transfers) walker, front-wheeled  -CS     Row Name 22 1516          Gait/Stairs (Locomotion)    Gait/Stairs Locomotion gait/ambulation assistive device  -CS     [place] Level (Gait) contact guard  -CS     Assistive Device (Gait)  walker, front-wheeled  -     Distance in Feet (Gait) 30  -CS     Pattern (Gait) 4-point;step-through  -CS     Deviations/Abnormal Patterns (Gait) gait speed decreased;stride length decreased  -CS     Bilateral Gait Deviations forward flexed posture  -CS     Row Name 04/04/22 1516          Motor Skills    Therapeutic Exercise hip;knee;ankle  -CS     Row Name 04/04/22 1516          Hip (Therapeutic Exercise)    Hip (Therapeutic Exercise) isometric exercises;strengthening exercise  -     Hip Isometrics (Therapeutic Exercise) bilateral;sitting;gluteal sets;aDduction;3 sets;3 second hold;10 repetitions  -CS     Hip Strengthening (Therapeutic Exercise) bilateral;sitting;aBduction;flexion;marching while seated;20 repititions  -     Row Name 04/04/22 1516          Knee (Therapeutic Exercise)    Knee (Therapeutic Exercise) isometric exercises;strengthening exercise  -     Knee Isometrics (Therapeutic Exercise) bilateral;sitting;quad sets;3 sets;3 second hold;10 repetitions  -CS     Knee Strengthening (Therapeutic Exercise) bilateral;sitting;LAQ (long arc quad);20 repititions  -     Row Name 04/04/22 1516          Ankle (Therapeutic Exercise)    Ankle (Therapeutic Exercise) strengthening exercise  -     Ankle Strengthening (Therapeutic Exercise) bilateral;sitting;dorsiflexion;plantarflexion;20 repititions  -     Row Name             Wound 02/09/22 1235 Right lower leg Blisters    Wound - Properties Group Placement Date: 02/09/22  -FL Placement Time: 1235  -FL Present on Hospital Admission: Y  -FL Side: Right  -FL Orientation: lower  -FL Location: leg  -FL Primary Wound Type: Blisters  -FL     Retired Wound - Properties Group Placement Date: 02/09/22  -FL Placement Time: 1235  -FL Present on Hospital Admission: Y  -FL Side: Right  -FL Orientation: lower  -FL Location: leg  -FL Primary Wound Type: Blisters  -FL     Retired Wound - Properties Group Date first assessed: 02/09/22  -FL Time first assessed: 1235  -FL  Present on Hospital Admission: Y  -FL Side: Right  -FL Location: leg  -FL Primary Wound Type: Blisters  -FL     Row Name             Wound 02/23/22 0934 Left lower leg    Wound - Properties Group Placement Date: 02/23/22  -BA Placement Time: 0934  -BA Present on Hospital Admission: Y  -BA Side: Left  -BA Orientation: lower  -BA Location: leg  -BA     Retired Wound - Properties Group Placement Date: 02/23/22  -BA Placement Time: 0934  -BA Present on Hospital Admission: Y  -BA Side: Left  -BA Orientation: lower  -BA Location: leg  -BA     Retired Wound - Properties Group Date first assessed: 02/23/22  -BA Time first assessed: 0934  -BA Present on Hospital Admission: Y  -BA Side: Left  -BA Location: leg  -BA     Row Name             Wound 03/28/22 1558 Right gluteal    Wound - Properties Group Placement Date: 03/28/22  -FH Placement Time: 1558  -FH Side: Right  -FH Location: gluteal  -FH     Retired Wound - Properties Group Placement Date: 03/28/22  -FH Placement Time: 1558  -FH Side: Right  -FH Location: gluteal  -FH     Retired Wound - Properties Group Date first assessed: 03/28/22  -FH Time first assessed: 1558  -FH Side: Right  -FH Location: gluteal  -FH     Row Name 04/04/22 1516          Plan of Care Review    Plan of Care Reviewed With patient  -CS     Progress no change  -CS     Outcome Evaluation The patient was able to walk 30 feet today before needing a break.  She tolerated treatment well today.  Continue with current plan of care.  -CS     Row Name 04/04/22 1516          Vital Signs    Pre SpO2 (%) 96  -CS     O2 Delivery Pre Treatment nasal cannula  1 liter  -CS     Intra SpO2 (%) 95  -CS     O2 Delivery Intra Treatment nasal cannula  1 liter  -CS     Post SpO2 (%) 98  -CS     O2 Delivery Post Treatment nasal cannula  1 liter  -CS     Row Name 04/04/22 1516          Positioning and Restraints    Pre-Treatment Position sitting in chair/recliner  -CS     Post Treatment Position chair  -CS     In Chair  reclined;call light within reach;encouraged to call for assist  -CS     Row Name 04/04/22 6357          Progress Summary (PT)    Progress Toward Functional Goals (PT) progress toward functional goals is gradual  -CS           User Key  (r) = Recorded By, (t) = Taken By, (c) = Cosigned By    Initials Name Provider Type    Sarah Medina, RN Registered Nurse    Sylvia Olson, RN Registered Nurse    Swathi Yuen RN Registered Nurse    Jojo Tejada, PT Physical Therapist              Section G  Mobility  Bed mobility - self performance: activity did not occur  Bed mobility support/assistance: Activity did not occur  Transfer - self performance: limited assistance (staff provide guided maneuvering of limbs or other non-weight bearing assistance)  Transfer support/assistance: One person assist  Walking in room - self performance: limited assistance (staff provide guided maneuvering of limbs or other non-weight bearing assistance)  Walking in room support/assistance: One person assist  Walking in corridors/hallway - self performance: activity did not occur  Walking in corridors/hallway support/assistance: Activity did not occur  Locomotion on unit - self performance: activity did not occur  Locomotion on unit support/assistance: Activity did not occur  Locomotion off unit - self performance: activity did not occur  Locomotion off unit support/assistance: Activity did not occur     Balance  Balance during transitions & walking: Not steady, requires assist to steady  Moving from seated to standing position: Not steady, requires assist to steady  Walking: Not steady, requires assist to steady  Turning around while walking: Not steady, requires assist to steady  Moving on and off toilet: Not steady, requires assist to steady  Surface-to-surface transfer: Not steady, requires assist to steady  Mobility devices: Cane/crutch     Section GG  SectionGG: Functional Ability/Goals, Adm  Self Care, Prior  Functioning (UQ6501P): 3. Independent  Indoor Mobility - Ambulation, Prior Function (YQ3762J): 3. Independent  Stairs, Prior Function (YD7012I): 3. Independent  Functional Cognition, Prior Functioning (VC9555M): 3. Independent  Prior Device Use (BF5110): none of the above (Z)     Mobility, Admission Performance (KV6851)  Roll Left & Right: Mobility Admission Performance (LI8795X9): partial/moderate assistance (03)  Sit to Lying: Mobility Admission Performance (SF2586R7): partial/moderate assistance (03)  Lying to Sitting, Side of Bed: Mobility Admission Performance (IY4992F1): partial/moderate assistance (03)  Sit to Stand: Mobility Admission Performance (CZ1891I3): partial/moderate assistance (03)  Chair/Bed-Chair Transfer: Mobility Admission Performance (BQ8693I7): partial/moderate assistance (03)  Toilet Transfer: Mobility Admission Performance (YX7639R5): partial/moderate assistance (03)  Car Transfer: Mobility Admission Performance (JW9702I0): not applicable (09)  Walk 10 Feet: Mobility Admission Performance (EU2217V4): not attempted, medical condition/safety concern (88)  Walk 50 Feet With Two Turns: Mobility Admission Performance (IV0926S9): not attempted, medical condition/safety concern (88)  Walk 150 Feet: Mobility Admission Performance (SP3384P5): not attempted, medical condition/safety concern (88)  Walk 10 Ft, Uneven Surfaces: Mobility Admission Performance (WP3522H0): not applicable (09)  1 Step/Curb: Mobility Admission Performance (YW2762I9): not attempted, medical condition/safety concern (88)  4 Steps: Mobility Admission Performance (NV4599R8): not attempted, medical condition/safety concern (88)  12 Steps: Mobility Admission Performance (MY6957A7): not attempted, medical condition/safety concern (88)  Picking up object: Mobility Admission Performance (GT4457S8): not attempted, medical condition/safety concern (88)  Use a Wheelchair and/or Scooter: Mobility (MW6042O5): no (0)     Mobility (GG),  Discharge Goal (BY4116)  Roll Left & Right: Mobility Discharge Goal (HU1031K1): independent (06)  Sit to Lying: Mobility Discharge Goal (UE0450I3): independent (06)  Lying to Sitting, Side of Bed: Mobility Discharge Goal (FZ4822O6): independent (06)  Sit to Stand: Mobility Discharge Goal (NX2322X3): independent (06)  Chair/Bed-Chair Transfer: Mobility Discharge Goal (UT9691L8): independent (06)  Toilet Transfer: Mobility Discharge Goal (KS7411X5): independent (06)  Car Transfer: Mobility Discharge Goal (AL9915E5): not applicable (09)  Walk 10 Feet: Mobility Discharge Goal (EX1330T6): independent (06)  Walk 50 Feet With Two Turns: Mobility Discharge Goal (BH2127L1): independent (06)  Walk 150 Feet: Mobility Discharge Goal (HY7782Z3): independent (06)  Walk 10 Ft, Uneven Surfaces: Mobility Discharge Goal (GH2390W2): not applicable (09)  1 Step/Curb: Mobility Discharge Goal (ZI2830U4): independent (06)  4 Steps: Mobility Discharge Goal (JS7830E5): independent (06)  12 Steps: Mobility Discharge Goal (NE0158J6): independent (06)  Picking up object: Mobility Discharge Goal (CP9544W5): independent (06)  Use a Wheelchair and/or Scooter: Mobility (ZG9025F4): no (0)  Wheel 50 Ft Two Turns: Mobility Discharge Goal (VX3459H6): not applicable (09)  Wheel 150 Feet: Mobility Discharge Goal (RI4990W2): not applicable (09)     Mobility, Discharge Performance (PW5384)  Use a Wheelchair and/or Scooter: Mobility (XX8126Z4): no (0)  Physical Therapy Education                 Title: PT OT SLP Therapies (Done)     Topic: Physical Therapy (Done)     Point: Mobility training (Done)     Learning Progress Summary           Patient Acceptance, E, VU by MARCEL at 2022 1149    Acceptance, E,TB, VU by MICHAEL at 3/24/2022 1607                   Point: Home exercise program (Done)     Learning Progress Summary           Patient Acceptance, E, VU by MARCEL at 2022 1149    Acceptance, E,TB, VU by MICHAEL at 3/24/2022 1607                   Point: Body  mechanics (Done)     Learning Progress Summary           Patient Acceptance, E, VU by KN at 4/1/2022 1149    Acceptance, E,TB, VU by  at 3/24/2022 1607                   Point: Precautions (Done)     Learning Progress Summary           Patient Acceptance, E, VU by KN at 4/1/2022 1149    Acceptance, E,TB, VU by CS at 3/24/2022 1607                               User Key     Initials Effective Dates Name Provider Type Discipline     04/25/21 -  Jojo Ramirez, PT Physical Therapist PT    MARCEL 12/20/21 -  Koby Bass OT Occupational Therapist OT              PT Recommendation and Plan  Anticipated Discharge Disposition (PT): home with home health, home with assist  Planned Therapy Interventions (PT): balance training, bed mobility training, gait training, stair training, strengthening, stretching, transfer training, patient/family education  Therapy Frequency (PT): 6 times/wk  Progress Summary (PT)  Progress Toward Functional Goals (PT): progress toward functional goals is gradual  Plan of Care Reviewed With: patient  Progress: no change  Outcome Evaluation: The patient was able to walk 30 feet today before needing a break.  She tolerated treatment well today.  Continue with current plan of care.   Outcome Measures     Row Name 04/04/22 1521 04/02/22 1200          How much help from another person do you currently need...    Turning from your back to your side while in flat bed without using bedrails? 4  -CS 4  -CSA     Moving from lying on back to sitting on the side of a flat bed without bedrails? 3  -CS 3  -CSA     Moving to and from a bed to a chair (including a wheelchair)? 3  -CS 3  -CSA     Standing up from a chair using your arms (e.g., wheelchair, bedside chair)? 3  -CS 4  -CSA     Climbing 3-5 steps with a railing? 2  -CS 3  -CSA     To walk in hospital room? 3  -CS 3  -CSA     AM-PAC 6 Clicks Score (PT) 18  -CS 20  -CSA            Functional Assessment    Outcome Measure Options AM-PAC 6 Clicks Basic  Mobility (PT)  -CS AM-PAC 6 Clicks Basic Mobility (PT)  -CSA           User Key  (r) = Recorded By, (t) = Taken By, (c) = Cosigned By    Initials Name Provider Type    CS Jojo Ramirez, PT Physical Therapist    Fan Arellano PTA Physical Therapist Assistant                 Time Calculation:    PT Charges     Row Name 04/04/22 1515             Time Calculation    PT Received On 04/04/22  -CS              Timed Charges    77488 - PT Therapeutic Exercise Minutes 20  -CS      76484 - Gait Training Minutes  7  -CS              SNF Physical Therapy Minutes    Skilled Minutes- PT 27 min  -CS              Total Minutes    Timed Charges Total Minutes 27  -CS       Total Minutes 27  -CS            User Key  (r) = Recorded By, (t) = Taken By, (c) = Cosigned By    Initials Name Provider Type    Jojo Tejada, PT Physical Therapist              Therapy Charges for Today     Code Description Service Date Service Provider Modifiers Qty    12941748239 HC PT THER PROC EA 15 MIN 4/4/2022 Jojo Ramirez, PT GP 1    22686584724 HC GAIT TRAINING EA 15 MIN 4/4/2022 Jojo Ramirez, PT GP 1          PT G-Codes  Outcome Measure Options: AM-PAC 6 Clicks Basic Mobility (PT)  AM-PAC 6 Clicks Score (PT): 18  AM-PAC 6 Clicks Score (OT): 20    Jojo Ramirez PT  4/4/2022

## 2022-04-04 NOTE — PROGRESS NOTES
[facility]     Progress Note    Patient Name: Lauren Redd  : 1945  MRN: [id number]  Primary Care Physician:  Dennis Kohler MD  Date of admission: 3/23/2022      Subjective   Brief summary.  Admitted to rehab for cellulitis, CHF and A. fib and weakness      HPI:  Follow-up on cellulitis and shortness of breath,   Continues to have increased heart rate despite adjustment of cardiac meds.  Patient gaining weight.  Not making much urine.    Review of Systems     Fatigue and weakness,  Denies any chest pain or shortness of breath at rest.  Swelling of legs    Objective     Vitals:   Temp:  [97.2 °F (36.2 °C)-97.7 °F (36.5 °C)] 97.2 °F (36.2 °C)  Heart Rate:  [] 112  Resp:  [20-22] 20  BP: (109-124)/(81-82) 109/81  Flow (L/min):  [2] 2    Physical Exam :     Elderly female not in acute distress.  Tired looking.    Heart irregular and tachycardic.   Lungs diminished breath sounds.  Abdomen soft.  Both lower extremities with ulcers healing and edema   neurologically awake alert and oriented.      Result Review:  I have personally reviewed the results from the time of this admission to 2022 10:43 EDT and agree with these findings:  [x]  Laboratory  []  Microbiology  []  Radiology  []  EKG/Telemetry   []  Cardiology/Vascular   []  Pathology  []  Old records  []  Other:           Assessment / Plan       Active Hospital Problems:  Active Hospital Problems    Diagnosis    • Atrial fibrillation (HCC)    • Generalized weakness    • CHF (congestive heart failure) (HCC)    • Cellulitis of anterior lower leg        Plan:   Not much change since yesterday.  Heart rate in A. fib continues with RVR  IV Lasix 80 yesterday.  BUN and creatinine still high.  We will see recommendations from cardiologist       DVT prophylaxis:  Medical DVT prophylaxis orders are present.    CODE STATUS:   Code Status (Patient has no pulse and is not breathing): CPR (Attempt to Resuscitate)  Medical Interventions (Patient has  pulse or is breathing): Full Support              Electronically signed by Pablo Fajardo MD, 04/04/22, 10:45 AM EDT.

## 2022-04-04 NOTE — THERAPY TREATMENT NOTE
SNF - Occupational Therapy Treatment Note   [facility]    Patient Name: Lauren Redd  : 1945    MRN: [id number]                              Today's Date: 2022       Admit Date: 3/23/2022    Visit Dx:     ICD-10-CM ICD-9-CM   1. Decreased activities of daily living (ADL)  Z78.9 V49.89   2. Difficulty walking  R26.2 719.7     Patient Active Problem List   Diagnosis   • Positive colorectal cancer screening using Cologuard test   • Atrial fibrillation with RVR (HCC)   • CHF (congestive heart failure) (HCC)   • Cellulitis of anterior lower leg   • Generalized weakness   • Atrial fibrillation (HCC)     Past Medical History:   Diagnosis Date   • Afib (HCC)    • Aneurysm (HCC)    • Arthritis    • GERD (gastroesophageal reflux disease)    • Hypertension    • Irregular heart beat      Past Surgical History:   Procedure Laterality Date   • COLONOSCOPY     • HYSTERECTOMY        General Information     Row Name 22 1332          OT Time and Intention    Document Type therapy note (daily note)  -     Mode of Treatment individual therapy;occupational therapy  -     Row Name 22 1332          General Information    Patient Profile Reviewed yes  -     Existing Precautions/Restrictions fall;oxygen therapy device and L/min  -     Row Name 22 1339          Safety Issues, Functional Mobility    Impairments Affecting Function (Mobility) balance;endurance/activity tolerance;pain;strength  -           User Key  (r) = Recorded By, (t) = Taken By, (c) = Cosigned By    Initials Name Provider Type     Rosalba Christianson OT Occupational Therapist                 Mobility/ADL's     Row Name 22 133          Transfers    Transfers bed-chair transfer;stand-sit transfer;sit-stand transfer;toilet transfer  -     Bed-[place]air [place] (Transfers) contact guard;verbal cues;1 person assist  -     Sit-Stand [place] (Transfers) contact guard;1 person assist  -     Stand-Sit [place] (Transfers)  contact guard;1 person assist  -     [place] Level (Toilet Transfer) verbal cues;contact guard;1 person assist;standby assist  -     Assistive Device (Toilet Transfer) commode, 3-in-1  -     Row Name 04/04/22 1333          Sit-Stand Transfer    Assistive Device (Sit-Stand Transfers) walker, front-wheeled  -     Row Name 04/04/22 1333          Toilet Transfer    Type (Toilet Transfer) sit-stand;stand-sit  -     Row Name 04/04/22 1333          Activities of Daily Living    BADL Assessment/Intervention bathing;upper body dressing;lower body dressing;grooming;toileting  -     Row Name 04/04/22 1333          Stand-Sit Transfer    Assistive Device (Stand-Sit Transfers) walker, front-wheeled  -     Row Name 04/04/22 1333          Stand Pivot/Stand Step Transfer    Stand Pivot/Stand Step [place] (Transfers) contact guard  x2 during BADLs  -     Row Name 04/04/22 1333          Wheelchair Transfer    [place] Level (Wheelchair Transfer) contact guard  -     Row Name 04/04/22 1333          Bathing Assessment/Intervention    [place] Level (Bathing) bathing skills;minimum assist (75% patient effort)  -     Row Name 04/04/22 1333          Upper Body Dressing Assessment/Training    [place] Level (Upper Body Dressing) upper body dressing skills;set up  -     Row Name 04/04/22 1333          Lower Body Dressing Assessment/Training    [place] Level (Lower Body Dressing) lower body dressing skills;maximum assist (25% patient effort);moderate assist (50% patient effort)  -     Row Name 04/04/22 1333          Grooming Assessment/Training    [place] Level (Grooming) grooming skills;set up;oral care regimen;wash face, hands;hair care, combing/brushing  -     Position (Grooming) unsupported sitting  -     Row Name 04/04/22 1333          Toileting Assessment/Training    [place] Level (Toileting) toileting skills;contact guard assist  -     Assistive Devices (Toileting)  commode, 3-in-1  -           User Key  (r) = Recorded By, (t) = Taken By, (c) = Cosigned By    Initials Name Provider Type    Rosalba Mccormick OT Occupational Therapist               Obj/Interventions     Row Name 04/04/22 1334          Motor Skills    Therapeutic Exercise aerobic  -     Row Name 04/04/22 1334          Balance    Balance Interventions dynamic reaching;weight shifting activity;UE activity with balance activity  -     Comment, Balance fair standing balance during adls using RW  -     Row Name 04/04/22 1334          Aerobic Exercise    Comment, Aerobic Exercise (Therapeutic Exercise) Pt. tolerated the Omnicycle x15 minutes w/ no rest breaks  -           User Key  (r) = Recorded By, (t) = Taken By, (c) = Cosigned By    Initials Name Provider Type    Rosalba Mccormick OT Occupational Therapist               Goals/Plan    No documentation.                Clinical Impression     Row Name 04/04/22 1335          Pain Scale: FACES Pre/Post-Treatment    Pain: FACES Scale, Pretreatment 2-->hurts little bit  -     Posttreatment Pain Rating 2-->hurts little bit  -     Row Name 04/04/22 1335          Plan of Care Review    Plan of Care Reviewed With patient  -     Progress no change  -     Outcome Evaluation No signficant changes however pt. appreared in less discomfort in LEs during BADL training. Pt. continues to struggle to reach her LEs for LB from knee down. Pt. continues to receive wound wrapping on LEs.  -     Row Name 04/04/22 1335          Therapy Plan Review/Discharge Plan (OT)    Anticipated Discharge Disposition (OT) home with home health;home with assist  -           User Key  (r) = Recorded By, (t) = Taken By, (c) = Cosigned By    Initials Name Provider Type    Rosalba Mccormick OT Occupational Therapist               Outcome Measures     Row Name 04/04/22 1337          How much help from another is currently needed...    Putting on and taking off regular lower body clothing? 2   -GC     Bathing (including washing, rinsing, and drying) 3  -GC     Toileting (which includes using toilet bed pan or urinal) 3  -GC     Putting on and taking off regular upper body clothing 4  -GC     Taking care of personal grooming (such as brushing teeth) 4  -GC     Eating meals 4  -GC     AM-PAC 6 Clicks Score (OT) 20  -GC     Row Name 04/04/22 1100          How much help from another person do you currently need...    Turning from your back to your side while in flat bed without using bedrails? 3  -CR     Moving from lying on back to sitting on the side of a flat bed without bedrails? 3  -CR     Moving to and from a bed to a chair (including a wheelchair)? 3  -CR     Standing up from a chair using your arms (e.g., wheelchair, bedside chair)? 3  -CR     Climbing 3-5 steps with a railing? 2  -CR     To walk in hospital room? 3  -CR     AM-PAC 6 Clicks Score (PT) 17  -CR     Row Name 04/04/22 1337          Optimal Instrument    Optimal Instrument Optimal - 3  -GC     Bending/Stooping 3  -GC     Standing 1  -GC     Reaching 1  -GC           User Key  (r) = Recorded By, (t) = Taken By, (c) = Cosigned By    Initials Name Provider Type    Dread Rutledge, RN Registered Nurse    Rosalba Mccormick OT Occupational Therapist              Section G  Mobility  Dressing - self performance: limited assistance (staff provide guided maneuvering of limbs or other non-weight bearing assistance)  Dressing support/assistance: One person assist  Eating - self performance: independent  Eating support/assistance: No setup or physical help  Toileting - self performance: limited assistance (staff provide guided maneuvering of limbs or other non-weight bearing assistance)  Toileting support/assistance: One person assist  Personal hygiene - self performance: supervision (oversight, encourage)  Personal hygiene support/assistance: One person assist  Bathing  Bathing - self performance: Physical help only to transfer to bathe  Bathing  support/assistance: One person assist     Range of Motion  Upper Extremity: No impairment  Section GG  SectionGG: Functional Ability/Goals, Adm  Self Care, Prior Functioning ([id number]): 3. Independent  Functional Cognition, Prior Functioning ([id number]): 3. Independent  Self Care, Admission (Section GG)  Eating: Self-Care Admission Performance (QE9768S2): independent (06)  Oral Hygiene: Self-Care Admission Performance ([id number]): setup or clean-up assistance (05)  Toileting Hygiene: Self-Care Admission Performance (UX4610W6): supervision or touching assistance (04)  Shower/Bathe Self: Self-Care Admission Performance (HV7841N7): partial/moderate assistance (03)  Upper Body Dressing: Self-Care Admission Performance (SK3958S1): setup or clean-up assistance (05)  Putting On/Taking Off Footwear: Self-Care Admission Performance (IJ7506E8): substantial/maximal assistance (02)  Mobility, Admission Performance (FR0514)  Toilet Transfer: Mobility Admission Performance (SU1500Z6): supervision or touching assistance (04)  Section GG: Functional Ability/Goals, DC  Eating: Self-Care Discharge Goal (GX6861N8): independent (06)  Oral Hygiene: Self-Care Discharge Goal (DV7046Y5): independent (06)  Toileting Hygiene: Self-Care Discharge Goal (MQ9411M4): independent (06)  Shower/Bathe Self: Self-Care Discharge Goal ([id number]): independent (06)  Upper Body Dressing: Self-Care Discharge Goal (IA6621Y2): independent (06)  Lower Body Dressing: Self-Care Discharge Goal (UQ0472H4): independent (06)  Putting On/Taking Off Footwear: Self-Care Discharge Goal (LG9463J5): independent (06)  Mobility (GG), Discharge Goal (TO0393)  Toilet Transfer: Mobility Discharge Goal (MR5547B5): independent (06)          Occupational Therapy Education                 Title: PT OT SLP Therapies (Done)     Topic: Occupational Therapy (Done)     Point: ADL training (Done)     Description:   Instruct learner(s) on proper safety adaptation and remediation techniques  during self care or transfers.   Instruct in proper use of assistive devices.              Learning Progress Summary           Patient Acceptance, E, VU by  at 4/1/2022 1149    Acceptance, E, VU,NR by  at 3/24/2022 1149                   Point: Home exercise program (Done)     Description:   Instruct learner(s) on appropriate technique for monitoring, assisting and/or progressing therapeutic exercises/activities.              Learning Progress Summary           Patient Acceptance, E, VU by  at 4/1/2022 1149    Acceptance, E, VU,NR by  at 3/24/2022 1149                   Point: Precautions (Done)     Description:   Instruct learner(s) on prescribed precautions during self-care and functional transfers.              Learning Progress Summary           Patient Acceptance, E, VU by  at 4/1/2022 1149    Acceptance, E, VU,NR by  at 3/24/2022 1149                   Point: Body mechanics (Done)     Description:   Instruct learner(s) on proper positioning and spine alignment during self-care, functional mobility activities and/or exercises.              Learning Progress Summary           Patient Acceptance, E, VU by  at 4/1/2022 1149    Acceptance, E, VU,NR by  at 3/24/2022 1149                               User Key     Initials Effective Dates Name Provider Type Discipline     06/16/21 -  Rosalba Christianson OT Occupational Therapist OT     12/20/21 -  Koby Bass OT Occupational Therapist OT              OT Recommendation and Plan  Planned Therapy Interventions (OT): activity tolerance training, adaptive equipment training, BADL retraining, functional balance retraining, occupation/activity based interventions, patient/caregiver education/training, strengthening exercise, transfer/mobility retraining  Therapy Frequency (OT): 5 times/wk  Plan of Care Review  Plan of Care Reviewed With: patient  Progress: no change  Outcome Evaluation: No signficant changes however pt. appreared in less discomfort in LEs  during BADL training. Pt. continues to struggle to reach her LEs for LB from knee down. Pt. continues to receive wound wrapping on LEs.     Time Calculation:    Time Calculation- OT     Row Name 04/04/22 1337             Time Calculation- OT    OT Received On 04/04/22  -GC              Timed Charges    06042 - OT Therapeutic Exercise Minutes 15  -GC      98281 - OT Therapeutic Activity Minutes 10  -GC      78544 - OT Self Care/Mgmt Minutes 28  -GC              SNF Occupational Therapy Minutes    Skilled Minutes- OT 53 min  -GC              Total Minutes    Timed Charges Total Minutes 53  -GC       Total Minutes 53  -GC            User Key  (r) = Recorded By, (t) = Taken By, (c) = Cosigned By    Initials Name Provider Type     Rosalba Christianson OT Occupational Therapist              Therapy Charges for Today     Code Description Service Date Service Provider Modifiers Qty    15551945183 HC OT THER PROC EA 15 MIN 4/4/2022 Rosalba Christianson OT GO 1    73672936924 HC OT THERAPEUTIC ACT EA 15 MIN 4/4/2022 Rosalba Christianson OT GO 1    32451146514 HC OT SELF CARE/MGMT/TRAIN EA 15 MIN 4/4/2022 Rosalba Christianson OT GO 2               Rosalba Christianson OT  4/4/2022

## 2022-04-05 NOTE — THERAPY TREATMENT NOTE
SNF - Physical Therapy Treatment Note   [facility]     Patient Name: Lauren Redd  : 1945  MRN: [id number]  Today's Date: 2022      Visit Dx:    ICD-10-CM ICD-9-CM   1. Decreased activities of daily living (ADL)  Z78.9 V49.89   2. Difficulty walking  R26.2 719.7     Patient Active Problem List   Diagnosis   • Positive colorectal cancer screening using Cologuard test   • Atrial fibrillation with RVR (HCC)   • CHF (congestive heart failure) (HCC)   • Cellulitis of anterior lower leg   • Generalized weakness   • Atrial fibrillation (HCC)     Past Medical History:   Diagnosis Date   • Afib (HCC)    • Aneurysm (HCC)    • Arthritis    • GERD (gastroesophageal reflux disease)    • Hypertension    • Irregular heart beat      Past Surgical History:   Procedure Laterality Date   • COLONOSCOPY     • HYSTERECTOMY         PT Assessment (last 12 hours)     PT Evaluation and Treatment     Row Name 22 1500          Physical Therapy Time and Intention    Subjective Information no complaints  -CS     Document Type therapy note (daily note)  -CS     Mode of Treatment individual therapy;physical therapy  -CS     Patient Effort good  -CS     Row Name 22 1500          Pain    Pretreatment Pain Rating 5/10  -CS     Posttreatment Pain Rating 5/10  -CS     Row Name 22 1500          Bed Mobility    Bed Mobility sit-supine;supine-sit  -CS     Supine-Sit Ephraim (Bed Mobility) contact guard  -CS     Sit-Supine Ephraim (Bed Mobility) contact guard  -CS     Assistive Device (Bed Mobility) bed rails  -CS     Row Name 22 1500          Transfers    Transfers sit-stand transfer;stand-sit transfer;toilet transfer  -CS     Sit-Stand Ephraim (Transfers) contact guard  -CS     Stand-Sit Ephraim (Transfers) contact guard  -CS     Ephraim Level (Toilet Transfer) contact guard  -CS     Assistive Device (Toilet Transfer) commode, 3-in-1  -CS     Row Name 22 1500          Sit-Stand Transfer     Assistive Device (Sit-Stand Transfers) walker, front-wheeled  -CS     Row Name 04/05/22 1500          Stand-Sit Transfer    Assistive Device (Stand-Sit Transfers) walker, front-wheeled  -CS     Row Name 04/05/22 1500          Toilet Transfer    Type (Toilet Transfer) stand pivot/stand step  -CS     Row Name 04/05/22 1500          Gait/Stairs (Locomotion)    Gait/Stairs Locomotion gait/ambulation assistive device  -     Stephenson Level (Gait) contact guard  -     Assistive Device (Gait) walker, front-wheeled  -CS     Distance in Feet (Gait) 10  -CS     Pattern (Gait) 4-point;step-through  -CS     Deviations/Abnormal Patterns (Gait) gait speed decreased;stride length decreased  -     Bilateral Gait Deviations forward flexed posture  -     Row Name 04/05/22 1500          Motor Skills    Therapeutic Exercise hip;knee;ankle  -     Row Name 04/05/22 1500          Hip (Therapeutic Exercise)    Hip (Therapeutic Exercise) isometric exercises;strengthening exercise  -     Hip Isometrics (Therapeutic Exercise) bilateral;supine;aDduction;gluteal sets;2 sets;3 second hold;10 repetitions  -     Hip Strengthening (Therapeutic Exercise) bilateral;marching while seated;aBduction;flexion;20 repititions  -     Row Name 04/05/22 1500          Knee (Therapeutic Exercise)    Knee (Therapeutic Exercise) isometric exercises;strengthening exercise  -     Knee Isometrics (Therapeutic Exercise) bilateral;supine;quad sets;2 sets;3 second hold;10 repetitions  -     Knee Strengthening (Therapeutic Exercise) bilateral;sitting;LAQ (long arc quad);20 repititions  -     Row Name 04/05/22 1500          Ankle (Therapeutic Exercise)    Ankle (Therapeutic Exercise) strengthening exercise  -     Ankle Strengthening (Therapeutic Exercise) bilateral;supine;dorsiflexion;plantarflexion;30 repititions  -     Row Name             Wound 02/09/22 1235 Right lower leg Blisters    Wound - Properties Group Placement Date: 02/09/22   -FL Placement Time: 1235  -FL Present on Hospital Admission: Y  -FL Side: Right  -FL Orientation: lower  -FL Location: leg  -FL Primary Wound Type: Blisters  -FL     Retired Wound - Properties Group Placement Date: 02/09/22  -FL Placement Time: 1235  -FL Present on Hospital Admission: Y  -FL Side: Right  -FL Orientation: lower  -FL Location: leg  -FL Primary Wound Type: Blisters  -FL     Retired Wound - Properties Group Date first assessed: 02/09/22  -FL Time first assessed: 1235  -FL Present on Hospital Admission: Y  -FL Side: Right  -FL Location: leg  -FL Primary Wound Type: Blisters  -FL     Row Name             Wound 02/23/22 0934 Left lower leg    Wound - Properties Group Placement Date: 02/23/22  -BA Placement Time: 0934  -BA Present on Hospital Admission: Y  -BA Side: Left  -BA Orientation: lower  -BA Location: leg  -BA     Retired Wound - Properties Group Placement Date: 02/23/22  -BA Placement Time: 0934  -BA Present on Hospital Admission: Y  -BA Side: Left  -BA Orientation: lower  -BA Location: leg  -BA     Retired Wound - Properties Group Date first assessed: 02/23/22  -BA Time first assessed: 0934  -BA Present on Hospital Admission: Y  -BA Side: Left  -BA Location: leg  -BA     Row Name             Wound 03/28/22 1558 Right gluteal    Wound - Properties Group Placement Date: 03/28/22  -FH Placement Time: 1558  -FH Side: Right  -FH Location: gluteal  -FH     Retired Wound - Properties Group Placement Date: 03/28/22  -FH Placement Time: 1558  -FH Side: Right  -FH Location: gluteal  -FH     Retired Wound - Properties Group Date first assessed: 03/28/22  -FH Time first assessed: 1558  -FH Side: Right  -FH Location: gluteal  -FH     Row Name 04/05/22 1500          Plan of Care Review    Plan of Care Reviewed With patient  -CS     Progress improving  -CS     Outcome Evaluation The patient states that her pain is at a 5/10 today and is feeling a bit better.  She is also performing transfers in and out of bed  with more ease.  She currently needs contact guard assist to perform supine to sit and sit to supine.  She continue sto progress well toward her goals.  Continue with current plan of care.  -CS     Row Name 04/05/22 1500          Positioning and Restraints    Pre-Treatment Position sitting in chair/recliner  -CS     Post Treatment Position bed  -CS     In Bed supine;call light within reach;encouraged to call for assist  -CS     Row Name 04/05/22 1500          Progress Summary (PT)    Progress Toward Functional Goals (PT) progress toward functional goals is gradual  -CS           User Key  (r) = Recorded By, (t) = Taken By, (c) = Cosigned By    Initials Name Provider Type     Sarah Church, RN Registered Nurse    Sylvia Olson, RN Registered Nurse    Swathi Yuen RN Registered Nurse    Jojo Tejada, PT Physical Therapist              Section G  Mobility  Bed mobility - self performance: activity did not occur  Bed mobility support/assistance: Activity did not occur  Transfer - self performance: limited assistance (staff provide guided maneuvering of limbs or other non-weight bearing assistance)  Transfer support/assistance: One person assist  Walking in room - self performance: limited assistance (staff provide guided maneuvering of limbs or other non-weight bearing assistance)  Walking in room support/assistance: One person assist  Walking in corridors/hallway - self performance: activity did not occur  Walking in corridors/hallway support/assistance: Activity did not occur  Locomotion on unit - self performance: activity did not occur  Locomotion on unit support/assistance: Activity did not occur  Locomotion off unit - self performance: activity did not occur  Locomotion off unit support/assistance: Activity did not occur     Balance  Balance during transitions & walking: Not steady, requires assist to steady  Moving from seated to standing position: Not steady, requires assist to  steady  Walking: Not steady, requires assist to steady  Turning around while walking: Not steady, requires assist to steady  Moving on and off toilet: Not steady, requires assist to steady  Surface-to-surface transfer: Not steady, requires assist to steady  Mobility devices: Cane/crutch     Section GG  SectionGG: Functional Ability/Goals, Adm  Self Care, Prior Functioning (FY1585U): 3. Independent  Indoor Mobility - Ambulation, Prior Function (HV7065V): 3. Independent  Stairs, Prior Function (OJ8209V): 3. Independent  Functional Cognition, Prior Functioning (AE0529V): 3. Independent  Prior Device Use (BI8376): none of the above (Z)     Mobility, Admission Performance (RD5260)  Roll Left & Right: Mobility Admission Performance (VF7340A8): partial/moderate assistance (03)  Sit to Lying: Mobility Admission Performance (EL5807D1): partial/moderate assistance (03)  Lying to Sitting, Side of Bed: Mobility Admission Performance (JL5737N6): partial/moderate assistance (03)  Sit to Stand: Mobility Admission Performance (AK1933C4): partial/moderate assistance (03)  Chair/Bed-Chair Transfer: Mobility Admission Performance (VK0647E6): partial/moderate assistance (03)  Toilet Transfer: Mobility Admission Performance (RW4977S0): partial/moderate assistance (03)  Car Transfer: Mobility Admission Performance (KT5916Z8): not applicable (09)  Walk 10 Feet: Mobility Admission Performance (JA9267X0): not attempted, medical condition/safety concern (88)  Walk 50 Feet With Two Turns: Mobility Admission Performance (ON5294G1): not attempted, medical condition/safety concern (88)  Walk 150 Feet: Mobility Admission Performance (UE7298U7): not attempted, medical condition/safety concern (88)  Walk 10 Ft, Uneven Surfaces: Mobility Admission Performance (PT9401B1): not applicable (09)  1 Step/Curb: Mobility Admission Performance (VA6404O4): not attempted, medical condition/safety concern (88)  4 Steps: Mobility Admission Performance  (YO8077D1): not attempted, medical condition/safety concern (88)  12 Steps: Mobility Admission Performance (LV6370T1): not attempted, medical condition/safety concern (88)  Picking up object: Mobility Admission Performance (CL8078Z9): not attempted, medical condition/safety concern (88)  Use a Wheelchair and/or Scooter: Mobility (PQ2547M5): no (0)     Mobility (GG), Discharge Goal (MI3335)  Roll Left & Right: Mobility Discharge Goal (RC0454R1): independent (06)  Sit to Lying: Mobility Discharge Goal (QN6678P8): independent (06)  Lying to Sitting, Side of Bed: Mobility Discharge Goal (BJ4748S3): independent (06)  Sit to Stand: Mobility Discharge Goal (TA8898A3): independent (06)  Chair/Bed-Chair Transfer: Mobility Discharge Goal (DO5571A0): independent (06)  Toilet Transfer: Mobility Discharge Goal (TQ5086J1): independent ()  Car Transfer: Mobility Discharge Goal (ZM8228B3): not applicable (09)  Walk 10 Feet: Mobility Discharge Goal (HK6286C9): independent (06)  Walk 50 Feet With Two Turns: Mobility Discharge Goal (FO2241W6): independent (06)  Walk 150 Feet: Mobility Discharge Goal (GR2058F8): independent (06)  Walk 10 Ft, Uneven Surfaces: Mobility Discharge Goal (IN7314B7): not applicable (09)  1 Step/Curb: Mobility Discharge Goal (MP2299O2): independent (06)  4 Steps: Mobility Discharge Goal (KD7650S2): independent (06)  12 Steps: Mobility Discharge Goal (VI1625Y9): independent (06)  Picking up object: Mobility Discharge Goal (PT5816K6): independent (06)  Use a Wheelchair and/or Scooter: Mobility (RU5045Q8): no (0)  Wheel 50 Ft Two Turns: Mobility Discharge Goal (FM7603Z4): not applicable (09)  Wheel 150 Feet: Mobility Discharge Goal (GS3341G5): not applicable (09)     Mobility, Discharge Performance (MC4655)  Use a Wheelchair and/or Scooter: Mobility (TU3368B6): no (0)  Physical Therapy Education                 Title: PT OT SLP Therapies (Done)     Topic: Physical Therapy (Done)     Point: Mobility training  (Done)     Learning Progress Summary           Patient Acceptance, E, VU by  at 4/1/2022 1149    Acceptance, E,TB, VU by  at 3/24/2022 1607                   Point: Home exercise program (Done)     Learning Progress Summary           Patient Acceptance, E, VU by KN at 4/1/2022 1149    Acceptance, E,TB, VU by CS at 3/24/2022 1607                   Point: Body mechanics (Done)     Learning Progress Summary           Patient Acceptance, E, VU by KN at 4/1/2022 1149    Acceptance, E,TB, VU by CS at 3/24/2022 1607                   Point: Precautions (Done)     Learning Progress Summary           Patient Acceptance, E, VU by KN at 4/1/2022 1149    Acceptance, E,TB, VU by  at 3/24/2022 1607                               User Key     Initials Effective Dates Name Provider Type Discipline     04/25/21 -  Jojo Ramirez, PT Physical Therapist PT     12/20/21 -  Koby Bass OT Occupational Therapist OT              PT Recommendation and Plan  Anticipated Discharge Disposition (PT): home with home health, home with assist  Planned Therapy Interventions (PT): balance training, bed mobility training, gait training, stair training, strengthening, stretching, transfer training, patient/family education  Therapy Frequency (PT): 6 times/wk  Progress Summary (PT)  Progress Toward Functional Goals (PT): progress toward functional goals is gradual  Plan of Care Reviewed With: patient  Progress: improving  Outcome Evaluation: The patient states that her pain is at a 5/10 today and is feeling a bit better.  She is also performing transfers in and out of bed with more ease.  She currently needs contact guard assist to perform supine to sit and sit to supine.  She continue sto progress well toward her goals.  Continue with current plan of care.   Outcome Measures     Row Name 04/05/22 1508 04/04/22 1521          How much help from another person do you currently need...    Turning from your back to your side while in flat bed  without using bedrails? 4  -CS 4  -CS     Moving from lying on back to sitting on the side of a flat bed without bedrails? 3  -CS 3  -CS     Moving to and from a bed to a chair (including a wheelchair)? 3  -CS 3  -CS     Standing up from a chair using your arms (e.g., wheelchair, bedside chair)? 3  -CS 3  -CS     Climbing 3-5 steps with a railing? 2  -CS 2  -CS     To walk in hospital room? 3  -CS 3  -CS     AM-PAC 6 Clicks Score (PT) 18  -CS 18  -CS            Functional Assessment    Outcome Measure Options AM-PAC 6 Clicks Basic Mobility (PT)  -CS AM-PAC 6 Clicks Basic Mobility (PT)  -CS           User Key  (r) = Recorded By, (t) = Taken By, (c) = Cosigned By    Initials Name Provider Type    CS Jojo Ramirez, PT Physical Therapist                 Time Calculation:    PT Charges     Row Name 04/05/22 1459             Time Calculation    PT Received On 04/05/22  -CS              Timed Charges    22527 - PT Therapeutic Exercise Minutes 20  -CS      31259 - PT Therapeutic Activity Minutes 10  -CS              SNF Physical Therapy Minutes    Skilled Minutes- PT 30 min  -CS              Total Minutes    Timed Charges Total Minutes 30  -CS       Total Minutes 30  -CS            User Key  (r) = Recorded By, (t) = Taken By, (c) = Cosigned By    Initials Name Provider Type    Jojo Tejada, PT Physical Therapist              Therapy Charges for Today     Code Description Service Date Service Provider Modifiers Qty    04705128885 HC PT THER PROC EA 15 MIN 4/4/2022 Jojo Ramirez, PT GP 1    80365330296 HC GAIT TRAINING EA 15 MIN 4/4/2022 Jojo Ramirez, PT GP 1    83548103651 HC PT THER PROC EA 15 MIN 4/5/2022 Jojo Ramirez, PT GP 1    59132041245 HC PT THERAPEUTIC ACT EA 15 MIN 4/5/2022 Jojo Ramirez, PT GP 1          PT G-Codes  Outcome Measure Options: AM-PAC 6 Clicks Basic Mobility (PT)  AM-PAC 6 Clicks Score (PT): 18  AM-PAC 6 Clicks Score (OT): 20    Jojo Ramirez PT  4/5/2022

## 2022-04-05 NOTE — PROGRESS NOTES
CARDIOLOGY  INPATIENT PROGRESS NOTE                [facility]    4/5/2022      PATIENT IDENTIFICATION:   Name:  Lauren Redd      MRN:  [id number]     77 y.o.  female                 SUBJECTIVE:   Atrial fibrillation with heart rate better controlled  noSignificant diuresis      OBJECTIVE:  Vitals:    04/04/22 2351 04/05/22 0500 04/05/22 0700 04/05/22 1256   BP:   132/79    BP Location:   Left arm    Patient Position:   Lying    Pulse:   80 84   Resp:   18    Temp:   98 °F (36.7 °C)    TempSrc:   Oral    SpO2: 96%  100%    Weight:  117 kg (257 lb 8 oz)     Height:               Body mass index is 39.15 kg/m².    Intake/Output Summary (Last 24 hours) at 4/5/2022 1617  Last data filed at 4/5/2022 1235  Gross per 24 hour   Intake 940 ml   Output 250 ml   Net 690 ml       Telemetry:     Physical Exam  General Appearance:   · no acute distress  · Alert and oriented x3  HENT:   · lips not cyanotic  · Atraumatic  Neck:  · No jvd   · supple  Respiratory:  · no respiratory distress  · normal breath sounds  · no rales  Cardiovascular:    · regular rhythm  · no S3, no S4   · no murmur  · no rub  · lower extremity edema: none    Skin:   · warm, dry  · No rashes      Allergies   Allergen Reactions   • Contrast Dye Rash   • Lotrel [Amlodipine Besy-Benazepril Hcl] Unknown - Low Severity       Scheduled meds:  apixaban, 5 mg, Oral, BID  digoxin, 125 mcg, Oral, Daily  DULoxetine, 30 mg, Oral, Daily  famotidine, 40 mg, Oral, Daily  furosemide, 40 mg, Oral, BID  gabapentin, 100 mg, Oral, Q12H  HYDROcodone-acetaminophen, 1 tablet, Oral, TID  metOLazone, 10 mg, Oral, Daily  metoprolol succinate XL, 100 mg, Oral, BID  senna-docusate sodium, 1 tablet, Oral, BID  sodium chloride, 10 mL, Intravenous, Q12H  spironolactone, 25 mg, Oral, Daily  triamcinolone, 1 application, Topical, Every Other Day      IV meds:                           Data Review:  CBC    CBC 3/22/22 4/2/22 4/3/22   WBC 12.18 (A) 11.03 (A)  13.12 (A)   RBC 4.27 3.99 3.84   Hemoglobin 12.8 12.0 11.5 (A)   Hematocrit 40.2 38.3 36.3   MCV 94.1 96.0 94.5   MCH 30.0 30.1 29.9   MCHC 31.8 31.3 (A) 31.7   RDW 15.7 (A) 16.0 (A) 15.9 (A)   Platelets 312 226 219   (A) Abnormal value            CMP    CMP 4/3/22 4/4/22 4/5/22   Glucose 93 104 (A) 94   BUN 51 (A) 55 (A) 56 (A)   Creatinine 1.77 (A) 1.89 (A) 1.78 (A)   Sodium 138 137 138   Potassium 4.6 4.5 4.3   Chloride 99 100 99   Calcium 9.9 9.9 10.0   Albumin 3.00 (A) 2.80 (A) 2.90 (A)   Total Bilirubin 0.6 0.5 0.5   Alkaline Phosphatase 176 (A) 176 (A) 171 (A)   AST (SGOT) 67 (A) 50 (A) 41 (A)   ALT (SGPT) 53 (A) 51 (A) 48 (A)   (A) Abnormal value             CARDIAC LABS:      Lab 04/02/22  1324   PROBNP 5,043.0*        Lab Results   Component Value Date    DIGOXIN 1.18 04/03/2022      Lab Results   Component Value Date    TSH 2.380 02/17/2022           Invalid input(s): LDLCALC  No results found for: POCTROP  Lab Results   Component Value Date    TROPONINT 0.011 02/08/2022   (  Lab Results   Component Value Date    MG 2.1 03/26/2022     Results for orders placed during the hospital encounter of 02/07/22    Adult Transthoracic Echo Complete W/ Cont if Necessary Per Protocol    Interpretation Summary  · Estimated right ventricular systolic pressure from tricuspid regurgitation is mildly elevated (35-45 mmHg). Calculated right ventricular systolic pressure from tricuspid regurgitation is 40 mmHg.  · The left ventricular cavity is mild to moderately dilated.  · Calculated left ventricular EF = 65% Estimated left ventricular EF was in agreement with the calculated left ventricular EF. Left ventricular systolic function is normal.  · The right atrial cavity is mild to moderately dilated.  · Moderate mitral valve regurgitation is present.  · Moderate tricuspid valve regurgitation is present.  · Mild pulmonary hypertension is present.           ASSESSMENT:  Atrial fibrillation with rapid rate  Congestive heart  failure acute on chronic diastolic  Hypertension  Severe swelling and draining wounds of both lower extremities  Probable cellulitis of lower extremities  Obesity         PLAN:     Continue digoxin 0.125 by mouth daily  Continued diuretics          Lisa Leung MD  4/5/2022    16:17 EDT

## 2022-04-05 NOTE — PLAN OF CARE
Goal Outcome Evaluation:   Pt remains alert and oriented and pleasant with staff. X1 assist for transfers and ambulation, transferring to BSC without difficulty. X2 episodes of urinary incontinence, heavy output. Bilateral Dressings to Lower Extremities changed this shift, wounds improved. Continues on O2 at 2L/NC, SOA with exertion noted. Currently sitting up in bed, eating supper meal, call light in reach.

## 2022-04-05 NOTE — PLAN OF CARE
Goal Outcome Evaluation:  Plan of Care Reviewed With: patient        Progress: improving  Outcome Evaluation: Difficult to obtain accurate urine output due to incontinence but is voiding large amt in brief. Up to BSC x1 person assist; SOA noted with any exertion. Wearing O2 at 2L NC with sats of 97%. Scheduled Norco administered for bilateral leg pain with relief. Completed IV antibiotic tonight. Bilateral legs remain wrapped with [place]. Continue plan of care. Call light within reach.

## 2022-04-05 NOTE — THERAPY TREATMENT NOTE
SNF - Occupational Therapy Treatment Note   [facility]    Patient Name: Lauren Redd  : 1945    MRN: [id number]                              Today's Date: 2022       Admit Date: 3/23/2022    Visit Dx:     ICD-10-CM ICD-9-CM   1. Decreased activities of daily living (ADL)  Z78.9 V49.89   2. Difficulty walking  R26.2 719.7     Patient Active Problem List   Diagnosis   • Positive colorectal cancer screening using Cologuard test   • Atrial fibrillation with RVR (HCC)   • CHF (congestive heart failure) (HCC)   • Cellulitis of anterior lower leg   • Generalized weakness   • Atrial fibrillation (HCC)     Past Medical History:   Diagnosis Date   • Afib (HCC)    • Aneurysm (HCC)    • Arthritis    • GERD (gastroesophageal reflux disease)    • Hypertension    • Irregular heart beat      Past Surgical History:   Procedure Laterality Date   • COLONOSCOPY     • HYSTERECTOMY        General Information     Row Name 22 1300          OT Time and Intention    Document Type therapy note (daily note)  -     Mode of Treatment individual therapy;occupational therapy  -     Row Name 22 1300          General Information    Patient Profile Reviewed yes  -     Existing Precautions/Restrictions fall;oxygen therapy device and L/min  -     Row Name 22 1300          Safety Issues, Functional Mobility    Impairments Affecting Function (Mobility) balance;endurance/activity tolerance;pain;strength  -           User Key  (r) = Recorded By, (t) = Taken By, (c) = Cosigned By    Initials Name Provider Type     Rosalba Christianson OT Occupational Therapist                 Mobility/ADL's     Row Name 22 1300          Transfers    Transfers bed-chair transfer;sit-stand transfer;stand-sit transfer;stand pivot/stand step transfer;toilet transfer;other (see comments)  recliner  -     Bed-Chair Real (Transfers) contact guard;verbal cues;1 person assist  -     Sit-Stand Real (Transfers) contact  guard;standby assist  -     Stand-Sit [place] (Transfers) contact guard;standby assist  x3 during bathing/dressing skills  -     [place] Level (Toilet Transfer) verbal cues;contact guard;1 person assist;standby assist  -     Assistive Device (Toilet Transfer) commode, 3-in-1  -     Row Name 04/05/22 1300          Sit-Stand Transfer    Assistive Device (Sit-Stand Transfers) walker, front-wheeled  -     Row Name 04/05/22 1300          Toilet Transfer    Type (Toilet Transfer) sit-stand;stand-sit  -     Row Name 04/05/22 1300          Activities of Daily Living    BADL Assessment/Intervention bathing;upper body dressing;lower body dressing;grooming;toileting  -     Row Name 04/05/22 1300          Stand-Sit Transfer    Assistive Device (Stand-Sit Transfers) walker, front-wheeled  -     Row Name 04/05/22 1300          Stand Pivot/Stand Step Transfer    Stand Pivot/Stand Step [place] (Transfers) contact guard;standby assist  -     Row Name 04/05/22 1300          Bathing Assessment/Intervention    [place] Level (Bathing) bathing skills;minimum assist (75% patient effort)  -     Row Name 04/05/22 1300          Upper Body Dressing Assessment/Training    [place] Level (Upper Body Dressing) upper body dressing skills;set up  -     Row Name 04/05/22 1300          Lower Body Dressing Assessment/Training    [place] Level (Lower Body Dressing) lower body dressing skills;maximum assist (25% patient effort)  -     Comment, (Lower Body Dressing) Pt. very sensitive w/ touch of LEs. Pt. not using LE AE due to LEs pain/wound care.  -     Row Name 04/05/22 1300          Grooming Assessment/Training    [place] Level (Grooming) grooming skills;set up;oral care regimen;wash face, hands;hair care, combing/brushing  -     Position (Grooming) unsupported sitting  -     Row Name 04/05/22 1300          Toileting Assessment/Training    [place] Level (Toileting) toileting  skills;standby assist;contact guard assist  -     Assistive Devices (Toileting) commode, 3-in-1  -           User Key  (r) = Recorded By, (t) = Taken By, (c) = Cosigned By    Initials Name Provider Type    Rosalba Mccormick OT Occupational Therapist               Obj/Interventions     Row Name 04/05/22 1303          Motor Skills    Functional Endurance fair-/fair  -GC     Therapeutic Exercise aerobic  -     Row Name 04/05/22 1303          Balance    Balance Interventions dynamic reaching;weight shifting activity;UE activity with balance activity  -     Comment, Balance fair standing balance w/ adls  -     Row Name 04/05/22 1303          Aerobic Exercise    Type (Aerobic Exercise) arm bike  -     Comment, Aerobic Exercise (Therapeutic Exercise) Omnicycle x20 minutes today w/ no rest breaks.  -           User Key  (r) = Recorded By, (t) = Taken By, (c) = Cosigned By    Initials Name Provider Type    Rosalba Mccormick OT Occupational Therapist               Goals/Plan    No documentation.                Clinical Impression     Row Name 04/05/22 1304          Pain Scale: FACES Pre/Post-Treatment    Pain: FACES Scale, Pretreatment 2-->hurts little bit  -     Posttreatment Pain Rating 2-->hurts little bit  -     Row Name 04/05/22 1304          Plan of Care Review    Plan of Care Reviewed With patient  -     Progress improving  -     Outcome Evaluation Patient slowly progressing w/ adls with improved pain in LEs, balance and endurance to support adls. Pt. continues to wear mainly  hospital gowns due to wound on LEs/pain.  Plan to continue POC established and advance per patients wound healing for LB self-care.  -     Row Name 04/05/22 1304          Therapy Plan Review/Discharge Plan (OT)    Anticipated Discharge Disposition (OT) home with home health;home with assist  -           User Key  (r) = Recorded By, (t) = Taken By, (c) = Cosigned By    Initials Name Provider Type    Rosalba Mccormick OT  Occupational Therapist               Outcome Measures     Row Name 04/05/22 1306          How much help from another is currently needed...    Putting on and taking off regular lower body clothing? 2  -GC     Bathing (including washing, rinsing, and drying) 3  -GC     Toileting (which includes using toilet bed pan or urinal) 3  -GC     Putting on and taking off regular upper body clothing 4  -GC     Taking care of personal grooming (such as brushing teeth) 4  -GC     Eating meals 4  -GC     AM-PAC 6 Clicks Score (OT) 20  -GC     Row Name 04/05/22 1306          Functional Assessment    Outcome Measure Options Optimal Instrument;AM-PAC 6 Clicks Daily Activity (OT)  -GC     Row Name 04/05/22 1306          Optimal Instrument    Optimal Instrument Optimal - 3  -GC     Bending/Stooping 3  -GC     Standing 1  -GC     Reaching 1  -GC           User Key  (r) = Recorded By, (t) = Taken By, (c) = Cosigned By    Initials Name Provider Type    GC Rosalba Christianson, OT Occupational Therapist              Section G  Mobility  Dressing - self performance: limited assistance (staff provide guided maneuvering of limbs or other non-weight bearing assistance)  Dressing support/assistance: One person assist  Eating - self performance: independent  Eating support/assistance: No setup or physical help  Toileting - self performance: limited assistance (staff provide guided maneuvering of limbs or other non-weight bearing assistance)  Toileting support/assistance: One person assist  Personal hygiene - self performance: supervision (oversight, encourage)  Personal hygiene support/assistance: One person assist  Bathing  Bathing - self performance: Physical help only to transfer to bathe  Bathing support/assistance: One person assist     Range of Motion  Upper Extremity: No impairment  Section GG  SectionGG: Functional Ability/Goals, Adm  Self Care, Prior Functioning ([id number]): 3. Independent  Functional Cognition, Prior Functioning (FV2763V): 3.  Independent  Self Care, Admission (Section GG)  Eating: Self-Care Admission Performance (VA3502N4): independent (06)  Oral Hygiene: Self-Care Admission Performance (QU2145M8): setup or clean-up assistance (05)  Toileting Hygiene: Self-Care Admission Performance (II4085G8): supervision or touching assistance (04)  Shower/Bathe Self: Self-Care Admission Performance (VW6869E9): partial/moderate assistance (03)  Upper Body Dressing: Self-Care Admission Performance (NL7889V7): setup or clean-up assistance (05)  Putting On/Taking Off Footwear: Self-Care Admission Performance (VY9006Y6): substantial/maximal assistance (02)  Mobility, Admission Performance (JD2178)  Toilet Transfer: Mobility Admission Performance (OZ8558N5): supervision or touching assistance (04)  Section GG: Functional Ability/Goals, DC  Eating: Self-Care Discharge Goal (OY4862M2): independent (06)  Oral Hygiene: Self-Care Discharge Goal (CI4317C1): independent (06)  Toileting Hygiene: Self-Care Discharge Goal (ZO1585G7): independent (06)  Shower/Bathe Self: Self-Care Discharge Goal (WF4570E7): independent (06)  Upper Body Dressing: Self-Care Discharge Goal (MZ5408Q1): independent (06)  Lower Body Dressing: Self-Care Discharge Goal (OI6309O0): independent (06)  Putting On/Taking Off Footwear: Self-Care Discharge Goal (YF6685N6): independent (06)  Mobility (GG), Discharge Goal (SO7098)  Toilet Transfer: Mobility Discharge Goal (KB8925I7): independent (06)          Occupational Therapy Education                 Title: PT OT SLP Therapies (Done)     Topic: Occupational Therapy (Done)     Point: ADL training (Done)     Description:   Instruct learner(s) on proper safety adaptation and remediation techniques during self care or transfers.   Instruct in proper use of assistive devices.              Learning Progress Summary           Patient Acceptance, E, VU by MARCEL at 4/1/2022 1149    Acceptance, E, VU,NR by MARYSOL at 3/24/2022 1149                   Point: Home  exercise program (Done)     Description:   Instruct learner(s) on appropriate technique for monitoring, assisting and/or progressing therapeutic exercises/activities.              Learning Progress Summary           Patient Acceptance, E, VU by  at 4/1/2022 1149    Acceptance, E, VU,NR by  at 3/24/2022 1149                   Point: Precautions (Done)     Description:   Instruct learner(s) on prescribed precautions during self-care and functional transfers.              Learning Progress Summary           Patient Acceptance, E, VU by  at 4/1/2022 1149    Acceptance, E, VU,NR by  at 3/24/2022 1149                   Point: Body mechanics (Done)     Description:   Instruct learner(s) on proper positioning and spine alignment during self-care, functional mobility activities and/or exercises.              Learning Progress Summary           Patient Acceptance, E, VU by  at 4/1/2022 1149    Acceptance, E, VU,NR by  at 3/24/2022 1149                               User Key     Initials Effective Dates Name Provider Type Discipline     06/16/21 -  Rosalba Christianson OT Occupational Therapist OT     12/20/21 -  Koby Bass OT Occupational Therapist OT              OT Recommendation and Plan  Planned Therapy Interventions (OT): activity tolerance training, adaptive equipment training, BADL retraining, functional balance retraining, occupation/activity based interventions, patient/caregiver education/training, strengthening exercise, transfer/mobility retraining  Therapy Frequency (OT): 5 times/wk  Plan of Care Review  Plan of Care Reviewed With: patient  Progress: improving  Outcome Evaluation: Patient slowly progressing w/ adls with improved pain in LEs, balance and endurance to support adls. Pt. continues to wear mainly  hospital gowns due to wound on LEs/pain.  Plan to continue POC established and advance per patients wound healing for LB self-care.     Time Calculation:    Time Calculation- OT     Row Name  04/05/22 1306             Time Calculation- OT    OT Received On 04/05/22  -GC              Timed Charges    46680 - OT Therapeutic Exercise Minutes 20  -GC      52511 - OT Therapeutic Activity Minutes 10  -GC      81887 - OT Self Care/Mgmt Minutes 25  -GC              SNF Occupational Therapy Minutes    Skilled Minutes- OT 55 min  -GC              Total Minutes    Timed Charges Total Minutes 55  -GC       Total Minutes 55  -GC            User Key  (r) = Recorded By, (t) = Taken By, (c) = Cosigned By    Initials Name Provider Type     Rosalba Christianson OT Occupational Therapist              Therapy Charges for Today     Code Description Service Date Service Provider Modifiers Qty    22545206087 HC OT THER PROC EA 15 MIN 4/4/2022 Rosalba Christianson, OT GO 1    82392191338 HC OT THERAPEUTIC ACT EA 15 MIN 4/4/2022 Rosalba Christianson, OT GO 1    09627436764 HC OT SELF CARE/MGMT/TRAIN EA 15 MIN 4/4/2022 Rosalba Christianson, OT GO 2    17109441189 HC OT THER PROC EA 15 MIN 4/5/2022 Rosalba Christianson, OT GO 1    08150341860 HC OT THERAPEUTIC ACT EA 15 MIN 4/5/2022 Rosalba Christianson, OT GO 1    17390750238 HC OT SELF CARE/MGMT/TRAIN EA 15 MIN 4/5/2022 Rosalba Christianson, OT GO 2               Rosalba Sammy, OT  4/5/2022

## 2022-04-06 NOTE — NURSING NOTE
Spoke with Mitch in pharmacy about Covid booster shot for the pt, Dr Fajardo gave order ok to give the shot.  Mitch states he will place order for it.

## 2022-04-06 NOTE — THERAPY TREATMENT NOTE
SNF - Occupational Therapy Treatment Note   [facility]    Patient Name: Lauren Redd  : 1945    MRN: [id number]                              Today's Date: 2022       Admit Date: 3/23/2022    Visit Dx:     ICD-10-CM ICD-9-CM   1. Decreased activities of daily living (ADL)  Z78.9 V49.89   2. Difficulty walking  R26.2 719.7     Patient Active Problem List   Diagnosis   • Positive colorectal cancer screening using Cologuard test   • Atrial fibrillation with RVR (HCC)   • CHF (congestive heart failure) (HCC)   • Cellulitis of anterior lower leg   • Generalized weakness   • Atrial fibrillation (HCC)     Past Medical History:   Diagnosis Date   • Afib (HCC)    • Aneurysm (HCC)    • Arthritis    • GERD (gastroesophageal reflux disease)    • Hypertension    • Irregular heart beat      Past Surgical History:   Procedure Laterality Date   • COLONOSCOPY     • HYSTERECTOMY        General Information     Row Name 22 1236          OT Time and Intention    Document Type therapy note (daily note)  -     Mode of Treatment individual therapy;occupational therapy  -     Row Name 22 1236          General Information    Patient Profile Reviewed yes  -     Existing Precautions/Restrictions fall;oxygen therapy device and L/min  -     Row Name 22 1236          Safety Issues, Functional Mobility    Impairments Affecting Function (Mobility) balance;endurance/activity tolerance;pain;strength  -           User Key  (r) = Recorded By, (t) = Taken By, (c) = Cosigned By    Initials Name Provider Type     Rosalba Christianson OT Occupational Therapist                 Mobility/ADL's     Row Name 22 1236          Transfers    Transfers bed-chair transfer;sit-stand transfer;stand-sit transfer;toilet transfer  -     Bed-Chair [place] (Transfers) contact guard;verbal cues;1 person assist;standby assist  -     Sit-Stand [place] (Transfers) contact guard;standby assist  -     Stand-Sit [place]  (Transfers) contact guard;standby assist  -     [place] Level (Toilet Transfer) contact guard;standby assist  -     Assistive Device (Toilet Transfer) [facility], 3-in-1  -     Row Name 04/06/22 1236          Sit-Stand Transfer    Assistive Device (Sit-Stand Transfers) walker, front-wheeled  -     Row Name 04/06/22 1236          Toilet Transfer    Type (Toilet Transfer) stand pivot/stand step  -     Row Name 04/06/22 1236          Stand-Sit Transfer    Assistive Device (Stand-Sit Transfers) walker, front-wheeled  -     Row Name 04/06/22 1236          Stand Pivot/Stand Step Transfer    Stand Pivot/Stand Step [place] (Transfers) contact guard;standby assist  -[facility] Name 04/06/22 1236          Bathing Assessment/Intervention    [place] Level (Bathing) bathing skills;minimum assist (75% patient effort)  -     Row Name 04/06/22 1236          Upper Body Dressing Assessment/Training    [place] Level (Upper Body Dressing) upper body dressing skills;set up  -[facility] Name 04/06/22 123          Lower Body Dressing Assessment/Training    [place] Level (Lower Body Dressing) lower body dressing skills;maximum assist (25% patient effort);moderate assist (50% patient effort);socks;pants/bottoms  -     Row Name 04/06/22 1236          Grooming Assessment/Training    [place] Level (Grooming) grooming skills;set up;oral care regimen;wash face, hands;hair care, combing/brushing  -     Row Name 04/06/22 1236          Toileting Assessment/Training    [place] Level (Toileting) toileting skills;[facility] assist  [facility]     Assistive Devices (Toileting) [facility], 3-in-1  [facility]           User Key  (r) = Recorded By, (t) = Taken By, (c) = Cosigned By    Initials Name Provider Type     Rosalba Christianson OT Occupational Therapist               Obj/Interventions     Row Name 04/06/22 1237          Motor Skills    Therapeutic Exercise aerobic  -     Row Name 04/06/22 1237          Aerobic Exercise     Comment, Aerobic Exercise (Therapeutic Exercise) Patient tolerated the Omnicycle x20 minutes w/ no rest breaks  -           User Key  (r) = Recorded By, (t) = Taken By, (c) = Cosigned By    Initials Name Provider Type    Rosalba Mccormick OT Occupational Therapist               Goals/Plan    No documentation.                Clinical Impression     Row Name 04/06/22 1238          Pain Scale: FACES Pre/Post-Treatment    Pain: FACES Scale, Pretreatment 2-->hurts little bit  -     Posttreatment Pain Rating 2-->hurts little bit  -     Row Name 04/06/22 1238          Plan of Care Review    Plan of Care Reviewed With patient  -     Progress improving  -     Outcome Evaluation Pt. is slowly improving with adls with improved endurance, balance and pain. Pt. continues to present w/ a lot of edema and wounds on LEs. Pt. mainly wearing hospital gowns for comfort.  Plan to continue POC established.  -     Row Name 04/06/22 1238          Therapy Plan Review/Discharge Plan (OT)    Anticipated Discharge Disposition (OT) home with home health;home with assist  -           User Key  (r) = Recorded By, (t) = Taken By, (c) = Cosigned By    Initials Name Provider Type    Rosalba Mccormick OT Occupational Therapist               Outcome Measures     Row Name 04/06/22 1240          How much help from another is currently needed...    Putting on and taking off regular lower body clothing? 2  -GC     Bathing (including washing, rinsing, and drying) 3  -GC     Toileting (which includes using toilet bed pan or urinal) 4  -GC     Putting on and taking off regular upper body clothing 4  -GC     Taking care of personal grooming (such as brushing teeth) 4  -GC     Eating meals 4  -GC     AM-PAC 6 Clicks Score (OT) 21  -     Row Name 04/06/22 0900          How much help from another person do you currently need...    Turning from your back to your side while in flat bed without using bedrails? 4  -LH     Moving from lying on back  to sitting on the side of a flat bed without bedrails? 3  -LH     Moving to and from a bed to a chair (including a wheelchair)? 3  -LH     Standing up from a chair using your arms (e.g., wheelchair, bedside chair)? 3  -LH     Climbing 3-5 steps with a railing? 2  -LH     To walk in hospital room? 3  -LH     AM-PAC 6 Clicks Score (PT) 18  -LH     Row Name 04/06/22 1240          Optimal Instrument    Optimal Instrument Optimal - 3  -GC     Bending/Stooping 3  -GC     Standing 1  -GC     Reaching 1  -GC           User Key  (r) = Recorded By, (t) = Taken By, (c) = Cosigned By    Initials Name Provider Type     Rosa Montes, RN Registered Nurse    Rosalba Mccormick OT Occupational Therapist              Section G  Mobility  Dressing - self performance: limited assistance (staff provide guided maneuvering of limbs or other non-weight bearing assistance)  Dressing support/assistance: One person assist  Eating - self performance: independent  Eating support/assistance: No setup or physical help  Toileting - self performance: limited assistance (staff provide guided maneuvering of limbs or other non-weight bearing assistance)  Toileting support/assistance: One person assist  Personal hygiene - self performance: supervision (oversight, encourage)  Personal hygiene support/assistance: One person assist  Bathing  Bathing - self performance: Physical help only to transfer to bathe  Bathing support/assistance: One person assist     Range of Motion  Upper Extremity: No impairment  Section GG  SectionGG: Functional Ability/Goals, Adm  Self Care, Prior Functioning (OC9587Z): 3. Independent  Functional Cognition, Prior Functioning (WS2376I): 3. Independent  Self Care, Admission (Section GG)  Eating: Self-Care Admission Performance (AY8549Z2): independent (06)  Oral Hygiene: Self-Care Admission Performance (JH3451Z4): setup or clean-up assistance (05)  Toileting Hygiene: Self-Care Admission Performance (AS7972Y0): supervision or  touching assistance (04)  Shower/Bathe Self: Self-Care Admission Performance (FD2777A9): partial/moderate assistance (03)  Upper Body Dressing: Self-Care Admission Performance (HC6617Q0): setup or clean-up assistance (05)  Putting On/Taking Off Footwear: Self-Care Admission Performance (MK7665S3): substantial/maximal assistance (02)  Mobility, Admission Performance (TI6130)  Toilet Transfer: Mobility Admission Performance (XX8639S0): supervision or touching assistance (04)  Section GG: Functional Ability/Goals, DC  Eating: Self-Care Discharge Goal (IA6747J2): independent (06)  Oral Hygiene: Self-Care Discharge Goal (YL2915P7): independent (06)  Toileting Hygiene: Self-Care Discharge Goal (EZ9831Y7): independent (06)  Shower/Bathe Self: Self-Care Discharge Goal (FF4783D3): independent (06)  Upper Body Dressing: Self-Care Discharge Goal (UO6189W0): independent (06)  Lower Body Dressing: Self-Care Discharge Goal (PL0320X3): independent (06)  Putting On/Taking Off Footwear: Self-Care Discharge Goal (LC6806Z3): independent (06)  Mobility (GG), Discharge Goal (EB0300)  Toilet Transfer: Mobility Discharge Goal (LJ5415I9): independent (06)          Occupational Therapy Education                 Title: PT OT SLP Therapies (Done)     Topic: Occupational Therapy (Done)     Point: ADL training (Done)     Description:   Instruct learner(s) on proper safety adaptation and remediation techniques during self care or transfers.   Instruct in proper use of assistive devices.              Learning Progress Summary           Patient Acceptance, E, VU by MARCEL at 4/1/2022 1149    Acceptance, E, VU,NR by GC at 3/24/2022 1149                   Point: Home exercise program (Done)     Description:   Instruct learner(s) on appropriate technique for monitoring, assisting and/or progressing therapeutic exercises/activities.              Learning Progress Summary           Patient Acceptance, E, VU by MARCEL at 4/1/2022 1149    Acceptance, E, VU,NR by   at 3/24/2022 1149                   Point: Precautions (Done)     Description:   Instruct learner(s) on prescribed precautions during self-care and functional transfers.              Learning Progress Summary           Patient Acceptance, E, VU by  at 4/1/2022 1149    Acceptance, E, VU,NR by  at 3/24/2022 1149                   Point: Body mechanics (Done)     Description:   Instruct learner(s) on proper positioning and spine alignment during self-care, functional mobility activities and/or exercises.              Learning Progress Summary           Patient Acceptance, E, VU by  at 4/1/2022 1149    Acceptance, E, VU,NR by  at 3/24/2022 1149                               User Key     Initials Effective Dates Name Provider Type Discipline     06/16/21 -  Rosalba Christianson OT Occupational Therapist OT     12/20/21 -  Koby Bass OT Occupational Therapist OT              OT Recommendation and Plan  Planned Therapy Interventions (OT): activity tolerance training, adaptive equipment training, BADL retraining, functional balance retraining, occupation/activity based interventions, patient/caregiver education/training, strengthening exercise, transfer/mobility retraining  Therapy Frequency (OT): 5 times/wk  Plan of Care Review  Plan of Care Reviewed With: patient  Progress: improving  Outcome Evaluation: Pt. is slowly improving with adls with improved endurance, balance and pain. Pt. continues to present w/ a lot of edema and wounds on LEs. Pt. mainly wearing hospital gowns for comfort.  Plan to continue POC established.     Time Calculation:    Time Calculation- OT     Row Name 04/06/22 1241             Time Calculation- OT    OT Received On 04/06/22  -              Timed Charges    86904 - OT Therapeutic Exercise Minutes 20  -      22572 - OT Therapeutic Activity Minutes 10  -      77536 - OT Self Care/Mgmt Minutes 24  -GC              SNF Occupational Therapy Minutes    Skilled Minutes- OT 54 min  -               Total Minutes    Timed Charges Total Minutes 54  -GC       Total Minutes 54  -GC            User Key  (r) = Recorded By, (t) = Taken By, (c) = Cosigned By    Initials Name Provider Type     Rosalba Christianson OT Occupational Therapist              Therapy Charges for Today     Code Description Service Date Service Provider Modifiers Qty    77323983259 HC OT THER PROC EA 15 MIN 4/5/2022 Rosalba Christianson, OT GO 1    16434911994 HC OT THERAPEUTIC ACT EA 15 MIN 4/5/2022 Rosalba Christianson, OT GO 1    80081892484 HC OT SELF CARE/MGMT/TRAIN EA 15 MIN 4/5/2022 Rosalba Christianson, OT GO 2    45082008231 HC OT THER PROC EA 15 MIN 4/6/2022 Rosalba Christianson, OT GO 1    93249382310 HC OT THERAPEUTIC ACT EA 15 MIN 4/6/2022 Rosalba Christianson, OT GO 1    55952299165 HC OT SELF CARE/MGMT/TRAIN EA 15 MIN 4/6/2022 Rosalba Christianson, OT GO 2               Rosalba Smamy, OT  4/6/2022

## 2022-04-06 NOTE — PLAN OF CARE
Goal Outcome Evaluation:  Plan of Care Reviewed With: patient        Progress: improving  Outcome Evaluation: Rsd. is alert and oriented x4.  Denies pain at this time.  Rsd. recieves scheduled hydrocodome of pain, states relief.  Rsd. is x1 assist to BSC, and has episodes of incontinence of the bladder.  Not able to get accurate I & O due to incontinence.  Call light and personal items within reach.  Rsd. reminded to use call light for assistance, verbalizes understanding.  Will continue to monitor and notify on-coming staff. Current plan of care continues at this time.

## 2022-04-06 NOTE — PROGRESS NOTES
[facility]     Progress Note    Patient Name: Lauren Redd  : 1945  MRN: [id number]  Primary Care Physician:  Dennis Kohler MD  Date of admission: 3/23/2022      Subjective   Brief summary.  Admitted to rehab for cellulitis, CHF and A. fib and weakness      HPI:  Follow-up on cellulitis and shortness of breath,   Shortness of breath slightly better today  Edema improving  Wants to get a Covid vaccine    Review of Systems     Fatigue and weakness,  Denies any chest pain or shortness of breath at rest.  No palpitations    Objective     Vitals:   Temp:  [97.1 °F (36.2 °C)-98 °F (36.7 °C)] 98 °F (36.7 °C)  Heart Rate:  [72-82] 82  Resp:  [20] 20  BP: (112-127)/(74-79) 112/74  Flow (L/min):  [2] 2    Physical Exam :     Elderly female not in acute distress.  Tired looking.    Heart irregular and tachycardic.   Lungs diminished breath sounds.  Abdomen soft.  Both lower extremities with ulcers healing and edema   neurologically awake alert and oriented.      Result Review:  I have personally reviewed the results from the time of this admission to 2022 18:47 EDT and agree with these findings:  [x]  Laboratory  []  Microbiology  []  Radiology  []  EKG/Telemetry   []  Cardiology/Vascular   []  Pathology  []  Old records  []  Other:           Assessment / Plan       Active Hospital Problems:  Active Hospital Problems    Diagnosis    • Atrial fibrillation (HCC)    • Generalized weakness    • CHF (congestive heart failure) (HCC)    • Cellulitis of anterior lower leg        Plan:   Continue treatment per cardiologist for CHF.  Continue wound care.  Covid vaccine.       DVT prophylaxis:  Medical DVT prophylaxis orders are present.    CODE STATUS:   Code Status (Patient has no pulse and is not breathing): CPR (Attempt to Resuscitate)  Medical Interventions (Patient has pulse or is breathing): Full Support                Electronically signed by Pablo Fajardo MD, 22, 6:48 PM EDT.

## 2022-04-06 NOTE — PLAN OF CARE
Goal Outcome Evaluation:  Plan of Care Reviewed With: patient        Progress: no change   Up in chair most of day, no c/o soa, sleeping off and on, awakens without difficulty, denied needing any pain medication at scheduled time so med with held.

## 2022-04-06 NOTE — THERAPY TREATMENT NOTE
SNF - Physical Therapy Treatment Note   [facility]     Patient Name: Lauren Redd  : 1945  MRN: [id number]  Today's Date: 2022      Visit Dx:    ICD-10-CM ICD-9-CM   1. Decreased activities of daily living (ADL)  Z78.9 V49.89   2. Difficulty walking  R26.2 719.7     Patient Active Problem List   Diagnosis   • Positive colorectal cancer screening using Cologuard test   • Atrial fibrillation with RVR (HCC)   • CHF (congestive heart failure) (HCC)   • Cellulitis of anterior lower leg   • Generalized weakness   • Atrial fibrillation (HCC)     Past Medical History:   Diagnosis Date   • Afib (HCC)    • Aneurysm (HCC)    • Arthritis    • GERD (gastroesophageal reflux disease)    • Hypertension    • Irregular heart beat      Past Surgical History:   Procedure Laterality Date   • COLONOSCOPY     • HYSTERECTOMY         PT Assessment (last 12 hours)     PT Evaluation and Treatment     Row Name 22 1548          Physical Therapy Time and Intention    Subjective Information complains of;pain  -CS     Document Type therapy note (daily note)  -CS     Mode of Treatment individual therapy;physical therapy  -CS     Patient Effort good  -CS     Row Name 22 1548          Pain    Pretreatment Pain Rating 5/10  -CS     Posttreatment Pain Rating 5/10  -CS     Row Name 22 1548          Transfers    Transfers sit-stand transfer;stand-sit transfer;toilet transfer  -CS     Sit-Stand [place] (Transfers) contact guard  -CS     Stand-Sit [place] (Transfers) contact guard  -CS     [place] Level (Toilet Transfer) contact guard  -CS     Assistive Device (Toilet Transfer) commode, 3-in-1  -     Row Name 22 1548          Sit-Stand Transfer    Assistive Device (Sit-Stand Transfers) walker, front-wheeled  -     Row Name 22 1548          Stand-Sit Transfer    Assistive Device (Stand-Sit Transfers) walker, front-wheeled  -     Row Name 22 1548          Toilet Transfer    Type (Toilet  Transfer) stand pivot/stand step  -     Row Name 04/06/22 1548          Gait/Stairs (Locomotion)    Gait/Stairs Locomotion gait/ambulation assistive device  -     [place] Level (Gait) contact guard  -     Assistive Device (Gait) walker, front-wheeled  -     Distance in Feet (Gait) 5  -CS     Pattern (Gait) 4-point;step-through  -CS     Deviations/Abnormal Patterns (Gait) gait speed decreased;stride length decreased  -     Bilateral Gait Deviations forward flexed posture  -     Row Name 04/06/22 1548          Motor Skills    Therapeutic Exercise hip;knee;ankle  -     Row Name 04/06/22 1548          Hip (Therapeutic Exercise)    Hip (Therapeutic Exercise) strengthening exercise  -     Hip Strengthening (Therapeutic Exercise) bilateral;marching while seated;20 repititions  -     Row Name 04/06/22 1548          Knee (Therapeutic Exercise)    Knee (Therapeutic Exercise) strengthening exercise  -     Knee Strengthening (Therapeutic Exercise) bilateral;sitting;LAQ (long arc quad);20 repititions  -     Row Name 04/06/22 1548          Ankle (Therapeutic Exercise)    Ankle (Therapeutic Exercise) strengthening exercise  -     Ankle Strengthening (Therapeutic Exercise) bilateral;dorsiflexion;plantarflexion;sitting;20 repititions  -     Row Name             Wound 02/09/22 1235 Right lower leg Blisters    Wound - Properties Group Placement Date: 02/09/22  -FL Placement Time: 1235  -FL Present on Hospital Admission: Y  -FL Side: Right  -FL Orientation: lower  -FL Location: leg  -FL Primary Wound Type: Blisters  -FL     Retired Wound - Properties Group Placement Date: 02/09/22  -FL Placement Time: 1235  -FL Present on Hospital Admission: Y  -FL Side: Right  -FL Orientation: lower  -FL Location: leg  -FL Primary Wound Type: Blisters  -FL     Retired Wound - Properties Group Date first assessed: 02/09/22  -FL Time first assessed: 1235  -FL Present on Hospital Admission: Y  -FL Side: Right  -FL Location:  leg  -FL Primary Wound Type: Blisters  -FL     Row Name             Wound 02/23/22 0934 Left lower leg    Wound - Properties Group Placement Date: 02/23/22  -BA Placement Time: 0934  -BA Present on Hospital Admission: Y  -BA Side: Left  -BA Orientation: lower  -BA Location: leg  -BA     Retired Wound - Properties Group Placement Date: 02/23/22  -BA Placement Time: 0934  -BA Present on Hospital Admission: Y  -BA Side: Left  -BA Orientation: lower  -BA Location: leg  -BA     Retired Wound - Properties Group Date first assessed: 02/23/22  -BA Time first assessed: 0934  -BA Present on Hospital Admission: Y  -BA Side: Left  -BA Location: leg  -BA     Row Name             Wound 03/28/22 1558 Right gluteal    Wound - Properties Group Placement Date: 03/28/22  -FH Placement Time: 1558  -FH Side: Right  -FH Location: gluteal  -FH     Retired Wound - Properties Group Placement Date: 03/28/22  -FH Placement Time: 1558  -FH Side: Right  -FH Location: gluteal  -FH     Retired Wound - Properties Group Date first assessed: 03/28/22  -FH Time first assessed: 1558  -FH Side: Right  -FH Location: gluteal  -FH     Row Name 04/06/22 1548          Plan of Care Review    Plan of Care Reviewed With patient  -CS     Progress no change  -CS     Outcome Evaluation There is no significant change to note.  The patient continues to progress toward goals.  Continue with plan of care.  -CS     Row Name 04/06/22 1548          Positioning and Restraints    Pre-Treatment Position sitting in chair/recliner  -CS     Post Treatment Position chair  -CS     In Chair sitting;call light within reach;encouraged to call for assist  -CS     Row Name 04/06/22 1548          Progress Summary (PT)    Progress Toward Functional Goals (PT) progress toward functional goals is gradual  -CS           User Key  (r) = Recorded By, (t) = Taken By, (c) = Cosigned By    Initials Name Provider Type    Sarah Medina, RN Registered Nurse    Sylvia Olson RN  Registered Nurse    Swathi Yuen RN Registered Nurse    Jojo Tejada, PT Physical Therapist              Section G  Mobility  Bed mobility - self performance: activity did not occur  Bed mobility support/assistance: Activity did not occur  Transfer - self performance: limited assistance (staff provide guided maneuvering of limbs or other non-weight bearing assistance)  Transfer support/assistance: One person assist  Walking in room - self performance: limited assistance (staff provide guided maneuvering of limbs or other non-weight bearing assistance)  Walking in room support/assistance: One person assist  Walking in corridors/hallway - self performance: activity did not occur  Walking in corridors/hallway support/assistance: Activity did not occur  Locomotion on unit - self performance: activity did not occur  Locomotion on unit support/assistance: Activity did not occur  Locomotion off unit - self performance: activity did not occur  Locomotion off unit support/assistance: Activity did not occur     Balance  Balance during transitions & walking: Not steady, requires assist to steady  Moving from seated to standing position: Not steady, requires assist to steady  Walking: Not steady, requires assist to steady  Turning around while walking: Not steady, requires assist to steady  Moving on and off toilet: Not steady, requires assist to steady  Surface-to-surface transfer: Not steady, requires assist to steady  Mobility devices: Cane/crutch     Section GG  SectionGG: Functional Ability/Goals, Adm  Self Care, Prior Functioning (EW1280S): 3. Independent  Indoor Mobility - Ambulation, Prior Function (LS9334V): 3. Independent  Stairs, Prior Function (UZ2424O): 3. Independent  Functional Cognition, Prior Functioning (HZ8645I): 3. Independent  Prior Device Use (RA4823): none of the above (Z)     Mobility, Admission Performance (LP4671)  Roll Left & Right: Mobility Admission Performance (XC8495H7):  partial/moderate assistance (03)  Sit to Lying: Mobility Admission Performance (AM2721O0): partial/moderate assistance (03)  Lying to Sitting, Side of Bed: Mobility Admission Performance (LK4782X7): partial/moderate assistance (03)  Sit to Stand: Mobility Admission Performance (IZ9320O5): partial/moderate assistance (03)  Chair/Bed-Chair Transfer: Mobility Admission Performance (BM8262Z9): partial/moderate assistance (03)  Toilet Transfer: Mobility Admission Performance (QZ4385H8): partial/moderate assistance (03)  Car Transfer: Mobility Admission Performance (BV0011Q4): not applicable (09)  Walk 10 Feet: Mobility Admission Performance (SB1195C6): not attempted, medical condition/safety concern (88)  Walk 50 Feet With Two Turns: Mobility Admission Performance (TB7316S8): not attempted, medical condition/safety concern ()  Walk 150 Feet: Mobility Admission Performance (BM8014F0): not attempted, medical condition/safety concern ()  Walk 10 Ft, Uneven Surfaces: Mobility Admission Performance (AZ3821Y8): not applicable ()  1 Step/Curb: Mobility Admission Performance (OG0691L7): not attempted, medical condition/safety concern ()  4 Steps: Mobility Admission Performance (EX0349U7): not attempted, medical condition/safety concern ()  12 Steps: Mobility Admission Performance (FN8695D4): not attempted, medical condition/safety concern (88)  Picking up object: Mobility Admission Performance (NL4071R4): not attempted, medical condition/safety concern (88)  Use a Wheelchair and/or Scooter: Mobility (WO8218Z0): no (0)     Mobility (GG), Discharge Goal (PA0655)  Roll Left & Right: Mobility Discharge Goal (XL9123S1): independent (06)  Sit to Lying: Mobility Discharge Goal (OK2120Q4): independent (06)  Lying to Sitting, Side of Bed: Mobility Discharge Goal (WM2882Q6): independent (06)  Sit to Stand: Mobility Discharge Goal (GS7020F8): independent (06)  Chair/Bed-Chair Transfer: Mobility Discharge Goal (ST2381P2):  independent (06)  Toilet Transfer: Mobility Discharge Goal (EX1894W9): independent (06)  Car Transfer: Mobility Discharge Goal (MA6041S7): not applicable (09)  Walk 10 Feet: Mobility Discharge Goal (QD0704T3): independent (06)  Walk 50 Feet With Two Turns: Mobility Discharge Goal (ZW0964X0): independent (06)  Walk 150 Feet: Mobility Discharge Goal (VX6489P7): independent (06)  Walk 10 Ft, Uneven Surfaces: Mobility Discharge Goal (JD8099K3): not applicable (09)  1 Step/Curb: Mobility Discharge Goal (NT8554P3): independent (06)  4 Steps: Mobility Discharge Goal (QF5221Z7): independent (06)  12 Steps: Mobility Discharge Goal (KT6251Y6): independent ()  Picking up object: Mobility Discharge Goal (KZ5569Q0): independent ()  Use a Wheelchair and/or Scooter: Mobility (HI3508Y3): no (0)  Wheel 50 Ft Two Turns: Mobility Discharge Goal (EQ3646I6): not applicable ()  Wheel 150 Feet: Mobility Discharge Goal (BL7164U7): not applicable (09)     Mobility, Discharge Performance (MV6268)  Use a Wheelchair and/or Scooter: Mobility (JZ2258M1): no (0)  Physical Therapy Education                 Title: PT OT SLP Therapies (Done)     Topic: Physical Therapy (Done)     Point: Mobility training (Done)     Learning Progress Summary           Patient Acceptance, E, VU by MARCEL at 2022 1149    Acceptance, E,TB, VU by CS at 3/24/2022 1607                   Point: Home exercise program (Done)     Learning Progress Summary           Patient Acceptance, E, VU by KN at 2022 1149    Acceptance, E,TB, VU by CS at 3/24/2022 1607                   Point: Body mechanics (Done)     Learning Progress Summary           Patient Acceptance, E, VU by KN at 2022 1149    Acceptance, E,TB, VU by CS at 3/24/2022 1607                   Point: Precautions (Done)     Learning Progress Summary           Patient Acceptance, E, VU by KN at 2022 1149    Acceptance, E,TB, VU by CS at 3/24/2022 1607                               User Key     Initials  Effective Dates Name Provider Type Discipline     04/25/21 -  Jojo Ramirez, NGOZI Physical Therapist PT     12/20/21 -  Koby Bass OT Occupational Therapist OT              PT Recommendation and Plan  Anticipated Discharge Disposition (PT): home with home health, home with assist  Planned Therapy Interventions (PT): balance training, bed mobility training, gait training, stair training, strengthening, stretching, transfer training, patient/family education  Therapy Frequency (PT): 6 times/wk  Progress Summary (PT)  Progress Toward Functional Goals (PT): progress toward functional goals is gradual  Plan of Care Reviewed With: patient  Progress: no change  Outcome Evaluation: There is no significant change to note.  The patient continues to progress toward goals.  Continue with plan of care.   Outcome Measures     Row Name 04/06/22 1557 04/05/22 1508 04/04/22 1521       How much help from another person do you currently need...    Turning from your back to your side while in flat bed without using bedrails? 4  -CS 4  -CS 4  -CS    Moving from lying on back to sitting on the side of a flat bed without bedrails? 3  -CS 3  -CS 3  -CS    Moving to and from a bed to a chair (including a wheelchair)? 3  -CS 3  -CS 3  -CS    Standing up from a chair using your arms (e.g., wheelchair, bedside chair)? 3  -CS 3  -CS 3  -CS    Climbing 3-5 steps with a railing? 2  -CS 2  -CS 2  -CS    To walk in hospital room? 3  -CS 3  -CS 3  -CS    AM-PAC 6 Clicks Score (PT) 18  -CS 18  -CS 18  -CS       Functional Assessment    Outcome Measure Options AM-PAC 6 Clicks Basic Mobility (PT)  -CS AM-PAC 6 Clicks Basic Mobility (PT)  -CS AM-PAC 6 Clicks Basic Mobility (PT)  -CS          User Key  (r) = Recorded By, (t) = Taken By, (c) = Cosigned By    Initials Name Provider Type    CS Jojo Ramirez, PT Physical Therapist                 Time Calculation:    PT Charges     Row Name 04/06/22 1547             Time Calculation    PT Received  On 04/06/22  -CS              Timed Charges    68248 - PT Therapeutic Exercise Minutes 10  -CS      89001 - PT Therapeutic Activity Minutes 15  -CS              SNF Physical Therapy Minutes    Skilled Minutes- PT 25 min  -CS              Total Minutes    Timed Charges Total Minutes 25  -CS       Total Minutes 25  -CS            User Key  (r) = Recorded By, (t) = Taken By, (c) = Cosigned By    Initials Name Provider Type    CS Jojo Ramirez, PT Physical Therapist              Therapy Charges for Today     Code Description Service Date Service Provider Modifiers Qty    85045446434 HC PT THER PROC EA 15 MIN 4/5/2022 Jojo Ramirez, PT GP 1    77099932325 HC PT THERAPEUTIC ACT EA 15 MIN 4/5/2022 Jojo Ramirez, PT GP 1    55320492868 HC PT THER PROC EA 15 MIN 4/6/2022 Jojo Ramirez, PT GP 1    00367895059 HC PT THERAPEUTIC ACT EA 15 MIN 4/6/2022 Jojo Ramirez, PT GP 1          PT G-Codes  Outcome Measure Options: AM-PAC 6 Clicks Basic Mobility (PT)  AM-PAC 6 Clicks Score (PT): 18  AM-PAC 6 Clicks Score (OT): 21    Jojo Ramirez PT  4/6/2022

## 2022-04-06 NOTE — PLAN OF CARE
"Goal Outcome Evaluation:      RD follow-up for 14 day LOS:  Pt eating 100% of all meals now.   Wt up 21# 2\" to edema in legs.  Labs reviewed.  BM yesterday.  Wounds improving per wound nurse notes 4/1.             "

## 2022-04-07 NOTE — THERAPY TREATMENT NOTE
SNF - Physical Therapy Treatment Note  [facility]     Patient Name: Lauren Redd  : 1945  MRN: [id number]  Today's Date: 2022      Visit Dx:    ICD-10-CM ICD-9-CM   1. Decreased activities of daily living (ADL)  Z78.9 V49.89   2. Difficulty walking  R26.2 719.7     Patient Active Problem List   Diagnosis   • Positive colorectal cancer screening using Cologuard test   • Atrial fibrillation with RVR (HCC)   • CHF (congestive heart failure) (HCC)   • Cellulitis of anterior lower leg   • Generalized weakness   • Atrial fibrillation (HCC)     Past Medical History:   Diagnosis Date   • Afib (HCC)    • Aneurysm (HCC)    • Arthritis    • GERD (gastroesophageal reflux disease)    • Hypertension    • Irregular heart beat      Past Surgical History:   Procedure Laterality Date   • COLONOSCOPY     • HYSTERECTOMY         PT Assessment (last 12 hours)     PT Evaluation and Treatment     Row Name 22 1549          Physical Therapy Time and Intention    Subjective Information no complaints  -CS     Document Type therapy note (daily note)  -CS     Mode of Treatment individual therapy;physical therapy  -CS     Patient Effort good  -CS     Row Name 22 1549          Pain    Pretreatment Pain Rating 5/10  -CS     Posttreatment Pain Rating 5/10  -CS     Row Name 22 1549          Bed Mobility    Bed Mobility supine-sit  -CS     Supine-Sit [place] (Bed Mobility) contact guard  -CS     Row Name 22 1549          Transfers    Transfers sit-stand transfer;stand-sit transfer;bed-chair transfer  -CS     Bed-Chair [place] (Transfers) contact guard  -CS     Assistive Device (Bed-Chair Transfers) other (see comments)  None  -CS     Sit-Stand [place] (Transfers) contact guard  -CS     Stand-Sit [place] (Transfers) contact guard  -CS     Row Name 22 1549          Sit-Stand Transfer    Assistive Device (Sit-Stand Transfers) walker, front-wheeled  -CS     Row Name 22 1549           Stand-Sit Transfer    Assistive Device (Stand-Sit Transfers) walker, front-wheeled  -CS     Row Name 04/07/22 1549          Gait/Stairs (Locomotion)    Gait/Stairs Locomotion gait/ambulation assistive device  -     [place] Level (Gait) contact guard  -     Assistive Device (Gait) walker, front-wheeled  -CS     Distance in Feet (Gait) 20 x 2  -CS     Pattern (Gait) 4-point;step-through  -CS     Deviations/Abnormal Patterns (Gait) gait speed decreased;stride length decreased  -     Bilateral Gait Deviations forward flexed posture  -CS     Row Name 04/07/22 1549          Motor Skills    Therapeutic Exercise hip;knee;ankle  -CS     Row Name 04/07/22 1549          Hip (Therapeutic Exercise)    Hip (Therapeutic Exercise) isometric exercises;strengthening exercise  -     Hip Isometrics (Therapeutic Exercise) bilateral;sitting;gluteal sets;3 sets;3 second hold;10 repetitions  -     Hip Strengthening (Therapeutic Exercise) bilateral;marching while seated;sitting;aBduction;flexion;20 repititions  -     Row Name 04/07/22 1549          Knee (Therapeutic Exercise)    Knee (Therapeutic Exercise) isometric exercises;strengthening exercise  -     Knee Isometrics (Therapeutic Exercise) bilateral;sitting;quad sets;3 sets;3 second hold;10 repetitions  -     Knee Strengthening (Therapeutic Exercise) bilateral;sitting;LAQ (long arc quad);20 repititions  -     Row Name 04/07/22 1549          Ankle (Therapeutic Exercise)    Ankle (Therapeutic Exercise) strengthening exercise  -     Ankle Strengthening (Therapeutic Exercise) bilateral;sitting;dorsiflexion;plantarflexion;20 repititions  -     Row Name             Wound 02/09/22 1235 Right lower leg Blisters    Wound - Properties Group Placement Date: 02/09/22  -FL Placement Time: 1235  -FL Present on Hospital Admission: Y  -FL Side: Right  -FL Orientation: lower  -FL Location: leg  -FL Primary Wound Type: Blisters  -FL     Retired Wound - Properties Group  Placement Date: 02/09/22  -FL Placement Time: 1235  -FL Present on Hospital Admission: Y  -FL Side: Right  -FL Orientation: lower  -FL Location: leg  -FL Primary Wound Type: Blisters  -FL     Retired Wound - Properties Group Date first assessed: 02/09/22  -FL Time first assessed: 1235  -FL Present on Hospital Admission: Y  -FL Side: Right  -FL Location: leg  -FL Primary Wound Type: Blisters  -FL     Row Name             Wound 02/23/22 0934 Left lower leg    Wound - Properties Group Placement Date: 02/23/22  -BA Placement Time: 0934  -BA Present on Hospital Admission: Y  -BA Side: Left  -BA Orientation: lower  -BA Location: leg  -BA     Retired Wound - Properties Group Placement Date: 02/23/22  -BA Placement Time: 0934  -BA Present on Hospital Admission: Y  -BA Side: Left  -BA Orientation: lower  -BA Location: leg  -BA     Retired Wound - Properties Group Date first assessed: 02/23/22  -BA Time first assessed: 0934  -BA Present on Hospital Admission: Y  -BA Side: Left  -BA Location: leg  -BA     Row Name             Wound 03/28/22 1558 Right gluteal    Wound - Properties Group Placement Date: 03/28/22  -FH Placement Time: 1558  -FH Side: Right  -FH Location: gluteal  -FH     Retired Wound - Properties Group Placement Date: 03/28/22  -FH Placement Time: 1558  -FH Side: Right  -FH Location: gluteal  -FH     Retired Wound - Properties Group Date first assessed: 03/28/22  -FH Time first assessed: 1558  -FH Side: Right  -FH Location: gluteal  -FH     Row Name 04/07/22 1549          Plan of Care Review    Plan of Care Reviewed With patient  -CS     Progress improving  -CS     Outcome Evaluation The patient was able to ambulate 40 feet before needing a seated rest break.  She continues to progress well toward goals.  Continue with plan of care.  -CS     Row Name 04/07/22 1549          Positioning and Restraints    Pre-Treatment Position in bed  -CS     Post Treatment Position chair  -CS     In Chair reclined;call light  within reach;encouraged to call for assist  -     Row Name 04/07/22 1549          Progress Summary (PT)    Progress Toward Functional Goals (PT) progress toward functional goals is gradual  -CS           User Key  (r) = Recorded By, (t) = Taken By, (c) = Cosigned By    Initials Name Provider Type     Sarah Church, RN Registered Nurse    Sylvia Olson, RN Registered Nurse    Swathi Yuen, RN Registered Nurse    Jojo Tejada, PT Physical Therapist              Section G  Mobility  Bed mobility - self performance: activity did not occur  Bed mobility support/assistance: Activity did not occur  Transfer - self performance: limited assistance (staff provide guided maneuvering of limbs or other non-weight bearing assistance)  Transfer support/assistance: One person assist  Walking in room - self performance: limited assistance (staff provide guided maneuvering of limbs or other non-weight bearing assistance)  Walking in room support/assistance: One person assist  Walking in corridors/hallway - self performance: activity did not occur  Walking in corridors/hallway support/assistance: Activity did not occur  Locomotion on unit - self performance: activity did not occur  Locomotion on unit support/assistance: Activity did not occur  Locomotion off unit - self performance: activity did not occur  Locomotion off unit support/assistance: Activity did not occur     Balance  Balance during transitions & walking: Not steady, requires assist to steady  Moving from seated to standing position: Not steady, requires assist to steady  Walking: Not steady, requires assist to steady  Turning around while walking: Not steady, requires assist to steady  Moving on and off toilet: Not steady, requires assist to steady  Surface-to-surface transfer: Not steady, requires assist to steady  Mobility devices: Cane/crutch     Section GG  SectionGG: Functional Ability/Goals, Adm  Self Care, Prior Functioning (JJ5494Y):  3. Independent  Indoor Mobility - Ambulation, Prior Function (PY9714K): 3. Independent  Stairs, Prior Function (TB7183U): 3. Independent  Functional Cognition, Prior Functioning (XP1820G): 3. Independent  Prior Device Use (XJ5597): none of the above (Z)     Mobility, Admission Performance (BI5472)  Roll Left & Right: Mobility Admission Performance (LH6543W3): partial/moderate assistance (03)  Sit to Lying: Mobility Admission Performance (EN0677N9): partial/moderate assistance (03)  Lying to Sitting, Side of Bed: Mobility Admission Performance (BC9323W9): partial/moderate assistance (03)  Sit to Stand: Mobility Admission Performance (RG8451I8): partial/moderate assistance (03)  Chair/Bed-Chair Transfer: Mobility Admission Performance (OL7511O3): partial/moderate assistance (03)  Toilet Transfer: Mobility Admission Performance (JK5942Y5): partial/moderate assistance (03)  Car Transfer: Mobility Admission Performance (ON7328Y7): not applicable (09)  Walk 10 Feet: Mobility Admission Performance (QE4842K2): not attempted, medical condition/safety concern (88)  Walk 50 Feet With Two Turns: Mobility Admission Performance (DF2370V6): not attempted, medical condition/safety concern (88)  Walk 150 Feet: Mobility Admission Performance (DI5429T9): not attempted, medical condition/safety concern ()  Walk 10 Ft, Uneven Surfaces: Mobility Admission Performance (DG9965G0): not applicable (09)  1 Step/Curb: Mobility Admission Performance (UX6119Q6): not attempted, medical condition/safety concern (88)  4 Steps: Mobility Admission Performance (YL6075F5): not attempted, medical condition/safety concern (88)  12 Steps: Mobility Admission Performance (YL6547Q9): not attempted, medical condition/safety concern (88)  Picking up object: Mobility Admission Performance (IC5434J0): not attempted, medical condition/safety concern (88)  Use a Wheelchair and/or Scooter: Mobility (UJ6372Z5): no (0)     Mobility (GG), Discharge Goal  (AX5475)  Roll Left & Right: Mobility Discharge Goal (ZE1289D8): independent (06)  Sit to Lying: Mobility Discharge Goal (NA5276D0): independent (06)  Lying to Sitting, Side of Bed: Mobility Discharge Goal (QJ5366U5): independent (06)  Sit to Stand: Mobility Discharge Goal (IO2842A4): independent (06)  Chair/Bed-Chair Transfer: Mobility Discharge Goal (EY6253U9): independent (06)  Toilet Transfer: Mobility Discharge Goal (BR4897A6): independent (06)  Car Transfer: Mobility Discharge Goal (ER2044C4): not applicable (09)  Walk 10 Feet: Mobility Discharge Goal (JE7321M3): independent (06)  Walk 50 Feet With Two Turns: Mobility Discharge Goal (ZF2657X0): independent (06)  Walk 150 Feet: Mobility Discharge Goal (LR6583Q5): independent (06)  Walk 10 Ft, Uneven Surfaces: Mobility Discharge Goal (PH6599B9): not applicable (09)  1 Step/Curb: Mobility Discharge Goal (RO8077B8): independent (06)  4 Steps: Mobility Discharge Goal (GD0485T1): independent (06)  12 Steps: Mobility Discharge Goal (FY6887D6): independent (06)  Picking up object: Mobility Discharge Goal (KX4505Q0): independent (06)  Use a Wheelchair and/or Scooter: Mobility (XL0082Q8): no (0)  Wheel 50 Ft Two Turns: Mobility Discharge Goal (JD5574O4): not applicable (09)  Wheel 150 Feet: Mobility Discharge Goal (DZ7608O9): not applicable (09)     Mobility, Discharge Performance (BJ8903)  Use a Wheelchair and/or Scooter: Mobility (LN9584X8): no (0)  Physical Therapy Education                 Title: PT OT SLP Therapies (Done)     Topic: Physical Therapy (Done)     Point: Mobility training (Done)     Learning Progress Summary           Patient Acceptance, E, VU by MARCEL at 2022 1149    Acceptance, E,TB, VU by MICHAEL at 3/24/2022 1607                   Point: Home exercise program (Done)     Learning Progress Summary           Patient Acceptance, E, VU by MARCEL at 2022 1149    Acceptance, E,TB, VU by MICHAEL at 3/24/2022 1607                   Point: Body mechanics (Done)      Learning Progress Summary           Patient Acceptance, E, VU by KN at 4/1/2022 1149    Acceptance, E,TB, VU by  at 3/24/2022 1607                   Point: Precautions (Done)     Learning Progress Summary           Patient Acceptance, E, VU by KN at 4/1/2022 1149    Acceptance, E,TB, VU by  at 3/24/2022 1607                               User Key     Initials Effective Dates Name Provider Type Discipline     04/25/21 -  Jojo Ramirez, PT Physical Therapist PT     12/20/21 -  Koby Bass OT Occupational Therapist OT              PT Recommendation and Plan  Anticipated Discharge Disposition (PT): home with home health, home with assist  Planned Therapy Interventions (PT): balance training, bed mobility training, gait training, stair training, strengthening, stretching, transfer training, patient/family education  Therapy Frequency (PT): 6 times/wk  Progress Summary (PT)  Progress Toward Functional Goals (PT): progress toward functional goals is gradual  Plan of Care Reviewed With: patient  Progress: improving  Outcome Evaluation: The patient was able to ambulate 40 feet before needing a seated rest break.  She continues to progress well toward goals.  Continue with plan of care.   Outcome Measures     Row Name 04/07/22 1555 04/06/22 1557 04/05/22 1508       How much help from another person do you currently need...    Turning from your back to your side while in flat bed without using bedrails? 4  -CS 4  -CS 4  -CS    Moving from lying on back to sitting on the side of a flat bed without bedrails? 3  -CS 3  -CS 3  -CS    Moving to and from a bed to a chair (including a wheelchair)? 3  -CS 3  -CS 3  -CS    Standing up from a chair using your arms (e.g., wheelchair, bedside chair)? 3  -CS 3  -CS 3  -CS    Climbing 3-5 steps with a railing? 2  -CS 2  -CS 2  -CS    To walk in hospital room? 3  -CS 3  -CS 3  -CS    AM-PAC 6 Clicks Score (PT) 18  -CS 18  -CS 18  -CS       Functional Assessment    Outcome Measure  Options AM-PAC 6 Clicks Basic Mobility (PT)  -CS AM-PAC 6 Clicks Basic Mobility (PT)  -CS AM-PAC 6 Clicks Basic Mobility (PT)  -CS          User Key  (r) = Recorded By, (t) = Taken By, (c) = Cosigned By    Initials Name Provider Type    CS Jojo Ramirez, PT Physical Therapist                 Time Calculation:    PT Charges     Row Name 04/07/22 1548             Time Calculation    PT Received On 04/07/22  -CS              Timed Charges    74611 - PT Therapeutic Exercise Minutes 10  -CS      14316 - Gait Training Minutes  10  -CS      54629 - PT Therapeutic Activity Minutes 5  -CS              SNF Physical Therapy Minutes    Skilled Minutes- PT 25 min  -CS              Total Minutes    Timed Charges Total Minutes 25  -CS       Total Minutes 25  -CS            User Key  (r) = Recorded By, (t) = Taken By, (c) = Cosigned By    Initials Name Provider Type    CS Jojo Ramirez, PT Physical Therapist              Therapy Charges for Today     Code Description Service Date Service Provider Modifiers Qty    94217817213 HC PT THER PROC EA 15 MIN 4/6/2022 Jojo Ramirez, PT GP 1    97683987257 HC PT THERAPEUTIC ACT EA 15 MIN 4/6/2022 Jojo Ramirez, PT GP 1    75596570416 HC PT THER PROC EA 15 MIN 4/7/2022 Jojo Ramirez, PT GP 1    09191733688 HC GAIT TRAINING EA 15 MIN 4/7/2022 Jojo Ramirez, PT GP 1          PT G-Codes  Outcome Measure Options: AM-PAC 6 Clicks Basic Mobility (PT)  AM-PAC 6 Clicks Score (PT): 18  AM-PAC 6 Clicks Score (OT): 21    Jojo Ramirez PT  4/7/2022

## 2022-04-07 NOTE — PLAN OF CARE
Goal Outcome Evaluation:  Plan of Care Reviewed With: patient        Progress: improving    Patient's BLE dressing changes done today. RN believes that patient's wounds look better than last time RN assessed. Patient also received Covid booster vaccine today and tolerated this well.

## 2022-04-07 NOTE — THERAPY TREATMENT NOTE
SNF - Occupational Therapy Treatment Note   [facility]    Patient Name: Lauren Redd  : 1945    MRN: [id number]                              Today's Date: 2022       Admit Date: 3/23/2022    Visit Dx:     ICD-10-CM ICD-9-CM   1. Decreased activities of daily living (ADL)  Z78.9 V49.89   2. Difficulty walking  R26.2 719.7     Patient Active Problem List   Diagnosis   • Positive colorectal cancer screening using Cologuard test   • Atrial fibrillation with RVR (HCC)   • CHF (congestive heart failure) (HCC)   • Cellulitis of anterior lower leg   • Generalized weakness   • Atrial fibrillation (HCC)     Past Medical History:   Diagnosis Date   • Afib (HCC)    • Aneurysm (HCC)    • Arthritis    • GERD (gastroesophageal reflux disease)    • Hypertension    • Irregular heart beat      Past Surgical History:   Procedure Laterality Date   • COLONOSCOPY     • HYSTERECTOMY        General Information     Row Name 22 1245          OT Time and Intention    Document Type therapy note (daily note)  -     Mode of Treatment individual therapy;occupational therapy  -     Row Name 22 1245          General Information    Patient Profile Reviewed yes  -     Existing Precautions/Restrictions fall;oxygen therapy device and L/min  -     Row Name 22 1245          Safety Issues, Functional Mobility    Impairments Affecting Function (Mobility) balance;endurance/activity tolerance;pain;strength  -           User Key  (r) = Recorded By, (t) = Taken By, (c) = Cosigned By    Initials Name Provider Type     Rosalba Christianson OT Occupational Therapist                 Mobility/ADL's     Row Name 22 1245          Transfers    Transfers stand-sit transfer;sit-stand transfer;toilet transfer  recliner  -     Sit-Stand [place] (Transfers) standby assist  -     Stand-Sit [place] (Transfers) standby assist  -     [place] Level (Toilet Transfer) contact guard;standby assist  -     Assistive  Device (Toilet Transfer) commode, 3-in-1  -     Row Name 04/07/22 1245          Sit-Stand Transfer    Assistive Device (Sit-Stand Transfers) walker, front-wheeled  -     Row Name 04/07/22 1245          Toilet Transfer    Type (Toilet Transfer) stand pivot/stand step  -     Row Name 04/07/22 1245          Stand-Sit Transfer    Assistive Device (Stand-Sit Transfers) walker, front-wheeled  -     Row Name 04/07/22 1245          Stand Pivot/Stand Step Transfer    Stand Pivot/Stand Step [place] (Transfers) contact guard;standby assist  -     Row Name 04/07/22 1245          Bathing Assessment/Intervention    [place] Level (Bathing) bathing skills;minimum assist (75% patient effort)  -     Row Name 04/07/22 1245          Upper Body Dressing Assessment/Training    [place] Level (Upper Body Dressing) upper body dressing skills;set up  -     Row Name 04/07/22 1245          Lower Body Dressing Assessment/Training    [place] Level (Lower Body Dressing) lower body dressing skills;socks;pants/bottoms;maximum assist (25% patient effort)  -     Comment, (Lower Body Dressing) Plan to reintroduce dressing LLEs per pt.s tolerance with sensitive in LLEs/wounds and edema in upper thighs. Pt. verbalized she wound try as her pain is improving.  -     Row Name 04/07/22 1245          Grooming Assessment/Training    [place] Level (Grooming) grooming skills;set up;oral care regimen;wash face, hands;hair care, combing/brushing  -     Position (Grooming) unsupported sitting  -     Row Name 04/07/22 1245          Toileting Assessment/Training    [place] Level (Toileting) toileting skills;standby assist  -           User Key  (r) = Recorded By, (t) = Taken By, (c) = Cosigned By    Initials Name Provider Type     Rosalba Christianson OT Occupational Therapist               Obj/Interventions     Row Name 04/07/22 1247          Motor Skills    Therapeutic Exercise aerobic  -     Row Name 04/07/22  1247          Aerobic Exercise    Comment, Aerobic Exercise (Therapeutic Exercise) Pt. tolerated the Omnicycle x20 minutes w/ no rest breaks but struggled at times w/ alertness.  -           User Key  (r) = Recorded By, (t) = Taken By, (c) = Cosigned By    Initials Name Provider Type    Rosalba Mccormick OT Occupational Therapist               Goals/Plan    No documentation.                Clinical Impression     Row Name 04/07/22 1248          Plan of Care Review    Progress no change  -     Outcome Evaluation No signficant changes although pain in LEs appear to be much improved. Plan to encourage lower body dressing as tolerated with wounds/edema  -     Row Name 04/07/22 1248          Therapy Plan Review/Discharge Plan (OT)    Anticipated Discharge Disposition (OT) home with home health;home with assist  -           User Key  (r) = Recorded By, (t) = Taken By, (c) = Cosigned By    Initials Name Provider Type    Rosalba Mccormick OT Occupational Therapist               Outcome Measures     Row Name 04/07/22 1250          How much help from another is currently needed...    Putting on and taking off regular lower body clothing? 2  -GC     Bathing (including washing, rinsing, and drying) 3  -GC     Toileting (which includes using toilet bed pan or urinal) 4  -GC     Putting on and taking off regular upper body clothing 4  -GC     Taking care of personal grooming (such as brushing teeth) 4  -GC     Eating meals 4  -GC     AM-PAC 6 Clicks Score (OT) 21  -GC     Row Name 04/07/22 1250          Optimal Instrument    Optimal Instrument Optimal - 3  -GC     Bending/Stooping 3  -GC     Standing 1  -GC     Reaching 1  -GC           User Key  (r) = Recorded By, (t) = Taken By, (c) = Cosigned By    Initials Name Provider Type    Rosalba Mccormick OT Occupational Therapist              Section G  Mobility  Dressing - self performance: limited assistance (staff provide guided maneuvering of limbs or other non-weight bearing  assistance)  Dressing support/assistance: One person assist  Eating - self performance: independent  Eating support/assistance: No setup or physical help  Toileting - self performance: limited assistance (staff provide guided maneuvering of limbs or other non-weight bearing assistance)  Toileting support/assistance: One person assist  Personal hygiene - self performance: supervision (oversight, encourage)  Personal hygiene support/assistance: One person assist  Bathing  Bathing - self performance: Physical help only to transfer to bathe  Bathing support/assistance: One person assist     Range of Motion  Upper Extremity: No impairment  Section GG  SectionGG: Functional Ability/Goals, Adm  Self Care, Prior Functioning ([id number]): 3. Independent  Functional Cognition, Prior Functioning (GA7581W): 3. Independent  Self Care, Admission (Section GG)  Eating: Self-Care Admission Performance (EW7875B0): independent (06)  Oral Hygiene: Self-Care Admission Performance (SX9849R6): setup or clean-up assistance (05)  Toileting Hygiene: Self-Care Admission Performance (DK0201U3): supervision or touching assistance (04)  Shower/Bathe Self: Self-Care Admission Performance (NV3472M8): partial/moderate assistance (03)  Upper Body Dressing: Self-Care Admission Performance (XN1731N7): setup or clean-up assistance (05)  Putting On/Taking Off Footwear: Self-Care Admission Performance (PR3162M6): substantial/maximal assistance (02)  Mobility, Admission Performance (YI5731)  Toilet Transfer: Mobility Admission Performance (JS7870U1): supervision or touching assistance (04)  Section GG: Functional Ability/Goals, DC  Eating: Self-Care Discharge Goal (RJ6481K9): independent (06)  Oral Hygiene: Self-Care Discharge Goal (XF0937N5): independent (06)  Toileting Hygiene: Self-Care Discharge Goal (RJ7423T4): independent (06)  Shower/Bathe Self: Self-Care Discharge Goal (QZ3758R1): independent (06)  Upper Body Dressing: Self-Care Discharge Goal  (BM1453M1): independent (06)  Lower Body Dressing: Self-Care Discharge Goal (KK4456E5): independent (06)  Putting On/Taking Off Footwear: Self-Care Discharge Goal (WP2636Q3): independent (06)  Mobility (GG), Discharge Goal (YB9222)  Toilet Transfer: Mobility Discharge Goal (KS3338O9): independent (06)          Occupational Therapy Education                 Title: PT OT SLP Therapies (Done)     Topic: Occupational Therapy (Done)     Point: ADL training (Done)     Description:   Instruct learner(s) on proper safety adaptation and remediation techniques during self care or transfers.   Instruct in proper use of assistive devices.              Learning Progress Summary           Patient Acceptance, E, VU by MARCEL at 4/1/2022 1149    Acceptance, E, VU,NR by  at 3/24/2022 1149                   Point: Home exercise program (Done)     Description:   Instruct learner(s) on appropriate technique for monitoring, assisting and/or progressing therapeutic exercises/activities.              Learning Progress Summary           Patient Acceptance, E, VU by MARCEL at 4/1/2022 1149    Acceptance, E, VU,NR by  at 3/24/2022 1149                   Point: Precautions (Done)     Description:   Instruct learner(s) on prescribed precautions during self-care and functional transfers.              Learning Progress Summary           Patient Acceptance, E, VU by MARCEL at 4/1/2022 1149    Acceptance, E, VU,NR by  at 3/24/2022 1149                   Point: Body mechanics (Done)     Description:   Instruct learner(s) on proper positioning and spine alignment during self-care, functional mobility activities and/or exercises.              Learning Progress Summary           Patient Acceptance, E, VU by MARCEL at 4/1/2022 1149    Acceptance, E, VU,NR by  at 3/24/2022 1149                               User Key     Initials Effective Dates Name Provider Type Discipline    MARYSOL 06/16/21 -  Rosalba Christianson OT Occupational Therapist OT    MARCEL 12/20/21 -  Kali  HANNAH Whalen Occupational Therapist OT              OT Recommendation and Plan  Planned Therapy Interventions (OT): activity tolerance training, adaptive equipment training, BADL retraining, functional balance retraining, occupation/activity based interventions, patient/caregiver education/training, strengthening exercise, transfer/mobility retraining  Therapy Frequency (OT): 5 times/wk  Plan of Care Review  Plan of Care Reviewed With: patient  Progress: no change  Outcome Evaluation: No signficant changes although pain in LEs appear to be much improved. Plan to encourage lower body dressing as tolerated with wounds/edema     Time Calculation:    Time Calculation- OT     Row Name 04/07/22 1250             Time Calculation- OT    OT Received On 04/07/22  -GC              Timed Charges    06227 - OT Therapeutic Exercise Minutes 20  -GC      23116 - OT Therapeutic Activity Minutes 10  -GC      42663 - OT Self Care/Mgmt Minutes 23  -GC              SNF Occupational Therapy Minutes    Skilled Minutes- OT 53 min  -GC              Total Minutes    Timed Charges Total Minutes 53  -GC       Total Minutes 53  -GC            User Key  (r) = Recorded By, (t) = Taken By, (c) = Cosigned By    Initials Name Provider Type     Rosalba Christianson OT Occupational Therapist              Therapy Charges for Today     Code Description Service Date Service Provider Modifiers Qty    12720821787 HC OT THER PROC EA 15 MIN 4/6/2022 Rosalba Christianson, OT GO 1    66198328473 HC OT THERAPEUTIC ACT EA 15 MIN 4/6/2022 Rosalba Christianson, OT GO 1    05221979124 HC OT SELF CARE/MGMT/TRAIN EA 15 MIN 4/6/2022 Rosalba Christianson, OT GO 2    28365376503 HC OT THER PROC EA 15 MIN 4/7/2022 Rosalba Christianson OT GO 1    77173498667 HC OT THERAPEUTIC ACT EA 15 MIN 4/7/2022 Rosalba Christianson, OT GO 1    02263838026 HC OT SELF CARE/MGMT/TRAIN EA 15 MIN 4/7/2022 Rosalba Christianson, OT GO 2               Rosalba Christianson, OT  4/7/2022

## 2022-04-07 NOTE — PROGRESS NOTES
CARDIOLOGY  INPATIENT PROGRESS NOTE                [facility]    4/7/2022      PATIENT IDENTIFICATION:   Name:  Lauren Redd      MRN:  [id number]     77 y.o.  female                 SUBJECTIVE:   Shortness of breath better  Atrial fibrillation with controlled heart rate  Leg swelling is improved although she has ulcerations and significant skin change    OBJECTIVE:  Vitals:    04/06/22 2108 04/07/22 0600 04/07/22 0730 04/07/22 1204   BP:   140/56    BP Location:   Right arm    Patient Position:   Lying    Pulse: 84  86 72   Resp: 18  20    Temp:   97.8 °F (36.6 °C)    TempSrc:   Oral    SpO2: 99%  98%    Weight:  116 kg (255 lb 11.7 oz)     Height:               Body mass index is 38.88 kg/m².    Intake/Output Summary (Last 24 hours) at 4/7/2022 1736  Last data filed at 4/7/2022 1300  Gross per 24 hour   Intake 1100 ml   Output 275 ml   Net 825 ml       Telemetry:     Physical Exam  General Appearance:   · no acute distress  · Alert and oriented x3  HENT:   · lips not cyanotic  · Atraumatic  Neck:  · No jvd   · supple  Respiratory:  · no respiratory distress  · normal breath sounds  · no rales  Cardiovascular:    · regular rhythm  · no S3, no S4   · no murmur  · no rub  · lower extremity edema: none    Skin:   · warm, dry  · No rashes      Allergies   Allergen Reactions   • Contrast Dye Rash   • Lotrel [Amlodipine Besy-Benazepril Hcl] Unknown - Low Severity       Scheduled meds:  apixaban, 5 mg, Oral, BID  digoxin, 125 mcg, Oral, Daily  DULoxetine, 30 mg, Oral, Daily  famotidine, 40 mg, Oral, Daily  furosemide, 40 mg, Oral, BID  gabapentin, 100 mg, Oral, Q12H  HYDROcodone-acetaminophen, 1 tablet, Oral, TID  metOLazone, 10 mg, Oral, Daily  metoprolol succinate XL, 100 mg, Oral, BID  senna-docusate sodium, 1 tablet, Oral, BID  sodium chloride, 10 mL, Intravenous, Q12H  spironolactone, 25 mg, Oral, Daily  triamcinolone, 1 application, Topical, Every Other Day      IV meds:                       Pharmacy Consult,         Data Review:  CBC    CBC 4/2/22 4/3/22 4/7/22   WBC 11.03 (A) 13.12 (A) 11.40 (A)   RBC 3.99 3.84 3.78   Hemoglobin 12.0 11.5 (A) 11.0 (A)   Hematocrit 38.3 36.3 36.5   MCV 96.0 94.5 96.6   MCH 30.1 29.9 29.1   MCHC 31.3 (A) 31.7 30.1 (A)   RDW 16.0 (A) 15.9 (A) 16.6 (A)   Platelets 226 219 201   (A) Abnormal value            CMP    CMP 4/4/22 4/5/22 4/7/22   Glucose 104 (A) 94 102 (A)   BUN 55 (A) 56 (A) 55 (A)   Creatinine 1.89 (A) 1.78 (A) 1.64 (A)   Sodium 137 138 139   Potassium 4.5 4.3 3.8   Chloride 100 99 101   Calcium 9.9 10.0 9.8   Albumin 2.80 (A) 2.90 (A) 2.60 (A)   Total Bilirubin 0.5 0.5 0.4   Alkaline Phosphatase 176 (A) 171 (A) 158 (A)   AST (SGOT) 50 (A) 41 (A) 21   ALT (SGPT) 51 (A) 48 (A) 31   (A) Abnormal value             CARDIAC LABS:      Lab 04/02/22  1324   PROBNP 5,043.0*        Lab Results   Component Value Date    DIGOXIN 1.18 04/03/2022      Lab Results   Component Value Date    TSH 2.380 02/17/2022           Invalid input(s): LDLCALC  No results found for: POCTROP  Lab Results   Component Value Date    TROPONINT 0.011 02/08/2022   (  Lab Results   Component Value Date    MG 2.1 03/26/2022     Results for orders placed during the hospital encounter of 02/07/22    Adult Transthoracic Echo Complete W/ Cont if Necessary Per Protocol    Interpretation Summary  · Estimated right ventricular systolic pressure from tricuspid regurgitation is mildly elevated (35-45 mmHg). Calculated right ventricular systolic pressure from tricuspid regurgitation is 40 mmHg.  · The left ventricular cavity is mild to moderately dilated.  · Calculated left ventricular EF = 65% Estimated left ventricular EF was in agreement with the calculated left ventricular EF. Left ventricular systolic function is normal.  · The right atrial cavity is mild to moderately dilated.  · Moderate mitral valve regurgitation is present.  · Moderate tricuspid valve regurgitation is present.  · Mild  pulmonary hypertension is present.           ASSESSMENT:  Atrial fibrillation with rapid rate  Congestive heart failure acute on chronic diastolic  Hypertension  Severe swelling and draining wounds of both lower extremities  Probable cellulitis of lower extremities  Obesity       PLAN:   Continue present medications            Lisa Leung MD  4/7/2022    17:36 EDT

## 2022-04-07 NOTE — PLAN OF CARE
Goal Outcome Evaluation:  Plan of Care Reviewed With: patient        Progress: improving  Outcome Evaluation: Rsd. is alert and oriented x4, able to make needs known.  Medicated with Tylenol for pain at bedtime.  States relief.  Rsd. rested well this shift, and was less lethargic.  Call light and personal items within reach, Rsd. remineded to use call light for assistance.  Verbalizes understanding.  Rsd. is x1 assist to BSC.  Will continue to monitor and notify on-coming staff.  Current plan of care continues at this time.

## 2022-04-08 NOTE — SIGNIFICANT NOTE
[facility]   Wound Evaluation / Progress Note       Patient Name: Lauren Redd    : 1945    MRN: [id number]  Today's Date: 2022                     Admit Date: 3/23/2022    Visit Dx:    ICD-10-CM ICD-9-CM   1. Decreased activities of daily living (ADL)  Z78.9 V49.89   2. Difficulty walking  R26.2 719.7       Patient Active Problem List   Diagnosis   • Positive colorectal cancer screening using Cologuard test   • Atrial fibrillation with RVR (HCC)   • CHF (congestive heart failure) (HCC)   • Cellulitis of anterior lower leg   • Generalized weakness   • Atrial fibrillation (HCC)        Past Medical History:   Diagnosis Date   • Afib (HCC)    • Aneurysm (HCC)    • Arthritis    • GERD (gastroesophageal reflux disease)    • Hypertension    • Irregular heart beat         Past Surgical History:   Procedure Laterality Date   • COLONOSCOPY     • HYSTERECTOMY           Physical Assessment:  Wound 22 1235 Right lower leg Blisters (Active)   Wound Image     22 1000   Dressing Appearance intact;moist drainage 22 1000   Base red;moist;scab 22 1000   Periwound intact;edematous;swelling;redness 22 1000   Periwound Temperature warm 22 1000   Periwound Skin Turgor soft 22 1835   Edges irregular 22 1000   Drainage Characteristics/Odor serosanguineous 22 1000   Drainage Amount small 22 1000   Care, Wound cleansed with;soap and water;sterile normal saline 22 1000   Dressing Care dressing changed;foam;abdominal pad;gauze;other (see comments) 22 1000   Periwound Care topical treatment applied 22 1000       Wound 22 0934 Left lower leg (Active)   Wound Image    22 0959   Dressing Appearance intact;moist drainage 22 0959   Base moist;red;dry 22 0959   Periwound intact;dry;edematous;swelling 22 0959   Periwound Temperature warm 22 0959   Periwound Skin Turgor soft 22 0959   Edges irregular 22  0959   Drainage Characteristics/Odor serous 04/08/22 0959   Drainage Amount small 04/08/22 0959   Care, Wound cleansed with;sterile normal saline 04/08/22 0959   Dressing Care dressing changed;foam;abdominal pad;gauze;other (see comments) 04/08/22 0959   Periwound Care topical treatment applied 04/08/22 0959       Wound 03/28/22 1558 Right gluteal (Active)   Wound Image   04/08/22 1002   Dressing Appearance open to air 04/08/22 1002   Closure SYDNI 04/07/22 2100   Base white 04/08/22 1002   Periwound intact;dry 04/08/22 1002   Periwound Temperature warm 04/08/22 1002   Periwound Skin Turgor soft 04/08/22 1002   Drainage Amount none 04/08/22 1002   Care, Wound cleansed with;sterile normal saline 04/08/22 1002   Dressing Care open to air 04/08/22 1002   Periwound Care moisturizer applied 04/08/22 1002        Wound Check / Follow-up:  Seen today on  for routine wound RN follow-up. Multiple open wounds on bilateral lower extremities with progression from last week. Beefy red granulation and pink noted to wound beds with no evidence of slough. Remaining skin intact of both legs with edema and dry flaking skin noted. Significant amount of dry skin removed. Open areas cleansed with wound cleanser and patted dry. Hydraferea Blue ready placed to wound beds and secured with abdominal pad, gauze roll, and setopress wraps from toes to knee under moderate compression. Recommend no changes to current plan of care.    Healing pressure injury that has regained epidermis noted to right gluteal buttocks. Recommend applying hydragaurd 3 times a day, continued use of waffle cushion when in chair, limiting time to chair and shifting weight every 15 minutes while in chair.    Impression: Bilateral lower leg wounds with progression and granulation; healing right gluteal pressure injury    Short term goals:  Regain tissue integrity    Isaac Foy RN    4/8/2022    10:24 EDT

## 2022-04-08 NOTE — THERAPY TREATMENT NOTE
SNF - Physical Therapy Treatment Note   Nayana     Patient Name: Lauren Redd  : 1945  MRN: [id number]  Today's Date: 2022      Visit Dx:     ICD-10-CM ICD-9-CM   1. Decreased activities of daily living (ADL)  Z78.9 V49.89   2. Difficulty walking  R26.2 719.7     Patient Active Problem List   Diagnosis   • Positive colorectal cancer screening using Cologuard test   • Atrial fibrillation with RVR (HCC)   • CHF (congestive heart failure) (HCC)   • Cellulitis of anterior lower leg   • Generalized weakness   • Atrial fibrillation (HCC)     Past Medical History:   Diagnosis Date   • Afib (HCC)    • Aneurysm (HCC)    • Arthritis    • GERD (gastroesophageal reflux disease)    • Hypertension    • Irregular heart beat      Past Surgical History:   Procedure Laterality Date   • COLONOSCOPY     • HYSTERECTOMY       PT Assessment (last 12 hours)     PT Evaluation and Treatment     Row Name 22 1100          Physical Therapy Time and Intention    Subjective Information no complaints  -WM     Document Type therapy note (daily note)  -WM     Mode of Treatment individual therapy;physical therapy  -WM     Patient Effort good  -WM     Symptoms Noted During/After Treatment fatigue  -WM     Row Name 22 1100          Transfers    Sit-Stand [place] (Transfers) contact guard  -     Stand-Sit [place] (Transfers) contact guard  -WM     Row Name 22 1100          Sit-Stand Transfer    Assistive Device (Sit-Stand Transfers) walker, front-wheeled  -WM     Row Name 22 1100          Stand-Sit Transfer    Assistive Device (Stand-Sit Transfers) walker, front-wheeled  -WM     Row Name 22 1100          Gait/Stairs (Locomotion)    [place] Level (Gait) contact guard  -WM     Assistive Device (Gait) walker, front-wheeled  -WM     Distance in Feet (Gait) 130  -WM     Pattern (Gait) 4-point;step-through  -WM     Deviations/Abnormal Patterns (Gait) gait speed decreased;stride length decreased   -     Row Name 04/08/22 1100          Safety Issues, Functional Mobility    Impairments Affecting Function (Mobility) balance;endurance/activity tolerance;strength  -     Row Name 04/08/22 1100          Balance    Dynamic Standing Balance contact guard  -     Row Name 04/08/22 1100          Hip (Therapeutic Exercise)    Hip Isometrics (Therapeutic Exercise) bilateral;gluteal sets;10 repetitions;5 repetitions;3 second hold;2 sets  -     Hip Strengthening (Therapeutic Exercise) bilateral;aBduction;aDduction;heel slides;15 repititions;2 sets  -     Row Name 04/08/22 1100          Knee (Therapeutic Exercise)    Knee Isometrics (Therapeutic Exercise) bilateral;quad sets;10 repetitions;5 repetitions;3 second hold;2 sets  -     Knee Strengthening (Therapeutic Exercise) marching while seated;hamstring curls;sitting;15 repititions;2 sets  -     Row Name 04/08/22 1100          Ankle (Therapeutic Exercise)    Ankle AROM (Therapeutic Exercise) bilateral;dorsiflexion;plantarflexion;15 repititions;2 sets  -     Row Name             Wound 02/09/22 1235 Right lower leg Blisters    Wound - Properties Group Placement Date: 02/09/22  -FL Placement Time: 1235  -FL Present on Hospital Admission: Y  -FL Side: Right  -FL Orientation: lower  -FL Location: leg  -FL Primary Wound Type: Blisters  -FL     Retired Wound - Properties Group Placement Date: 02/09/22  -FL Placement Time: 1235  -FL Present on Hospital Admission: Y  -FL Side: Right  -FL Orientation: lower  -FL Location: leg  -FL Primary Wound Type: Blisters  -FL     Retired Wound - Properties Group Date first assessed: 02/09/22  -FL Time first assessed: 1235  -FL Present on Hospital Admission: Y  -FL Side: Right  -FL Location: leg  -FL Primary Wound Type: Blisters  -FL     Row Name             Wound 02/23/22 0934 Left lower leg    Wound - Properties Group Placement Date: 02/23/22  -BA Placement Time: 0934  -BA Present on Hospital Admission: Y  -BA Side: Left  -BA  Orientation: lower  -BA Location: leg  -BA     Retired Wound - Properties Group Placement Date: 02/23/22  -BA Placement Time: 0934  -BA Present on Hospital Admission: Y  -BA Side: Left  -BA Orientation: lower  -BA Location: leg  -BA     Retired Wound - Properties Group Date first assessed: 02/23/22  -BA Time first assessed: 0934  -BA Present on Hospital Admission: Y  -BA Side: Left  -BA Location: leg  -BA     Row Name             Wound 03/28/22 1558 Right gluteal    Wound - Properties Group Placement Date: 03/28/22  -FH Placement Time: 1558  -FH Side: Right  -FH Location: gluteal  -FH     Retired Wound - Properties Group Placement Date: 03/28/22  -FH Placement Time: 1558  -FH Side: Right  -FH Location: gluteal  -FH     Retired Wound - Properties Group Date first assessed: 03/28/22  -FH Time first assessed: 1558  -FH Side: Right  -FH Location: gluteal  -FH     Row Name 04/08/22 1100          Progress Summary (PT)    Progress Toward Functional Goals (PT) progress toward functional goals is good  -WM           User Key  (r) = Recorded By, (t) = Taken By, (c) = Cosigned By    Initials Name Provider Type     Sarah Church RN Registered Nurse    Sylvia Olson RN Registered Nurse    Jorge Fournier PTA Physical Therapist Assistant    Swathi Yuen RN Registered Nurse                Physical Therapy Education                 Title: PT OT SLP Therapies (Done)     Topic: Physical Therapy (Done)     Point: Mobility training (Done)     Learning Progress Summary           Patient Acceptance, E, VU by KN at 4/1/2022 1149    Acceptance, E,TB, VU by CS at 3/24/2022 1607                   Point: Home exercise program (Done)     Learning Progress Summary           Patient Acceptance, E, VU by KN at 4/1/2022 1149    Acceptance, E,TB, VU by CS at 3/24/2022 1607                   Point: Body mechanics (Done)     Learning Progress Summary           Patient Acceptance, E, VU by KN at 4/1/2022 1149    Acceptance,  E,TB, VU by CS at 3/24/2022 1607                   Point: Precautions (Done)     Learning Progress Summary           Patient Acceptance, E, VU by KN at 4/1/2022 1149    Acceptance, E,TB, VU by CS at 3/24/2022 1607                               User Key     Initials Effective Dates Name Provider Type Discipline     04/25/21 -  Jojo Ramirez, PT Physical Therapist PT    MARCEL 12/20/21 -  Koby Bass OT Occupational Therapist OT              PT Recommendation and Plan     Progress Summary (PT)  Progress Toward Functional Goals (PT): progress toward functional goals is good   Outcome Measures     Row Name 04/08/22 1126 04/07/22 1555 04/06/22 1557       How much help from another person do you currently need...    Turning from your back to your side while in flat bed without using bedrails? 4  -WM 4  -CS 4  -CS    Moving from lying on back to sitting on the side of a flat bed without bedrails? 3  -WM 3  -CS 3  -CS    Moving to and from a bed to a chair (including a wheelchair)? 3  -WM 3  -CS 3  -CS    Standing up from a chair using your arms (e.g., wheelchair, bedside chair)? 3  -WM 3  -CS 3  -CS    Climbing 3-5 steps with a railing? 2  -WM 2  -CS 2  -CS    To walk in hospital room? 3  -WM 3  -CS 3  -CS    AM-PAC 6 Clicks Score (PT) 18  -WM 18  -CS 18  -CS       Functional Assessment    Outcome Measure Options -- AM-PAC 6 Clicks Basic Mobility (PT)  -CS AM-PAC 6 Clicks Basic Mobility (PT)  -CS    Row Name 04/05/22 1508             How much help from another person do you currently need...    Turning from your back to your side while in flat bed without using bedrails? 4  -CS      Moving from lying on back to sitting on the side of a flat bed without bedrails? 3  -CS      Moving to and from a bed to a chair (including a wheelchair)? 3  -CS      Standing up from a chair using your arms (e.g., wheelchair, bedside chair)? 3  -CS      Climbing 3-5 steps with a railing? 2  -CS      To walk in hospital room? 3  -CS       AM-PAC 6 Clicks Score (PT) 18  -CS              Functional Assessment    Outcome Measure Options AM-PAC 6 Clicks Basic Mobility (PT)  -CS            User Key  (r) = Recorded By, (t) = Taken By, (c) = Cosigned By    Initials Name Provider Type    Jorge Fournier PTA Physical Therapist Assistant    CS Jojo Ramirez, NGOZI Physical Therapist                 Time Calculation:    PT Charges     Row Name 04/08/22 1120             Time Calculation    PT Received On 04/08/22  -WM              Timed Charges    54284 - PT Therapeutic Exercise Minutes 14  -WM      33983 - Gait Training Minutes  6  -WM      28248 - PT Therapeutic Activity Minutes 4  -WM              Total Minutes    Timed Charges Total Minutes 24  -WM       Total Minutes 24  -WM            User Key  (r) = Recorded By, (t) = Taken By, (c) = Cosigned By    Initials Name Provider Type    Jorge Fournier PTA Physical Therapist Assistant              Therapy Charges for Today     Code Description Service Date Service Provider Modifiers Qty    13485415153 HC PT THER PROC EA 15 MIN 4/8/2022 Jorge Casarez, SANJU GP 1    50450771075 HC GAIT TRAINING EA 15 MIN 4/8/2022 Jorge Casarez PTA GP 1          PT G-Codes  Outcome Measure Options: AM-PAC 6 Clicks Basic Mobility (PT)  AM-PAC 6 Clicks Score (PT): 18  AM-PAC 6 Clicks Score (OT): 21    Jorge Casarez PTA  4/8/2022

## 2022-04-08 NOTE — PROGRESS NOTES
[facility]     Progress Note    Patient Name: Lauren Redd  : 1945  MRN: [id number]  Primary Care Physician:  Dennis Kohler MD  Date of admission: 3/23/2022      Subjective   Brief summary.  Admitted to rehab for cellulitis, CHF and A. fib and weakness      HPI:  Patient reports she is feeling better.  Received Covid vaccine yesterday.  Still short of breath with exertion.  Cellulitis in the legs healing according to wound care nurse    Review of Systems     Fatigue and weakness,  Short of breath with exertion  No palpitations    Objective     Vitals:   Temp:  [97 °F (36.1 °C)-97.8 °F (36.6 °C)] 97 °F (36.1 °C)  Heart Rate:  [66-78] 72  Resp:  [20-21] 21  BP: (120-131)/(72-84) 120/84  Flow (L/min):  [2] 2    Physical Exam :     Elderly female not in acute distress.  Tired looking.    Heart irregular and tachycardic.   Lungs diminished breath sounds.  Abdomen soft.  Both lower extremities with ulcers.  neurologically awake alert and oriented.      Result Review:  I have personally reviewed the results from the time of this admission to 2022 18:32 EDT and agree with these findings:  [x]  Laboratory  []  Microbiology  []  Radiology  []  EKG/Telemetry   []  Cardiology/Vascular   []  Pathology  []  Old records  []  Other:           Assessment / Plan       Active Hospital Problems:  Active Hospital Problems    Diagnosis    • Atrial fibrillation (HCC)    • Generalized weakness    • CHF (congestive heart failure) (HCC)    • Cellulitis of anterior lower leg        Plan:   BUN/creatinine stable  Continue treatment per cardiologist for CHF.  Continue wound care.  Continue PT and OT       DVT prophylaxis:  Medical DVT prophylaxis orders are present.    CODE STATUS:   Code Status (Patient has no pulse and is not breathing): CPR (Attempt to Resuscitate)  Medical Interventions (Patient has pulse or is breathing): Full Support                  Electronically signed by Pablo Fajardo MD, 22, 6:33 PM  EDT.

## 2022-04-08 NOTE — PLAN OF CARE
Goal Outcome Evaluation:  Plan of Care Reviewed With: patient        Progress: improving  Outcome Evaluation: resident alert, oriented, able to make needs known to staff. Remains on 2 liters of O2 per NC, sats remain in low 90's. Resident gets SOA with activity. Wound care nurse here today for treatments. resident tolerated well. Medicated x 2 today for prn pain, effective. transfers with assist of one to BS, had some incontinence in the am, refused to wear brief after bath. Remained continent for the remainder of the shift. some dribbling during transfer to BS. Resident is pleasnat and cooperative.

## 2022-04-08 NOTE — PLAN OF CARE
Goal Outcome Evaluation:  Plan of Care Reviewed With: patient        Progress: no change  Outcome Evaluation: Rsd. rested well this shift, medicated with scheduled pain medication.  Alert and oriented x4.  Resting comfortably with eyes closed.  call light and personal items within reach.  Rsd. reminded to use call light for assistance, verbalizes understanding.  Will continue to monitor and notify on-coming staff.  Current plan of care remains the same.  Rsd. transfers x1 assist to bsc.

## 2022-04-08 NOTE — THERAPY TREATMENT NOTE
SNF - Occupational Therapy Treatment Note   [facility]    Patient Name: Lauren Redd  : 1945    MRN: [id number]                              Today's Date: 2022       Admit Date: 3/23/2022    Visit Dx:     ICD-10-CM ICD-9-CM   1. Decreased activities of daily living (ADL)  Z78.9 V49.89   2. Difficulty walking  R26.2 719.7     Patient Active Problem List   Diagnosis   • Positive colorectal cancer screening using Cologuard test   • Atrial fibrillation with RVR (HCC)   • CHF (congestive heart failure) (HCC)   • Cellulitis of anterior lower leg   • Generalized weakness   • Atrial fibrillation (HCC)     Past Medical History:   Diagnosis Date   • Afib (HCC)    • Aneurysm (HCC)    • Arthritis    • GERD (gastroesophageal reflux disease)    • Hypertension    • Irregular heart beat      Past Surgical History:   Procedure Laterality Date   • COLONOSCOPY     • HYSTERECTOMY        General Information     Row Name 22 1247          OT Time and Intention    Document Type therapy note (daily note)  -     Mode of Treatment individual therapy;occupational therapy  -     Row Name 22 1247          General Information    Patient Profile Reviewed yes  -     Existing Precautions/Restrictions fall;oxygen therapy device and L/min  -     Row Name 22 1247          Safety Issues, Functional Mobility    Impairments Affecting Function (Mobility) balance;endurance/activity tolerance;strength  -           User Key  (r) = Recorded By, (t) = Taken By, (c) = Cosigned By    Initials Name Provider Type     Rosalba Christianson OT Occupational Therapist                 Mobility/ADL's     Row Name 22 1247          Transfers    Transfers bed-chair transfer;stand-sit transfer;sit-stand transfer;toilet transfer  -     Bed-Chair [place] (Transfers) contact guard;standby assist  -GC     Sit-Stand [place] (Transfers) contact guard;standby assist  -GC     Stand-Sit [place] (Transfers) contact guard  -      [place] Level (Toilet Transfer) standby assist;contact guard  -     Assistive Device (Toilet Transfer) commode, 3-in-1  -     Row Name 04/08/22 1247          Sit-Stand Transfer    Assistive Device (Sit-Stand Transfers) walker, front-wheeled  -     Row Name 04/08/22 1247          Toilet Transfer    Type (Toilet Transfer) stand pivot/stand step  -     Row Name 04/08/22 1247          Activities of Daily Living    BADL Assessment/Intervention bathing;upper body dressing;lower body dressing;toileting;grooming  -     Row Name 04/08/22 1247          Stand-Sit Transfer    Assistive Device (Stand-Sit Transfers) walker, front-wheeled  -     Row Name 04/08/22 1247          Stand Pivot/Stand Step Transfer    Stand Pivot/Stand Step [place] (Transfers) contact guard;standby assist  -     Row Name 04/08/22 1247          Wheelchair Transfer    [place] Level (Wheelchair Transfer) contact guard  -     Row Name 04/08/22 1247          Bathing Assessment/Intervention    [place] Level (Bathing) bathing skills;minimum assist (75% patient effort)  -     Row Name 04/08/22 [facility]          Upper Body Dressing Assessment/Training    [place] Level (Upper Body Dressing) upper body dressing skills;set up  -[facility] Name 04/08/22 [facility]          Lower Body Dressing Assessment/Training    [place] Level (Lower Body Dressing) lower body dressing skills;socks;pants/bottoms;maximum assist (25% patient effort)  -     Row Name 04/08/22 [facility]          Grooming Assessment/Training    [place] Level (Grooming) grooming skills;set up;oral care regimen;wash face, hands;hair care, combing/brushing  -     Row Name 04/08/22 1247          Toileting Assessment/Training    [place] Level (Toileting) toileting skills;standby assist  -     Assistive Devices (Toileting) commode, 3-in-1  -           User Key  (r) = Recorded By, (t) = Taken By, (c) = Cosigned By    Initials Name Provider Type    MARYSOL Christianson  HANNAH Navarrete Occupational Therapist               Obj/Interventions     Row Name 04/08/22 1248          Motor Skills    Therapeutic Exercise aerobic  -     Row Name 04/08/22 1248          Aerobic Exercise    Comment, Aerobic Exercise (Therapeutic Exercise) Omnicycle x10 minutes  -           User Key  (r) = Recorded By, (t) = Taken By, (c) = Cosigned By    Initials Name Provider Type    Rosalba Mccormick OT Occupational Therapist               Goals/Plan    No documentation.                Clinical Impression     Row Name 04/08/22 1249          Plan of Care Review    Plan of Care Reviewed With patient  -     Progress improving  -     Outcome Evaluation Pt. did tolerate wearing sweat pants this am for a short period as LEs are healing slowly. Pt. encouraged to ambulate to/from the bathroom during the day vs. BSC. Pt. does have incont. at times. Plan to continue POC for increase function in adls now that LEs are less painful.  -     Row Name 04/08/22 1249          Therapy Plan Review/Discharge Plan (OT)    Anticipated Discharge Disposition (OT) home with home health;home with assist  -           User Key  (r) = Recorded By, (t) = Taken By, (c) = Cosigned By    Initials Name Provider Type    Rosalba Mccormick OT Occupational Therapist               Outcome Measures     Row Name 04/08/22 1250          How much help from another is currently needed...    Putting on and taking off regular lower body clothing? 2  -GC     Bathing (including washing, rinsing, and drying) 3  -GC     Toileting (which includes using toilet bed pan or urinal) 4  -GC     Putting on and taking off regular upper body clothing 4  -GC     Taking care of personal grooming (such as brushing teeth) 4  -GC     Eating meals 4  -GC     AM-PAC 6 Clicks Score (OT) 21  -GC     Row Name 04/08/22 1126          How much help from another person do you currently need...    Turning from your back to your side while in flat bed without using bedrails? 4  -WM      Moving from lying on back to sitting on the side of a flat bed without bedrails? 3  -WM     Moving to and from a bed to a chair (including a wheelchair)? 3  -WM     Standing up from a chair using your arms (e.g., wheelchair, bedside chair)? 3  -WM     Climbing 3-5 steps with a railing? 2  -WM     To walk in hospital room? 3  -WM     AM-PAC 6 Clicks Score (PT) 18  -WM     Row Name 04/08/22 1250          Optimal Instrument    Optimal Instrument Optimal - 3  -GC     Bending/Stooping 3  -GC     Standing 1  -GC     Reaching 1  -GC           User Key  (r) = Recorded By, (t) = Taken By, (c) = Cosigned By    Initials Name Provider Type    Jorge Fournier PTA Physical Therapist Assistant    Rosalba Mccormick OT Occupational Therapist              Section G  Mobility  Dressing - self performance: limited assistance (staff provide guided maneuvering of limbs or other non-weight bearing assistance)  Dressing support/assistance: One person assist  Eating - self performance: independent  Eating support/assistance: No setup or physical help  Toileting - self performance: limited assistance (staff provide guided maneuvering of limbs or other non-weight bearing assistance)  Toileting support/assistance: One person assist  Personal hygiene - self performance: supervision (oversight, encourage)  Personal hygiene support/assistance: One person assist  Bathing  Bathing - self performance: Physical help only to transfer to bathe  Bathing support/assistance: One person assist     Range of Motion  Upper Extremity: No impairment  Section GG  SectionGG: Functional Ability/Goals, Adm  Self Care, Prior Functioning (GA6340F): 3. Independent  Functional Cognition, Prior Functioning (QU6364E): 3. Independent  Self Care, Admission (Section GG)  Eating: Self-Care Admission Performance (BF8944P9): independent (06)  Oral Hygiene: Self-Care Admission Performance (SX8950X3): setup or clean-up assistance (05)  Toileting Hygiene: Self-Care  Admission Performance (UQ0827G6): supervision or touching assistance (04)  Shower/Bathe Self: Self-Care Admission Performance (IQ4917K2): partial/moderate assistance (03)  Upper Body Dressing: Self-Care Admission Performance (SR6935G8): setup or clean-up assistance (05)  Putting On/Taking Off Footwear: Self-Care Admission Performance (IC3714Q1): substantial/maximal assistance (02)  Mobility, Admission Performance (AT2802)  Toilet Transfer: Mobility Admission Performance (NF2828M9): supervision or touching assistance (04)  Section GG: Functional Ability/Goals, DC  Eating: Self-Care Discharge Goal (LA3881N3): independent (06)  Oral Hygiene: Self-Care Discharge Goal (QS9057X7): independent (06)  Toileting Hygiene: Self-Care Discharge Goal (DU1098Z9): independent (06)  Shower/Bathe Self: Self-Care Discharge Goal (LC3133W7): independent (06)  Upper Body Dressing: Self-Care Discharge Goal (NC7933W8): independent (06)  Lower Body Dressing: Self-Care Discharge Goal (BJ1283R7): independent (06)  Putting On/Taking Off Footwear: Self-Care Discharge Goal (AO1026G2): independent (06)  Mobility (GG), Discharge Goal (XN5558)  Toilet Transfer: Mobility Discharge Goal (BU2869V2): independent (06)          Occupational Therapy Education                 Title: PT OT SLP Therapies (Done)     Topic: Occupational Therapy (Done)     Point: ADL training (Done)     Description:   Instruct learner(s) on proper safety adaptation and remediation techniques during self care or transfers.   Instruct in proper use of assistive devices.              Learning Progress Summary           Patient Acceptance, E, VU by KN at 4/1/2022 1149    Acceptance, E, VU,NR by GC at 3/24/2022 1149                   Point: Home exercise program (Done)     Description:   Instruct learner(s) on appropriate technique for monitoring, assisting and/or progressing therapeutic exercises/activities.              Learning Progress Summary           Patient Acceptance, E, VU  by  at 4/1/2022 1149    Acceptance, E, VU,NR by  at 3/24/2022 1149                   Point: Precautions (Done)     Description:   Instruct learner(s) on prescribed precautions during self-care and functional transfers.              Learning Progress Summary           Patient Acceptance, E, VU by  at 4/1/2022 1149    Acceptance, E, VU,NR by  at 3/24/2022 1149                   Point: Body mechanics (Done)     Description:   Instruct learner(s) on proper positioning and spine alignment during self-care, functional mobility activities and/or exercises.              Learning Progress Summary           Patient Acceptance, E, VU by  at 4/1/2022 1149    Acceptance, E, VU,NR by  at 3/24/2022 1149                               User Key     Initials Effective Dates Name Provider Type Discipline     06/16/21 -  Rosalba Christianson, OT Occupational Therapist OT     12/20/21 -  Koby Bass OT Occupational Therapist OT              OT Recommendation and Plan  Planned Therapy Interventions (OT): activity tolerance training, adaptive equipment training, BADL retraining, functional balance retraining, occupation/activity based interventions, patient/caregiver education/training, strengthening exercise, transfer/mobility retraining  Therapy Frequency (OT): 5 times/wk  Plan of Care Review  Plan of Care Reviewed With: patient  Progress: improving  Outcome Evaluation: Pt. did tolerate wearing sweat pants this am for a short period as LEs are healing slowly. Pt. encouraged to ambulate to/from the bathroom during the day vs. BSC. Pt. does have incont. at times. Plan to continue POC for increase function in adls now that LEs are less painful.     Time Calculation:    Time Calculation- OT     Row Name 04/08/22 1251 04/08/22 1120          Time Calculation- OT    OT Received On 04/08/22  - --            Timed Charges    75009 - OT Therapeutic Exercise Minutes 10  -GC --     03222 - Gait Training Minutes  -- 6  -WM     11654 - OT  Therapeutic Activity Minutes 15  -GC --     87795 - OT Self Care/Mgmt Minutes 20  -GC --            SNF Occupational Therapy Minutes    Skilled Minutes- OT 45 min  -GC --            Total Minutes    Timed Charges Total Minutes 45  -GC 6  -WM      Total Minutes 45  -GC 6  -WM           User Key  (r) = Recorded By, (t) = Taken By, (c) = Cosigned By    Initials Name Provider Type     Jorge Casarez PTA Physical Therapist Assistant     Rosalba Christianson, OT Occupational Therapist              Therapy Charges for Today     Code Description Service Date Service Provider Modifiers Qty    27768346876 HC OT THER PROC EA 15 MIN 4/7/2022 Juana Christiansonta, OT GO 1    68048192226 HC OT THERAPEUTIC ACT EA 15 MIN 4/7/2022 SammyJuana gerberta, OT GO 1    68153292593 HC OT SELF CARE/MGMT/TRAIN EA 15 MIN 4/7/2022 Rosalba Christianson, OT GO 2    11591742182 HC OT THER PROC EA 15 MIN 4/8/2022 Rosalba Christianson, OT GO 1    83104814251 HC OT THERAPEUTIC ACT EA 15 MIN 4/8/2022 Juana Christiansonta, OT GO 1    52695983727 HC OT SELF CARE/MGMT/TRAIN EA 15 MIN 4/8/2022 SammyRosalba gerber, OT GO 1               Rosalbaqian Christianson, OT  4/8/2022

## 2022-04-09 NOTE — PLAN OF CARE
Goal Outcome Evaluation:  Plan of Care Reviewed With: patient        Progress: no change   Medicated with tylenol x1 this shift for c/o leg discomfort.  Able to transfer from chair to bedside commode with standby assist.  No c/o soa but is noticeably soa after activity.

## 2022-04-09 NOTE — THERAPY TREATMENT NOTE
SNF - Physical Therapy Progress Note  [facility]     Patient Name: Lauren Redd  : 1945  MRN: [id number]  Today's Date: 2022      Visit Dx:    ICD-10-CM ICD-9-CM   1. Decreased activities of daily living (ADL)  Z78.9 V49.89   2. Difficulty walking  R26.2 719.7     Patient Active Problem List   Diagnosis   • Positive colorectal cancer screening using Cologuard test   • Atrial fibrillation with RVR (HCC)   • CHF (congestive heart failure) (HCC)   • Cellulitis of anterior lower leg   • Generalized weakness   • Atrial fibrillation (HCC)     Past Medical History:   Diagnosis Date   • Afib (HCC)    • Aneurysm (HCC)    • Arthritis    • GERD (gastroesophageal reflux disease)    • Hypertension    • Irregular heart beat      Past Surgical History:   Procedure Laterality Date   • COLONOSCOPY     • HYSTERECTOMY         PT Assessment (last 12 hours)     PT Evaluation and Treatment     Row Name 22 08          Physical Therapy Time and Intention    Subjective Information no complaints  -CS     Document Type therapy note (daily note)  -CS     Mode of Treatment individual therapy;physical therapy  -CS     Patient Effort good  -CS     Symptoms Noted During/After Treatment none  -CS     Row Name 22 08          Bed Mobility    Scooting/Bridging [place] (Bed Mobility) standby assist;1 person assist  -CS     Supine-Sit [place] (Bed Mobility) standby assist;1 person assist  -CS     Bed Mobility, Safety Issues decreased use of arms for pushing/pulling;decreased use of legs for bridging/pushing  -CS     Assistive Device (Bed Mobility) bed rails  -CS     Row Name 22 08          Transfers    Sit-Stand [place] (Transfers) standby assist;1 person assist  -CS     Stand-Sit [place] (Transfers) standby assist;1 person assist  -CS     Row Name 22 08          Sit-Stand Transfer    Assistive Device (Sit-Stand Transfers) walker, front-wheeled  -CS     Row Name 22 08           Stand-Sit Transfer    Assistive Device (Stand-Sit Transfers) walker, front-wheeled  -CS     Row Name 04/09/22 0800          Gait/Stairs (Locomotion)    Gait/Stairs Locomotion gait/ambulation independence;gait/ambulation assistive device;distance ambulated;gait pattern  -     [place] Level (Gait) standby assist;1 person to manage equipment  -     Assistive Device (Gait) walker, front-wheeled  -CS     Distance in Feet (Gait) 140  -CS     Pattern (Gait) 4-point;step-through  -CS     Deviations/Abnormal Patterns (Gait) gait speed decreased;stride length decreased  -CS     Bilateral Gait Deviations forward flexed posture  -CS     Row Name 04/09/22 0800          Hip (Therapeutic Exercise)    Hip AROM (Therapeutic Exercise) bilateral;sitting;20 repititions  marches, hip abd/add  -CS     Row Name 04/09/22 0800          Knee (Therapeutic Exercise)    Knee AROM (Therapeutic Exercise) bilateral;LAQ (long arc quad);sitting;20 repititions  -CS     Row Name 04/09/22 0800          Ankle (Therapeutic Exercise)    Ankle AROM (Therapeutic Exercise) bilateral;dorsiflexion;plantarflexion;sitting;20 repititions  -CS     Row Name             Wound 02/09/22 1235 Right lower leg Blisters    Wound - Properties Group Placement Date: 02/09/22  -FL Placement Time: 1235  -FL Present on Hospital Admission: Y  -FL Side: Right  -FL Orientation: lower  -FL Location: leg  -FL Primary Wound Type: Blisters  -FL     Retired Wound - Properties Group Placement Date: 02/09/22  -FL Placement Time: 1235  -FL Present on Hospital Admission: Y  -FL Side: Right  -FL Orientation: lower  -FL Location: leg  -FL Primary Wound Type: Blisters  -FL     Retired Wound - Properties Group Date first assessed: 02/09/22  -FL Time first assessed: 1235  -FL Present on Hospital Admission: Y  -FL Side: Right  -FL Location: leg  -FL Primary Wound Type: Blisters  -FL     Row Name             Wound 02/23/22 0934 Left lower leg    Wound - Properties Group Placement Date:  02/23/22  -BA Placement Time: 0934  -BA Present on Hospital Admission: Y  -BA Side: Left  -BA Orientation: lower  -BA Location: leg  -BA     Retired Wound - Properties Group Placement Date: 02/23/22  -BA Placement Time: 0934 -BA Present on Hospital Admission: Y  -BA Side: Left  -BA Orientation: lower  -BA Location: leg  -BA     Retired Wound - Properties Group Date first assessed: 02/23/22  -BA Time first assessed: 0934  -BA Present on Hospital Admission: Y  -BA Side: Left  -BA Location: leg  -BA     Row Name             Wound 03/28/22 1558 Right gluteal    Wound - Properties Group Placement Date: 03/28/22  -FH Placement Time: 1558  -FH Side: Right  -FH Location: gluteal  -FH     Retired Wound - Properties Group Placement Date: 03/28/22  -FH Placement Time: 1558  -FH Side: Right  -FH Location: gluteal  -FH     Retired Wound - Properties Group Date first assessed: 03/28/22  - Time first assessed: 1558  -FH Side: Right  -FH Location: gluteal  -FH     Row Name 04/09/22 0800          Progress Summary (PT)    Daily Progress Summary (PT) PT Tx performed with pt. as per this progress note.  Pt. ambulated in hallway without loss of balance and PTA managed O2 tank/tubing.  Pt. safely returned to room and T/F'd to chair.  Seated exercises performed with pt. as per this progress note.  Pt. remained in chair with call button within reach and table in front of pt.  -CS           User Key  (r) = Recorded By, (t) = Taken By, (c) = Cosigned By    Initials Name Provider Type    Sarah Medina RN Registered Nurse    Sylvia Olson, RN Registered Nurse    Swathi Yuen, RN Registered Nurse    Fan Alarcon PTA Physical Therapist Assistant              Section G              Section GG                       Physical Therapy Education                 Title: PT OT SLP Therapies (Done)     Topic: Physical Therapy (Done)     Point: Mobility training (Done)     Learning Progress Summary           Patient  Acceptance, E, VU by KN at 4/1/2022 1149    Acceptance, E,TB, VU by CS at 3/24/2022 1607                   Point: Home exercise program (Done)     Learning Progress Summary           Patient Acceptance, E, VU by KN at 4/1/2022 1149    Acceptance, E,TB, VU by CS at 3/24/2022 1607                   Point: Body mechanics (Done)     Learning Progress Summary           Patient Acceptance, E, VU by KN at 4/1/2022 1149    Acceptance, E,TB, VU by CS at 3/24/2022 1607                   Point: Precautions (Done)     Learning Progress Summary           Patient Acceptance, E, VU by KN at 4/1/2022 1149    Acceptance, E,TB, VU by CS at 3/24/2022 1607                               User Key     Initials Effective Dates Name Provider Type Discipline     04/25/21 -  Jojo Ramirez, PT Physical Therapist PT     12/20/21 -  Koby Bass OT Occupational Therapist OT              PT Recommendation and Plan     Progress Summary (PT)  Daily Progress Summary (PT): PT Tx performed with pt. as per this progress note.  Pt. ambulated in hallway without loss of balance and PTA managed O2 tank/tubing.  Pt. safely returned to room and T/F'd to chair.  Seated exercises performed with pt. as per this progress note.  Pt. remained in chair with call button within reach and table in front of pt.   Outcome Measures     Row Name 04/09/22 0800 04/08/22 1126 04/07/22 1555       How much help from another person do you currently need...    Turning from your back to your side while in flat bed without using bedrails? 4  -CS 4  -WM 4  -CSA    Moving from lying on back to sitting on the side of a flat bed without bedrails? 3  -CS 3  -WM 3  -CSA    Moving to and from a bed to a chair (including a wheelchair)? 3  -CS 3  -WM 3  -CSA    Standing up from a chair using your arms (e.g., wheelchair, bedside chair)? 4  -CS 3  -WM 3  -CSA    Climbing 3-5 steps with a railing? 2  -CS 2  -WM 2  -CSA    To walk in hospital room? 3  -CS 3  -WM 3  -CSA    AM-PAC 6  Clicks Score (PT) 19  -CS 18  -WM 18  -CSA       Functional Assessment    Outcome Measure Options AM-PAC 6 Clicks Basic Mobility (PT)  -CS -- AM-PAC 6 Clicks Basic Mobility (PT)  -CSA    Row Name 04/06/22 1557             How much help from another person do you currently need...    Turning from your back to your side while in flat bed without using bedrails? 4  -CSA      Moving from lying on back to sitting on the side of a flat bed without bedrails? 3  -CSA      Moving to and from a bed to a chair (including a wheelchair)? 3  -CSA      Standing up from a chair using your arms (e.g., wheelchair, bedside chair)? 3  -CSA      Climbing 3-5 steps with a railing? 2  -CSA      To walk in hospital room? 3  -CSA      AM-PAC 6 Clicks Score (PT) 18  -CSA              Functional Assessment    Outcome Measure Options AM-PAC 6 Clicks Basic Mobility (PT)  -CSA            User Key  (r) = Recorded By, (t) = Taken By, (c) = Cosigned By    Initials Name Provider Type     Jorge Casarez PTA Physical Therapist Assistant    [facility] Jojo Ramirez, PT Physical Therapist     Fan Negron PTA Physical Therapist Assistant                 Time Calculation:    PT Charges     Row Name 04/09/22 0822 04/09/22 0821          Time Calculation    Start Time -- 0744  -CS     PT Received On -- 04/09/22  -            Timed Charges    16184 - PT Therapeutic Exercise Minutes -- 10  -CS     33135 - PT Therapeutic Activity Minutes -- 15  -CS            SNF Physical Therapy Minutes    Skilled Minutes- PT 25 min  -CS --            Total Minutes    Timed Charges Total Minutes -- 25  -CS      Total Minutes -- 25  -CS           User Key  (r) = Recorded By, (t) = Taken By, (c) = Cosigned By    Initials Name Provider Type     Fan Negron PTA Physical Therapist Assistant              Therapy Charges for Today     Code Description Service Date Service Provider Modifiers Qty    44028173612  PT THER PROC EA 15 MIN 4/9/2022 Fan Negron PTA  GP 1    40760789155 HC PT THERAPEUTIC ACT EA 15 MIN 4/9/2022 Fan Negron, PTA GP 1          PT G-Codes  Outcome Measure Options: AM-PAC 6 Clicks Basic Mobility (PT)  AM-PAC 6 Clicks Score (PT): 19  AM-PAC 6 Clicks Score (OT): 21    Fan Negron PTA  4/9/2022

## 2022-04-09 NOTE — PLAN OF CARE
Goal Outcome Evaluation:           Progress: improving  Outcome Evaluation: Vital signs stable. Alert and oriented. Tylenol administered for bilateral leg pain. Continues with 2L Oxygen via nasal cannual. Transfers to the bedside commode with one person assistance. Slept most the night.

## 2022-04-10 NOTE — PLAN OF CARE
Goal Outcome Evaluation:  Plan of Care Reviewed With: patient        Progress: improving   Wound care done to bilateral lower legs, improvement noted.  Medicated with norco 5 x1 for wound care.  No c/o soa.

## 2022-04-10 NOTE — PLAN OF CARE
Goal Outcome Evaluation:           Progress: improving  Outcome Evaluation: No significant change. Vital signs stable. Tylenol administered for bilateral lower leg pain. Incontinent of bladder during night.

## 2022-04-11 NOTE — PLAN OF CARE
Goal Outcome Evaluation:           Progress: no change  Outcome Evaluation: No sigificate change during this shift, No c/o pain this morning. SOA with activity, 1x to the [facility]. Legs wraps are due to be changed tomorrow. I/c of urine, urgency. WIll con't with care plan

## 2022-04-11 NOTE — PROGRESS NOTES
[facility]     Progress Note    Patient Name: Lauren Redd  : 1945  MRN: [id number]  Primary Care Physician:  Dennis Kohler MD  Date of admission: 3/23/2022      Subjective   Brief summary.  Admitted to rehab for cellulitis, CHF and A. fib and weakness      HPI:  Patient sleepy today  No shortness of breath    Review of Systems     Fatigue and weakness,  Short of breath with exertion  No palpitations    Objective     Vitals:   Temp:  [97.5 °F (36.4 °C)-97.7 °F (36.5 °C)] 97.7 °F (36.5 °C)  Heart Rate:  [84-91] 87  Resp:  [18-20] 18  BP: (122-125)/(75-86) 125/86  Flow (L/min):  [2] 2    Physical Exam :     Elderly female not in acute distress.  Heart irregular and tachycardic.   Lungs diminished breath sounds.  Abdomen soft.  Nontender  Both lower extremities with ulcers.  neurologically awake alert and oriented.      Result Review:  I have personally reviewed the results from the time of this admission to 2022 10:43 EDT and agree with these findings:  [x]  Laboratory  []  Microbiology  []  Radiology  []  EKG/Telemetry   []  Cardiology/Vascular   []  Pathology  []  Old records  []  Other:           Assessment / Plan       Active Hospital Problems:  Active Hospital Problems    Diagnosis    • Atrial fibrillation (HCC)    • Generalized weakness    • CHF (congestive heart failure) (HCC)    • Cellulitis of anterior lower leg        Plan:   BUN/creatinine stable  Continue treatment per cardiologist for CHF.  Continue wound care.  Continue PT and OT  Check labs in a.m.       DVT prophylaxis:  Medical DVT prophylaxis orders are present.    CODE STATUS:   Code Status (Patient has no pulse and is not breathing): CPR (Attempt to Resuscitate)  Medical Interventions (Patient has pulse or is breathing): Full Support                    Electronically signed by Pablo Fajardo MD, 22, 10:44 AM EDT.

## 2022-04-11 NOTE — THERAPY TREATMENT NOTE
SNF - Occupational Therapy Treatment Note   [facility]    Patient Name: Lauren Redd  : 1945    MRN: [id number]                              Today's Date: 2022       Admit Date: 3/23/2022    Visit Dx:     ICD-10-CM ICD-9-CM   1. Decreased activities of daily living (ADL)  Z78.9 V49.89   2. Difficulty walking  R26.2 719.7     Patient Active Problem List   Diagnosis   • Positive colorectal cancer screening using Cologuard test   • Atrial fibrillation with RVR (HCC)   • CHF (congestive heart failure) (HCC)   • Cellulitis of anterior lower leg   • Generalized weakness   • Atrial fibrillation (HCC)     Past Medical History:   Diagnosis Date   • Afib (HCC)    • Aneurysm (HCC)    • Arthritis    • GERD (gastroesophageal reflux disease)    • Hypertension    • Irregular heart beat      Past Surgical History:   Procedure Laterality Date   • COLONOSCOPY     • HYSTERECTOMY        General Information     Row Name 22 1133          OT Time and Intention    Document Type therapy note (daily note)  -AV     Mode of Treatment individual therapy;occupational therapy  -AV     Row Name 22 1133          General Information    Existing Precautions/Restrictions fall;oxygen therapy device and L/min  -AV     Barriers to Rehab none identified  -AV     Row Name 22 1133          Cognition    Orientation Status (Cognition) --  alert and cooperative. receptive to cues for safe positioning during ADLs/transfers.  -AV     Row Name 22 1133          Safety Issues, Functional Mobility    Impairments Affecting Function (Mobility) balance;endurance/activity tolerance;strength  -AV           User Key  (r) = Recorded By, (t) = Taken By, (c) = Cosigned By    Initials Name Provider Type    AV Juan Carlos Zafar OT Occupational Therapist                 Mobility/ADL's     Row Name 22 1134          Transfers    Transfers sit-stand transfer;bed-chair transfer  -AV     Bed-Chair [place] (Transfers) standby  assist;verbal cues  -AV     Assistive Device (Bed-Chair Transfers) walker, front-wheeled  -AV     Sit-Stand [place] (Transfers) standby assist;verbal cues  x4 during functional ADL session  -AV     Row Name 04/11/22 1134          Sit-Stand Transfer    Assistive Device (Sit-Stand Transfers) walker, front-wheeled  -AV     Row Name 04/11/22 1134          Activities of Daily Living    BADL Assessment/Intervention --  (I) UB bathing, min assist UB dressing (bra adjustment, 2 dresses) with setup. (I) wash face & comb hair seated.CGA LB bathing. Mod assist LB dressing (brief mod assist). CGA brief over hips. slow ADL task performance with rest breaks.  -AV           User Key  (r) = Recorded By, (t) = Taken By, (c) = Cosigned By    Initials Name Provider Type    Juan Carlos Alarcon OT Occupational Therapist               Obj/Interventions     Row Name 04/11/22 1138          Shoulder (Therapeutic Exercise)    Shoulder (Therapeutic Exercise) strengthening exercise  -AV     Shoulder Strengthening (Therapeutic Exercise) bilateral;flexion;sitting;2 lb free weight;20 repititions  2# wand  -AV     Row Name 04/11/22 1138          Elbow/Forearm (Therapeutic Exercise)    Elbow/Forearm (Therapeutic Exercise) strengthening exercise  -AV     Elbow/Forearm Strengthening (Therapeutic Exercise) bilateral;flexion;extension;supination;pronation;sitting;1 lb free weight;2 lb free weight;20 repititions  1# weights pro/supination and horizontal ABduction. 2# wand bicep curls and tricep press. all 1 set of 20.  -AV     Row Name 04/11/22 1138          Motor Skills    Therapeutic Exercise shoulder;elbow/forearm  -AV     Row Name 04/11/22 1138          Balance    Balance Assessment sitting dynamic balance;standing dynamic balance  -AV     Dynamic Sitting Balance independent  -AV     Static Standing Balance standby assist  -AV     Position/Device Used, Standing Balance walker, front-wheeled  -AV     Balance Interventions sitting;standing;sit to  stand;minimal challenge;occupation based/functional task  -AV           User Key  (r) = Recorded By, (t) = Taken By, (c) = Cosigned By    Initials Name Provider Type    Juan Carlos Alarcon OT Occupational Therapist               Goals/Plan    No documentation.                Clinical Impression     Row Name 04/11/22 1140          Pain Assessment    Pretreatment Pain Rating 8/10  -AV     Posttreatment Pain Rating 8/10  -AV     Pre/Posttreatment Pain Comment reports she has recently received pain medication  -AV     Pain Intervention(s) Repositioned;Ambulation/increased activity;Therapeutic presence  -AV     Row Name 04/11/22 1140          Plan of Care Review    Progress no change  -AV     Outcome Evaluation Patient continues to present with limitations of balance and endurance/activity tolerance impacting ADL (I). Continued OT needed to remediate/compensate for deficits to max (I).  -AV     Row Name 04/11/22 1140          Therapy Plan Review/Discharge Plan (OT)    Equipment Needs Upon Discharge (OT) walker, rolling  -AV     Row Name 04/11/22 1140          Vital Signs    O2 Delivery Pre Treatment nasal cannula  1  -AV     O2 Delivery Intra Treatment nasal cannula  1  -AV     O2 Delivery Post Treatment nasal cannula  1  -AV           User Key  (r) = Recorded By, (t) = Taken By, (c) = Cosigned By    Initials Name Provider Type    Juan Carlos Alarcon OT Occupational Therapist               Outcome Measures     Row Name 04/11/22 1142          How much help from another is currently needed...    Putting on and taking off regular lower body clothing? 2  -AV     Bathing (including washing, rinsing, and drying) 3  -AV     Toileting (which includes using toilet bed pan or urinal) 3  -AV     Putting on and taking off regular upper body clothing 3  -AV     Taking care of personal grooming (such as brushing teeth) 4  -AV     Eating meals 4  -AV     AM-PAC 6 Clicks Score (OT) 19  -AV     Row Name 04/11/22 1113 04/11/22 0974        How much help from another person do you currently need...    Turning from your back to your side while in flat bed without using bedrails? 4  -WM 4  -AB    Moving from lying on back to sitting on the side of a flat bed without bedrails? 3  -WM 3  -AB    Moving to and from a bed to a chair (including a wheelchair)? 3  -WM 3  -AB    Standing up from a chair using your arms (e.g., wheelchair, bedside chair)? 3  -WM 3  -AB    Climbing 3-5 steps with a railing? 2  -WM 2  -AB    To walk in hospital room? 3  -WM 3  -AB    AM-PAC 6 Clicks Score (PT) 18  -WM 18  -AB    Row Name 04/11/22 1142          Optimal Instrument    Bending/Stooping 3  -AV     Standing 2  -AV     Reaching 1  -AV           User Key  (r) = Recorded By, (t) = Taken By, (c) = Cosigned By    Initials Name Provider Type    AB Una Cervantes, RN Registered Nurse    Jorge Fournier PTA Physical Therapist Assistant    Juan Carlos Alarcon OT Occupational Therapist              Section G              Section GG                         Occupational Therapy Education                 Title: PT OT SLP Therapies (Done)     Topic: Occupational Therapy (Done)     Point: ADL training (Done)     Description:   Instruct learner(s) on proper safety adaptation and remediation techniques during self care or transfers.   Instruct in proper use of assistive devices.              Learning Progress Summary           Patient Acceptance, E, VU by BAILEY at 4/11/2022 1142    Comment: need for staff assistance for all standing ADL/transfers  safe positioning ADLs    Acceptance, E, VU by KN at 4/1/2022 1149    Acceptance, E, VU,NR by MARYSOL at 3/24/2022 1149                   Point: Home exercise program (Done)     Description:   Instruct learner(s) on appropriate technique for monitoring, assisting and/or progressing therapeutic exercises/activities.              Learning Progress Summary           Patient Acceptance, E, VU by BAILEY at 4/11/2022 1142    Comment: need for staff assistance  for all standing ADL/transfers  safe positioning ADLs    Acceptance, E, VU by KN at 4/1/2022 1149    Acceptance, E, VU,NR by GC at 3/24/2022 1149                   Point: Precautions (Done)     Description:   Instruct learner(s) on prescribed precautions during self-care and functional transfers.              Learning Progress Summary           Patient Acceptance, E, VU by AV at 4/11/2022 1142    Comment: need for staff assistance for all standing ADL/transfers  safe positioning ADLs    Acceptance, E, VU by KN at 4/1/2022 1149    Acceptance, E, VU,NR by GC at 3/24/2022 1149                   Point: Body mechanics (Done)     Description:   Instruct learner(s) on proper positioning and spine alignment during self-care, functional mobility activities and/or exercises.              Learning Progress Summary           Patient Acceptance, E, VU by AV at 4/11/2022 1142    Comment: need for staff assistance for all standing ADL/transfers  safe positioning ADLs    Acceptance, E, VU by KN at 4/1/2022 1149    Acceptance, E, VU,NR by GC at 3/24/2022 1149                               User Key     Initials Effective Dates Name Provider Type Discipline    AV 06/16/21 -  Juan Carlos Zafar OT Occupational Therapist OT     06/16/21 -  Rosalba Chrsitianson OT Occupational Therapist OT     12/20/21 -  Koby Bass OT Occupational Therapist OT              OT Recommendation and Plan     Plan of Care Review  Progress: no change  Outcome Evaluation: Patient continues to present with limitations of balance and endurance/activity tolerance impacting ADL (I). Continued OT needed to remediate/compensate for deficits to max (I).     Time Calculation:    Time Calculation- OT     Row Name 04/11/22 1143 04/11/22 1110          Time Calculation- OT    OT Received On 04/11/22 -AV --     OT Goal Re-Cert Due Date 04/23/22 -AV --            Timed Charges    93653 - OT Therapeutic Exercise Minutes 10  -AV --     29658 - Gait Training Minutes  -- 4  -WM      34751 - OT Self Care/Mgmt Minutes 30  -AV --            SNF Occupational Therapy Minutes    Skilled Minutes- OT 40 min  -AV --            Total Minutes    Timed Charges Total Minutes 40  -AV 4  -WM      Total Minutes 40  -AV 4  -WM           User Key  (r) = Recorded By, (t) = Taken By, (c) = Cosigned By    Initials Name Provider Type    Jorge Fournier, SANJU Physical Therapist Assistant    AV Juan Carlos Zafar OT Occupational Therapist              Therapy Charges for Today     Code Description Service Date Service Provider Modifiers Qty    66654223845 HC OT THER PROC EA 15 MIN 4/11/2022 Juan Carlos Zafar OT GO 1    56436731408 HC OT SELF CARE/MGMT/TRAIN EA 15 MIN 4/11/2022 Juan Carlos Zafar OT GO 2               Juan Carlos Zafar OT  4/11/2022

## 2022-04-11 NOTE — PLAN OF CARE
Goal Outcome Evaluation:           Progress: improving  Outcome Evaluation: Vital signs stable. [place] administered at HS for bilateral leg pain. Alert and oriented. Transfers to bedside commode with one person assist. Rested well most the night.

## 2022-04-11 NOTE — THERAPY TREATMENT NOTE
SNF - Physical Therapy Treatment Note   [facility]     Patient Name: Lauren Redd  : 1945  MRN: [id number]  Today's Date: 2022      Visit Dx:     ICD-10-CM ICD-9-CM   1. Decreased activities of daily living (ADL)  Z78.9 V49.89   2. Difficulty walking  R26.2 719.7     Patient Active Problem List   Diagnosis   • Positive colorectal cancer screening using Cologuard test   • Atrial fibrillation with RVR (HCC)   • CHF (congestive heart failure) (HCC)   • Cellulitis of anterior lower leg   • Generalized weakness   • Atrial fibrillation (HCC)     Past Medical History:   Diagnosis Date   • Afib (HCC)    • Aneurysm (HCC)    • Arthritis    • GERD (gastroesophageal reflux disease)    • Hypertension    • Irregular heart beat      Past Surgical History:   Procedure Laterality Date   • COLONOSCOPY     • HYSTERECTOMY       PT Assessment (last 12 hours)     PT Evaluation and Treatment     Row Name 22 1111          Physical Therapy Time and Intention    Document Type therapy note (daily note)  -WM     Mode of Treatment individual therapy;physical therapy  -WM     Patient Effort good  -WM     Symptoms Noted During/After Treatment fatigue  -WM     Row Name 22 1111          Cognition    Affect/Mental Status (Cognition) WNL  -WM     Row Name 22 1111          Transfers    Sit-Stand [place] (Transfers) contact guard  -WM     Stand-Sit [place] (Transfers) contact guard  -WM     Row Name 22 1111          Sit-Stand Transfer    Assistive Device (Sit-Stand Transfers) walker, front-wheeled  -WM     Row Name 22 1111          Stand-Sit Transfer    Assistive Device (Stand-Sit Transfers) walker, front-wheeled  -WM     Row Name 22 1111          Gait/Stairs (Locomotion)    [place] (Gait) contact guard  -WM     Assistive Device (Gait) walker, front-wheeled  -WM     Distance in Feet (Gait) 40  -WM     Pattern (Gait) 4-point;step-through  -WM     Deviations/Abnormal Patterns (Gait)  gait speed decreased;stride length decreased  -     Row Name 04/11/22 1111          Safety Issues, Functional Mobility    Impairments Affecting Function (Mobility) balance;endurance/activity tolerance;strength  -     Row Name 04/11/22 1111          Balance    Dynamic Standing Balance contact guard  -     Row Name 04/11/22 1111          Hip (Therapeutic Exercise)    Hip Strengthening (Therapeutic Exercise) bilateral;aBduction;aDduction;heel slides;15 repititions;2 sets  -     Row Name 04/11/22 1111          Knee (Therapeutic Exercise)    Knee Strengthening (Therapeutic Exercise) bilateral;LAQ (long arc quad);hamstring curls;sitting;15 repititions;2 sets  -     Row Name 04/11/22 1111          Ankle (Therapeutic Exercise)    Ankle AROM (Therapeutic Exercise) bilateral;dorsiflexion;plantarflexion;15 repititions;2 sets  -     Row Name             Wound 02/09/22 1235 Right lower leg Blisters    Wound - Properties Group Placement Date: 02/09/22  -FL Placement Time: 1235  -FL Present on Hospital Admission: Y  -FL Side: Right  -FL Orientation: lower  -FL Location: leg  -FL Primary Wound Type: Blisters  -FL     Retired Wound - Properties Group Placement Date: 02/09/22  -FL Placement Time: 1235  -FL Present on Hospital Admission: Y  -FL Side: Right  -FL Orientation: lower  -FL Location: leg  -FL Primary Wound Type: Blisters  -FL     Retired Wound - Properties Group Date first assessed: 02/09/22  -FL Time first assessed: 1235  -FL Present on Hospital Admission: Y  -FL Side: Right  -FL Location: leg  -FL Primary Wound Type: Blisters  -FL     Row Name             Wound 02/23/22 0934 Left lower leg    Wound - Properties Group Placement Date: 02/23/22  -BA Placement Time: 0934  -BA Present on Hospital Admission: Y  -BA Side: Left  -BA Orientation: lower  -BA Location: leg  -BA     Retired Wound - Properties Group Placement Date: 02/23/22  -BA Placement Time: 0934  -BA Present on Hospital Admission: Y  -BA Side: Left   -BA Orientation: lower  -BA Location: leg  -BA     Retired Wound - Properties Group Date first assessed: 02/23/22  -BA Time first assessed: 0934  -BA Present on Hospital Admission: Y  -BA Side: Left  -BA Location: leg  -BA     Row Name             Wound 03/28/22 1558 Right gluteal    Wound - Properties Group Placement Date: 03/28/22  -FH Placement Time: 1558  -FH Side: Right  -FH Location: gluteal  -FH     Retired Wound - Properties Group Placement Date: 03/28/22  -FH Placement Time: 1558  -FH Side: Right  -FH Location: gluteal  -FH     Retired Wound - Properties Group Date first assessed: 03/28/22  -FH Time first assessed: 1558  -FH Side: Right  -FH Location: gluteal  -FH     Row Name 04/11/22 1111          Progress Summary (PT)    Progress Toward Functional Goals (PT) progress toward functional goals is fair  -WM           User Key  (r) = Recorded By, (t) = Taken By, (c) = Cosigned By    Initials Name Provider Type    Sarah Medina RN Registered Nurse    Sylvia Olson RN Registered Nurse    Jorge Fournier PTA Physical Therapist Assistant    Swathi Yuen RN Registered Nurse                Physical Therapy Education                 Title: PT OT SLP Therapies (Done)     Topic: Physical Therapy (Done)     Point: Mobility training (Done)     Learning Progress Summary           Patient Acceptance, E, VU by KN at 4/1/2022 1149    Acceptance, E,TB, VU by CS at 3/24/2022 1607                   Point: Home exercise program (Done)     Learning Progress Summary           Patient Acceptance, E, VU by KN at 4/1/2022 1149    Acceptance, E,TB, VU by CS at 3/24/2022 1607                   Point: Body mechanics (Done)     Learning Progress Summary           Patient Acceptance, E, VU by KN at 4/1/2022 1149    Acceptance, E,TB, VU by CS at 3/24/2022 1607                   Point: Precautions (Done)     Learning Progress Summary           Patient Acceptance, E, VU by KN at 4/1/2022 1149    Acceptance,  E,TB, VU by  at 3/24/2022 1607                               User Key     Initials Effective Dates Name Provider Type Discipline     04/25/21 -  Jojo Ramirez, PT Physical Therapist PT     12/20/21 -  Koby Bass OT Occupational Therapist OT              PT Recommendation and Plan     Progress Summary (PT)  Progress Toward Functional Goals (PT): progress toward functional goals is fair   Outcome Measures     Row Name 04/11/22 1113 04/09/22 0800 04/08/22 1126       How much help from another person do you currently need...    Turning from your back to your side while in flat bed without using bedrails? 4  -WM 4  -CS 4  -WM    Moving from lying on back to sitting on the side of a flat bed without bedrails? 3  -WM 3  -CS 3  -WM    Moving to and from a bed to a chair (including a wheelchair)? 3  -WM 3  -CS 3  -WM    Standing up from a chair using your arms (e.g., wheelchair, bedside chair)? 3  -WM 4  -CS 3  -WM    Climbing 3-5 steps with a railing? 2  -WM 2  -CS 2  -WM    To walk in hospital room? 3  -WM 3  -CS 3  -WM    AM-PAC 6 Clicks Score (PT) 18  -WM 19  -CS 18  -WM       Functional Assessment    Outcome Measure Options -- AM-PAC 6 Clicks Basic Mobility (PT)  - --          User Key  (r) = Recorded By, (t) = Taken By, (c) = Cosigned By    Initials Name Provider Type    WM Jorge Casarez PTA Physical Therapist Assistant     Fan Negron PTA Physical Therapist Assistant                 Time Calculation:    PT Charges     Row Name 04/11/22 1110             Time Calculation    PT Received On 04/11/22  -WM              Timed Charges    71408 - PT Therapeutic Exercise Minutes 14  -WM      61473 - Gait Training Minutes  4  -WM      39640 - PT Therapeutic Activity Minutes 5  -WM              SNF Physical Therapy Minutes    Skilled Minutes- PT 23 min  -WM              Total Minutes    Timed Charges Total Minutes 23  -WM       Total Minutes 23  -WM            User Key  (r) = Recorded By, (t) = Taken By,  (c) = Cosigned By    Initials Name Provider Type     Jorge Casarez PTA Physical Therapist Assistant              Therapy Charges for Today     Code Description Service Date Service Provider Modifiers Qty    04119679566 HC PT THER PROC EA 15 MIN 4/11/2022 Jorge Casarez PTA GP 1    [id number]  PT THERAPEUTIC ACT EA 15 MIN 4/11/2022 Jorge Casarez PTA GP 1          PT G-Codes  Outcome Measure Options: AM-PAC 6 Clicks Basic Mobility (PT)  AM-PAC 6 Clicks Score (PT): 18  AM-PAC 6 Clicks Score (OT): 21    Jorge Casarez PTA  4/11/2022

## 2022-04-12 NOTE — PLAN OF CARE
Goal Outcome Evaluation:  Plan of Care Reviewed With: patient        Progress: improving    Patient had not c/o any problems until RN did QOD dsg changes on BLE. Patient did report relief with Rx pain med. BLE don't look quite as swollen as when RN had done dsg changes last week.

## 2022-04-12 NOTE — PLAN OF CARE
Goal Outcome Evaluation:              Outcome Evaluation: Medicated with [place] once this shift, no further c/o pain. VSS. Slept well.

## 2022-04-12 NOTE — THERAPY TREATMENT NOTE
SNF - Physical Therapy Treatment Note   Nayana     Patient Name: Lauren Redd  : 1945  MRN: [id number]  Today's Date: 2022      Visit Dx:     ICD-10-CM ICD-9-CM   1. Decreased activities of daily living (ADL)  Z78.9 V49.89   2. Difficulty walking  R26.2 719.7     Patient Active Problem List   Diagnosis   • Positive colorectal cancer screening using Cologuard test   • Atrial fibrillation with RVR (HCC)   • CHF (congestive heart failure) (HCC)   • Cellulitis of anterior lower leg   • Generalized weakness   • Atrial fibrillation (HCC)     Past Medical History:   Diagnosis Date   • Afib (HCC)    • Aneurysm (HCC)    • Arthritis    • GERD (gastroesophageal reflux disease)    • Hypertension    • Irregular heart beat      Past Surgical History:   Procedure Laterality Date   • COLONOSCOPY     • HYSTERECTOMY       PT Assessment (last 12 hours)     PT Evaluation and Treatment     Row Name 22 141          Physical Therapy Time and Intention    Document Type therapy note (daily note)  -WM     Mode of Treatment individual therapy;physical therapy  -WM     Patient Effort good  -WM     Symptoms Noted During/After Treatment fatigue  -WM     Row Name 22 1411          Transfers    Sit-Stand [place] (Transfers) contact guard  -WM     Stand-Sit [place] (Transfers) contact guard  -WM     Row Name 22 1411          Sit-Stand Transfer    Assistive Device (Sit-Stand Transfers) walker, front-wheeled  -WM     Row Name 22 1411          Stand-Sit Transfer    Assistive Device (Stand-Sit Transfers) walker, front-wheeled  -WM     Row Name 22 1411          Gait/Stairs (Locomotion)    [place] Level (Gait) contact guard  -WM     Assistive Device (Gait) walker, front-wheeled  -WM     Distance in Feet (Gait) 30  -WM     Pattern (Gait) 4-point;step-through  -WM     Deviations/Abnormal Patterns (Gait) gait speed decreased;stride length decreased  -WM     Row Name 22 1411          Safety  Issues, Functional Mobility    Impairments Affecting Function (Mobility) balance;endurance/activity tolerance;strength  -WM     Row Name 04/12/22 1411          Hip (Therapeutic Exercise)    Hip Strengthening (Therapeutic Exercise) bilateral;aBduction;aDduction;heel slides;15 repititions;2 sets  Recumbant  -WM     Row Name 04/12/22 1411          Knee (Therapeutic Exercise)    Knee Isometrics (Therapeutic Exercise) bilateral;quad sets;10 repetitions;5 repetitions;3 second hold;2 sets  Recumbant  -WM     Knee Strengthening (Therapeutic Exercise) bilateral;SAQ (short arc quad);15 repititions;2 sets  Recumbant  -WM     Row Name 04/12/22 1411          Ankle (Therapeutic Exercise)    Ankle AROM (Therapeutic Exercise) bilateral;dorsiflexion;plantarflexion;15 repititions;2 sets  Recumbant  -WM     Row Name             Wound 02/09/22 1235 Right lower leg Blisters    Wound - Properties Group Placement Date: 02/09/22  -FL Placement Time: 1235  -FL Present on Hospital Admission: Y  -FL Side: Right  -FL Orientation: lower  -FL Location: leg  -FL Primary Wound Type: Blisters  -FL     Retired Wound - Properties Group Placement Date: 02/09/22  -FL Placement Time: 1235  -FL Present on Hospital Admission: Y  -FL Side: Right  -FL Orientation: lower  -FL Location: leg  -FL Primary Wound Type: Blisters  -FL     Retired Wound - Properties Group Date first assessed: 02/09/22  -FL Time first assessed: 1235  -FL Present on Hospital Admission: Y  -FL Side: Right  -FL Location: leg  -FL Primary Wound Type: Blisters  -FL     Row Name             Wound 02/23/22 0934 Left lower leg    Wound - Properties Group Placement Date: 02/23/22  -BA Placement Time: 0934  -BA Present on Hospital Admission: Y  -BA Side: Left  -BA Orientation: lower  -BA Location: leg  -BA     Retired Wound - Properties Group Placement Date: 02/23/22  -BA Placement Time: 0934  -BA Present on Hospital Admission: Y  -BA Side: Left  -BA Orientation: lower  -BA Location: leg  -BA      Retired Wound - Properties Group Date first assessed: 02/23/22  -BA Time first assessed: 0934 -BA Present on Hospital Admission: Y  -BA Side: Left  -BA Location: leg  -BA     Row Name             Wound 03/28/22 1558 Right gluteal    Wound - Properties Group Placement Date: 03/28/22  -FH Placement Time: 1558  -FH Side: Right  -FH Location: gluteal  -FH     Retired Wound - Properties Group Placement Date: 03/28/22  -FH Placement Time: 1558  -FH Side: Right  -FH Location: gluteal  -FH     Retired Wound - Properties Group Date first assessed: 03/28/22  -FH Time first assessed: 1558  -FH Side: Right  -FH Location: gluteal  -FH     Row Name 04/12/22 1411          Progress Summary (PT)    Progress Toward Functional Goals (PT) progress toward functional goals is fair  -WM           User Key  (r) = Recorded By, (t) = Taken By, (c) = Cosigned By    Initials Name Provider Type    Sarah Medina RN Registered Nurse    Sylvia Olson RN Registered Nurse    Jorge Fournier PTA Physical Therapist Assistant    Swathi Yuen RN Registered Nurse                Physical Therapy Education                 Title: PT OT SLP Therapies (Done)     Topic: Physical Therapy (Done)     Point: Mobility training (Done)     Learning Progress Summary           Patient Acceptance, E, VU by KN at 4/12/2022 1415    Acceptance, E, VU by AV at 4/11/2022 1142    Comment: need for staff assistance for all standing ADL/transfers  safe positioning ADLs    Acceptance, E, VU by KN at 4/1/2022 1149    Acceptance, E,TB, VU by CS at 3/24/2022 1607                   Point: Home exercise program (Done)     Learning Progress Summary           Patient Acceptance, E, VU by KN at 4/12/2022 1415    Acceptance, E, VU by AV at 4/11/2022 1142    Comment: need for staff assistance for all standing ADL/transfers  safe positioning ADLs    Acceptance, E, VU by KN at 4/1/2022 1149    Acceptance, E,TB, VU by CS at 3/24/2022 1607                    Point: Body mechanics (Done)     Learning Progress Summary           Patient Acceptance, E, VU by KN at 4/12/2022 1415    Acceptance, E, VU by AV at 4/11/2022 1142    Comment: need for staff assistance for all standing ADL/transfers  safe positioning ADLs    Acceptance, E, VU by KN at 4/1/2022 1149    Acceptance, E,TB, VU by CS at 3/24/2022 1607                   Point: Precautions (Done)     Learning Progress Summary           Patient Acceptance, E, VU by KN at 4/12/2022 1415    Acceptance, E, VU by AV at 4/11/2022 1142    Comment: need for staff assistance for all standing ADL/transfers  safe positioning ADLs    Acceptance, E, VU by KN at 4/1/2022 1149    Acceptance, E,TB, VU by CS at 3/24/2022 1607                               User Key     Initials Effective Dates Name Provider Type Discipline    AV 06/16/21 -  Juan Carlos Zafar, OT Occupational Therapist OT    CS 04/25/21 -  Jojo Ramirez, PT Physical Therapist PT     12/20/21 -  Koby Bass, OT Occupational Therapist OT              PT Recommendation and Plan     Progress Summary (PT)  Progress Toward Functional Goals (PT): progress toward functional goals is fair   Outcome Measures     Row Name 04/12/22 1415 04/11/22 1113          How much help from another person do you currently need...    Turning from your back to your side while in flat bed without using bedrails? 4  -WM 4  -WM     Moving from lying on back to sitting on the side of a flat bed without bedrails? 3  -WM 3  -WM     Moving to and from a bed to a chair (including a wheelchair)? 3  -WM 3  -WM     Standing up from a chair using your arms (e.g., wheelchair, bedside chair)? 4  -WM 3  -WM     Climbing 3-5 steps with a railing? 2  -WM 2  -WM     To walk in hospital room? 3  -WM 3  -WM     AM-PAC 6 Clicks Score (PT) 19  -WM 18  -WM           User Key  (r) = Recorded By, (t) = Taken By, (c) = Cosigned By    Initials Name Provider Type    WM Jorge Casarez PTA Physical Therapist Assistant                  Time Calculation:    PT Charges     Row Name 04/12/22 1410             Time Calculation    PT Received On 04/12/22  -WM              Timed Charges    07687 - PT Therapeutic Exercise Minutes 14  -WM      01654 - Gait Training Minutes  4  -WM      17822 - PT Therapeutic Activity Minutes 6  -WM              SNF Physical Therapy Minutes    Skilled Minutes- PT 24 min  -WM              Total Minutes    Timed Charges Total Minutes 24  -WM       Total Minutes 24  -WM            User Key  (r) = Recorded By, (t) = Taken By, (c) = Cosigned By    Initials Name Provider Type     Jorge Casarez PTA Physical Therapist Assistant              Therapy Charges for Today     Code Description Service Date Service Provider Modifiers Qty    40077644353 HC PT THER PROC EA 15 MIN 4/11/2022 Jorge Casarez, SANJU GP 1    97104254900 HC PT THERAPEUTIC ACT EA 15 MIN 4/11/2022 Jorge Casarez PTA GP 1    94426732022 HC PT THER PROC EA 15 MIN 4/12/2022 Jorge Casarez PTA GP 1    41157119179 HC PT THERAPEUTIC ACT EA 15 MIN 4/12/2022 Jorge Casarez, SANJU GP 1          PT G-Codes  Outcome Measure Options: AM-PAC 6 Clicks Daily Activity (OT), Optimal Instrument  AM-PAC 6 Clicks Score (PT): 19  AM-PAC 6 Clicks Score (OT): 19    Jorge Casarez PTA  4/12/2022

## 2022-04-12 NOTE — THERAPY TREATMENT NOTE
SNF - Occupational Therapy Progress Note   [facility]    Patient Name: Lauren Redd  : 1945    MRN: [id number]                              Today's Date: 2022       Admit Date: 3/23/2022    Visit Dx:     ICD-10-CM ICD-9-CM   1. Decreased activities of daily living (ADL)  Z78.9 V49.89   2. Difficulty walking  R26.2 719.7     Patient Active Problem List   Diagnosis   • Positive colorectal cancer screening using Cologuard test   • Atrial fibrillation with RVR (HCC)   • CHF (congestive heart failure) (HCC)   • Cellulitis of anterior lower leg   • Generalized weakness   • Atrial fibrillation (HCC)     Past Medical History:   Diagnosis Date   • Afib (HCC)    • Aneurysm (HCC)    • Arthritis    • GERD (gastroesophageal reflux disease)    • Hypertension    • Irregular heart beat      Past Surgical History:   Procedure Laterality Date   • COLONOSCOPY     • HYSTERECTOMY        General Information     Row Name 22 1404          OT Time and Intention    Document Type therapy note (daily note)  -KN     Mode of Treatment individual therapy;occupational therapy  -KN     Row Name 22 1404          General Information    Patient Profile Reviewed yes  -KN     Existing Precautions/Restrictions fall;oxygen therapy device and L/min  -KN     Row Name 22 1404          Cognition    Orientation Status (Cognition) oriented x 3  -KN     Row Name 22 1404          Safety Issues, Functional Mobility    Impairments Affecting Function (Mobility) balance;endurance/activity tolerance;strength  -KN           User Key  (r) = Recorded By, (t) = Taken By, (c) = Cosigned By    Initials Name Provider Type    KN Koby Bass OT Occupational Therapist                 Mobility/ADL's     Row Name 22 1405          Transfers    Transfers sit-stand transfer;toilet transfer  -KN     Comment, (Transfers) Cues for safety with all t/f  -KN     Bed-Chair [place] (Transfers) standby assist;verbal cues  -KN     Assistive  Device (Bed-Chair Transfers) walker, front-wheeled  -KN     Sit-Stand [place] (Transfers) standby assist;verbal cues  -KN     [place] Level (Toilet Transfer) standby assist;contact guard  -KN     Assistive Device (Toilet Transfer) commode, 3-in-1  -KN     Row Name 04/12/22 1405          Sit-Stand Transfer    Assistive Device (Sit-Stand Transfers) walker, front-wheeled  -KN     Comment, (Sit-Stand Transfer) x 4  -KN     Row Name 04/12/22 1405          Toilet Transfer    Type (Toilet Transfer) stand pivot/stand step  -KN     Comment, (Toilet Transfer) cues for safety  -KN     Row Name 04/12/22 1405          Activities of Daily Living    BADL Assessment/Intervention bathing;upper body dressing;grooming;toileting  -KN     Row Name 04/12/22 1405          Bathing Assessment/Intervention    [place] Level (Bathing) bathing skills;minimum assist (75% patient effort);upper body;standby assist  -KN     Position (Bathing) edge of bed sitting  -KN     Row Name 04/12/22 1405          Upper Body Dressing Assessment/Training    [place] Level (Upper Body Dressing) upper body dressing skills;set up  -KN     Row Name 04/12/22 1405          Grooming Assessment/Training    [place] Level (Grooming) grooming skills;set up;oral care regimen;wash face, hands  -KN     Position (Grooming) unsupported sitting  -KN     Row Name 04/12/22 1405          Toileting Assessment/Training    [place] Level (Toileting) toileting skills;contact guard assist  -KN     Assistive Devices (Toileting) commode, 3-in-1  -KN     Comment, (Toileting) Pt off balance during toileting hygiene  -KN           User Key  (r) = Recorded By, (t) = Taken By, (c) = Cosigned By    Initials Name Provider Type    Koby Card OT Occupational Therapist               Obj/Interventions    No documentation.                Goals/Plan    No documentation.                Clinical Impression     Row Name 04/12/22 1412          Pain Assessment     Pretreatment Pain Rating 3/10  -KN     Posttreatment Pain Rating 3/10  -KN     Row Name 04/12/22 1412          Plan of Care Review    Plan of Care Reviewed With patient  -KN     Progress no change  -KN     Outcome Evaluation Continue OT POC.  -KN     Row Name 04/12/22 1412          Vital Signs    O2 Delivery Pre Treatment nasal cannula  -KN     O2 Delivery Intra Treatment nasal cannula  -KN     O2 Delivery Post Treatment nasal cannula  -KN           User Key  (r) = Recorded By, (t) = Taken By, (c) = Cosigned By    Initials Name Provider Type    Koby Card, OT Occupational Therapist               Outcome Measures     Row Name 04/12/22 1413          How much help from another is currently needed...    Putting on and taking off regular lower body clothing? 2  -KN     Bathing (including washing, rinsing, and drying) 3  -KN     Toileting (which includes using toilet bed pan or urinal) 3  -KN     Putting on and taking off regular upper body clothing 3  -KN     Taking care of personal grooming (such as brushing teeth) 4  -KN     Eating meals 4  -KN     AM-PAC 6 Clicks Score (OT) 19  -KN     Row Name 04/12/22 1415          How much help from another person do you currently need...    Turning from your back to your side while in flat bed without using bedrails? 4  -WM     Moving from lying on back to sitting on the side of a flat bed without bedrails? 3  -WM     Moving to and from a bed to a chair (including a wheelchair)? 3  -WM     Standing up from a chair using your arms (e.g., wheelchair, bedside chair)? 4  -WM     Climbing 3-5 steps with a railing? 2  -WM     To walk in hospital room? 3  -WM     AM-PAC 6 Clicks Score (PT) 19  -WM     Row Name 04/12/22 1413          Functional Assessment    Outcome Measure Options AM-PAC 6 Clicks Daily Activity (OT);Optimal Instrument  -KN     Row Name 04/12/22 1413          Optimal Instrument    Bending/Stooping 3  -KN     Standing 2  -KN     Reaching 1  -KN           User Key  (r)  = Recorded By, (t) = Taken By, (c) = Cosigned By    Initials Name Provider Type    Jorge Fournier PTA Physical Therapist Assistant    Koby Card OT Occupational Therapist              Section G              Section GG                         Occupational Therapy Education                 Title: PT OT SLP Therapies (Done)     Topic: Occupational Therapy (Done)     Point: ADL training (Done)     Description:   Instruct learner(s) on proper safety adaptation and remediation techniques during self care or transfers.   Instruct in proper use of assistive devices.              Learning Progress Summary           Patient Acceptance, E, VU by KN at 4/12/2022 1415    Acceptance, E, VU by AV at 4/11/2022 1142    Comment: need for staff assistance for all standing ADL/transfers  safe positioning ADLs    Acceptance, E, VU by KN at 4/1/2022 1149    Acceptance, E, VU,NR by GC at 3/24/2022 1149                   Point: Home exercise program (Done)     Description:   Instruct learner(s) on appropriate technique for monitoring, assisting and/or progressing therapeutic exercises/activities.              Learning Progress Summary           Patient Acceptance, E, VU by MARCEL at 4/12/2022 1415    Acceptance, E, VU by AV at 4/11/2022 1142    Comment: need for staff assistance for all standing ADL/transfers  safe positioning ADLs    Acceptance, E, VU by KN at 4/1/2022 1149    Acceptance, E, VU,NR by GC at 3/24/2022 1149                   Point: Precautions (Done)     Description:   Instruct learner(s) on prescribed precautions during self-care and functional transfers.              Learning Progress Summary           Patient Acceptance, E, VU by KN at 4/12/2022 1415    Acceptance, E, VU by AV at 4/11/2022 1142    Comment: need for staff assistance for all standing ADL/transfers  safe positioning ADLs    Acceptance, E, VU by KN at 4/1/2022 1149    Acceptance, E, VU,NR by GC at 3/24/2022 1149                   Point: Body mechanics  (Done)     Description:   Instruct learner(s) on proper positioning and spine alignment during self-care, functional mobility activities and/or exercises.              Learning Progress Summary           Patient Acceptance, E, VU by KN at 4/12/2022 1415    Acceptance, E, VU by AV at 4/11/2022 1142    Comment: need for staff assistance for all standing ADL/transfers  safe positioning ADLs    Acceptance, E, VU by KN at 4/1/2022 1149    Acceptance, E, VU,NR by  at 3/24/2022 1149                               User Key     Initials Effective Dates Name Provider Type Discipline    AV 06/16/21 -  Juan Carlos Zafar, OT Occupational Therapist OT    GC 06/16/21 -  Rosalba Christianson OT Occupational Therapist OT     12/20/21 -  Koby Bass OT Occupational Therapist OT              OT Recommendation and Plan     Plan of Care Review  Plan of Care Reviewed With: patient  Progress: no change  Outcome Evaluation: Continue OT POC.     Time Calculation:    Time Calculation- OT     Row Name 04/12/22 1415 04/12/22 1410          Time Calculation- OT    OT Received On 04/12/22  -KN --            Timed Charges    10123 - Gait Training Minutes  -- 4  -WM     57600 - OT Therapeutic Activity Minutes 10  -KN --     60121 - OT Self Care/Mgmt Minutes 35  -KN --            SNF Occupational Therapy Minutes    Skilled Minutes- OT 45 min  -KN --            Total Minutes    Timed Charges Total Minutes 45  -KN 4  -WM      Total Minutes 45  -KN 4  -WM           User Key  (r) = Recorded By, (t) = Taken By, (c) = Cosigned By    Initials Name Provider Type    Jorge Fournier, PTA Physical Therapist Assistant     Koby Bass OT Occupational Therapist              Therapy Charges for Today     Code Description Service Date Service Provider Modifiers Qty    03168783682 HC OT THERAPEUTIC ACT EA 15 MIN 4/12/2022 Koby Bass OT GO 1    52313249774 HC OT SELF CARE/MGMT/TRAIN EA 15 MIN 4/12/2022 Koby Bass OT GO 2               Koby Bass  OT  4/12/2022

## 2022-04-13 NOTE — PLAN OF CARE
Goal Outcome Evaluation:           Progress: no change  Outcome Evaluation: No significate changes during this shift. C/O pain twice today(see emar) due to legs. Legs elevated, changed postioning. 1x to the [facility]. Wound care is due tomorrow. Call light within reach, will con't with care plan

## 2022-04-13 NOTE — THERAPY EVALUATION
SNF - Occupational Therapy Progress Note   [facility]    Patient Name: Lauren Redd  : 1945    MRN: [id number]                              Today's Date: 2022       Admit Date: 3/23/2022    Visit Dx:     ICD-10-CM ICD-9-CM   1. Decreased activities of daily living (ADL)  Z78.9 V49.89   2. Difficulty walking  R26.2 719.7     Patient Active Problem List   Diagnosis   • Positive colorectal cancer screening using Cologuard test   • Atrial fibrillation with RVR (HCC)   • CHF (congestive heart failure) (HCC)   • Cellulitis of anterior lower leg   • Generalized weakness   • Atrial fibrillation (HCC)     Past Medical History:   Diagnosis Date   • Afib (HCC)    • Aneurysm (HCC)    • Arthritis    • GERD (gastroesophageal reflux disease)    • Hypertension    • Irregular heart beat      Past Surgical History:   Procedure Laterality Date   • COLONOSCOPY     • HYSTERECTOMY        General Information     Row Name 22 1139          OT Time and Intention    Document Type therapy note (daily note)  -KN     Mode of Treatment individual therapy;occupational therapy  -     Row Name 22 1139          General Information    Patient Profile Reviewed yes  -KN     Existing Precautions/Restrictions fall;oxygen therapy device and L/min  -KN     Row Name 22 1139          Cognition    Orientation Status (Cognition) oriented x 3  -KN     Row Name 22 1139          Safety Issues, Functional Mobility    Impairments Affecting Function (Mobility) balance;endurance/activity tolerance;strength  -KN           User Key  (r) = Recorded By, (t) = Taken By, (c) = Cosigned By    Initials Name Provider Type    Koby Card OT Occupational Therapist                 Mobility/ADL's     Row Name 22 1141          Bed Mobility    Bed Mobility supine-sit  -KN     Supine-Sit [place] (Bed Mobility) standby assist;1 person assist  -KN     Sit-Supine [place] (Bed Mobility) standby assist  -KN     Row Name 22  1141          Transfers    Transfers toilet transfer;sit-stand transfer;bed-chair transfer  -KN     Comment, (Transfers) Pt continues to require cues for safety with t/f.  -KN     Bed-Chair [place] (Transfers) standby assist  -KN     Assistive Device (Bed-Chair Transfers) --  none  -KN     Sit-Stand [place] (Transfers) standby assist  -KN     [place] Level (Toilet Transfer) standby assist  -KN     Assistive Device (Toilet Transfer) commode, 3-in-1  -KN     Row Name 04/13/22 1141          Sit-Stand Transfer    Assistive Device (Sit-Stand Transfers) --  none  -KN     Row Name 04/13/22 1141          Toilet Transfer    Type (Toilet Transfer) stand pivot/stand step  -     Row Name 04/13/22 1141          Activities of Daily Living    BADL Assessment/Intervention bathing;upper body dressing;toileting;grooming  -     Row Name 04/13/22 1141          Bathing Assessment/Intervention    [place] Level (Bathing) bathing skills;minimum assist (75% patient effort);upper body;standby assist  -     Row Name 04/13/22 1141          Upper Body Dressing Assessment/Training    [place] Level (Upper Body Dressing) upper body dressing skills;set up  -     Row Name 04/13/22 1141          Grooming Assessment/Training    [place] Level (Grooming) grooming skills;set up;oral care regimen;wash face, hands  -KN     Position (Grooming) unsupported sitting  -     Row Name 04/13/22 1141          Toileting Assessment/Training    [place] Level (Toileting) toileting skills;supervision  -     Assistive Devices (Toileting) commode, 3-in-1  -KN           User Key  (r) = Recorded By, (t) = Taken By, (c) = Cosigned By    Initials Name Provider Type    Koby Card OT Occupational Therapist               Obj/Interventions     Row Name 04/13/22 1148          Elbow/Forearm (Therapeutic Exercise)    Elbow/Forearm (Therapeutic Exercise) strengthening exercise  -     Elbow/Forearm Strengthening (Therapeutic  Exercise) 4 lb free weight;3 sets;15 repititions  -KN     Row Name 04/13/22 1148          Motor Skills    Therapeutic Exercise elbow/forearm  -KN     Row Name 04/13/22 1148          Balance    Balance Assessment standing dynamic balance;sitting dynamic balance  -KN     Dynamic Sitting Balance independent  -KN     Dynamic Standing Balance standby assist  -KN           User Key  (r) = Recorded By, (t) = Taken By, (c) = Cosigned By    Initials Name Provider Type    Koby Card OT Occupational Therapist               Goals/Plan    No documentation.                Clinical Impression     Row Name 04/13/22 1150          Pain Assessment    Pretreatment Pain Rating 4/10  -KN     Posttreatment Pain Rating 4/10  -KN     Pain Intervention(s) Rest  -KN     Row Name 04/13/22 1150          Plan of Care Review    Plan of Care Reviewed With patient  -MARCEL     Progress no change  -KN     Outcome Evaluation Pt sleepy this session but willing to participate in therapy. Pt continues to require multiple safety cues during balance activities. Continue OT plan of care.  -KN     Row Name 04/13/22 1150          Vital Signs    O2 Delivery Pre Treatment nasal cannula  -KN     O2 Delivery Intra Treatment nasal cannula  -KN     O2 Delivery Post Treatment nasal cannula  -KN           User Key  (r) = Recorded By, (t) = Taken By, (c) = Cosigned By    Initials Name Provider Type    Koby Card OT Occupational Therapist               Outcome Measures     Row Name 04/13/22 1153          How much help from another is currently needed...    Putting on and taking off regular lower body clothing? 2  -KN     Bathing (including washing, rinsing, and drying) 3  -KN     Toileting (which includes using toilet bed pan or urinal) 3  -KN     Putting on and taking off regular upper body clothing 3  -KN     Taking care of personal grooming (such as brushing teeth) 4  -KN     Eating meals 4  -KN     AM-PAC 6 Clicks Score (OT) 19  -KN     Row Name 04/13/22  1000 04/13/22 0340       How much help from another person do you currently need...    Turning from your back to your side while in flat bed without using bedrails? 4  -AB 4  -FM    Moving from lying on back to sitting on the side of a flat bed without bedrails? 3  -AB 3  -FM    Moving to and from a bed to a chair (including a wheelchair)? 3  -AB 3  -FM    Standing up from a chair using your arms (e.g., wheelchair, bedside chair)? 4  -AB 4  -FM    Climbing 3-5 steps with a railing? 2  -AB 2  -FM    To walk in hospital room? 2  -AB 3  -FM    AM-PAC 6 Clicks Score (PT) 18  -AB 19  -FM    Row Name 04/13/22 1153          Functional Assessment    Outcome Measure Options AM-PAC 6 Clicks Daily Activity (OT);Optimal Instrument  -KN     Row Name 04/13/22 1153          Optimal Instrument    Bending/Stooping 2  -KN     Standing 2  -KN     Reaching 1  -KN           User Key  (r) = Recorded By, (t) = Taken By, (c) = Cosigned By    Initials Name Provider Type    Demi Schwab, RN Registered Nurse    Una Bustillos, RN Registered Nurse    Koby Card OT Occupational Therapist              Section G              Section GG                         Occupational Therapy Education                 Title: PT OT SLP Therapies (Done)     Topic: Occupational Therapy (Done)     Point: ADL training (Done)     Description:   Instruct learner(s) on proper safety adaptation and remediation techniques during self care or transfers.   Instruct in proper use of assistive devices.              Learning Progress Summary           Patient Acceptance, E, VU by MARCEL at 4/13/2022 1154    Comment: Cues for safety during functional t/f    Acceptance, E, VU by MARCEL at 4/12/2022 1415    Acceptance, E, VU by BAILEY at 4/11/2022 1142    Comment: need for staff assistance for all standing ADL/transfers  safe positioning ADLs    Acceptance, E, VU by KN at 4/1/2022 1149    Acceptance, E, VU,NR by GC at 3/24/2022 1149                   Point: Home exercise  program (Done)     Description:   Instruct learner(s) on appropriate technique for monitoring, assisting and/or progressing therapeutic exercises/activities.              Learning Progress Summary           Patient Acceptance, E, VU by KN at 4/13/2022 1154    Comment: Cues for safety during functional t/f    Acceptance, E, VU by KN at 4/12/2022 1415    Acceptance, E, VU by AV at 4/11/2022 1142    Comment: need for staff assistance for all standing ADL/transfers  safe positioning ADLs    Acceptance, E, VU by KN at 4/1/2022 1149    Acceptance, E, VU,NR by GC at 3/24/2022 1149                   Point: Precautions (Done)     Description:   Instruct learner(s) on prescribed precautions during self-care and functional transfers.              Learning Progress Summary           Patient Acceptance, E, VU by KN at 4/13/2022 1154    Comment: Cues for safety during functional t/f    Acceptance, E, VU by KN at 4/12/2022 1415    Acceptance, E, VU by AV at 4/11/2022 1142    Comment: need for staff assistance for all standing ADL/transfers  safe positioning ADLs    Acceptance, E, VU by KN at 4/1/2022 1149    Acceptance, E, VU,NR by GC at 3/24/2022 1149                   Point: Body mechanics (Done)     Description:   Instruct learner(s) on proper positioning and spine alignment during self-care, functional mobility activities and/or exercises.              Learning Progress Summary           Patient Acceptance, E, VU by KN at 4/13/2022 1154    Comment: Cues for safety during functional t/f    Acceptance, E, VU by KN at 4/12/2022 1415    Acceptance, E, VU by AV at 4/11/2022 1142    Comment: need for staff assistance for all standing ADL/transfers  safe positioning ADLs    Acceptance, E, VU by KN at 4/1/2022 1149    Acceptance, E, VU,NR by GC at 3/24/2022 1149                               User Key     Initials Effective Dates Name Provider Type Discipline    BAILEY 06/16/21 -  Juan Carlos Zafar OT Occupational Therapist OT    GC 06/16/21  -  Rosalba Christianson OT Occupational Therapist OT     12/20/21 -  Koby Bass OT Occupational Therapist OT              OT Recommendation and Plan     Plan of Care Review  Plan of Care Reviewed With: patient  Progress: no change  Outcome Evaluation: Pt sleepy this session but willing to participate in therapy. Pt continues to require multiple safety cues during balance activities. Continue OT plan of care.     Time Calculation:    Time Calculation- OT     Row Name 04/13/22 1154             Time Calculation- OT    OT Received On 04/13/22  -KN              Timed Charges    61164 - OT Therapeutic Exercise Minutes 15  -KN      23542 - OT Self Care/Mgmt Minutes 40  -KN              SNF Occupational Therapy Minutes    Skilled Minutes- OT 55 min  -KN              Total Minutes    Timed Charges Total Minutes 55  -KN       Total Minutes 55  -KN            User Key  (r) = Recorded By, (t) = Taken By, (c) = Cosigned By    Initials Name Provider Type    Koby Card OT Occupational Therapist              Therapy Charges for Today     Code Description Service Date Service Provider Modifiers Qty    85413804896 HC OT THERAPEUTIC ACT EA 15 MIN 4/12/2022 Koby Bass OT GO 1    46834177788 HC OT SELF CARE/MGMT/TRAIN EA 15 MIN 4/12/2022 Koby Bass OT GO 2    18147828724 HC OT THER PROC EA 15 MIN 4/13/2022 Koby Bass OT GO 1    55028868644 HC OT SELF CARE/MGMT/TRAIN EA 15 MIN 4/13/2022 Koby Bass OT GO 3               Koby Bass OT  4/13/2022

## 2022-04-13 NOTE — PLAN OF CARE
Nutrition Note    Patient followed by RD for LOS day 21. She is consuming 100% of meals typically with acute nutrition concerns at this time. Having BMs. No acute nutrition intervention at this time. RD will continue to follow and monitor per protocol.     Katarzyna Roberts, KATIA 07:56 EDT

## 2022-04-13 NOTE — THERAPY TREATMENT NOTE
SNF - Physical Therapy Treatment Note   [facility]     Patient Name: Lauren Redd  : 1945  MRN: [id number]  Today's Date: 2022      Visit Dx:     ICD-10-CM ICD-9-CM   1. Decreased activities of daily living (ADL)  Z78.9 V49.89   2. Difficulty walking  R26.2 719.7     Patient Active Problem List   Diagnosis   • Positive colorectal cancer screening using Cologuard test   • Atrial fibrillation with RVR (HCC)   • CHF (congestive heart failure) (HCC)   • Cellulitis of anterior lower leg   • Generalized weakness   • Atrial fibrillation (HCC)     Past Medical History:   Diagnosis Date   • Afib (HCC)    • Aneurysm (HCC)    • Arthritis    • GERD (gastroesophageal reflux disease)    • Hypertension    • Irregular heart beat      Past Surgical History:   Procedure Laterality Date   • COLONOSCOPY     • HYSTERECTOMY       PT Assessment (last 12 hours)     PT Evaluation and Treatment     Row Name 22 140          Physical Therapy Time and Intention    Document Type therapy note (daily note)  -WM     Mode of Treatment individual therapy;physical therapy  -WM     Patient Effort good  -     Symptoms Noted During/After Treatment fatigue  -     Row Name 22 140          Hip (Therapeutic Exercise)    Hip Isometrics (Therapeutic Exercise) bilateral;gluteal sets;10 repetitions;5 repetitions;2 sets  Recumbant  -     Hip Strengthening (Therapeutic Exercise) bilateral;aBduction;aDduction;heel slides;15 repititions;2 sets  Recumbant  -     Row Name 22 140          Knee (Therapeutic Exercise)    Knee Isometrics (Therapeutic Exercise) bilateral;quad sets;10 repetitions;5 repetitions;3 second hold;2 sets  -     Knee Strengthening (Therapeutic Exercise) bilateral;SAQ (short arc quad);15 repititions;2 sets  -     Row Name 22 140          Ankle (Therapeutic Exercise)    Ankle AROM (Therapeutic Exercise) bilateral;dorsiflexion;plantarflexion;15 repititions;2 sets  Recunbant  -     Row Name              Wound 02/09/22 1235 Right lower leg Blisters    Wound - Properties Group Placement Date: 02/09/22  -FL Placement Time: 1235  -FL Present on Hospital Admission: Y  -FL Side: Right  -FL Orientation: lower  -FL Location: leg  -FL Primary Wound Type: Blisters  -FL     Retired Wound - Properties Group Placement Date: 02/09/22  -FL Placement Time: 1235  -FL Present on Hospital Admission: Y  -FL Side: Right  -FL Orientation: lower  -FL Location: leg  -FL Primary Wound Type: Blisters  -FL     Retired Wound - Properties Group Date first assessed: 02/09/22  -FL Time first assessed: 1235  -FL Present on Hospital Admission: Y  -FL Side: Right  -FL Location: leg  -FL Primary Wound Type: Blisters  -FL     Row Name             Wound 02/23/22 0934 Left lower leg    Wound - Properties Group Placement Date: 02/23/22  -BA Placement Time: 0934  -BA Present on Hospital Admission: Y  -BA Side: Left  -BA Orientation: lower  -BA Location: leg  -BA     Retired Wound - Properties Group Placement Date: 02/23/22  -BA Placement Time: 0934  -BA Present on Hospital Admission: Y  -BA Side: Left  -BA Orientation: lower  -BA Location: leg  -BA     Retired Wound - Properties Group Date first assessed: 02/23/22  -BA Time first assessed: 0934  -BA Present on Hospital Admission: Y  -BA Side: Left  -BA Location: leg  -BA     Row Name             Wound 03/28/22 1558 Right gluteal    Wound - Properties Group Placement Date: 03/28/22  -FH Placement Time: 1558  -FH Side: Right  -FH Location: gluteal  -FH     Retired Wound - Properties Group Placement Date: 03/28/22  -FH Placement Time: 1558  -FH Side: Right  -FH Location: gluteal  -FH     Retired Wound - Properties Group Date first assessed: 03/28/22  -FH Time first assessed: 1558  -FH Side: Right  -FH Location: gluteal  -FH     Row Name 04/13/22 1409          Progress Summary (PT)    Progress Toward Functional Goals (PT) progress toward functional goals is fair  -WM           User Key  (r) = Recorded  By, (t) = Taken By, (c) = Cosigned By    Initials Name Provider Type    Sarah Medina, RN Registered Nurse    Sylvia Olson, RN Registered Nurse    Jorge Fournier PTA Physical Therapist Assistant    Swathi Yuen RN Registered Nurse                Physical Therapy Education                 Title: PT OT SLP Therapies (Done)     Topic: Physical Therapy (Done)     Point: Mobility training (Done)     Learning Progress Summary           Patient Acceptance, E, VU by KN at 4/13/2022 1154    Comment: Cues for safety during functional t/f    Acceptance, E, VU by KN at 4/12/2022 1415    Acceptance, E, VU by AV at 4/11/2022 1142    Comment: need for staff assistance for all standing ADL/transfers  safe positioning ADLs    Acceptance, E, VU by KN at 4/1/2022 1149    Acceptance, E,TB, VU by CS at 3/24/2022 1607                   Point: Home exercise program (Done)     Learning Progress Summary           Patient Acceptance, E, VU by KN at 4/13/2022 1154    Comment: Cues for safety during functional t/f    Acceptance, E, VU by KN at 4/12/2022 1415    Acceptance, E, VU by AV at 4/11/2022 1142    Comment: need for staff assistance for all standing ADL/transfers  safe positioning ADLs    Acceptance, E, VU by KN at 4/1/2022 1149    Acceptance, E,TB, VU by CS at 3/24/2022 1607                   Point: Body mechanics (Done)     Learning Progress Summary           Patient Acceptance, E, VU by KN at 4/13/2022 1154    Comment: Cues for safety during functional t/f    Acceptance, E, VU by KN at 4/12/2022 1415    Acceptance, E, VU by AV at 4/11/2022 1142    Comment: need for staff assistance for all standing ADL/transfers  safe positioning ADLs    Acceptance, E, VU by KN at 4/1/2022 1149    Acceptance, E,TB, VU by CS at 3/24/2022 1607                   Point: Precautions (Done)     Learning Progress Summary           Patient Acceptance, E, VU by KN at 4/13/2022 1154    Comment: Cues for safety during functional t/f     Acceptance, E, VU by KN at 4/12/2022 1415    Acceptance, E, VU by AV at 4/11/2022 1142    Comment: need for staff assistance for all standing ADL/transfers  safe positioning ADLs    Acceptance, E, VU by KN at 4/1/2022 1149    Acceptance, E,TB, VU by CS at 3/24/2022 1607                               User Key     Initials Effective Dates Name Provider Type Discipline    AV 06/16/21 -  Juan Carlos Zafar, OT Occupational Therapist OT    MICHAEL 04/25/21 -  Jojo Ramirez, PT Physical Therapist PT    MARCEL 12/20/21 -  Koby Bass, OT Occupational Therapist OT              PT Recommendation and Plan     Progress Summary (PT)  Progress Toward Functional Goals (PT): progress toward functional goals is fair   Outcome Measures     Row Name 04/13/22 1413 04/12/22 1415 04/11/22 1113       How much help from another person do you currently need...    Turning from your back to your side while in flat bed without using bedrails? 4  -WM 4  -WM 4  -WM    Moving from lying on back to sitting on the side of a flat bed without bedrails? 3  -WM 3  -WM 3  -WM    Moving to and from a bed to a chair (including a wheelchair)? 3  -WM 3  -WM 3  -WM    Standing up from a chair using your arms (e.g., wheelchair, bedside chair)? 4  -WM 4  -WM 3  -WM    Climbing 3-5 steps with a railing? 2  -WM 2  -WM 2  -WM    To walk in hospital room? 3  -WM 3  -WM 3  -WM    AM-PAC 6 Clicks Score (PT) 19  -WM 19  -WM 18  -WM          User Key  (r) = Recorded By, (t) = Taken By, (c) = Cosigned By    Initials Name Provider Type    WM Jorge Casarez PTA Physical Therapist Assistant                 Time Calculation:    PT Charges     Row Name 04/13/22 1408             Time Calculation    PT Received On 04/13/22  -WM              Timed Charges    53095 - PT Therapeutic Exercise Minutes 15  -WM              SNF Physical Therapy Minutes    Skilled Minutes- PT 15 min  -WM              Total Minutes    Timed Charges Total Minutes 15  -WM       Total Minutes 15  -WM             User Key  (r) = Recorded By, (t) = Taken By, (c) = Cosigned By    Initials Name Provider Type     Jorge Casarez PTA Physical Therapist Assistant              Therapy Charges for Today     Code Description Service Date Service Provider Modifiers Qty    53895945813 HC PT THER PROC EA 15 MIN 4/12/2022 Jorge Casarez, SANJU GP 1    10903064131 HC PT THERAPEUTIC ACT EA 15 MIN 4/12/2022 Jorge Casarez PTA GP 1    26099900591 HC PT THER PROC EA 15 MIN 4/13/2022 Jorge Casarez PTA GP 1          PT G-Codes  Outcome Measure Options: AM-PAC 6 Clicks Daily Activity (OT), Optimal Instrument  AM-PAC 6 Clicks Score (PT): 19  AM-PAC 6 Clicks Score (OT): 19    Jorge Casarez PTA  4/13/2022

## 2022-04-13 NOTE — PLAN OF CARE
Goal Outcome Evaluation:  Plan of Care Reviewed With: patient        Progress: no change  Outcome Evaluation: Patient alert and oriented x4.  No c/o pain this shift.  Rsd. has rested comfortably.  Transfers x1 assist to BSC.  Call light and personal items within reach.  Rsd. reminded to use call light for assistance, verbalizes understanding.  Will continue to monitor and notify on-coming staff.  Current plan of care continues at this time.

## 2022-04-14 NOTE — PLAN OF CARE
Goal Outcome Evaluation:  Plan of Care Reviewed With: patient        Progress: no change  Outcome Evaluation: Rsd. alert and oriented x4.  Denies pain at this time, medicated earlier in shift with tylenol, see MAR.  Rsd. states relief.  Rsd. continues to be a 1 assist transfer to BSC.  Tolerates well.  Soa with any type of activity which is not new for patient.  Rested well this shift.  Call light and personal items within reach, Rsd. reminded to use call light for assistance, verbalizes understanding.  WIll continue to monitor and notify on-coming staff.  Current plan of care continues at this time.

## 2022-04-14 NOTE — THERAPY TREATMENT NOTE
SNF - Occupational Therapy Progress Note   Nayana    Patient Name: Lauren Redd  : 1945    MRN: [id number]                              Today's Date: 2022       Admit Date: 3/23/2022    Visit Dx:     ICD-10-CM ICD-9-CM   1. Decreased activities of daily living (ADL)  Z78.9 V49.89   2. Difficulty walking  R26.2 719.7     Patient Active Problem List   Diagnosis   • Positive colorectal cancer screening using Cologuard test   • Atrial fibrillation with RVR (HCC)   • CHF (congestive heart failure) (HCC)   • Cellulitis of anterior lower leg   • Generalized weakness   • Atrial fibrillation (HCC)     Past Medical History:   Diagnosis Date   • Afib (HCC)    • Aneurysm (HCC)    • Arthritis    • GERD (gastroesophageal reflux disease)    • Hypertension    • Irregular heart beat      Past Surgical History:   Procedure Laterality Date   • COLONOSCOPY     • HYSTERECTOMY        General Information     Row Name 22 1118          OT Time and Intention    Document Type therapy note (daily note)  -KN     Mode of Treatment individual therapy;occupational therapy  -KN     Row Name 22 1118          General Information    Patient Profile Reviewed yes  -KN     Existing Precautions/Restrictions fall;oxygen therapy device and L/min  -KN     Row Name 22 1118          Cognition    Orientation Status (Cognition) oriented x 3  -KN     Row Name 22 1118          Safety Issues, Functional Mobility    Impairments Affecting Function (Mobility) balance;endurance/activity tolerance;strength  -KN           User Key  (r) = Recorded By, (t) = Taken By, (c) = Cosigned By    Initials Name Provider Type    Koby Card OT Occupational Therapist                 Mobility/ADL's     Row Name 22 1122          Transfers    Transfers sit-stand transfer;toilet transfer  -KN     Sit-Stand [place] (Transfers) standby assist  -KN     [place] Level (Toilet Transfer) standby assist  -KN     Assistive Device  (Toilet Transfer) commode, 3-in-1  -KN     Row Name 04/14/22 1122          Sit-Stand Transfer    Comment, (Sit-Stand Transfer) x 3  -KN     Row Name 04/14/22 1122          Toilet Transfer    Type (Toilet Transfer) stand pivot/stand step  -KN     Row Name 04/14/22 1122          Activities of Daily Living    BADL Assessment/Intervention bathing;upper body dressing;grooming;toileting  -KN     Row Name 04/14/22 1122          Bathing Assessment/Intervention    [place] Level (Bathing) bathing skills;upper body;set up  -KN     Position (Bathing) edge of bed sitting  -KN     Comment, (Bathing) UB only  -KN     Row Name 04/14/22 1122          Upper Body Dressing Assessment/Training    [place] Level (Upper Body Dressing) upper body dressing skills;set up  -KN     Row Name 04/14/22 1122          Grooming Assessment/Training    [place] Level (Grooming) grooming skills;set up;oral care regimen;wash face, hands  -KN     Position (Grooming) edge of bed sitting  -KN     Row Name 04/14/22 1122          Toileting Assessment/Training    [place] Level (Toileting) toileting skills;supervision  -KN     Assistive Devices (Toileting) commode, 3-in-1  -KN           User Key  (r) = Recorded By, (t) = Taken By, (c) = Cosigned By    Initials Name Provider Type    Koby Card OT Occupational Therapist               Obj/Interventions     Row Name 04/14/22 1126          Balance    Balance Assessment standing dynamic balance  -KN     Dynamic Standing Balance standby assist  -KN           User Key  (r) = Recorded By, (t) = Taken By, (c) = Cosigned By    Initials Name Provider Type    Koby Card OT Occupational Therapist               Goals/Plan    No documentation.                Clinical Impression     Row Name 04/14/22 1127          Pain Assessment    Pretreatment Pain Rating 4/10  -KN     Posttreatment Pain Rating 4/10  -KN     Pain Location generalized  -KN     Pain Intervention(s) Nursing Notified  -     Dale  Name 04/14/22 1127          Plan of Care Review    Plan of Care Reviewed With patient  -KN     Progress no change  -KN     Outcome Evaluation Pt seated in recliner at arrival. ADLs performed this session including oral care. Pt continues to be tired throughout therapy sessions. Nursing arrived at end of session to change LE dressings. Pt will continue with OT plan of care.  -KN     Row Name 04/14/22 1127          Vital Signs    O2 Delivery Pre Treatment nasal cannula  -KN     O2 Delivery Intra Treatment nasal cannula  -KN     O2 Delivery Post Treatment nasal cannula  -KN           User Key  (r) = Recorded By, (t) = Taken By, (c) = Cosigned By    Initials Name Provider Type    Koby Card, OT Occupational Therapist               Outcome Measures     Row Name 04/14/22 1132          How much help from another is currently needed...    Putting on and taking off regular lower body clothing? 2  -KN     Bathing (including washing, rinsing, and drying) 3  -KN     Toileting (which includes using toilet bed pan or urinal) 3  -KN     Putting on and taking off regular upper body clothing 3  -KN     Taking care of personal grooming (such as brushing teeth) 4  -KN     Eating meals 4  -KN     AM-PAC 6 Clicks Score (OT) 19  -KN     Row Name 04/14/22 0849 04/14/22 0000       How much help from another person do you currently need...    Turning from your back to your side while in flat bed without using bedrails? 4  -AB 4  -FM    Moving from lying on back to sitting on the side of a flat bed without bedrails? 3  -AB 3  -FM    Moving to and from a bed to a chair (including a wheelchair)? 3  -AB 3  -FM    Standing up from a chair using your arms (e.g., wheelchair, bedside chair)? 4  -AB 4  -FM    Climbing 3-5 steps with a railing? 2  -AB 2  -FM    To walk in hospital room? 3  -AB 3  -FM    AM-PAC 6 Clicks Score (PT) 19  -AB 19  -FM    Row Name 04/14/22 1132          Functional Assessment    Outcome Measure Options AM-PAC 6 Clicks  Daily Activity (OT);Optimal Instrument  -KN     Row Name 04/14/22 1132          Optimal Instrument    Bending/Stooping 1  -KN     Standing 1  -KN     Reaching 1  -KN           User Key  (r) = Recorded By, (t) = Taken By, (c) = Cosigned By    Initials Name Provider Type    Demi Schwab, RN Registered Nurse    Una Bustillos, RN Registered Nurse    Koby Card OT Occupational Therapist              Section G              Section GG                         Occupational Therapy Education                 Title: PT OT SLP Therapies (Done)     Topic: Occupational Therapy (Done)     Point: ADL training (Done)     Description:   Instruct learner(s) on proper safety adaptation and remediation techniques during self care or transfers.   Instruct in proper use of assistive devices.              Learning Progress Summary           Patient Acceptance, E, VU by KN at 4/14/2022 1133    Acceptance, E, VU by KN at 4/13/2022 1154    Comment: Cues for safety during functional t/f    Acceptance, E, VU by KN at 4/12/2022 1415    Acceptance, E, VU by AV at 4/11/2022 1142    Comment: need for staff assistance for all standing ADL/transfers  safe positioning ADLs    Acceptance, E, VU by KN at 4/1/2022 1149    Acceptance, E, VU,NR by GC at 3/24/2022 1149                   Point: Home exercise program (Done)     Description:   Instruct learner(s) on appropriate technique for monitoring, assisting and/or progressing therapeutic exercises/activities.              Learning Progress Summary           Patient Acceptance, E, VU by KN at 4/14/2022 1133    Acceptance, E, VU by KN at 4/13/2022 1154    Comment: Cues for safety during functional t/f    Acceptance, E, VU by KN at 4/12/2022 1415    Acceptance, E, VU by AV at 4/11/2022 1142    Comment: need for staff assistance for all standing ADL/transfers  safe positioning ADLs    Acceptance, E, VU by KN at 4/1/2022 1149    Acceptance, E, VU,NR by GC at 3/24/2022 1149                    Point: Precautions (Done)     Description:   Instruct learner(s) on prescribed precautions during self-care and functional transfers.              Learning Progress Summary           Patient Acceptance, E, VU by KN at 4/14/2022 1133    Acceptance, E, VU by KN at 4/13/2022 1154    Comment: Cues for safety during functional t/f    Acceptance, E, VU by KN at 4/12/2022 1415    Acceptance, E, VU by AV at 4/11/2022 1142    Comment: need for staff assistance for all standing ADL/transfers  safe positioning ADLs    Acceptance, E, VU by KN at 4/1/2022 1149    Acceptance, E, VU,NR by GC at 3/24/2022 1149                   Point: Body mechanics (Done)     Description:   Instruct learner(s) on proper positioning and spine alignment during self-care, functional mobility activities and/or exercises.              Learning Progress Summary           Patient Acceptance, E, VU by KN at 4/14/2022 1133    Acceptance, E, VU by KN at 4/13/2022 1154    Comment: Cues for safety during functional t/f    Acceptance, E, VU by KN at 4/12/2022 1415    Acceptance, E, VU by AV at 4/11/2022 1142    Comment: need for staff assistance for all standing ADL/transfers  safe positioning ADLs    Acceptance, E, VU by KN at 4/1/2022 1149    Acceptance, E, VU,NR by GC at 3/24/2022 1149                               User Key     Initials Effective Dates Name Provider Type Discipline    AV 06/16/21 -  Juan Carlos Zafar, OT Occupational Therapist OT    GC 06/16/21 -  Rosalba Christianson OT Occupational Therapist OT     12/20/21 -  Koby Bass OT Occupational Therapist OT              OT Recommendation and Plan     Plan of Care Review  Plan of Care Reviewed With: patient  Progress: no change  Outcome Evaluation: Pt seated in recliner at arrival. ADLs performed this session including oral care. Pt continues to be tired throughout therapy sessions. Nursing arrived at end of session to change LE dressings. Pt will continue with OT plan of care.     Time Calculation:     Time Calculation- OT     Row Name 04/14/22 1134             Timed Charges    74319 - OT Self Care/Mgmt Minutes 40  -KN              SNF Occupational Therapy Minutes    Skilled Minutes- OT 40 min  -KN              Total Minutes    Timed Charges Total Minutes 40  -KN       Total Minutes 40  -KN            User Key  (r) = Recorded By, (t) = Taken By, (c) = Cosigned By    Initials Name Provider Type    KN Koby Bass OT Occupational Therapist              Therapy Charges for Today     Code Description Service Date Service Provider Modifiers Qty    91697211321 HC OT THER PROC EA 15 MIN 4/13/2022 Koby Bass OT GO 1    85090144433 HC OT SELF CARE/MGMT/TRAIN EA 15 MIN 4/13/2022 Koby Bass OT GO 3    27382377597 HC OT SELF CARE/MGMT/TRAIN EA 15 MIN 4/14/2022 Koby Bass OT GO 3               Koby Bass OT  4/14/2022

## 2022-04-14 NOTE — THERAPY TREATMENT NOTE
SNF - Physical Therapy Treatment Note   Nayana     Patient Name: Lauren Redd  : 1945  MRN: [id number]  Today's Date: 2022      Visit Dx:     ICD-10-CM ICD-9-CM   1. Decreased activities of daily living (ADL)  Z78.9 V49.89   2. Difficulty walking  R26.2 719.7     Patient Active Problem List   Diagnosis   • Positive colorectal cancer screening using Cologuard test   • Atrial fibrillation with RVR (HCC)   • CHF (congestive heart failure) (HCC)   • Cellulitis of anterior lower leg   • Generalized weakness   • Atrial fibrillation (HCC)     Past Medical History:   Diagnosis Date   • Afib (HCC)    • Aneurysm (HCC)    • Arthritis    • GERD (gastroesophageal reflux disease)    • Hypertension    • Irregular heart beat      Past Surgical History:   Procedure Laterality Date   • COLONOSCOPY     • HYSTERECTOMY       PT Assessment (last 12 hours)     PT Evaluation and Treatment     Row Name 22 1200          Physical Therapy Time and Intention    Document Type therapy note (daily note)  -WM     Mode of Treatment individual therapy;physical therapy  -WM     Patient Effort good  -WM     Symptoms Noted During/After Treatment fatigue  -WM     Row Name 22 1200          Transfers    Sit-Stand [place] (Transfers) contact guard  -WM     Stand-Sit [place] (Transfers) contact guard  -WM     Row Name 22 1200          Sit-Stand Transfer    Assistive Device (Sit-Stand Transfers) walker, front-wheeled  -WM     Row Name 22 1200          Stand-Sit Transfer    Assistive Device (Stand-Sit Transfers) walker, front-wheeled  -WM     Row Name 22 1200          Gait/Stairs (Locomotion)    [place] Level (Gait) contact guard  -WM     Assistive Device (Gait) walker, front-wheeled  -WM     Distance in Feet (Gait) 20  -WM     Pattern (Gait) 4-point;step-through  -WM     Deviations/Abnormal Patterns (Gait) gait speed decreased;stride length decreased  -WM     Row Name 22 1200          Safety  Issues, Functional Mobility    Impairments Affecting Function (Mobility) balance;endurance/activity tolerance;strength  -WM     Row Name 04/14/22 1200          Hip (Therapeutic Exercise)    Hip Isometrics (Therapeutic Exercise) bilateral;gluteal sets;10 repetitions;5 repetitions;3 second hold;2 sets  Recumbant  -WM     Hip Strengthening (Therapeutic Exercise) bilateral;aBduction;aDduction;heel slides;15 repititions;2 sets  Recumbant  -WM     Row Name 04/14/22 1200          Knee (Therapeutic Exercise)    Knee Isometrics (Therapeutic Exercise) bilateral;quad sets;10 repetitions;5 repetitions;3 second hold;2 sets  Recumbant  -WM     Knee Strengthening (Therapeutic Exercise) bilateral;SAQ (short arc quad);15 repititions;2 sets  Recumbant  -WM     Row Name 04/14/22 1200          Ankle (Therapeutic Exercise)    Ankle AROM (Therapeutic Exercise) bilateral;dorsiflexion;plantarflexion;15 repititions;2 sets  Recumbant  -WM     Row Name             Wound 02/09/22 1235 Right lower leg Blisters    Wound - Properties Group Placement Date: 02/09/22  -FL Placement Time: 1235  -FL Present on Hospital Admission: Y  -FL Side: Right  -FL Orientation: lower  -FL Location: leg  -FL Primary Wound Type: Blisters  -FL     Retired Wound - Properties Group Placement Date: 02/09/22  -FL Placement Time: 1235  -FL Present on Hospital Admission: Y  -FL Side: Right  -FL Orientation: lower  -FL Location: leg  -FL Primary Wound Type: Blisters  -FL     Retired Wound - Properties Group Date first assessed: 02/09/22  -FL Time first assessed: 1235  -FL Present on Hospital Admission: Y  -FL Side: Right  -FL Location: leg  -FL Primary Wound Type: Blisters  -FL     Row Name             Wound 02/23/22 0934 Left lower leg    Wound - Properties Group Placement Date: 02/23/22  -BA Placement Time: 0934  -BA Present on Hospital Admission: Y  -BA Side: Left  -BA Orientation: lower  -BA Location: leg  -BA     Retired Wound - Properties Group Placement Date:  02/23/22  -BA Placement Time: 0934  -BA Present on Hospital Admission: Y  -BA Side: Left  -BA Orientation: lower  -BA Location: leg  -BA     Retired Wound - Properties Group Date first assessed: 02/23/22  -BA Time first assessed: 0934  -BA Present on Hospital Admission: Y  -BA Side: Left  -BA Location: leg  -BA     Row Name             [REMOVED] Wound 03/28/22 1558 Right gluteal    Wound - Properties Group Placement Date: 03/28/22  -FH Placement Time: 1558  -FH Side: Right  -FH Location: gluteal  -FH Removal Date: 04/14/22  -AB Removal Time: 0914  -AB     Retired Wound - Properties Group Placement Date: 03/28/22  -FH Placement Time: 1558  -FH Side: Right  -FH Location: gluteal  -FH Removal Date: 04/14/22  -AB Removal Time: 0914  -AB     Retired Wound - Properties Group Date first assessed: 03/28/22  -FH Time first assessed: 1558  -FH Side: Right  -FH Location: gluteal  -FH Resolution Date: 04/14/22  -AB Resolution Time: 0914  -AB     Row Name 04/14/22 1200          Progress Summary (PT)    Progress Toward Functional Goals (PT) progress toward functional goals is fair  -WM           User Key  (r) = Recorded By, (t) = Taken By, (c) = Cosigned By    Initials Name Provider Type    Sarah Medina RN Registered Nurse    Una Bustillos RN Registered Nurse    Sylvia Olson RN Registered Nurse    Jorge Fournier PTA Physical Therapist Assistant    Swathi Yuen RN Registered Nurse                Physical Therapy Education                 Title: PT OT SLP Therapies (Done)     Topic: Physical Therapy (Done)     Point: Mobility training (Done)     Learning Progress Summary           Patient Acceptance, E, VU by KN at 4/14/2022 1133    Acceptance, E, VU by KN at 4/13/2022 1154    Comment: Cues for safety during functional t/f    Acceptance, E, VU by KN at 4/12/2022 1415    Acceptance, E, VU by BAILEY at 4/11/2022 1142    Comment: need for staff assistance for all standing ADL/transfers  safe  positioning ADLs    Acceptance, E, VU by KN at 4/1/2022 1149    Acceptance, E,TB, VU by CS at 3/24/2022 1607                   Point: Home exercise program (Done)     Learning Progress Summary           Patient Acceptance, E, VU by KN at 4/14/2022 1133    Acceptance, E, VU by KN at 4/13/2022 1154    Comment: Cues for safety during functional t/f    Acceptance, E, VU by KN at 4/12/2022 1415    Acceptance, E, VU by AV at 4/11/2022 1142    Comment: need for staff assistance for all standing ADL/transfers  safe positioning ADLs    Acceptance, E, VU by KN at 4/1/2022 1149    Acceptance, E,TB, VU by CS at 3/24/2022 1607                   Point: Body mechanics (Done)     Learning Progress Summary           Patient Acceptance, E, VU by KN at 4/14/2022 1133    Acceptance, E, VU by KN at 4/13/2022 1154    Comment: Cues for safety during functional t/f    Acceptance, E, VU by KN at 4/12/2022 1415    Acceptance, E, VU by AV at 4/11/2022 1142    Comment: need for staff assistance for all standing ADL/transfers  safe positioning ADLs    Acceptance, E, VU by KN at 4/1/2022 1149    Acceptance, E,TB, VU by CS at 3/24/2022 1607                   Point: Precautions (Done)     Learning Progress Summary           Patient Acceptance, E, VU by KN at 4/14/2022 1133    Acceptance, E, VU by KN at 4/13/2022 1154    Comment: Cues for safety during functional t/f    Acceptance, E, VU by KN at 4/12/2022 1415    Acceptance, E, VU by AV at 4/11/2022 1142    Comment: need for staff assistance for all standing ADL/transfers  safe positioning ADLs    Acceptance, E, VU by KN at 4/1/2022 1149    Acceptance, E,TB, VU by CS at 3/24/2022 1607                               User Key     Initials Effective Dates Name Provider Type Discipline    AV 06/16/21 -  Juan Carlos Zafar, OT Occupational Therapist OT    CS 04/25/21 -  Jojo Ramirez, PT Physical Therapist PT    MARCEL 12/20/21 -  Koby Bass, OT Occupational Therapist OT              PT Recommendation and  Plan     Progress Summary (PT)  Progress Toward Functional Goals (PT): progress toward functional goals is fair   Outcome Measures     Row Name 04/14/22 1211 04/13/22 1413 04/12/22 1415       How much help from another person do you currently need...    Turning from your back to your side while in flat bed without using bedrails? 4  -WM 4  -WM 4  -WM    Moving from lying on back to sitting on the side of a flat bed without bedrails? 3  -WM 3  -WM 3  -WM    Moving to and from a bed to a chair (including a wheelchair)? 3  -WM 3  -WM 3  -WM    Standing up from a chair using your arms (e.g., wheelchair, bedside chair)? 4  -WM 4  -WM 4  -WM    Climbing 3-5 steps with a railing? 2  -WM 2  -WM 2  -WM    To walk in hospital room? 3  -WM 3  -WM 3  -WM    AM-PAC 6 Clicks Score (PT) 19  -WM 19  -WM 19  -WM          User Key  (r) = Recorded By, (t) = Taken By, (c) = Cosigned By    Initials Name Provider Type    Jorge Fournier PTA Physical Therapist Assistant                 Time Calculation:    PT Charges     Row Name 04/14/22 1201             Time Calculation    PT Received On 04/14/22  -WM              Timed Charges    39990 - PT Therapeutic Exercise Minutes 14  -WM      79335 - Gait Training Minutes  4  -WM      61316 - PT Therapeutic Activity Minutes 5  -WM              SNF Physical Therapy Minutes    Skilled Minutes- PT 23 min  -WM              Total Minutes    Timed Charges Total Minutes 23  -WM       Total Minutes 23  -WM            User Key  (r) = Recorded By, (t) = Taken By, (c) = Cosigned By    Initials Name Provider Type    Jorge Fournier PTA Physical Therapist Assistant              Therapy Charges for Today     Code Description Service Date Service Provider Modifiers Qty    79113931015 HC PT THER PROC EA 15 MIN 4/13/2022 Jorge Casarez PTA GP 1    66176037526 HC PT THER PROC EA 15 MIN 4/14/2022 Jorge Casarez PTA GP 1    90914643363 HC PT THERAPEUTIC ACT EA 15 MIN 4/14/2022 Jorge Casarez PTA GP 1           PT G-Codes  Outcome Measure Options: AM-PAC 6 Clicks Daily Activity (OT), Optimal Instrument  AM-PAC 6 Clicks Score (PT): 19  AM-PAC 6 Clicks Score (OT): 19    Jorge Casarez, PTA  4/14/2022

## 2022-04-14 NOTE — PLAN OF CARE
Goal Outcome Evaluation:           Progress: no change  Outcome Evaluation: Rsd is AOx4, c/o leg pain today (see emar), changed postioning, legs elevated on pillows. Napping between care, walking 1x to the BSC. Call light within reach. Tolorated wound care fair, medicated after due to reported pain. WIll con't with care plan

## 2022-04-14 NOTE — PROGRESS NOTES
[facility]     Progress Note    Patient Name: Lauren Redd  : 1945  MRN: [id number]  Primary Care Physician:  Dennis Kohler MD  Date of admission: 3/23/2022      Subjective   Brief summary.  Admitted to rehab for cellulitis, CHF and A. fib and weakness  Patient seen and examined yesterday this is a late entry note    HPI:  Patient seen around 8 AM yesterday, awake alert  Feels tired  No shortness of breath    Review of Systems     Fatigue and weakness,  Short of breath with exertion      Objective     Vitals:   Temp:  [97.3 °F (36.3 °C)-98.1 °F (36.7 °C)] 97.3 °F (36.3 °C)  Heart Rate:  [78-83] 83  Resp:  [16-18] 16  BP: (128-146)/(79-94) 128/94  Flow (L/min):  [1] 1    Physical Exam :     Elderly female not in acute distress.  Heart irregular and tachycardic.   Lungs diminished breath sounds.  Abdomen soft.  Nontender  Both lower extremities with ulcers.  neurologically awake alert and oriented.      Result Review:  I have personally reviewed the results from the time of this admission to 2022 06:53 EDT and agree with these findings:  [x]  Laboratory  []  Microbiology  []  Radiology  []  EKG/Telemetry   []  Cardiology/Vascular   []  Pathology  []  Old records  []  Other:           Assessment / Plan       Active Hospital Problems:  Active Hospital Problems    Diagnosis    • Atrial fibrillation (HCC)    • Generalized weakness    • CHF (congestive heart failure) (HCC)    • Cellulitis of anterior lower leg        Plan:   Overall doing better  Continue wound care.  Continue PT and OT  Check labs in a.m.       DVT prophylaxis:  Medical DVT prophylaxis orders are present.    CODE STATUS:   Code Status (Patient has no pulse and is not breathing): CPR (Attempt to Resuscitate)  Medical Interventions (Patient has pulse or is breathing): Full Support                    Electronically signed by Pablo Fajardo MD, 22, 9:56 AM EDT.

## 2022-04-15 NOTE — PLAN OF CARE
Goal Outcome Evaluation:  Plan of Care Reviewed With: patient        Progress: no change   Vss. Pt still soa with activity such as getting up to bedside commode.  Pt stated that she didn't feel good today.  Medicated x1 for pain in legs with norco with adequate relief.

## 2022-04-15 NOTE — PLAN OF CARE
Goal Outcome Evaluation:  Plan of Care Reviewed With: patient        Progress: no change  Outcome Evaluation: Rsd. is alert and oriented x4.  Medicated x1 for c/o pain, see MAR.  States relief.  Transfers x1 assist to BSC with rolling walker.  Call light and personal items within reach, Rsd. reminded to use call light for assistance, verbalizes understanding.  WIll continue to monitor and notify on-coming staff.  Current plan of care continues at this time.

## 2022-04-15 NOTE — PROGRESS NOTES
"Nursing Facility Medication Regimen Review    Potentially Clinically Significant Medication Issues during this review: []Identified   [x]Not identified    Provider acknowledgement required?   [x]Yes   []No    Lauren Redd is a 77 y.o.female admitted by Pablo Fajardo MD , on 3/23/2022  2:37 PM , for Generalized weakness [R53.1]    Visit Vitals  /75 (BP Location: Left arm, Patient Position: Lying)   Pulse 88   Temp 97.6 °F (36.4 °C) (Oral)   Resp 18   Ht 172.7 cm (68\")   Wt 115 kg (252 lb 6.8 oz)   LMP  (LMP Unknown)   SpO2 98%   BMI 38.38 kg/m²        Lab Results   Component Value Date    GLUCOSE 105 (H) 04/15/2022    BUN 48 (H) 04/15/2022    CREATININE 1.70 (H) 04/15/2022    EGFRIFAFRI 36 (L) 02/25/2022    BCR 28.2 (H) 04/15/2022    K 3.5 04/15/2022    CO2 36.9 (H) 04/15/2022    CALCIUM 10.0 04/15/2022    ALBUMIN 3.20 (L) 04/15/2022    LABIL2 1.2 (L) 03/03/2021    AST 18 04/15/2022    ALT 16 04/15/2022    WBC 8.71 04/15/2022    HGB 12.3 04/15/2022    HCT 38.9 04/15/2022    MCV 95.8 04/15/2022     04/15/2022        Active Ambulatory Problems     Diagnosis Date Noted    Positive colorectal cancer screening using Cologuard test 12/14/2021    Atrial fibrillation with RVR (HCC) 02/07/2022    CHF (congestive heart failure) (HCC) 03/14/2022    Cellulitis of anterior lower leg 03/14/2022     Resolved Ambulatory Problems     Diagnosis Date Noted    Acute on chronic diastolic CHF (congestive heart failure) (HCC) 03/14/2022     Past Medical History:   Diagnosis Date    Afib (HCC)     Aneurysm (HCC)     Arthritis     GERD (gastroesophageal reflux disease)     Hypertension     Irregular heart beat         Hospital Medications (active)         Dose Frequency Start End Indication    acetaminophen (TYLENOL) tablet 650 mg 650 mg Every 4 Hours PRN 3/23/2022  Pain     Route: Oral     aluminum-magnesium hydroxide-simethicone (MAALOX MAX) 400-400-40 MG/5ML suspension 15 mL 15 mL Every 6 Hours PRN 3/23/2022  Heartburn     " Admin Instructions: Maximum 60 mL in 24 hours.     Route: Oral     apixaban (ELIQUIS) tablet 5 mg 5 mg 2 Times Daily 3/23/2022  Atrial Fibrillation    Admin Instructions: Tablet may be crushed and suspended in 60 mL of water or D5W and immediately delivered via NG tube.     Route: Oral     bisacodyl (DULCOLAX) EC tablet 5 mg 5 mg Daily PRN 3/23/2022  Constipation    Admin Instructions: Use if no bowel movement after 12 hours.  Swallow whole. Do not crush, split, or chew tablet.     Route: Oral     bisacodyl (DULCOLAX) suppository 10 mg 10 mg Daily PRN 3/23/2022  Constipation    Admin Instructions: Use if no bowel movement after 12 hours.     Route: Rectal     covid-19 vaccine-Sha (PFIZER) 30 mcg/0.3mL (GRAY CAP) 0.3 mL During Hospitalization 4/7/2022  Preventative Vaccine     Admin Instructions: COVID-19 vaccine may be administered without regards to timing of other vaccines.  If administering on the same day as another vaccine, administer in a different site (arm).     Route: Intramuscular     digoxin (LANOXIN) tablet 125 mcg 125 mcg Daily Digoxin 3/25/2022  Atrial Fibrillation    Admin Instructions:  Check and record heart rate.     Route: Oral     DULoxetine (CYMBALTA) DR capsule 30 mg 30 mg Daily 3/24/2022      Admin Instructions: Caution: Look alike/sound alike drug alert. Capsule may be opened and sprinkled on applesauce or apple juice. Do not crush or chew capsule.     Route: Oral     famotidine (PEPCID) tablet 40 mg 40 mg Daily 3/24/2022  GERD    Route: Oral     furosemide (LASIX) tablet 40 mg 40 mg 2 Times Daily (Diuretics) 4/2/2022  Congestive Heart Failure    Route: Oral     gabapentin (NEURONTIN) capsule 100 mg 100 mg Every 12 Hours Scheduled 3/23/2022      Admin Instructions:      Route: Oral     HYDROcodone-acetaminophen (NORCO) 5-325 MG per tablet 1 tablet 1 tablet Every 6 Hours PRN 4/10/2022 4/17/2022 Pain    Route: Oral     metOLazone (ZAROXOLYN) tablet 10 mg 10 mg Daily 4/4/2022  Congestive Heart  "Failure    Admin Instructions: Caution: Look alike/sound alike drug alert     Route: Oral     metoprolol succinate XL (TOPROL-XL) 24 hr tablet 100 mg 100 mg 2 Times Daily 4/3/2022  Hypertension    Admin Instructions: Do not crush or chew.     Route: Oral     naloxone (NARCAN) injection 0.4 mg 0.4 mg Every 5 Minutes PRN 3/23/2022  Respiratory Depression    Route: Intravenous     polyethylene glycol (MIRALAX) packet 17 g 17 g Daily PRN 3/23/2022  Constipation    Admin Instructions: Use if no bowel movement after 12 hours. Mix in 6-8 ounces of water.  Use 4-8 ounces of water, tea, or juice for each 17 gram dose.     Route: Oral     Linked Group 1: \"And\" Linked Group Details         sennosides-docusate (PERICOLACE) 8.6-50 MG per tablet 1 tablet 1 tablet 2 Times Daily 3/23/2022  Constipation    Admin Instructions: HOLD MEDICATION IF PATIENT HAS HAD BOWEL MOVEMENT. Start bowel management regimen if patient has not had a bowel movement after 12 hours.     Route: Oral     Linked Group 1: \"And\" Linked Group Details         sodium chloride 0.9 % flush 10 mL 10 mL Every 12 Hours Scheduled 3/23/2022  Line Care     Route: Intravenous     sodium chloride 0.9 % flush 10 mL 10 mL As Needed 3/23/2022  Line Care    Route: Intravenous     spironolactone (ALDACTONE) tablet 25 mg 25 mg Daily 3/24/2022  Hypertension    Route: Oral     triamcinolone (KENALOG) 0.1 % ointment 1 application 1 application Every Other Day 4/4/2022  Cellulitis     Admin Instructions: Apply thin layer to bilateral legs     Route: Topical     Cosign for Ordering: Accepted by Pablo Fajardo MD on 4/4/2022 10:42 AM                Psychotropic Medications  Cymbalta 30mg daily         Potentially Clinically Significant Medication Issues/PharmD Recommendations:   Psychotropic Medication: Cymbalta 30mg daily   Opioid Use Review: Hydrocodone/APAP 5-325mg. Stopped date of 7 days entered.    Medication without an appropriate indication: Cymbalta 30mg daily, Saline Flush "   Untreated indication: N/A  Missing Duration: N/A  Excessive or Inadequate Dose: N/A  Ineffective Drug Therapy: N/A  Medication Adverse Reactions/Consequences: N/A  Medication Monitoring:  N/A  Drug Interactions: N/A  Duplicate Therapy: N/A  PTA Medication Omissions (not currently ordered): N/A  Safety: Most recent digoxin level 4/11/22 = 0.87      Additional notes: Patient has Cymbalta active on MAR with no documentation of indication. Patient also has saline flush orders that may not be needed, does not currently have any active lines.       In completing this Drug Regimen Review for PRISCILLA Johnson, Nanci Villafana, PharmD, have reviewed the electronic medical chart contents, including but not limited to the following: Medication Administration Records (MAR), Prescriber's orders, Progress, nursing and consultants' notes, Laboratory and diagnostic test results, Other sources of information about documented expressions or indications of distress and/or changes in condition.

## 2022-04-15 NOTE — THERAPY TREATMENT NOTE
SNF - Physical Therapy Treatment Note   [place]     Patient Name: Lauren Redd  : 1945  MRN: [id number]  Today's Date: 4/15/2022      Visit Dx:     ICD-10-CM ICD-9-CM   1. Decreased activities of daily living (ADL)  Z78.9 V49.89   2. Difficulty walking  R26.2 719.7     Patient Active Problem List   Diagnosis   • Positive colorectal cancer screening using Cologuard test   • Atrial fibrillation with RVR (HCC)   • CHF (congestive heart failure) (HCC)   • Cellulitis of anterior lower leg   • Generalized weakness   • Atrial fibrillation (HCC)     Past Medical History:   Diagnosis Date   • Afib (HCC)    • Aneurysm (HCC)    • Arthritis    • GERD (gastroesophageal reflux disease)    • Hypertension    • Irregular heart beat      Past Surgical History:   Procedure Laterality Date   • COLONOSCOPY     • HYSTERECTOMY       PT Assessment (last 12 hours)     PT Evaluation and Treatment     Row Name 04/15/22 1407          Physical Therapy Time and Intention    Subjective Information complains of;fatigue  -WM     Document Type therapy note (daily note)  -WM     Mode of Treatment individual therapy;physical therapy  -WM     Patient Effort good  -WM     Symptoms Noted During/After Treatment fatigue  -WM     Row Name 04/15/22 140          Transfers    Sit-Stand [place] (Transfers) contact guard  -WM     Stand-Sit [place] (Transfers) contact guard  -WM     Row Name 04/15/22 1407          Sit-Stand Transfer    Assistive Device (Sit-Stand Transfers) walker, front-wheeled  -WM     Row Name 04/15/22 1407          Stand-Sit Transfer    Assistive Device (Stand-Sit Transfers) walker, front-wheeled  -WM     Row Name 04/15/22 1407          Gait/Stairs (Locomotion)    [place] Level (Gait) contact guard  -WM     Assistive Device (Gait) walker, front-wheeled  -WM     Distance in Feet (Gait) 24  -WM     Pattern (Gait) 4-point;step-through  -WM     Deviations/Abnormal Patterns (Gait) gait speed decreased;stride length  decreased  -WM     Row Name 04/15/22 1407          Safety Issues, Functional Mobility    Impairments Affecting Function (Mobility) balance;endurance/activity tolerance;strength  -WM     Row Name 04/15/22 1407          Hip (Therapeutic Exercise)    Hip Strengthening (Therapeutic Exercise) bilateral;aBduction;aDduction;heel slides;15 repititions;2 sets  Recumbant  -WM     Row Name 04/15/22 1407          Knee (Therapeutic Exercise)    Knee Isometrics (Therapeutic Exercise) bilateral;gluteal sets;10 repetitions;5 repetitions;3 second hold;2 sets  Recumbant  -WM     Knee Strengthening (Therapeutic Exercise) bilateral;SAQ (short arc quad);15 repititions;2 sets  Recumbant  -WM     Row Name 04/15/22 1407          Ankle (Therapeutic Exercise)    Ankle AROM (Therapeutic Exercise) bilateral;dorsiflexion;plantarflexion;15 repititions;2 sets  Recumbant  -WM     Row Name             Wound 02/09/22 1235 Right lower leg Blisters    Wound - Properties Group Placement Date: 02/09/22  -FL Placement Time: 1235  -FL Present on Hospital Admission: Y  -FL Side: Right  -FL Orientation: lower  -FL Location: leg  -FL Primary Wound Type: Blisters  -FL     Retired Wound - Properties Group Placement Date: 02/09/22  -FL Placement Time: 1235  -FL Present on Hospital Admission: Y  -FL Side: Right  -FL Orientation: lower  -FL Location: leg  -FL Primary Wound Type: Blisters  -FL     Retired Wound - Properties Group Date first assessed: 02/09/22  -FL Time first assessed: 1235  -FL Present on Hospital Admission: Y  -FL Side: Right  -FL Location: leg  -FL Primary Wound Type: Blisters  -FL     Row Name             Wound 02/23/22 0934 Left lower leg    Wound - Properties Group Placement Date: 02/23/22  -BA Placement Time: 0934  -BA Present on Hospital Admission: Y  -BA Side: Left  -BA Orientation: lower  -BA Location: leg  -BA     Retired Wound - Properties Group Placement Date: 02/23/22  -BA Placement Time: 0934  -BA Present on Hospital Admission: Y   -BA Side: Left  -BA Orientation: lower  -BA Location: leg  -BA     Retired Wound - Properties Group Date first assessed: 02/23/22  -BA Time first assessed: 0934  -BA Present on Hospital Admission: Y  -BA Side: Left  -BA Location: leg  -BA     Row Name 04/15/22 1407          Progress Summary (PT)    Progress Toward Functional Goals (PT) progress toward functional goals is fair  -WM           User Key  (r) = Recorded By, (t) = Taken By, (c) = Cosigned By    Initials Name Provider Type    FL Sylvia Lara, RN Registered Nurse    Jorge Fournier PTA Physical Therapist Assistant    Swathi Yuen RN Registered Nurse                Physical Therapy Education                 Title: PT OT SLP Therapies (Done)     Topic: Physical Therapy (Done)     Point: Mobility training (Done)     Learning Progress Summary           Patient Acceptance, E, VU by KN at 4/15/2022 1141    Acceptance, E, VU by KN at 4/14/2022 1133    Acceptance, E, VU by KN at 4/13/2022 1154    Comment: Cues for safety during functional t/f    Acceptance, E, VU by KN at 4/12/2022 1415    Acceptance, E, VU by AV at 4/11/2022 1142    Comment: need for staff assistance for all standing ADL/transfers  safe positioning ADLs    Acceptance, E, VU by KN at 4/1/2022 1149    Acceptance, E,TB, VU by CS at 3/24/2022 1607                   Point: Home exercise program (Done)     Learning Progress Summary           Patient Acceptance, E, VU by KN at 4/15/2022 1141    Acceptance, E, VU by KN at 4/14/2022 1133    Acceptance, E, VU by KN at 4/13/2022 1154    Comment: Cues for safety during functional t/f    Acceptance, E, VU by KN at 4/12/2022 1415    Acceptance, E, VU by AV at 4/11/2022 1142    Comment: need for staff assistance for all standing ADL/transfers  safe positioning ADLs    Acceptance, E, VU by KN at 4/1/2022 1149    Acceptance, E,TB, VU by CS at 3/24/2022 1607                   Point: Body mechanics (Done)     Learning Progress Summary            Patient Acceptance, E, VU by KN at 4/15/2022 1141    Acceptance, E, VU by KN at 4/14/2022 1133    Acceptance, E, VU by KN at 4/13/2022 1154    Comment: Cues for safety during functional t/f    Acceptance, E, VU by KN at 4/12/2022 1415    Acceptance, E, VU by AV at 4/11/2022 1142    Comment: need for staff assistance for all standing ADL/transfers  safe positioning ADLs    Acceptance, E, VU by KN at 4/1/2022 1149    Acceptance, E,TB, VU by CS at 3/24/2022 1607                   Point: Precautions (Done)     Learning Progress Summary           Patient Acceptance, E, VU by KN at 4/15/2022 1141    Acceptance, E, VU by KN at 4/14/2022 1133    Acceptance, E, VU by KN at 4/13/2022 1154    Comment: Cues for safety during functional t/f    Acceptance, E, VU by KN at 4/12/2022 1415    Acceptance, E, VU by AV at 4/11/2022 1142    Comment: need for staff assistance for all standing ADL/transfers  safe positioning ADLs    Acceptance, E, VU by KN at 4/1/2022 1149    Acceptance, E,TB, VU by CS at 3/24/2022 1607                               User Key     Initials Effective Dates Name Provider Type Discipline    AV 06/16/21 -  Juan Carlos Zafar, OT Occupational Therapist OT    CS 04/25/21 -  Jojo Ramirez, PT Physical Therapist PT    KN 12/20/21 -  Koby Bass, OT Occupational Therapist OT              PT Recommendation and Plan     Progress Summary (PT)  Progress Toward Functional Goals (PT): progress toward functional goals is fair   Outcome Measures     Row Name 04/15/22 1410 04/14/22 1211 04/13/22 1413       How much help from another person do you currently need...    Turning from your back to your side while in flat bed without using bedrails? 4  -WM 4  -WM 4  -WM    Moving from lying on back to sitting on the side of a flat bed without bedrails? 3  -WM 3  -WM 3  -WM    Moving to and from a bed to a chair (including a wheelchair)? 3  -WM 3  -WM 3  -WM    Standing up from a chair using your arms (e.g., wheelchair, bedside  chair)? 4  -WM 4  -WM 4  -WM    Climbing 3-5 steps with a railing? 2  -WM 2  -WM 2  -WM    To walk in hospital room? 3  -WM 3  -WM 3  -WM    AM-PAC 6 Clicks Score (PT) 19  -WM 19  -WM 19  -WM    Row Name 04/12/22 1415             How much help from another person do you currently need...    Turning from your back to your side while in flat bed without using bedrails? 4  -WM      Moving from lying on back to sitting on the side of a flat bed without bedrails? 3  -WM      Moving to and from a bed to a chair (including a wheelchair)? 3  -WM      Standing up from a chair using your arms (e.g., wheelchair, bedside chair)? 4  -WM      Climbing 3-5 steps with a railing? 2  -WM      To walk in hospital room? 3  -WM      AM-PAC 6 Clicks Score (PT) 19  -WM            User Key  (r) = Recorded By, (t) = Taken By, (c) = Cosigned By    Initials Name Provider Type     Jorge Casarez PTA Physical Therapist Assistant                 Time Calculation:    PT Charges     Row Name 04/15/22 1406             Time Calculation    PT Received On 04/15/22  -WM              Timed Charges    31487 - PT Therapeutic Exercise Minutes 14  -WM      19466 - Gait Training Minutes  5  -WM      08972 - PT Therapeutic Activity Minutes 5  -WM              SNF Physical Therapy Minutes    Skilled Minutes- PT 24 min  -WM              Total Minutes    Timed Charges Total Minutes 24  -WM       Total Minutes 24  -WM            User Key  (r) = Recorded By, (t) = Taken By, (c) = Cosigned By    Initials Name Provider Type     Jorge Casarez PTA Physical Therapist Assistant              Therapy Charges for Today     Code Description Service Date Service Provider Modifiers Qty    66414293044 HC PT THER PROC EA 15 MIN 4/14/2022 Jorge Casarez PTA GP 1    63573568239 HC PT THERAPEUTIC ACT EA 15 MIN 4/14/2022 Jorge Casarez PTA GP 1    06605043205 HC PT THER PROC EA 15 MIN 4/15/2022 Jorge Casarez PTA GP 1    18918170108 HC GAIT TRAINING EA 15 MIN  4/15/2022 Jorge Casarez, PTA GP 1          PT G-Codes  Outcome Measure Options: AM-PAC 6 Clicks Daily Activity (OT), Optimal Instrument  AM-PAC 6 Clicks Score (PT): 19  AM-PAC 6 Clicks Score (OT): 19    Jorge Casarez PTA  4/15/2022

## 2022-04-15 NOTE — PROGRESS NOTES
[facility]     Progress Note    Patient Name: Lauren Redd  : 1945  MRN: [id number]  Primary Care Physician:  Dennis Kohler MD  Date of admission: 3/23/2022      Subjective   Brief summary.  Admitted to rehab for cellulitis, CHF and A. fib and weakness      HPI:  Seen earlier today  Feels tired  No shortness of breath  Complains of increased leg swelling around the knees and thighs    Review of Systems     Fatigue and weakness,  Short of breath with exertion  Leg swelling      Objective     Vitals:   Temp:  [97.4 °F (36.3 °C)-97.6 °F (36.4 °C)] 97.6 °F (36.4 °C)  Heart Rate:  [73-88] 88  Resp:  [18-20] 18  BP: (133)/(75-87) 133/75    Physical Exam :     Elderly female not in acute distress.  Heart irregular and tachycardic.   Lungs diminished breath sounds.  Abdomen soft.  Nontender  Both lower extremities with ulcers.  Patient has swelling above the wound dressing in the legs/Ace wraps  neurologically awake alert and oriented.      Result Review:  I have personally reviewed the results from the time of this admission to 4/15/2022 17:43 EDT and agree with these findings:  [x]  Laboratory  []  Microbiology  []  Radiology  []  EKG/Telemetry   []  Cardiology/Vascular   []  Pathology  []  Old records  []  Other:           Assessment / Plan       Active Hospital Problems:  Active Hospital Problems    Diagnosis    • Atrial fibrillation (HCC)    • Generalized weakness    • CHF (congestive heart failure) (HCC)    • Cellulitis of anterior lower leg        Plan:   Swelling of legs worse above the wound wraps  Understandably due to pressure, discussed with patient  Labs reviewed from today stable  Continue PT and OT efforts  Discussed with RN       DVT prophylaxis:  Medical DVT prophylaxis orders are present.    CODE STATUS:   Code Status (Patient has no pulse and is not breathing): CPR (Attempt to Resuscitate)  Medical Interventions (Patient has pulse or is breathing): Full  Support                    Electronically signed by Pablo Fajardo MD, 04/15/22, 9:56 AM EDT.

## 2022-04-15 NOTE — THERAPY TREATMENT NOTE
SNF - Occupational Therapy Progress Note   [facility]    Patient Name: Lauren Redd  : 1945    MRN: [id number]                              Today's Date: 4/15/2022       Admit Date: 3/23/2022    Visit Dx:     ICD-10-CM ICD-9-CM   1. Decreased activities of daily living (ADL)  Z78.9 V49.89   2. Difficulty walking  R26.2 719.7     Patient Active Problem List   Diagnosis   • Positive colorectal cancer screening using Cologuard test   • Atrial fibrillation with RVR (HCC)   • CHF (congestive heart failure) (HCC)   • Cellulitis of anterior lower leg   • Generalized weakness   • Atrial fibrillation (HCC)     Past Medical History:   Diagnosis Date   • Afib (HCC)    • Aneurysm (HCC)    • Arthritis    • GERD (gastroesophageal reflux disease)    • Hypertension    • Irregular heart beat      Past Surgical History:   Procedure Laterality Date   • COLONOSCOPY     • HYSTERECTOMY        General Information     Row Name 04/15/22 1128          OT Time and Intention    Document Type therapy note (daily note)  -KN     Mode of Treatment individual therapy;occupational therapy  -     Row Name 04/15/22 1128          General Information    Patient Profile Reviewed yes  -KN     Existing Precautions/Restrictions fall;oxygen therapy device and L/min  -KN     Row Name 04/15/22 1128          Cognition    Orientation Status (Cognition) oriented x 3  alert and resting in recliner at bed side  -     Row Name 04/15/22 1128          Safety Issues, Functional Mobility    Impairments Affecting Function (Mobility) balance;endurance/activity tolerance;strength  -KN           User Key  (r) = Recorded By, (t) = Taken By, (c) = Cosigned By    Initials Name Provider Type    Koby Card OT Occupational Therapist                 Mobility/ADL's     Row Name 04/15/22 1129          Transfers    Transfers sit-stand transfer  -KN     Sit-Stand Joy (Transfers) standby assist  -MARCEL     Row Name 04/15/22 1129          Sit-Stand Transfer     Assistive Device (Sit-Stand Transfers) --  none  -KN           User Key  (r) = Recorded By, (t) = Taken By, (c) = Cosigned By    Initials Name Provider Type    Koby Card OT Occupational Therapist               Obj/Interventions     Row Name 04/15/22 1131          Shoulder (Therapeutic Exercise)    Shoulder (Therapeutic Exercise) strengthening exercise  -KN     Shoulder Strengthening (Therapeutic Exercise) bilateral;flexion;3 sets;20 repititions  5# Dowel Luis  -     Row Name 04/15/22 1131          Elbow/Forearm (Therapeutic Exercise)    Elbow/Forearm (Therapeutic Exercise) strengthening exercise  -KN     Elbow/Forearm Strengthening (Therapeutic Exercise) bilateral;flexion;3 sets;20 repititions  -     Row Name 04/15/22 1131          Motor Skills    Therapeutic Exercise shoulder;elbow/forearm;hand  -KN           User Key  (r) = Recorded By, (t) = Taken By, (c) = Cosigned By    Initials Name Provider Type    Koby Card, HANNAH Occupational Therapist               Goals/Plan    No documentation.                Clinical Impression     Row Name 04/15/22 1135          Pain Assessment    Pretreatment Pain Rating 4/10  -KN     Posttreatment Pain Rating 4/10  -KN     Pain Location generalized  -KN     Pain Intervention(s) Nursing Notified  -     Row Name 04/15/22 1135          Plan of Care Review    Progress no change  -KN     Outcome Evaluation Pt recieving pain meds upon arrival.  also present to answer pt questions at that time. Pt noted to be SOA during treatment. O2 checked during exercise and checked out WNL. Pt wish to cut therapy short today due to being tired. Pt will continue OT plan of care.  -     Row Name 04/15/22 1135          Vital Signs    Pre SpO2 (%) 98  -KN     O2 Delivery Pre Treatment nasal cannula  -KN     Intra SpO2 (%) 100  -KN     O2 Delivery Intra Treatment nasal cannula  -KN     Post SpO2 (%) 100  -KN     O2 Delivery Post Treatment nasal cannula  -KN           User Key  (r) =  Recorded By, (t) = Taken By, (c) = Cosigned By    Initials Name Provider Type    Koby Card OT Occupational Therapist               Outcome Measures     Row Name 04/15/22 1139          How much help from another is currently needed...    Putting on and taking off regular lower body clothing? 2  -KN     Bathing (including washing, rinsing, and drying) 3  -KN     Toileting (which includes using toilet bed pan or urinal) 3  -KN     Putting on and taking off regular upper body clothing 3  -KN     Taking care of personal grooming (such as brushing teeth) 4  -KN     Eating meals 4  -KN     AM-PAC 6 Clicks Score (OT) 19  -KN     Row Name 04/15/22 1019          How much help from another person do you currently need...    Turning from your back to your side while in flat bed without using bedrails? 4  -LH     Moving from lying on back to sitting on the side of a flat bed without bedrails? 3  -LH     Moving to and from a bed to a chair (including a wheelchair)? 3  -LH     Standing up from a chair using your arms (e.g., wheelchair, bedside chair)? 4  -LH     Climbing 3-5 steps with a railing? 2  -LH     To walk in hospital room? 3  -LH     AM-PAC 6 Clicks Score (PT) 19  -LH     Row Name 04/15/22 1139          Functional Assessment    Outcome Measure Options AM-PAC 6 Clicks Daily Activity (OT);Optimal Instrument  -KN     Row Name 04/15/22 1139          Optimal Instrument    Bending/Stooping 1  -KN     Standing 1  -KN     Reaching 1  -KN           User Key  (r) = Recorded By, (t) = Taken By, (c) = Cosigned By    Initials Name Provider Type    Rosa Chen, RN Registered Nurse    Koby Card OT Occupational Therapist              Section G              Section GG                         Occupational Therapy Education                 Title: PT OT SLP Therapies (Done)     Topic: Occupational Therapy (Done)     Point: ADL training (Done)     Description:   Instruct learner(s) on proper safety adaptation and  remediation techniques during self care or transfers.   Instruct in proper use of assistive devices.              Learning Progress Summary           Patient Acceptance, E, VU by KN at 4/15/2022 1141    Acceptance, E, VU by KN at 4/14/2022 1133    Acceptance, E, VU by KN at 4/13/2022 1154    Comment: Cues for safety during functional t/f    Acceptance, E, VU by KN at 4/12/2022 1415    Acceptance, E, VU by AV at 4/11/2022 1142    Comment: need for staff assistance for all standing ADL/transfers  safe positioning ADLs    Acceptance, E, VU by KN at 4/1/2022 1149    Acceptance, E, VU,NR by GC at 3/24/2022 1149                   Point: Home exercise program (Done)     Description:   Instruct learner(s) on appropriate technique for monitoring, assisting and/or progressing therapeutic exercises/activities.              Learning Progress Summary           Patient Acceptance, E, VU by KN at 4/15/2022 1141    Acceptance, E, VU by KN at 4/14/2022 1133    Acceptance, E, VU by KN at 4/13/2022 1154    Comment: Cues for safety during functional t/f    Acceptance, E, VU by KN at 4/12/2022 1415    Acceptance, E, VU by AV at 4/11/2022 1142    Comment: need for staff assistance for all standing ADL/transfers  safe positioning ADLs    Acceptance, E, VU by KN at 4/1/2022 1149    Acceptance, E, VU,NR by GC at 3/24/2022 1149                   Point: Precautions (Done)     Description:   Instruct learner(s) on prescribed precautions during self-care and functional transfers.              Learning Progress Summary           Patient Acceptance, E, VU by KN at 4/15/2022 1141    Acceptance, E, VU by KN at 4/14/2022 1133    Acceptance, E, VU by KN at 4/13/2022 1154    Comment: Cues for safety during functional t/f    Acceptance, E, VU by KN at 4/12/2022 1415    Acceptance, E, VU by AV at 4/11/2022 1142    Comment: need for staff assistance for all standing ADL/transfers  safe positioning ADLs    Acceptance, E, VU by KN at 4/1/2022 1149     Acceptance, E, VU,NR by  at 3/24/2022 1149                   Point: Body mechanics (Done)     Description:   Instruct learner(s) on proper positioning and spine alignment during self-care, functional mobility activities and/or exercises.              Learning Progress Summary           Patient Acceptance, E, VU by KN at 4/15/2022 1141    Acceptance, E, VU by KN at 4/14/2022 1133    Acceptance, E, VU by KN at 4/13/2022 1154    Comment: Cues for safety during functional t/f    Acceptance, E, VU by KN at 4/12/2022 1415    Acceptance, E, VU by AV at 4/11/2022 1142    Comment: need for staff assistance for all standing ADL/transfers  safe positioning ADLs    Acceptance, E, VU by KN at 4/1/2022 1149    Acceptance, E, VU,NR by  at 3/24/2022 1149                               User Key     Initials Effective Dates Name Provider Type Discipline     06/16/21 -  Juan Carlos Zafar, OT Occupational Therapist OT     06/16/21 -  Rosalba Christianson, OT Occupational Therapist OT     12/20/21 -  Koby Bass OT Occupational Therapist OT              OT Recommendation and Plan     Plan of Care Review  Plan of Care Reviewed With: patient  Progress: no change  Outcome Evaluation: Pt recieving pain meds upon arrival.  also present to answer pt questions at that time. Pt noted to be SOA during treatment. O2 checked during exercise and checked out WNL. Pt wish to cut therapy short today due to being tired. Pt will continue OT plan of care.     Time Calculation:    Time Calculation- OT     Row Name 04/15/22 1142             Time Calculation- OT    OT Received On 04/15/22  -KN              Timed Charges    11089 - OT Therapeutic Exercise Minutes 28  -KN              SNF Occupational Therapy Minutes    Skilled Minutes- OT 28 min  -KN              Total Minutes    Timed Charges Total Minutes 28  -KN       Total Minutes 28  -KN            User Key  (r) = Recorded By, (t) = Taken By, (c) = Cosigned By    Initials Name Provider Type      Koby Bass OT Occupational Therapist              Therapy Charges for Today     Code Description Service Date Service Provider Modifiers Qty    77252346331 HC OT SELF CARE/MGMT/TRAIN EA 15 MIN 4/14/2022 Koby Bass OT GO 3    63206157053 HC OT THER PROC EA 15 MIN 4/15/2022 Koby Bass OT GO 2               Koby Bass OT  4/15/2022

## 2022-04-16 NOTE — PLAN OF CARE
Goal Outcome Evaluation:           Progress: improving  Vital signs stable. Alert and oriented. One person assist to bedside commode during night. Athena administered at HS for bilateral leg pain.

## 2022-04-16 NOTE — PLAN OF CARE
Goal Outcome Evaluation:  Plan of Care Reviewed With: patient        Progress: improving     Patient was up ambulating with therapy staff today and tolerated this well. Patient's wounds on legs continue to improve in appearance.

## 2022-04-16 NOTE — THERAPY TREATMENT NOTE
SNF - Physical Therapy Progress Note   [facility]     Patient Name: Lauren Redd  : 1945  MRN: [id number]  Today's Date: 2022      Visit Dx:    ICD-10-CM ICD-9-CM   1. Decreased activities of daily living (ADL)  Z78.9 V49.89   2. Difficulty walking  R26.2 719.7     Patient Active Problem List   Diagnosis   • Positive colorectal cancer screening using Cologuard test   • Atrial fibrillation with RVR (HCC)   • CHF (congestive heart failure) (HCC)   • Cellulitis of anterior lower leg   • Generalized weakness   • Atrial fibrillation (HCC)     Past Medical History:   Diagnosis Date   • Afib (HCC)    • Aneurysm (HCC)    • Arthritis    • GERD (gastroesophageal reflux disease)    • Hypertension    • Irregular heart beat      Past Surgical History:   Procedure Laterality Date   • COLONOSCOPY     • HYSTERECTOMY         PT Assessment (last 12 hours)     PT Evaluation and Treatment     Row Name 22 1200          Physical Therapy Time and Intention    Subjective Information no complaints  -CS     Document Type therapy note (daily note)  -CS     Mode of Treatment individual therapy;physical therapy  -CS     Patient Effort good  -CS     Symptoms Noted During/After Treatment none  -CS     Row Name 22 1200          Bed Mobility    Scooting/Bridging [place] (Bed Mobility) standby assist  -CS     Supine-Sit [place] (Bed Mobility) standby assist  -CS     Assistive Device (Bed Mobility) bed rails;head of bed elevated  -CS     Row Name 22 1200          Transfers    Sit-Stand [place] (Transfers) standby assist;1 person assist  -CS     Stand-Sit [place] (Transfers) standby assist;1 person assist  -CS     Row Name 22 1200          Sit-Stand Transfer    Assistive Device (Sit-Stand Transfers) walker, front-wheeled  -CS     Row Name 22 1200          Stand-Sit Transfer    Assistive Device (Stand-Sit Transfers) walker, front-wheeled  -CS     Row Name 22 1200          Gait/Stairs  (Locomotion)    Gait/Stairs Locomotion gait/ambulation independence;gait/ambulation assistive device;distance ambulated;gait pattern  -     [place] Level (Gait) standby assist;1 person to manage equipment  -     Assistive Device (Gait) walker, front-wheeled  -CS     Distance in Feet (Gait) 155  -CS     Pattern (Gait) 4-point;step-through  -CS     Deviations/Abnormal Patterns (Gait) gait speed decreased;stride length decreased  -     Bilateral Gait Deviations forward flexed posture  -     Row Name 04/16/22 1200          Hip (Therapeutic Exercise)    Hip AROM (Therapeutic Exercise) bilateral;flexion;extension;aBduction;aDduction;sitting;20 repititions;5 repetitions;external rotation;internal rotation  -CS     Row Name 04/16/22 1200          Knee (Therapeutic Exercise)    Knee AROM (Therapeutic Exercise) bilateral;LAQ (long arc quad);sitting;20 repititions;5 repetitions  -     Row Name 04/16/22 1200          Ankle (Therapeutic Exercise)    Ankle AROM (Therapeutic Exercise) bilateral;dorsiflexion;plantarflexion;sitting;20 repititions;5 repetitions  -     Row Name             Wound 02/09/22 1235 Right lower leg Blisters    Wound - Properties Group Placement Date: 02/09/22  -FL Placement Time: 1235  -FL Present on Hospital Admission: Y  -FL Side: Right  -FL Orientation: lower  -FL Location: leg  -FL Primary Wound Type: Blisters  -FL     Retired Wound - Properties Group Placement Date: 02/09/22  -FL Placement Time: 1235  -FL Present on Hospital Admission: Y  -FL Side: Right  -FL Orientation: lower  -FL Location: leg  -FL Primary Wound Type: Blisters  -FL     Retired Wound - Properties Group Date first assessed: 02/09/22  -FL Time first assessed: 1235  -FL Present on Hospital Admission: Y  -FL Side: Right  -FL Location: leg  -FL Primary Wound Type: Blisters  -FL     Row Name             Wound 02/23/22 0934 Left lower leg    Wound - Properties Group Placement Date: 02/23/22  -BA Placement Time: 0934 -BA  Present on Hospital Admission: Y  -BA Side: Left  -BA Orientation: lower  -BA Location: leg  -BA     Retired Wound - Properties Group Placement Date: 02/23/22  -BA Placement Time: 0934 -BA Present on Hospital Admission: Y  -BA Side: Left  -BA Orientation: lower  -BA Location: leg  -BA     Retired Wound - Properties Group Date first assessed: 02/23/22  -BA Time first assessed: 0934  -BA Present on Hospital Admission: Y  -BA Side: Left  -BA Location: leg  -BA     Row Name 04/16/22 1200          Progress Summary (PT)    Daily Progress Summary (PT) Pt. ambulated for increased distance in hallway today without loss of balance while PTA managed O2 tank/tubing.  LE exercises performed in chair today after ambulation and pt. remained in chair after Tx with call button within reach.  -CS           User Key  (r) = Recorded By, (t) = Taken By, (c) = Cosigned By    Initials Name Provider Type    Sylvia Olson, RN Registered Nurse    Swathi Yuen, RN Registered Nurse    Fan Alarcon PTA Physical Therapist Assistant              Section G              Section GG                       Physical Therapy Education                 Title: PT OT SLP Therapies (Done)     Topic: Physical Therapy (Done)     Point: Mobility training (Done)     Learning Progress Summary           Patient Acceptance, E, VU by KN at 4/15/2022 1141    Acceptance, E, VU by KN at 4/14/2022 1133    Acceptance, E, VU by KN at 4/13/2022 1154    Comment: Cues for safety during functional t/f    Acceptance, E, VU by KN at 4/12/2022 1415    Acceptance, E, VU by AV at 4/11/2022 1142    Comment: need for staff assistance for all standing ADL/transfers  safe positioning ADLs    Acceptance, E, VU by KN at 4/1/2022 1149    Acceptance, E,TB, VU by CS at 3/24/2022 1607                   Point: Home exercise program (Done)     Learning Progress Summary           Patient Acceptance, E, VU by KN at 4/15/2022 1141    Acceptance, E, VU by KN at 4/14/2022 1133     Acceptance, E, VU by KN at 4/13/2022 1154    Comment: Cues for safety during functional t/f    Acceptance, E, VU by KN at 4/12/2022 1415    Acceptance, E, VU by AV at 4/11/2022 1142    Comment: need for staff assistance for all standing ADL/transfers  safe positioning ADLs    Acceptance, E, VU by KN at 4/1/2022 1149    Acceptance, E,TB, VU by CS at 3/24/2022 1607                   Point: Body mechanics (Done)     Learning Progress Summary           Patient Acceptance, E, VU by KN at 4/15/2022 1141    Acceptance, E, VU by KN at 4/14/2022 1133    Acceptance, E, VU by KN at 4/13/2022 1154    Comment: Cues for safety during functional t/f    Acceptance, E, VU by KN at 4/12/2022 1415    Acceptance, E, VU by AV at 4/11/2022 1142    Comment: need for staff assistance for all standing ADL/transfers  safe positioning ADLs    Acceptance, E, VU by KN at 4/1/2022 1149    Acceptance, E,TB, VU by CS at 3/24/2022 1607                   Point: Precautions (Done)     Learning Progress Summary           Patient Acceptance, E, VU by KN at 4/15/2022 1141    Acceptance, E, VU by KN at 4/14/2022 1133    Acceptance, E, VU by KN at 4/13/2022 1154    Comment: Cues for safety during functional t/f    Acceptance, E, VU by KN at 4/12/2022 1415    Acceptance, E, VU by AV at 4/11/2022 1142    Comment: need for staff assistance for all standing ADL/transfers  safe positioning ADLs    Acceptance, E, VU by KN at 4/1/2022 1149    Acceptance, E,TB, VU by CS at 3/24/2022 1607                               User Key     Initials Effective Dates Name Provider Type Discipline    AV 06/16/21 -  Juan Carlos Zafar, OT Occupational Therapist OT    CS 04/25/21 -  Jojo Ramirez, PT Physical Therapist PT    MARCEL 12/20/21 -  Koby Bass, OT Occupational Therapist OT              PT Recommendation and Plan     Progress Summary (PT)  Daily Progress Summary (PT): Pt. ambulated for increased distance in hallway today without loss of balance while PTA managed O2  tank/tubing.  LE exercises performed in chair today after ambulation and pt. remained in chair after Tx with call button within reach.   Outcome Measures     Row Name 04/16/22 1200 04/15/22 1410 04/14/22 1211       How much help from another person do you currently need...    Turning from your back to your side while in flat bed without using bedrails? 4  -CS 4  -WM 4  -WM    Moving from lying on back to sitting on the side of a flat bed without bedrails? 3  -CS 3  -WM 3  -WM    Moving to and from a bed to a chair (including a wheelchair)? 4  -CS 3  -WM 3  -WM    Standing up from a chair using your arms (e.g., wheelchair, bedside chair)? 4  -CS 4  -WM 4  -WM    Climbing 3-5 steps with a railing? 3  -CS 2  -WM 2  -WM    To walk in hospital room? 3  -CS 3  -WM 3  -WM    AM-PAC 6 Clicks Score (PT) 21  -CS 19  -WM 19  -WM       Functional Assessment    Outcome Measure Options AM-PAC 6 Clicks Basic Mobility (PT)  -CS -- --    Row Name 04/13/22 1413             How much help from another person do you currently need...    Turning from your back to your side while in flat bed without using bedrails? 4  -WM      Moving from lying on back to sitting on the side of a flat bed without bedrails? 3  -WM      Moving to and from a bed to a chair (including a wheelchair)? 3  -WM      Standing up from a chair using your arms (e.g., wheelchair, bedside chair)? 4  -WM      Climbing 3-5 steps with a railing? 2  -WM      To walk in hospital room? 3  -WM      AM-PAC 6 Clicks Score (PT) 19  -WM            User Key  (r) = Recorded By, (t) = Taken By, (c) = Cosigned By    Initials Name Provider Type    WM Jorge Casarez PTA Physical Therapist Assistant    Fan Alarcon PTA Physical Therapist Assistant                 Time Calculation:    PT Charges     Row Name 04/16/22 1232             Time Calculation    Start Time 1018  -CS      PT Received On 04/16/22  -              Timed Charges    38969 - PT Therapeutic Exercise Minutes 12   -CS      40237 - PT Therapeutic Activity Minutes 14  -CS              SNF Physical Therapy Minutes    Skilled Minutes- PT 26 min  -CS              Total Minutes    Timed Charges Total Minutes 26  -CS       Total Minutes 26  -CS            User Key  (r) = Recorded By, (t) = Taken By, (c) = Cosigned By    Initials Name Provider Type    CS Fan Negron PTA Physical Therapist Assistant              Therapy Charges for Today     Code Description Service Date Service Provider Modifiers Qty    30284761150 HC PT THER PROC EA 15 MIN 4/16/2022 Fan Negron PTA GP 1    91612013093 HC PT THERAPEUTIC ACT EA 15 MIN 4/16/2022 Fan Negron PTA GP 1          PT G-Codes  Outcome Measure Options: AM-PAC 6 Clicks Basic Mobility (PT)  AM-PAC 6 Clicks Score (PT): 21  AM-PAC 6 Clicks Score (OT): 19    Fan Negron PTA  4/16/2022

## 2022-04-17 NOTE — PLAN OF CARE
Goal Outcome Evaluation:           Progress: improving  Outcome Evaluation: No significant change. Vital signs stable. Alert and oriented. Transferred to bedside commode with one person assist. One episode of incontinence during night.

## 2022-04-17 NOTE — PLAN OF CARE
Goal Outcome Evaluation:  Plan of Care Reviewed With: patient        Progress: improving    Patient has not reported any pain or discomfort during shift. Patient has rested well today.

## 2022-04-18 NOTE — PROGRESS NOTES
[facility]     Progress Note    Patient Name: Lauren Redd  : 1945  MRN: [id number]  Primary Care Physician:  Dennis Kohler MD  Date of admission: 3/23/2022      Subjective   Brief summary.  Admitted to rehab for cellulitis, CHF and A. fib and weakness      HPI:  Follow-up on shortness of breath  Still have shortness of breath with exertion    Review of Systems     Fatigue and weakness,  Short of breath with exertion  Leg swelling      Objective     Vitals:   Temp:  [97.3 °F (36.3 °C)-98.4 °F (36.9 °C)] 98.4 °F (36.9 °C)  Heart Rate:  [52-83] 83  Resp:  [16-18] 18  BP: (124-128)/(70-76) 124/70  Flow (L/min):  [1-2] 1    Physical Exam :     Elderly female not in acute distress.  Heart irregular and tachycardic.   Lungs diminished breath sounds.  Abdomen soft.  Nontender  Edema 2+ of lower extremity  neurologically awake alert and oriented.      Result Review:  I have personally reviewed the results from the time of this admission to 2022 10:41 EDT and agree with these findings:  [x]  Laboratory  []  Microbiology  []  Radiology  []  EKG/Telemetry   []  Cardiology/Vascular   []  Pathology  []  Old records  []  Other:           Assessment / Plan       Active Hospital Problems:  Active Hospital Problems    Diagnosis    • Atrial fibrillation (HCC)    • Generalized weakness    • CHF (congestive heart failure) (HCC)    • Cellulitis of anterior lower leg        Plan:   Stable  Running out of rehab days  Patient wants to go home when ready  Interdisciplinary team working on discharge planning       DVT prophylaxis:  Medical DVT prophylaxis orders are present.    CODE STATUS:   Code Status (Patient has no pulse and is not breathing): CPR (Attempt to Resuscitate)  Medical Interventions (Patient has pulse or is breathing): Full Support                      Electronically signed by Pablo Fajardo MD, 22, 10:42 AM EDT.

## 2022-04-18 NOTE — PLAN OF CARE
Goal Outcome Evaluation:           Progress: improving  Outcome Evaluation: Tylenol administered at HS for bilateral leg pain. Vital signs stable. Rested well most the night.

## 2022-04-18 NOTE — THERAPY TREATMENT NOTE
SNF - Occupational Therapy Treatment Note   [facility]    Patient Name: Lauren Redd  : 1945    MRN: [id number]                              Today's Date: 2022       Admit Date: 3/23/2022    Visit Dx:     ICD-10-CM ICD-9-CM   1. Decreased activities of daily living (ADL)  Z78.9 V49.89   2. Difficulty walking  R26.2 719.7     Patient Active Problem List   Diagnosis   • Positive colorectal cancer screening using Cologuard test   • Atrial fibrillation with RVR (HCC)   • CHF (congestive heart failure) (HCC)   • Cellulitis of anterior lower leg   • Generalized weakness   • Atrial fibrillation (HCC)     Past Medical History:   Diagnosis Date   • Afib (HCC)    • Aneurysm (HCC)    • Arthritis    • GERD (gastroesophageal reflux disease)    • Hypertension    • Irregular heart beat      Past Surgical History:   Procedure Laterality Date   • COLONOSCOPY     • HYSTERECTOMY        General Information     Row Name 22 1328          OT Time and Intention    Document Type therapy note (daily note)  -     Mode of Treatment individual therapy;occupational therapy  -     Row Name 22 1328          General Information    Patient Profile Reviewed yes  -     Existing Precautions/Restrictions fall;oxygen therapy device and L/min  -     Row Name 22 1328          Safety Issues, Functional Mobility    Impairments Affecting Function (Mobility) balance;endurance/activity tolerance;strength;shortness of breath  -           User Key  (r) = Recorded By, (t) = Taken By, (c) = Cosigned By    Initials Name Provider Type     Rosalba Christianson OT Occupational Therapist                 Mobility/ADL's     Row Name 22 1328          Transfers    Transfers bed-chair transfer;sit-stand transfer;stand-sit transfer;toilet transfer  -     Bed-Chair [place] (Transfers) standby assist  -     Sit-Stand [place] (Transfers) standby assist  -     Stand-Sit [place] (Transfers) standby assist  -      [place] Level (Toilet Transfer) standby assist  -     Assistive Device (Toilet Transfer) commode, 3-in-1  -     Row Name 04/18/22 1328          Sit-Stand Transfer    Assistive Device (Sit-Stand Transfers) walker, front-wheeled  -     Row Name 04/18/22 1328          Stand-Sit Transfer    Assistive Device (Stand-Sit Transfers) walker, 4-wheeled  -     Row Name 04/18/22 1328          Toilet Transfer    Type (Toilet Transfer) stand pivot/stand step  -     Row Name 04/18/22 1328          Activities of Daily Living    BADL Assessment/Intervention bathing;upper body dressing;lower body dressing;grooming;toileting  -     Row Name 04/18/22 1328          Stand Pivot/Stand Step Transfer    Stand Pivot/Stand Step [place] (Transfers) standby assist  -     Row Name 04/18/22 1328          Bathing Assessment/Intervention    [place] Level (Bathing) bathing skills;upper body;set up  -     Position (Bathing) edge of bed sitting  -     Row Name 04/18/22 1328          Upper Body Dressing Assessment/Training    [place] Level (Upper Body Dressing) upper body dressing skills;set up  -     Row Name 04/18/22 132          Lower Body Dressing Assessment/Training    [place] Level (Lower Body Dressing) lower body dressing skills;pants/bottoms;minimum assist (75% patient effort)  -     Assistive Devices (Lower Body Dressing) reacher  -     Comment, (Lower Body Dressing) Pt. able to dorcas her undergarments this am without AE.  -     Row Name 04/18/22 1328          Grooming Assessment/Training    [place] Level (Grooming) grooming skills;set up;oral care regimen;wash face, hands  -     Position (Grooming) edge of bed sitting  -     Row Name 04/18/22 1328          Toileting Assessment/Training    [place] Level (Toileting) toileting skills;supervision  -     Assistive Devices (Toileting) commode, 3-in-1  -           User Key  (r) = Recorded By, (t) = Taken By, (c) = Cosigned By     Initials Name Provider Type    Rosalba Mccormick OT Occupational Therapist               Obj/Interventions     Row Name 04/18/22 1330          Motor Skills    Functional Endurance F/F- endurance to support adls.  -     Row Name 04/18/22 1330          Aerobic Exercise    Comment, Aerobic Exercise (Therapeutic Exercise) Pt. tolerated the Omnicycle x15 minutes without rest breaks  -           User Key  (r) = Recorded By, (t) = Taken By, (c) = Cosigned By    Initials Name Provider Type    Rosalba Mccormick OT Occupational Therapist               Goals/Plan    No documentation.                Clinical Impression     Row Name 04/18/22 1331          Pain Scale: FACES Pre/Post-Treatment    Pain: FACES Scale, Pretreatment 0-->no hurt  -     Posttreatment Pain Rating 0-->no hurt  -GC     Row Name 04/18/22 1331          Plan of Care Review    Plan of Care Reviewed With patient  -     Progress improving  -     Outcome Evaluation Pt. is progressing with improved balance endurance and general strength. Pt. encouraged to ambulate to the BR during the day vs. using BSC in prepartion for d/cing home. Pt. verbalized an understaning.+  -     Row Name 04/18/22 1331          Therapy Plan Review/Discharge Plan (OT)    Anticipated Discharge Disposition (OT) home with home health;home with assist  -           User Key  (r) = Recorded By, (t) = Taken By, (c) = Cosigned By    Initials Name Provider Type    Rosalba Mccormick OT Occupational Therapist               Outcome Measures     Row Name 04/18/22 1332          How much help from another is currently needed...    Putting on and taking off regular lower body clothing? 3  -GC     Bathing (including washing, rinsing, and drying) 3  -GC     Toileting (which includes using toilet bed pan or urinal) 4  -GC     Putting on and taking off regular upper body clothing 4  -GC     Taking care of personal grooming (such as brushing teeth) 4  -GC     Eating meals 4  -GC     AM-PAC 6 Clicks  Score (OT) 22  -GC     Row Name 04/18/22 0948          How much help from another person do you currently need...    Turning from your back to your side while in flat bed without using bedrails? 4  -WM     Moving from lying on back to sitting on the side of a flat bed without bedrails? 4  -WM     Moving to and from a bed to a chair (including a wheelchair)? 4  -WM     Standing up from a chair using your arms (e.g., wheelchair, bedside chair)? 4  -WM     Climbing 3-5 steps with a railing? 3  -WM     To walk in hospital room? 3  -WM     AM-PAC 6 Clicks Score (PT) 22  -WM     Row Name 04/18/22 1332          Functional Assessment    Outcome Measure Options AM-PAC 6 Clicks Daily Activity (OT);Optimal Instrument  -     Row Name 04/18/22 1332          Optimal Instrument    Optimal Instrument Optimal - 3  -GC     Bending/Stooping 2  -GC     Standing 1  -GC     Reaching 1  -GC           User Key  (r) = Recorded By, (t) = Taken By, (c) = Cosigned By    Initials Name Provider Type     Jorge Casarez, PTA Physical Therapist Assistant     Rosalba Christianson OT Occupational Therapist              Section G  Mobility  Dressing - self performance: limited assistance (staff provide guided maneuvering of limbs or other non-weight bearing assistance)  Dressing support/assistance: One person assist  Eating - self performance: independent  Eating support/assistance: No setup or physical help  Toileting - self performance: limited assistance (staff provide guided maneuvering of limbs or other non-weight bearing assistance)  Toileting support/assistance: One person assist  Personal hygiene - self performance: supervision (oversight, encourage)  Personal hygiene support/assistance: One person assist  Bathing  Bathing - self performance: Physical help only to transfer to bathe  Bathing support/assistance: One person assist     Range of Motion  Upper Extremity: No impairment  Section GG  SectionGG: Functional Ability/Goals, Adm  Self Care,  Prior Functioning (NQ7431K): 3. Independent  Functional Cognition, Prior Functioning (XR3977A): 3. Independent  Self Care, Admission (Section GG)  Eating: Self-Care Admission Performance (KX4471B2): independent (06)  Oral Hygiene: Self-Care Admission Performance (KF7368Y2): setup or clean-up assistance (05)  Toileting Hygiene: Self-Care Admission Performance (DN8019N4): supervision or touching assistance (04)  Shower/Bathe Self: Self-Care Admission Performance (DU1950I3): partial/moderate assistance (03)  Upper Body Dressing: Self-Care Admission Performance (JD3461U1): setup or clean-up assistance (05)  Putting On/Taking Off Footwear: Self-Care Admission Performance (JA3088T6): substantial/maximal assistance (02)  Mobility, Admission Performance (LE8165)  Toilet Transfer: Mobility Admission Performance (RJ5957S8): supervision or touching assistance (04)  Section GG: Functional Ability/Goals, DC  Eating: Self-Care Discharge Goal (FW1579J5): independent (06)  Oral Hygiene: Self-Care Discharge Goal (HG1334T5): independent (06)  Toileting Hygiene: Self-Care Discharge Goal (ME4145Z3): independent (06)  Shower/Bathe Self: Self-Care Discharge Goal (PY9964U4): independent (06)  Upper Body Dressing: Self-Care Discharge Goal (TQ3349J5): independent (06)  Lower Body Dressing: Self-Care Discharge Goal (LA5006C8): independent (06)  Putting On/Taking Off Footwear: Self-Care Discharge Goal (XQ7832D0): independent (06)  Mobility (GG), Discharge Goal (IX9905)  Toilet Transfer: Mobility Discharge Goal (ZJ0059N1): independent (06)          Occupational Therapy Education                 Title: PT OT SLP Therapies (Done)     Topic: Occupational Therapy (Done)     Point: ADL training (Done)     Description:   Instruct learner(s) on proper safety adaptation and remediation techniques during self care or transfers.   Instruct in proper use of assistive devices.              Learning Progress Summary           Patient Acceptance, E, VU by  KN at 4/15/2022 1141    Acceptance, E, VU by KN at 4/14/2022 1133    Acceptance, E, VU by KN at 4/13/2022 1154    Comment: Cues for safety during functional t/f    Acceptance, E, VU by KN at 4/12/2022 1415    Acceptance, E, VU by AV at 4/11/2022 1142    Comment: need for staff assistance for all standing ADL/transfers  safe positioning ADLs    Acceptance, E, VU by KN at 4/1/2022 1149    Acceptance, E, VU,NR by GC at 3/24/2022 1149                   Point: Home exercise program (Done)     Description:   Instruct learner(s) on appropriate technique for monitoring, assisting and/or progressing therapeutic exercises/activities.              Learning Progress Summary           Patient Acceptance, E, VU by KN at 4/15/2022 1141    Acceptance, E, VU by KN at 4/14/2022 1133    Acceptance, E, VU by KN at 4/13/2022 1154    Comment: Cues for safety during functional t/f    Acceptance, E, VU by KN at 4/12/2022 1415    Acceptance, E, VU by AV at 4/11/2022 1142    Comment: need for staff assistance for all standing ADL/transfers  safe positioning ADLs    Acceptance, E, VU by KN at 4/1/2022 1149    Acceptance, E, VU,NR by GC at 3/24/2022 1149                   Point: Precautions (Done)     Description:   Instruct learner(s) on prescribed precautions during self-care and functional transfers.              Learning Progress Summary           Patient Acceptance, E, VU by KN at 4/15/2022 1141    Acceptance, E, VU by KN at 4/14/2022 1133    Acceptance, E, VU by KN at 4/13/2022 1154    Comment: Cues for safety during functional t/f    Acceptance, E, VU by KN at 4/12/2022 1415    Acceptance, E, VU by AV at 4/11/2022 1142    Comment: need for staff assistance for all standing ADL/transfers  safe positioning ADLs    Acceptance, E, VU by KN at 4/1/2022 1149    Acceptance, E, VU,NR by GC at 3/24/2022 1149                   Point: Body mechanics (Done)     Description:   Instruct learner(s) on proper positioning and spine alignment during  self-care, functional mobility activities and/or exercises.              Learning Progress Summary           Patient Acceptance, E, VU by KN at 4/15/2022 1141    Acceptance, E, VU by KN at 4/14/2022 1133    Acceptance, E, VU by KN at 4/13/2022 1154    Comment: Cues for safety during functional t/f    Acceptance, E, VU by KN at 4/12/2022 1415    Acceptance, E, VU by AV at 4/11/2022 1142    Comment: need for staff assistance for all standing ADL/transfers  safe positioning ADLs    Acceptance, E, VU by KN at 4/1/2022 1149    Acceptance, E, VU,NR by  at 3/24/2022 1149                               User Key     Initials Effective Dates Name Provider Type Discipline    BAILEY 06/16/21 -  Juan Carlos Zafar, OT Occupational Therapist OT     06/16/21 -  Rosalba Christianson OT Occupational Therapist OT     12/20/21 -  Koby Bass, OT Occupational Therapist OT              OT Recommendation and Plan  Planned Therapy Interventions (OT): activity tolerance training, adaptive equipment training, BADL retraining, functional balance retraining, occupation/activity based interventions, patient/caregiver education/training, strengthening exercise, transfer/mobility retraining  Therapy Frequency (OT): 5 times/wk  Plan of Care Review  Plan of Care Reviewed With: patient  Progress: improving  Outcome Evaluation: Pt. is progressing with improved balance endurance and general strength. Pt. encouraged to ambulate to the BR during the day vs. using BSC in prepartion for d/cing home. Pt. verbalized an understaning.+     Time Calculation:    Time Calculation- OT     Row Name 04/18/22 1333 04/18/22 0944          Time Calculation- OT    OT Received On 04/18/22  -GC --            Timed Charges    54700 - OT Therapeutic Exercise Minutes 15  -GC --     48921 - Gait Training Minutes  -- 4  -WM     79282 - OT Therapeutic Activity Minutes 10  -GC --     86869 - OT Self Care/Mgmt Minutes 29  -GC --            SNF Occupational Therapy Minutes    Skilled  Minutes- OT 54 min  -GC --            Total Minutes    Timed Charges Total Minutes 54  -GC 4  -WM      Total Minutes 54  -GC 4  -WM           User Key  (r) = Recorded By, (t) = Taken By, (c) = Cosigned By    Initials Name Provider Type    Jorge Fournier PTA Physical Therapist Assistant    Rosalba Mccormick OT Occupational Therapist              Therapy Charges for Today     Code Description Service Date Service Provider Modifiers Qty    13000084543 HC OT THER PROC EA 15 MIN 4/18/2022 Rosalba Christianson OT GO 1    61878292066 HC OT THERAPEUTIC ACT EA 15 MIN 4/18/2022 Rosalba Christianson OT GO 1    83382560852 HC OT SELF CARE/MGMT/TRAIN EA 15 MIN 4/18/2022 Rosalba Christianson OT GO 2               Rosalba Christianson OT  4/18/2022

## 2022-04-18 NOTE — THERAPY TREATMENT NOTE
SNF - Physical Therapy Treatment Note   [facility]     Patient Name: Lauren Redd  : 1945  MRN: [id number]  Today's Date: 2022      Visit Dx:     ICD-10-CM ICD-9-CM   1. Decreased activities of daily living (ADL)  Z78.9 V49.89   2. Difficulty walking  R26.2 719.7     Patient Active Problem List   Diagnosis   • Positive colorectal cancer screening using Cologuard test   • Atrial fibrillation with RVR (HCC)   • CHF (congestive heart failure) (HCC)   • Cellulitis of anterior lower leg   • Generalized weakness   • Atrial fibrillation (HCC)     Past Medical History:   Diagnosis Date   • Afib (HCC)    • Aneurysm (HCC)    • Arthritis    • GERD (gastroesophageal reflux disease)    • Hypertension    • Irregular heart beat      Past Surgical History:   Procedure Laterality Date   • COLONOSCOPY     • HYSTERECTOMY       PT Assessment (last 12 hours)     PT Evaluation and Treatment     Row Name 22 0945          Physical Therapy Time and Intention    Document Type therapy note (daily note)  -WM     Mode of Treatment individual therapy;physical therapy  -WM     Patient Effort good  -     Symptoms Noted During/After Treatment fatigue  -     Row Name 22 0945          Bed Mobility    Supine-Sit [place] (Bed Mobility) standby assist  -     Bed Mobility, Safety Issues decreased use of legs for bridging/pushing  -     Assistive Device (Bed Mobility) bed rails  -     Row Name 22 0945          Transfers    Sit-Stand [place] (Transfers) standby assist  -     Stand-Sit [place] (Transfers) standby assist  -     Row Name 22 0945          Sit-Stand Transfer    Assistive Device (Sit-Stand Transfers) walker, front-wheeled  -     Row Name 22 0945          Stand-Sit Transfer    Assistive Device (Stand-Sit Transfers) walker, 4-wheeled  -WM     Row Name 22 0945          Gait/Stairs (Locomotion)    [place] Level (Gait) standby assist  -     Assistive Device (Gait)  walker, front-wheeled  -     Distance in Feet (Gait) 40  -WM     Pattern (Gait) 4-point;step-through  -WM     Deviations/Abnormal Patterns (Gait) gait speed decreased;stride length decreased  -     Row Name 04/18/22 0945          Safety Issues, Functional Mobility    Impairments Affecting Function (Mobility) balance;endurance/activity tolerance;strength  -     Row Name 04/18/22 0945          Hip (Therapeutic Exercise)    Hip AROM (Therapeutic Exercise) bilateral;aBduction;aDduction;flexion;extension;supine;20 repititions  -     Hip Isometrics (Therapeutic Exercise) bilateral;gluteal sets;supine;10 repetitions;2 sets  -WM     Row Name 04/18/22 0945          Knee (Therapeutic Exercise)    Knee Isometrics (Therapeutic Exercise) bilateral;quad sets;supine;10 repetitions;3 second hold;2 sets  -     Knee Strengthening (Therapeutic Exercise) bilateral;SAQ (short arc quad);supine;10 repetitions;2 sets  -     Row Name 04/18/22 0945          Ankle (Therapeutic Exercise)    Ankle AROM (Therapeutic Exercise) bilateral;dorsiflexion;plantarflexion;supine;10 repetitions;2 sets  -     Row Name             Wound 02/09/22 1235 Right lower leg Blisters    Wound - Properties Group Placement Date: 02/09/22  -FL Placement Time: 1235  -FL Present on Hospital Admission: Y  -FL Side: Right  -FL Orientation: lower  -FL Location: leg  -FL Primary Wound Type: Blisters  -FL     Retired Wound - Properties Group Placement Date: 02/09/22  -FL Placement Time: 1235  -FL Present on Hospital Admission: Y  -FL Side: Right  -FL Orientation: lower  -FL Location: leg  -FL Primary Wound Type: Blisters  -FL     Retired Wound - Properties Group Date first assessed: 02/09/22  -FL Time first assessed: 1235  -FL Present on Hospital Admission: Y  -FL Side: Right  -FL Location: leg  -FL Primary Wound Type: Blisters  -FL     Row Name             Wound 02/23/22 0934 Left lower leg    Wound - Properties Group Placement Date: 02/23/22  -BA Placement  Time: 0934  -BA Present on Hospital Admission: Y  -BA Side: Left  -BA Orientation: lower  -BA Location: leg  -BA     Retired Wound - Properties Group Placement Date: 02/23/22  -BA Placement Time: 0934  -BA Present on Hospital Admission: Y  -BA Side: Left  -BA Orientation: lower  -BA Location: leg  -BA     Retired Wound - Properties Group Date first assessed: 02/23/22  -BA Time first assessed: 0934  -BA Present on Hospital Admission: Y  -BA Side: Left  -BA Location: leg  -BA     Row Name 04/18/22 0945          Progress Summary (PT)    Progress Toward Functional Goals (PT) progress toward functional goals is fair  -WM           User Key  (r) = Recorded By, (t) = Taken By, (c) = Cosigned By    Initials Name Provider Type    FL Sylvia Lara, RN Registered Nurse    Jorge Fournier PTA Physical Therapist Assistant    Swathi Yuen RN Registered Nurse                Physical Therapy Education                 Title: PT OT SLP Therapies (Done)     Topic: Physical Therapy (Done)     Point: Mobility training (Done)     Learning Progress Summary           Patient Acceptance, E, VU by KN at 4/15/2022 1141    Acceptance, E, VU by KN at 4/14/2022 1133    Acceptance, E, VU by KN at 4/13/2022 1154    Comment: Cues for safety during functional t/f    Acceptance, E, VU by KN at 4/12/2022 1415    Acceptance, E, VU by AV at 4/11/2022 1142    Comment: need for staff assistance for all standing ADL/transfers  safe positioning ADLs    Acceptance, E, VU by KN at 4/1/2022 1149    Acceptance, E,TB, VU by CS at 3/24/2022 1607                   Point: Home exercise program (Done)     Learning Progress Summary           Patient Acceptance, E, VU by KN at 4/15/2022 1141    Acceptance, E, VU by KN at 4/14/2022 1133    Acceptance, E, VU by KN at 4/13/2022 1154    Comment: Cues for safety during functional t/f    Acceptance, E, VU by KN at 4/12/2022 1415    Acceptance, E, VU by AV at 4/11/2022 1142    Comment: need for staff  assistance for all standing ADL/transfers  safe positioning ADLs    Acceptance, E, VU by KN at 4/1/2022 1149    Acceptance, E,TB, VU by CS at 3/24/2022 1607                   Point: Body mechanics (Done)     Learning Progress Summary           Patient Acceptance, E, VU by KN at 4/15/2022 1141    Acceptance, E, VU by KN at 4/14/2022 1133    Acceptance, E, VU by KN at 4/13/2022 1154    Comment: Cues for safety during functional t/f    Acceptance, E, VU by KN at 4/12/2022 1415    Acceptance, E, VU by AV at 4/11/2022 1142    Comment: need for staff assistance for all standing ADL/transfers  safe positioning ADLs    Acceptance, E, VU by KN at 4/1/2022 1149    Acceptance, E,TB, VU by CS at 3/24/2022 1607                   Point: Precautions (Done)     Learning Progress Summary           Patient Acceptance, E, VU by KN at 4/15/2022 1141    Acceptance, E, VU by KN at 4/14/2022 1133    Acceptance, E, VU by KN at 4/13/2022 1154    Comment: Cues for safety during functional t/f    Acceptance, E, VU by KN at 4/12/2022 1415    Acceptance, E, VU by AV at 4/11/2022 1142    Comment: need for staff assistance for all standing ADL/transfers  safe positioning ADLs    Acceptance, E, VU by KN at 4/1/2022 1149    Acceptance, E,TB, VU by CS at 3/24/2022 1607                               User Key     Initials Effective Dates Name Provider Type Discipline     06/16/21 -  Juan Carlos Zafar, OT Occupational Therapist OT     04/25/21 -  Jojo Ramirez, PT Physical Therapist PT     12/20/21 -  Koby Bass OT Occupational Therapist OT              PT Recommendation and Plan     Progress Summary (PT)  Progress Toward Functional Goals (PT): progress toward functional goals is fair   Outcome Measures     Row Name 04/18/22 0948 04/16/22 1200 04/15/22 1410       How much help from another person do you currently need...    Turning from your back to your side while in flat bed without using bedrails? 4  -WM 4  -CS 4  -WM    Moving from lying on  back to sitting on the side of a flat bed without bedrails? 4  -WM 3  -CS 3  -WM    Moving to and from a bed to a chair (including a wheelchair)? 4  -WM 4  -CS 3  -WM    Standing up from a chair using your arms (e.g., wheelchair, bedside chair)? 4  -WM 4  -CS 4  -WM    Climbing 3-5 steps with a railing? 3  -WM 3  -CS 2  -WM    To walk in hospital room? 3  -WM 3  -CS 3  -WM    AM-PAC 6 Clicks Score (PT) 22  -WM 21  -CS 19  -WM       Functional Assessment    Outcome Measure Options -- AM-PAC 6 Clicks Basic Mobility (PT)  -CS --          User Key  (r) = Recorded By, (t) = Taken By, (c) = Cosigned By    Initials Name Provider Type    Jorge Fournier PTA Physical Therapist Assistant    Fan Alarcon PTA Physical Therapist Assistant                 Time Calculation:    PT Charges     Row Name 04/18/22 0944             Time Calculation    PT Received On 04/18/22  -WM              Timed Charges    47578 - PT Therapeutic Exercise Minutes 14  -WM      61048 - Gait Training Minutes  4  -WM      87693 - PT Therapeutic Activity Minutes 6  -WM              SNF Physical Therapy Minutes    Skilled Minutes- PT 24 min  -WM              Total Minutes    Timed Charges Total Minutes 24  -WM       Total Minutes 24  -WM            User Key  (r) = Recorded By, (t) = Taken By, (c) = Cosigned By    Initials Name Provider Type    Jorge Fournier PTA Physical Therapist Assistant              Therapy Charges for Today     Code Description Service Date Service Provider Modifiers Qty    18779651335 HC PT THER PROC EA 15 MIN 4/18/2022 Jorge Casarez PTA GP 1    21248578041 HC PT THERAPEUTIC ACT EA 15 MIN 4/18/2022 Jorge Casarez PTA GP 1          PT G-Codes  Outcome Measure Options: AM-PAC 6 Clicks Basic Mobility (PT)  AM-PAC 6 Clicks Score (PT): 22  AM-PAC 6 Clicks Score (OT): 19    Jorge Casarez PTA  4/18/2022

## 2022-04-18 NOTE — PLAN OF CARE
Goal Outcome Evaluation:   Remains alert and oriented and pleasant with staff. X1 assist for transfers and ambulation. X1 c/o pain in bilateral lower extremities, PRN pain medication administered, with relief of symptoms noted. Currently resting in bed, call light in reach.

## 2022-04-19 NOTE — PLAN OF CARE
Goal Outcome Evaluation:  Plan of Care Reviewed With: patient        Progress: improving   Bilateral leg wounds improving.  Oxygen walk test done, readings were difficult to obtain due to poor circulation in fingers.  Medicated x1 this shift for pain with norco with good results.

## 2022-04-19 NOTE — THERAPY TREATMENT NOTE
SNF - Physical Therapy Treatment Note   Nayana     Patient Name: Lauren Redd  : 1945  MRN: [id number]  Today's Date: 2022      Visit Dx:     ICD-10-CM ICD-9-CM   1. Decreased activities of daily living (ADL)  Z78.9 V49.89   2. Difficulty walking  R26.2 719.7     Patient Active Problem List   Diagnosis   • Positive colorectal cancer screening using Cologuard test   • Atrial fibrillation with RVR (HCC)   • CHF (congestive heart failure) (HCC)   • Cellulitis of anterior lower leg   • Generalized weakness   • Atrial fibrillation (HCC)     Past Medical History:   Diagnosis Date   • Afib (HCC)    • Aneurysm (HCC)    • Arthritis    • GERD (gastroesophageal reflux disease)    • Hypertension    • Irregular heart beat      Past Surgical History:   Procedure Laterality Date   • COLONOSCOPY     • HYSTERECTOMY       PT Assessment (last 12 hours)     PT Evaluation and Treatment     Row Name 22 1209          Physical Therapy Time and Intention    Subjective Information no complaints  -WM     Document Type therapy note (daily note)  -WM     Mode of Treatment individual therapy;physical therapy  -WM     Patient Effort good  -WM     Symptoms Noted During/After Treatment fatigue  -WM     Row Name 22 1209          Transfers    Sit-Stand Lilly (Transfers) standby assist  -WM     Stand-Sit Lilly (Transfers) standby assist  -WM     Row Name 22 1209          Sit-Stand Transfer    Assistive Device (Sit-Stand Transfers) walker, front-wheeled  -WM     Row Name 22 1209          Stand-Sit Transfer    Assistive Device (Stand-Sit Transfers) walker, front-wheeled  -WM     Row Name 22 1209          Gait/Stairs (Locomotion)    Lilly Level (Gait) standby assist  -WM     Assistive Device (Gait) walker, front-wheeled  -WM     Distance in Feet (Gait) 40  -WM     Pattern (Gait) 4-point;step-through  -WM     Deviations/Abnormal Patterns (Gait) gait speed decreased;stride length decreased   -WM     Row Name 04/19/22 1209          Safety Issues, Functional Mobility    Impairments Affecting Function (Mobility) balance;endurance/activity tolerance;strength  -WM     Row Name 04/19/22 1209          Hip (Therapeutic Exercise)    Hip Isometrics (Therapeutic Exercise) bilateral;gluteal sets;10 repetitions;5 repetitions;3 second hold  Recumbant  -WM     Hip Strengthening (Therapeutic Exercise) bilateral;aBduction;aDduction;heel slides;15 repititions;2 sets  Recumbant  -WM     Row Name 04/19/22 1209          Knee (Therapeutic Exercise)    Knee Isometrics (Therapeutic Exercise) bilateral;quad sets;10 repetitions;5 repetitions;3 second hold;2 sets  Recumbant  -WM     Knee Strengthening (Therapeutic Exercise) bilateral;SAQ (short arc quad);15 repititions;2 sets  Recumbant  -WM     Row Name 04/19/22 1209          Ankle (Therapeutic Exercise)    Ankle AROM (Therapeutic Exercise) bilateral;dorsiflexion;plantarflexion;15 repititions;2 sets  Recumbant  -WM     Row Name             Wound 02/09/22 1235 Right lower leg Blisters    Wound - Properties Group Placement Date: 02/09/22  -FL Placement Time: 1235  -FL Present on Hospital Admission: Y  -FL Side: Right  -FL Orientation: lower  -FL Location: leg  -FL Primary Wound Type: Blisters  -FL     Retired Wound - Properties Group Placement Date: 02/09/22  -FL Placement Time: 1235  -FL Present on Hospital Admission: Y  -FL Side: Right  -FL Orientation: lower  -FL Location: leg  -FL Primary Wound Type: Blisters  -FL     Retired Wound - Properties Group Date first assessed: 02/09/22  -FL Time first assessed: 1235  -FL Present on Hospital Admission: Y  -FL Side: Right  -FL Location: leg  -FL Primary Wound Type: Blisters  -FL     Row Name             Wound 02/23/22 0934 Left lower leg    Wound - Properties Group Placement Date: 02/23/22  -BA Placement Time: 0934  -BA Present on Hospital Admission: Y  -BA Side: Left  -BA Orientation: lower  -BA Location: leg  -BA     Retired Wound  - Properties Group Placement Date: 02/23/22  -BA Placement Time: 0934  -BA Present on Hospital Admission: Y  -BA Side: Left  -BA Orientation: lower  -BA Location: leg  -BA     Retired Wound - Properties Group Date first assessed: 02/23/22  -BA Time first assessed: 0934  -BA Present on Hospital Admission: Y  -BA Side: Left  -BA Location: leg  -BA     Row Name 04/19/22 1209          Progress Summary (PT)    Progress Toward Functional Goals (PT) progress toward functional goals is fair  -WM           User Key  (r) = Recorded By, (t) = Taken By, (c) = Cosigned By    Initials Name Provider Type    Sylvia Olson, RN Registered Nurse    Jorge Fournier PTA Physical Therapist Assistant    Swathi Yuen RN Registered Nurse                Physical Therapy Education                 Title: PT OT SLP Therapies (Done)     Topic: Physical Therapy (Done)     Point: Mobility training (Done)     Learning Progress Summary           Patient Acceptance, E, VU by KN at 4/15/2022 1141    Acceptance, E, VU by KN at 4/14/2022 1133    Acceptance, E, VU by KN at 4/13/2022 1154    Comment: Cues for safety during functional t/f    Acceptance, E, VU by KN at 4/12/2022 1415    Acceptance, E, VU by AV at 4/11/2022 1142    Comment: need for staff assistance for all standing ADL/transfers  safe positioning ADLs    Acceptance, E, VU by KN at 4/1/2022 1149    Acceptance, E,TB, VU by CS at 3/24/2022 1607                   Point: Home exercise program (Done)     Learning Progress Summary           Patient Acceptance, E, VU by KN at 4/15/2022 1141    Acceptance, E, VU by KN at 4/14/2022 1133    Acceptance, E, VU by KN at 4/13/2022 1154    Comment: Cues for safety during functional t/f    Acceptance, E, VU by KN at 4/12/2022 1415    Acceptance, E, VU by AV at 4/11/2022 1142    Comment: need for staff assistance for all standing ADL/transfers  safe positioning ADLs    Acceptance, E, VU by KN at 4/1/2022 1149    Acceptance, E,TB, VU by CS  at 3/24/2022 1607                   Point: Body mechanics (Done)     Learning Progress Summary           Patient Acceptance, E, VU by KN at 4/15/2022 1141    Acceptance, E, VU by KN at 4/14/2022 1133    Acceptance, E, VU by KN at 4/13/2022 1154    Comment: Cues for safety during functional t/f    Acceptance, E, VU by KN at 4/12/2022 1415    Acceptance, E, VU by AV at 4/11/2022 1142    Comment: need for staff assistance for all standing ADL/transfers  safe positioning ADLs    Acceptance, E, VU by KN at 4/1/2022 1149    Acceptance, E,TB, VU by CS at 3/24/2022 1607                   Point: Precautions (Done)     Learning Progress Summary           Patient Acceptance, E, VU by KN at 4/15/2022 1141    Acceptance, E, VU by KN at 4/14/2022 1133    Acceptance, E, VU by KN at 4/13/2022 1154    Comment: Cues for safety during functional t/f    Acceptance, E, VU by KN at 4/12/2022 1415    Acceptance, E, VU by AV at 4/11/2022 1142    Comment: need for staff assistance for all standing ADL/transfers  safe positioning ADLs    Acceptance, E, VU by KN at 4/1/2022 1149    Acceptance, E,TB, VU by CS at 3/24/2022 1607                               User Key     Initials Effective Dates Name Provider Type Discipline    AV 06/16/21 -  Juan Carlos Zafar, OT Occupational Therapist OT     04/25/21 -  Jojo Ramirez, PT Physical Therapist PT     12/20/21 -  Koby Bass OT Occupational Therapist OT              PT Recommendation and Plan     Progress Summary (PT)  Progress Toward Functional Goals (PT): progress toward functional goals is fair   Outcome Measures     Row Name 04/19/22 1213 04/18/22 0948          How much help from another person do you currently need...    Turning from your back to your side while in flat bed without using bedrails? 4  -WM 4  -WM     Moving from lying on back to sitting on the side of a flat bed without bedrails? 4  -WM 4  -WM     Moving to and from a bed to a chair (including a wheelchair)? 4  -WM 4  -WM      Standing up from a chair using your arms (e.g., wheelchair, bedside chair)? 4  -WM 4  -WM     Climbing 3-5 steps with a railing? 3  -WM 3  -WM     To walk in hospital room? 3  -WM 3  -WM     AM-PAC 6 Clicks Score (PT) 22  -WM 22  -WM           User Key  (r) = Recorded By, (t) = Taken By, (c) = Cosigned By    Initials Name Provider Type     Jorge Casarez PTA Physical Therapist Assistant                 Time Calculation:    PT Charges     Row Name 04/19/22 1207             Time Calculation    PT Received On 04/19/22  -WM              Timed Charges    17307 - PT Therapeutic Exercise Minutes 15  -WM      56885 - Gait Training Minutes  4  -WM      62932 - PT Therapeutic Activity Minutes 5  -WM              SNF Physical Therapy Minutes    Skilled Minutes- PT 24 min  -WM              Total Minutes    Timed Charges Total Minutes 24  -WM       Total Minutes 24  -WM            User Key  (r) = Recorded By, (t) = Taken By, (c) = Cosigned By    Initials Name Provider Type     Jorge Casarez PTA Physical Therapist Assistant              Therapy Charges for Today     Code Description Service Date Service Provider Modifiers Qty    84162167577 HC PT THER PROC EA 15 MIN 4/18/2022 Jorge Casarez, PTA GP 1    30448471625 HC PT THERAPEUTIC ACT EA 15 MIN 4/18/2022 Jorge Casarez, PTA GP 1    22577808899 HC PT THER PROC EA 15 MIN 4/19/2022 Jorge Casarez, PTA GP 1    75870345735 HC PT THERAPEUTIC ACT EA 15 MIN 4/19/2022 Jorge Casarez, SANJU GP 1          PT G-Codes  Outcome Measure Options: AM-PAC 6 Clicks Daily Activity (OT), Optimal Instrument  AM-PAC 6 Clicks Score (PT): 22  AM-PAC 6 Clicks Score (OT): 22    Jorge Casarez PTA  4/19/2022

## 2022-04-19 NOTE — PLAN OF CARE
Goal Outcome Evaluation:  Plan of Care Reviewed With: patient        Progress: no change  Outcome Evaluation: Rsd. is alert and oriented x4.  Denies pain at this time.  Rested well this shift.  Call light and personal items within reach.  Rsd. reminded to use call light for assistance, verbalized understanding.  Will continue to monitor and notify on-comin staff, current plan of care continues at this time.

## 2022-04-19 NOTE — SIGNIFICANT NOTE
Wound Eval / Progress Noted    MARLA Kraus     Patient Name: Lauren Redd  : 1945  MRN: [id number]  Today's Date: 2022                 Admit Date: 3/23/2022    Visit Dx:    ICD-10-CM ICD-9-CM   1. Decreased activities of daily living (ADL)  Z78.9 V49.89   2. Difficulty walking  R26.2 719.7       Patient Active Problem List   Diagnosis    Positive colorectal cancer screening using Cologuard test    Atrial fibrillation with RVR (HCC)    CHF (congestive heart failure) (HCC)    Cellulitis of anterior lower leg    Generalized weakness    Atrial fibrillation (HCC)        Past Medical History:   Diagnosis Date    Afib (HCC)     Aneurysm (HCC)     Arthritis     GERD (gastroesophageal reflux disease)     Hypertension     Irregular heart beat         Past Surgical History:   Procedure Laterality Date    COLONOSCOPY      HYSTERECTOMY           Physical Assessment:  Wound 22 1235 Right lower leg Blisters (Active)   Wound Image    22 1020   Dressing Appearance intact;moist drainage 22 1020   Base red;moist;granulating;epithelialization 22 1020   Red (%), Wound Tissue Color 100 22 1020   Periwound intact;dry 22 1020   Periwound Temperature warm 22 1020   Periwound Skin Turgor firm 22 1020   Edges open 22 1020   Wound Length (cm) 15 cm 22 1020   Wound Width (cm) 5.5 cm 22 1020   Wound Surface Area (cm^2) 82.5 cm^2 22 1020   Drainage Characteristics/Odor serosanguineous 22 1020   Drainage Amount small 22 1020   Care, Wound cleansed with;sterile normal saline 22 1020   Dressing Care dressing applied;abdominal pad;gauze;non-adherent;petroleum-based;elastic bandage 22 1020   Periwound Care topical treatment applied 22 1020       Wound 22 0934 Left lower leg (Active)   Wound Image    22 1020   Dressing Appearance intact;moist drainage 22 1020   Base red;moist 22 1020   Periwound intact;dry 22  1020   Periwound Temperature warm 04/19/22 1020   Periwound Skin Turgor firm 04/19/22 1020   Edges open 04/19/22 1020   Wound Length (cm) 2.2 cm 04/19/22 1020   Wound Width (cm) 1.6 cm 04/19/22 1020   Wound Surface Area (cm^2) 3.52 cm^2 04/19/22 1020   Drainage Characteristics/Odor serosanguineous 04/19/22 1020   Drainage Amount small 04/19/22 1020   Care, Wound cleansed with;sterile normal saline 04/19/22 1020   Dressing Care dressing applied;abdominal pad;gauze;petroleum-based;non-adherent;elastic bandage 04/19/22 1020   Periwound Care topical treatment applied 04/19/22 1020        Wound Check / Follow-up:  Patient seen today for follow-up. Dressings to BLE removed. Continues to have dry flaky skin to BLE. Significant improvement to wounds. Small open area to right anterior leg and linear opening along posterior aspect of RLE. LLE only with small area open to left lateral leg. All tissue is red and moist. Cleansed open areas with NS and gauze. Remaining tissue to BLE cleansed with soap and water. Topical treatment with triamcinolone applied. Will recommend change to non-adherent petroleum based gauze to wounds and then cover with ABD pads. Continue to wrap with gauze and setopress wraps. Will recommend change in frequency to daily. For skin care. Patient with complete closure to buttocks. Pink epithelium in place. No further breakdown noted this assessment.       Impression: chronic wounds to BLE, thick scaly skin      Short term goals:  Regain skin integrity, Daily skin care and wraps.    Sylvia Lara RN    4/19/2022    12:29 EDT

## 2022-04-19 NOTE — NURSING NOTE
Exercise Oximetry    Patient Name:Lauren Redd   MRN: [id number]   Date: 04/19/22             ROOM AIR BASELINE   SpO2% 86   Heart [id number]   Blood Pressure 121/89     EXERCISE ON ROOM AIR SpO2% EXERCISE ON O2 @ 2 LPM SpO2%   1 MINUTE  1 MINUTE 88   2 MINUTES  2 MINUTES 94   3 MINUTES  3 MINUTES 89   4 MINUTES  4 MINUTES    5 MINUTES  5 MINUTES    6 MINUTES  6 MINUTES               Distance Walked   Distance Walked 30 ft   Dyspnea (Chacha Scale)   Dyspnea (Chacha Scale)   Fatigue (Chacha Scale)   Fatigue (Chacha Scale)   SpO2% Post Exercise   SpO2% Post Exercise 92   HR Post Exercise   HR Post Exercise 95   Time to Recovery   Time to Recovery 5 min     Comments: pt has poor circulation in fingers and holding onto walker made the reading pleth irregular.  Oxygen readings were very irregular.  Noted dyspnea while ambulating.  Pt was not able to ambulate for 6 minutes due to decreased stamina.

## 2022-04-19 NOTE — THERAPY TREATMENT NOTE
SNF - Occupational Therapy Treatment Note   [facility]    Patient Name: Lauren Redd  : 1945    MRN: [id number]                              Today's Date: 2022       Admit Date: 3/23/2022    Visit Dx:     ICD-10-CM ICD-9-CM   1. Decreased activities of daily living (ADL)  Z78.9 V49.89   2. Difficulty walking  R26.2 719.7     Patient Active Problem List   Diagnosis   • Positive colorectal cancer screening using Cologuard test   • Atrial fibrillation with RVR (HCC)   • CHF (congestive heart failure) (HCC)   • Cellulitis of anterior lower leg   • Generalized weakness   • Atrial fibrillation (HCC)     Past Medical History:   Diagnosis Date   • Afib (HCC)    • Aneurysm (HCC)    • Arthritis    • GERD (gastroesophageal reflux disease)    • Hypertension    • Irregular heart beat      Past Surgical History:   Procedure Laterality Date   • COLONOSCOPY     • HYSTERECTOMY        General Information     Row Name 22 1454          OT Time and Intention    Document Type therapy note (daily note)  -     Mode of Treatment occupational therapy;individual therapy  -     Row Name 22 1454          General Information    Patient Profile Reviewed yes  -     Existing Precautions/Restrictions fall;oxygen therapy device and L/min  -     Row Name 22 1454          Safety Issues, Functional Mobility    Impairments Affecting Function (Mobility) balance;endurance/activity tolerance;strength  -           User Key  (r) = Recorded By, (t) = Taken By, (c) = Cosigned By    Initials Name Provider Type    GC Rosalba Christianson OT Occupational Therapist                 Mobility/ADL's    No documentation.                Obj/Interventions     Row Name 22 1454          Shoulder (Therapeutic Exercise)    Shoulder Strengthening (Therapeutic Exercise) bilateral;2 lb free weight;20 repititions;flexion  -     Row Name 22 1454          Elbow/Forearm (Therapeutic Exercise)    Elbow/Forearm Strengthening (Therapeutic  Exercise) bilateral;flexion;extension;2 lb free weight;30 repititions  -     Row Name 04/19/22 1454          Motor Skills    Therapeutic Exercise elbow/forearm;shoulder  -     Row Name 04/19/22 1454          Aerobic Exercise    Comment, Aerobic Exercise (Therapeutic Exercise) Omnicycle x15 with no rest breaks  -           User Key  (r) = Recorded By, (t) = Taken By, (c) = Cosigned By    Initials Name Provider Type     Rosalba Christianson OT Occupational Therapist               Goals/Plan    No documentation.                Clinical Impression     Row Name 04/19/22 1455          Plan of Care Review    Plan of Care Reviewed With patient  -     Outcome Evaluation Patient declined this am not feeling well on 2 attempts. Pt. seen in the afternoon for TE. Pt. tolerated exercises w/ no complaints. Plan to continue POC established.  -           User Key  (r) = Recorded By, (t) = Taken By, (c) = Cosigned By    Initials Name Provider Type     Rosalba Christianson OT Occupational Therapist               Outcome Measures     Row Name 04/19/22 1213 04/19/22 0850       How much help from another person do you currently need...    Turning from your back to your side while in flat bed without using bedrails? 4  -WM 4  -LH    Moving from lying on back to sitting on the side of a flat bed without bedrails? 4  -WM 4  -LH    Moving to and from a bed to a chair (including a wheelchair)? 4  -WM 4  -LH    Standing up from a chair using your arms (e.g., wheelchair, bedside chair)? 4  -WM 4  -LH    Climbing 3-5 steps with a railing? 3  -WM 2  -LH    To walk in hospital room? 3  -WM 3  -LH    AM-PAC 6 Clicks Score (PT) 22  - 21  -          User Key  (r) = Recorded By, (t) = Taken By, (c) = Cosigned By    Initials Name Provider Type     Rosa Montes, RN Registered Nurse    Jorge Fournier PTA Physical Therapist Assistant              Section G  Mobility  Dressing - self performance: limited assistance (staff provide guided  maneuvering of limbs or other non-weight bearing assistance)  Dressing support/assistance: One person assist  Eating - self performance: independent  Eating support/assistance: No setup or physical help  Toileting - self performance: limited assistance (staff provide guided maneuvering of limbs or other non-weight bearing assistance)  Toileting support/assistance: One person assist  Personal hygiene - self performance: supervision (oversight, encourage)  Personal hygiene support/assistance: One person assist  Bathing  Bathing - self performance: Physical help only to transfer to bathe  Bathing support/assistance: One person assist     Range of Motion  Upper Extremity: No impairment  Section GG  SectionGG: Functional Ability/Goals, Adm  Self Care, Prior Functioning (UO4150A): 3. Independent  Functional Cognition, Prior Functioning (AX9942H): 3. Independent  Self Care, Admission (Section GG)  Eating: Self-Care Admission Performance (LB3706C3): independent (06)  Oral Hygiene: Self-Care Admission Performance (KG2963K8): setup or clean-up assistance (05)  Toileting Hygiene: Self-Care Admission Performance (KS9883X4): supervision or touching assistance (04)  Shower/Bathe Self: Self-Care Admission Performance (RE0344Y6): partial/moderate assistance (03)  Upper Body Dressing: Self-Care Admission Performance (LB4226V4): setup or clean-up assistance (05)  Putting On/Taking Off Footwear: Self-Care Admission Performance (GZ7421X2): substantial/maximal assistance (02)  Mobility, Admission Performance (CE3191)  Toilet Transfer: Mobility Admission Performance (MF0898E1): supervision or touching assistance (04)  Section GG: Functional Ability/Goals, DC  Eating: Self-Care Discharge Goal (VC0245G0): independent (06)  Oral Hygiene: Self-Care Discharge Goal (KH9779J0): independent (06)  Toileting Hygiene: Self-Care Discharge Goal (UC1738T0): independent (06)  Shower/Bathe Self: Self-Care Discharge Goal (HI3554E5): independent  (06)  Upper Body Dressing: Self-Care Discharge Goal (UK4283Y6): independent (06)  Lower Body Dressing: Self-Care Discharge Goal (XI7537K0): independent (06)  Putting On/Taking Off Footwear: Self-Care Discharge Goal (DN4386C2): independent (06)  Mobility (GG), Discharge Goal (TT0368)  Toilet Transfer: Mobility Discharge Goal (PI3482C7): independent (06)          Occupational Therapy Education                 Title: PT OT SLP Therapies (Done)     Topic: Occupational Therapy (Done)     Point: ADL training (Done)     Description:   Instruct learner(s) on proper safety adaptation and remediation techniques during self care or transfers.   Instruct in proper use of assistive devices.              Learning Progress Summary           Patient Acceptance, E, VU by KN at 4/15/2022 1141    Acceptance, E, VU by KN at 4/14/2022 1133    Acceptance, E, VU by KN at 4/13/2022 1154    Comment: Cues for safety during functional t/f    Acceptance, E, VU by KN at 4/12/2022 1415    Acceptance, E, VU by AV at 4/11/2022 1142    Comment: need for staff assistance for all standing ADL/transfers  safe positioning ADLs    Acceptance, E, VU by KN at 4/1/2022 1149    Acceptance, E, VU,NR by GC at 3/24/2022 1149                   Point: Home exercise program (Done)     Description:   Instruct learner(s) on appropriate technique for monitoring, assisting and/or progressing therapeutic exercises/activities.              Learning Progress Summary           Patient Acceptance, E, VU by KN at 4/15/2022 1141    Acceptance, E, VU by KN at 4/14/2022 1133    Acceptance, E, VU by KN at 4/13/2022 1154    Comment: Cues for safety during functional t/f    Acceptance, E, VU by KN at 4/12/2022 1415    Acceptance, E, VU by AV at 4/11/2022 1142    Comment: need for staff assistance for all standing ADL/transfers  safe positioning ADLs    Acceptance, E, VU by KN at 4/1/2022 1149    Acceptance, E, VU,NR by GC at 3/24/2022 1149                   Point: Precautions  (Done)     Description:   Instruct learner(s) on prescribed precautions during self-care and functional transfers.              Learning Progress Summary           Patient Acceptance, E, VU by KN at 4/15/2022 1141    Acceptance, E, VU by KN at 4/14/2022 1133    Acceptance, E, VU by KN at 4/13/2022 1154    Comment: Cues for safety during functional t/f    Acceptance, E, VU by KN at 4/12/2022 1415    Acceptance, E, VU by AV at 4/11/2022 1142    Comment: need for staff assistance for all standing ADL/transfers  safe positioning ADLs    Acceptance, E, VU by KN at 4/1/2022 1149    Acceptance, E, VU,NR by GC at 3/24/2022 1149                   Point: Body mechanics (Done)     Description:   Instruct learner(s) on proper positioning and spine alignment during self-care, functional mobility activities and/or exercises.              Learning Progress Summary           Patient Acceptance, E, VU by KN at 4/15/2022 1141    Acceptance, E, VU by KN at 4/14/2022 1133    Acceptance, E, VU by KN at 4/13/2022 1154    Comment: Cues for safety during functional t/f    Acceptance, E, VU by KN at 4/12/2022 1415    Acceptance, E, VU by AV at 4/11/2022 1142    Comment: need for staff assistance for all standing ADL/transfers  safe positioning ADLs    Acceptance, E, VU by KN at 4/1/2022 1149    Acceptance, E, VU,NR by GC at 3/24/2022 1149                               User Key     Initials Effective Dates Name Provider Type Discipline    BAILEY 06/16/21 -  Juan Carlos Zafar, OT Occupational Therapist OT     06/16/21 -  Rosalba Christianson OT Occupational Therapist OT     12/20/21 -  Koby Bass, OT Occupational Therapist OT              OT Recommendation and Plan  Planned Therapy Interventions (OT): activity tolerance training, adaptive equipment training, BADL retraining, functional balance retraining, occupation/activity based interventions, patient/caregiver education/training, strengthening exercise, transfer/mobility retraining  Therapy  Frequency (OT): 5 times/wk  Plan of Care Review  Plan of Care Reviewed With: patient  Progress: improving  Outcome Evaluation: Patient declined this am not feeling well on 2 attempts. Pt. seen in the afternoon for TE. Pt. tolerated exercises w/ no complaints. Plan to continue POC established.     Time Calculation:    Time Calculation- OT     Row Name 04/19/22 1456 04/19/22 1207          Time Calculation- OT    OT Received On 04/19/22  -GC --            Timed Charges    31071 - OT Therapeutic Exercise Minutes 30  -GC --     16996 - Gait Training Minutes  -- 4  -WM            SNF Occupational Therapy Minutes    Skilled Minutes- OT 30 min  -GC --            Total Minutes    Timed Charges Total Minutes 30  -GC 4  -WM      Total Minutes 30  -GC 4  -WM           User Key  (r) = Recorded By, (t) = Taken By, (c) = Cosigned By    Initials Name Provider Type    Jorge Fournier, PTA Physical Therapist Assistant    GC Rosalba Christianson OT Occupational Therapist              Therapy Charges for Today     Code Description Service Date Service Provider Modifiers Qty    30961948906 HC OT THER PROC EA 15 MIN 4/18/2022 Rosalba Christianson, OT GO 1    94076369973 HC OT THERAPEUTIC ACT EA 15 MIN 4/18/2022 Rosalba Christianson OT GO 1    64725929169 HC OT SELF CARE/MGMT/TRAIN EA 15 MIN 4/18/2022 Rosalba Christianson OT GO 2    54334373863 HC OT THER PROC EA 15 MIN 4/19/2022 Rosalba Christianson OT GO 2               Rosalba Christianson OT  4/19/2022

## 2022-04-20 NOTE — THERAPY TREATMENT NOTE
SNF - Physical Therapy Treatment Note   [facility]     Patient Name: Lauren Redd  : 1945  MRN: [id number]  Today's Date: 2022      Visit Dx:     ICD-10-CM ICD-9-CM   1. Decreased activities of daily living (ADL)  Z78.9 V49.89   2. Difficulty walking  R26.2 719.7     Patient Active Problem List   Diagnosis   • Positive colorectal cancer screening using Cologuard test   • Atrial fibrillation with RVR (HCC)   • CHF (congestive heart failure) (HCC)   • Cellulitis of anterior lower leg   • Generalized weakness   • Atrial fibrillation (HCC)     Past Medical History:   Diagnosis Date   • Afib (HCC)    • Aneurysm (HCC)    • Arthritis    • GERD (gastroesophageal reflux disease)    • Hypertension    • Irregular heart beat      Past Surgical History:   Procedure Laterality Date   • COLONOSCOPY     • HYSTERECTOMY       PT Assessment (last 12 hours)     PT Evaluation and Treatment     Row Name 22 1108          Physical Therapy Time and Intention    Document Type therapy note (daily note)  -WM     Mode of Treatment individual therapy;physical therapy  -WM     Patient Effort adequate  -WM     Symptoms Noted During/After Treatment fatigue  -WM     Row Name 22 1108          Transfers    Sit-Stand [place] (Transfers) standby assist  -     Stand-Sit [place] (Transfers) standby assist  -WM     Row Name 22 1108          Sit-Stand Transfer    Assistive Device (Sit-Stand Transfers) walker, front-wheeled  -WM     Row Name 22 1108          Stand-Sit Transfer    Assistive Device (Stand-Sit Transfers) walker, front-wheeled  -WM     Row Name 22 1108          Gait/Stairs (Locomotion)    [place] Level (Gait) standby assist  -     Assistive Device (Gait) walker, front-wheeled  -WM     Distance in Feet (Gait) 40  -WM     Pattern (Gait) 4-point;step-through  -WM     Deviations/Abnormal Patterns (Gait) gait speed decreased;stride length decreased  -WM     Row Name 22 1108           Safety Issues, Functional Mobility    Impairments Affecting Function (Mobility) balance;endurance/activity tolerance;strength  -WM     Row Name 04/20/22 1108          Hip (Therapeutic Exercise)    Hip Strengthening (Therapeutic Exercise) bilateral;aBduction;aDduction;heel slides;15 repititions;2 sets  Recumbanty  -WM     Row Name 04/20/22 1108          Knee (Therapeutic Exercise)    Knee Isometrics (Therapeutic Exercise) bilateral;quad sets;10 repetitions;5 repetitions;3 second hold  Recumbant  -     Knee Strengthening (Therapeutic Exercise) bilateral;SAQ (short arc quad);15 repititions;2 sets  Recumbant  -WM     Row Name 04/20/22 1108          Ankle (Therapeutic Exercise)    Ankle AROM (Therapeutic Exercise) bilateral;dorsiflexion;plantarflexion;15 repititions;2 sets  Recumbant  -WM     Row Name             Wound 02/09/22 1235 Right lower leg Blisters    Wound - Properties Group Placement Date: 02/09/22  -FL Placement Time: 1235  -FL Present on Hospital Admission: Y  -FL Side: Right  -FL Orientation: lower  -FL Location: leg  -FL Primary Wound Type: Blisters  -FL     Retired Wound - Properties Group Placement Date: 02/09/22  -FL Placement Time: 1235  -FL Present on Hospital Admission: Y  -FL Side: Right  -FL Orientation: lower  -FL Location: leg  -FL Primary Wound Type: Blisters  -FL     Retired Wound - Properties Group Date first assessed: 02/09/22  -FL Time first assessed: 1235  -FL Present on Hospital Admission: Y  -FL Side: Right  -FL Location: leg  -FL Primary Wound Type: Blisters  -FL     Row Name             Wound 02/23/22 0934 Left lower leg    Wound - Properties Group Placement Date: 02/23/22  -BA Placement Time: 0934  -BA Present on Hospital Admission: Y  -BA Side: Left  -BA Orientation: lower  -BA Location: leg  -BA     Retired Wound - Properties Group Placement Date: 02/23/22  -BA Placement Time: 0934  -BA Present on Hospital Admission: Y  -BA Side: Left  -BA Orientation: lower  -BA Location: leg  -BA      Retired Wound - Properties Group Date first assessed: 02/23/22  -BA Time first assessed: 0934 -BA Present on Hospital Admission: Y  -BA Side: Left  -BA Location: leg  -BA     Row Name 04/20/22 1108          Progress Summary (PT)    Progress Toward Functional Goals (PT) progress toward functional goals is fair  -WM           User Key  (r) = Recorded By, (t) = Taken By, (c) = Cosigned By    Initials Name Provider Type    Sylvia Olson RN Registered Nurse    Jorge Fournier PTA Physical Therapist Assistant    Swathi Yuen RN Registered Nurse                Physical Therapy Education                 Title: PT OT SLP Therapies (Done)     Topic: Physical Therapy (Done)     Point: Mobility training (Done)     Learning Progress Summary           Patient Acceptance, E, VU by KN at 4/15/2022 1141    Acceptance, E, VU by KN at 4/14/2022 1133    Acceptance, E, VU by KN at 4/13/2022 1154    Comment: Cues for safety during functional t/f    Acceptance, E, VU by KN at 4/12/2022 1415    Acceptance, E, VU by AV at 4/11/2022 1142    Comment: need for staff assistance for all standing ADL/transfers  safe positioning ADLs    Acceptance, E, VU by KN at 4/1/2022 1149    Acceptance, E,TB, VU by CS at 3/24/2022 1607                   Point: Home exercise program (Done)     Learning Progress Summary           Patient Acceptance, E, VU by KN at 4/15/2022 1141    Acceptance, E, VU by KN at 4/14/2022 1133    Acceptance, E, VU by KN at 4/13/2022 1154    Comment: Cues for safety during functional t/f    Acceptance, E, VU by KN at 4/12/2022 1415    Acceptance, E, VU by AV at 4/11/2022 1142    Comment: need for staff assistance for all standing ADL/transfers  safe positioning ADLs    Acceptance, E, VU by KN at 4/1/2022 1149    Acceptance, E,TB, VU by CS at 3/24/2022 1607                   Point: Body mechanics (Done)     Learning Progress Summary           Patient Acceptance, E, VU by KN at 4/15/2022 1141    Acceptance, E,  VU by KN at 4/14/2022 1133    Acceptance, E, VU by KN at 4/13/2022 1154    Comment: Cues for safety during functional t/f    Acceptance, E, VU by KN at 4/12/2022 1415    Acceptance, E, VU by AV at 4/11/2022 1142    Comment: need for staff assistance for all standing ADL/transfers  safe positioning ADLs    Acceptance, E, VU by KN at 4/1/2022 1149    Acceptance, E,TB, VU by CS at 3/24/2022 1607                   Point: Precautions (Done)     Learning Progress Summary           Patient Acceptance, E, VU by KN at 4/15/2022 1141    Acceptance, E, VU by KN at 4/14/2022 1133    Acceptance, E, VU by KN at 4/13/2022 1154    Comment: Cues for safety during functional t/f    Acceptance, E, VU by KN at 4/12/2022 1415    Acceptance, E, VU by AV at 4/11/2022 1142    Comment: need for staff assistance for all standing ADL/transfers  safe positioning ADLs    Acceptance, E, VU by KN at 4/1/2022 1149    Acceptance, E,TB, VU by CS at 3/24/2022 1607                               User Key     Initials Effective Dates Name Provider Type Discipline    AV 06/16/21 -  Juan Carlos Zafar, OT Occupational Therapist OT    CS 04/25/21 -  Jojo Ramirez, PT Physical Therapist PT     12/20/21 -  Koby Bass OT Occupational Therapist OT              PT Recommendation and Plan     Progress Summary (PT)  Progress Toward Functional Goals (PT): progress toward functional goals is fair   Outcome Measures     Row Name 04/20/22 1113 04/19/22 1213 04/18/22 0948       How much help from another person do you currently need...    Turning from your back to your side while in flat bed without using bedrails? 4  -WM 4  -WM 4  -WM    Moving from lying on back to sitting on the side of a flat bed without bedrails? 4  -WM 4  -WM 4  -WM    Moving to and from a bed to a chair (including a wheelchair)? 4  -WM 4  -WM 4  -WM    Standing up from a chair using your arms (e.g., wheelchair, bedside chair)? 4  -WM 4  -WM 4  -WM    Climbing 3-5 steps with a railing? 3  -WM  3  -WM 3  -WM    To walk in hospital room? 3  -WM 3  -WM 3  -WM    AM-PAC 6 Clicks Score (PT) 22  -WM 22  -WM 22  -WM          User Key  (r) = Recorded By, (t) = Taken By, (c) = Cosigned By    Initials Name Provider Type    Jorge Fournier PTA Physical Therapist Assistant                 Time Calculation:    PT Charges     Row Name 04/20/22 1105             Time Calculation    PT Received On 04/20/22  -WM              Timed Charges    61231 - PT Therapeutic Exercise Minutes 15  -WM      11442 - Gait Training Minutes  4  -WM      10341 - PT Therapeutic Activity Minutes 4  -WM              SNF Physical Therapy Minutes    Skilled Minutes- PT 23 min  -WM              Total Minutes    Timed Charges Total Minutes 23  -WM       Total Minutes 23  -WM            User Key  (r) = Recorded By, (t) = Taken By, (c) = Cosigned By    Initials Name Provider Type    Jorge Fournier PTA Physical Therapist Assistant              Therapy Charges for Today     Code Description Service Date Service Provider Modifiers Qty    78784164763 HC PT THER PROC EA 15 MIN 4/19/2022 Jorge Casarez PTA GP 1    76546142101 HC PT THERAPEUTIC ACT EA 15 MIN 4/19/2022 Jorge Casarez PTA GP 1    51585145710 HC PT THER PROC EA 15 MIN 4/20/2022 Jorge Casarez, SANJU GP 1    67661131413 HC GAIT TRAINING EA 15 MIN 4/20/2022 Jorge Casarez PTA GP 1          PT G-Codes  Outcome Measure Options: AM-PAC 6 Clicks Daily Activity (OT), Optimal Instrument  AM-PAC 6 Clicks Score (PT): 22  AM-PAC 6 Clicks Score (OT): 22    Jorge Casarez PTA  4/20/2022

## 2022-04-20 NOTE — THERAPY TREATMENT NOTE
SNF - Occupational Therapy Treatment Note   [facility]    Patient Name: Lauren Redd  : 1945    MRN: [id number]                              Today's Date: 2022       Admit Date: 3/23/2022    Visit Dx:     ICD-10-CM ICD-9-CM   1. Decreased activities of daily living (ADL)  Z78.9 V49.89   2. Difficulty walking  R26.2 719.7     Patient Active Problem List   Diagnosis   • Positive colorectal cancer screening using Cologuard test   • Atrial fibrillation with RVR (HCC)   • CHF (congestive heart failure) (HCC)   • Cellulitis of anterior lower leg   • Generalized weakness   • Atrial fibrillation (HCC)     Past Medical History:   Diagnosis Date   • Afib (HCC)    • Aneurysm (HCC)    • Arthritis    • GERD (gastroesophageal reflux disease)    • Hypertension    • Irregular heart beat      Past Surgical History:   Procedure Laterality Date   • COLONOSCOPY     • HYSTERECTOMY        General Information     Row Name 22 1430          OT Time and Intention    Mode of Treatment individual therapy;occupational therapy  -     Row Name 22 1430          General Information    Patient Profile Reviewed yes  -     Existing Precautions/Restrictions fall;oxygen therapy device and L/min  -     Row Name 22 1430          Safety Issues, Functional Mobility    Impairments Affecting Function (Mobility) balance;endurance/activity tolerance;strength  -           User Key  (r) = Recorded By, (t) = Taken By, (c) = Cosigned By    Initials Name Provider Type     Rosalba Christianson OT Occupational Therapist                 Mobility/ADL's     Row Name 22 1430          Bed Mobility    Bed Mobility bed mobility (all) activities  -     All Activities, [place] (Bed Mobility) minimum assist (75% patient effort);contact guard  -     Row Name 22 1430          Transfers    Transfers bed-chair transfer;sit-stand transfer;stand-sit transfer;toilet transfer  -     Bed-Chair [place] (Transfers) standby  assist  -     Sit-Stand [place] (Transfers) standby assist  -     Stand-Sit [place] (Transfers) standby assist  -     [place] Level (Toilet Transfer) standby assist  -     Row Name 04/20/22 1430          Sit-Stand Transfer    Assistive Device (Sit-Stand Transfers) walker, front-wheeled  -     Row Name 04/20/22 1430          Stand-Sit Transfer    Assistive Device (Stand-Sit Transfers) walker, front-wheeled  -     Row Name 04/20/22 1430          Toilet Transfer    Type (Toilet Transfer) stand pivot/stand step  -     Row Name 04/20/22 1430          Stand Pivot/Stand Step Transfer    Stand Pivot/Stand Step [place] (Transfers) standby assist  -     Row Name 04/20/22 1430          Bathing Assessment/Intervention    [place] Level (Bathing) bathing skills;set up  -     Position (Bathing) edge of bed sitting  -     Row Name 04/20/22 1430          Upper Body Dressing Assessment/Training    [place] Level (Upper Body Dressing) upper body dressing skills;set up  -     Row Name 04/20/22 1430          Lower Body Dressing Assessment/Training    [place] Level (Lower Body Dressing) lower body dressing skills;pants/bottoms;minimum assist (75% patient effort)  -     Row Name 04/20/22 1430          Grooming Assessment/Training    [place] Level (Grooming) grooming skills;set up;oral care regimen;wash face, hands;hair care, combing/brushing  -     Row Name 04/20/22 1430          Toileting Assessment/Training    [place] Level (Toileting) toileting skills;supervision  -     Assistive Devices (Toileting) commode, 3-in-1  -           User Key  (r) = Recorded By, (t) = Taken By, (c) = Cosigned By    Initials Name Provider Type     Rosalba Christianson, OT Occupational Therapist               Obj/Interventions     Row Name 04/20/22 1431          Motor Skills    Functional Endurance fair  -     Therapeutic Exercise aerobic  -     Row Name 04/20/22 1431          Aerobic  Exercise    Comment, Aerobic Exercise (Therapeutic Exercise) Omni cycle x15 w/ no rest breaks  -           User Key  (r) = Recorded By, (t) = Taken By, (c) = Cosigned By    Initials Name Provider Type    Rosalba Mccormick OT Occupational Therapist               Goals/Plan    No documentation.                Clinical Impression     Row Name 04/20/22 1432          Plan of Care Review    Progress improving  -     Outcome Evaluation Pt. is spv to set up w/ most self-care activities and trfs. Pt. d/cing home tomorrow w/ family.  -           User Key  (r) = Recorded By, (t) = Taken By, (c) = Cosigned By    Initials Name Provider Type    Rosalba Mccormick OT Occupational Therapist               Outcome Measures     Row Name 04/20/22 1113          How much help from another person do you currently need...    Turning from your back to your side while in flat bed without using bedrails? 4  -WM     Moving from lying on back to sitting on the side of a flat bed without bedrails? 4  -WM     Moving to and from a bed to a chair (including a wheelchair)? 4  -WM     Standing up from a chair using your arms (e.g., wheelchair, bedside chair)? 4  -WM     Climbing 3-5 steps with a railing? 3  -WM     To walk in hospital room? 3  -WM     AM-PAC 6 Clicks Score (PT) 22  -           User Key  (r) = Recorded By, (t) = Taken By, (c) = Cosigned By    Initials Name Provider Type    Jorge Fournier PTA Physical Therapist Assistant              Section G  Mobility  Dressing - self performance: limited assistance (staff provide guided maneuvering of limbs or other non-weight bearing assistance)  Dressing support/assistance: One person assist  Eating - self performance: independent  Eating support/assistance: No setup or physical help  Toileting - self performance: limited assistance (staff provide guided maneuvering of limbs or other non-weight bearing assistance)  Toileting support/assistance: One person assist  Personal hygiene - self  performance: supervision (oversight, encourage)  Personal hygiene support/assistance: One person assist  Bathing  Bathing - self performance: Physical help only to transfer to bathe  Bathing support/assistance: One person assist     Range of Motion  Upper Extremity: No impairment  Section GG  SectionGG: Functional Ability/Goals, Adm  Self Care, Prior Functioning (XM1275O): 3. Independent  Functional Cognition, Prior Functioning (VM9798O): 3. Independent  Self Care, Admission (Section GG)  Eating: Self-Care Admission Performance (WQ1455P9): independent (06)  Oral Hygiene: Self-Care Admission Performance (AA3550T6): setup or clean-up assistance (05)  Toileting Hygiene: Self-Care Admission Performance (JM3073R8): supervision or touching assistance (04)  Shower/Bathe Self: Self-Care Admission Performance (WM9414I6): partial/moderate assistance (03)  Upper Body Dressing: Self-Care Admission Performance (XO6663O6): setup or clean-up assistance (05)  Putting On/Taking Off Footwear: Self-Care Admission Performance (UX3973S0): substantial/maximal assistance (02)  Mobility, Admission Performance (MG7195)  Toilet Transfer: Mobility Admission Performance (AV3090K1): supervision or touching assistance (04)  Section GG: Functional Ability/Goals, DC  Eating: Self-Care Discharge Goal (WN0073G2): independent (06)  Oral Hygiene: Self-Care Discharge Goal (JV9843M8): independent (06)  Toileting Hygiene: Self-Care Discharge Goal (HR5521T7): independent (06)  Shower/Bathe Self: Self-Care Discharge Goal (VV7185E9): independent (06)  Upper Body Dressing: Self-Care Discharge Goal (AR3828S3): independent (06)  Lower Body Dressing: Self-Care Discharge Goal (QQ5196D5): independent (06)  Putting On/Taking Off Footwear: Self-Care Discharge Goal (YS5124S0): independent (06)  Mobility (GG), Discharge Goal (IB8132)  Toilet Transfer: Mobility Discharge Goal (SZ0172X9): independent (06)          Occupational Therapy Education                 Title:  PT OT SLP Therapies (Done)     Topic: Occupational Therapy (Done)     Point: ADL training (Done)     Description:   Instruct learner(s) on proper safety adaptation and remediation techniques during self care or transfers.   Instruct in proper use of assistive devices.              Learning Progress Summary           Patient Acceptance, E, VU by KN at 4/15/2022 1141    Acceptance, E, VU by KN at 4/14/2022 1133    Acceptance, E, VU by KN at 4/13/2022 1154    Comment: Cues for safety during functional t/f    Acceptance, E, VU by KN at 4/12/2022 1415    Acceptance, E, VU by AV at 4/11/2022 1142    Comment: need for staff assistance for all standing ADL/transfers  safe positioning ADLs    Acceptance, E, VU by KN at 4/1/2022 1149    Acceptance, E, VU,NR by GC at 3/24/2022 1149                   Point: Home exercise program (Done)     Description:   Instruct learner(s) on appropriate technique for monitoring, assisting and/or progressing therapeutic exercises/activities.              Learning Progress Summary           Patient Acceptance, E, VU by KN at 4/15/2022 1141    Acceptance, E, VU by KN at 4/14/2022 1133    Acceptance, E, VU by KN at 4/13/2022 1154    Comment: Cues for safety during functional t/f    Acceptance, E, VU by KN at 4/12/2022 1415    Acceptance, E, VU by AV at 4/11/2022 1142    Comment: need for staff assistance for all standing ADL/transfers  safe positioning ADLs    Acceptance, E, VU by KN at 4/1/2022 1149    Acceptance, E, VU,NR by GC at 3/24/2022 1149                   Point: Precautions (Done)     Description:   Instruct learner(s) on prescribed precautions during self-care and functional transfers.              Learning Progress Summary           Patient Acceptance, E, VU by KN at 4/15/2022 1141    Acceptance, E, VU by KN at 4/14/2022 1133    Acceptance, E, VU by KN at 4/13/2022 1154    Comment: Cues for safety during functional t/f    Acceptance, E, VU by KN at 4/12/2022 1415    Acceptance, E, VU  by AV at 4/11/2022 1142    Comment: need for staff assistance for all standing ADL/transfers  safe positioning ADLs    Acceptance, E, VU by KN at 4/1/2022 1149    Acceptance, E, VU,NR by  at 3/24/2022 1149                   Point: Body mechanics (Done)     Description:   Instruct learner(s) on proper positioning and spine alignment during self-care, functional mobility activities and/or exercises.              Learning Progress Summary           Patient Acceptance, E, VU by KN at 4/15/2022 1141    Acceptance, E, VU by KN at 4/14/2022 1133    Acceptance, E, VU by KN at 4/13/2022 1154    Comment: Cues for safety during functional t/f    Acceptance, E, VU by KN at 4/12/2022 1415    Acceptance, E, VU by AV at 4/11/2022 1142    Comment: need for staff assistance for all standing ADL/transfers  safe positioning ADLs    Acceptance, E, VU by KN at 4/1/2022 1149    Acceptance, E, VU,NR by  at 3/24/2022 1149                               User Key     Initials Effective Dates Name Provider Type Discipline     06/16/21 -  Juan Carlos Zafar, OT Occupational Therapist OT     06/16/21 -  Rosalba Christianson OT Occupational Therapist OT     12/20/21 -  Koby Bass OT Occupational Therapist OT              OT Recommendation and Plan  Planned Therapy Interventions (OT): activity tolerance training, adaptive equipment training, BADL retraining, functional balance retraining, occupation/activity based interventions, patient/caregiver education/training, strengthening exercise, transfer/mobility retraining  Therapy Frequency (OT): 5 times/wk  Plan of Care Review  Plan of Care Reviewed With: patient  Progress: improving  Outcome Evaluation: Pt. is spv to set up w/ most self-care activities and trfs. Pt. d/cing home tomorrow w/ family.     Time Calculation:    Time Calculation- OT     Row Name 04/20/22 1433 04/20/22 1105          Time Calculation- OT    OT Received On 04/20/22  - --            Timed Charges    08441 - OT Therapeutic  Exercise Minutes 15  -GC --     22559 - Gait Training Minutes  -- 4  -WM     32114 - OT Therapeutic Activity Minutes 12  -GC --     90857 - OT Self Care/Mgmt Minutes 28  -GC --            [facility] Occupational Therapy Minutes    Skilled Minutes- OT 55 min  -GC --            Total Minutes    Timed Charges Total Minutes 55  -GC 4  -WM      Total Minutes 55  -GC 4  -WM           User Key  (r) = Recorded By, (t) = Taken By, (c) = Cosigned By    Initials Name Provider Type     Jorge Casarez PTA Physical Therapist Assistant     Rosalba Christianson OT Occupational Therapist              Therapy Charges for Today     Code Description Service Date Service Provider Modifiers Qty    76958811651 HC OT THER PROC EA 15 MIN 4/19/2022 Rosalba Christianson OT GO 2    12210491493 HC OT THER PROC EA 15 MIN 4/20/2022 Rosalba Christianson OT GO 1    20923817401 HC OT THERAPEUTIC ACT EA 15 MIN 4/20/2022 Rosalba Christianson OT GO 1    68231738743 HC OT SELF CARE/MGMT/TRAIN EA 15 MIN 4/20/2022 Rosalba Christianson OT GO 2               Rosalba Christianson OT  4/20/2022

## 2022-04-20 NOTE — PLAN OF CARE
Goal Outcome Evaluation:   Remains alert and oriented and pleasant with staff. Continues to be SOA with exertion. X1 assist for transfers and ambulation. X1 admin PRN pain medication, with relief of symptoms noted. Schedule for Discharge, 4/21/22. Sitting up in bed, call light in reach.

## 2022-04-20 NOTE — PLAN OF CARE
Nutrition Note    Patient followed by RD for LOS day 28. She is consuming % of meals typically. Wt fluctuations likely related to varying edema. Having BMs. No acute nutrition concerns or nutrition intervention at this time. RD will continue to follow and monitor per protocol.     Katarzyna Roberts, KATIA 07:56 EDT

## 2022-04-20 NOTE — PLAN OF CARE
Goal Outcome Evaluation:  Plan of Care Reviewed With: patient        Progress: no change  Outcome Evaluation: Rsd. is alert and oriented x4.  Medicated earlier in shift for c/p pain, prn Tylenol administered, see MAR.  Personal items and call light within reach.  Rsd. reminded to use call light for assistance, Verbalizes understanding.  WIll continue to monitor and notify on-coming staff.  Current plan of care continues at this time. Rsd. transfers x1 assist to BSC.

## 2022-04-21 NOTE — PLAN OF CARE
Goal Outcome Evaluation:  Plan of Care Reviewed With: patient        Progress: no change  Outcome Evaluation: Rsd. has rested well this shift.  Alert and oriented x4.  Denies pain at this time, medicated earlier in shift with PRN tylenol, Rsd. states effective.  Call light and personal items within reach, Rsd. reminded to use call light for assistance, verbalizes understanding.  Will continue to monitor and notify on-coming staff.  Current plan of care continues at this time.  Rsd. x1 assist  to BSC.

## 2022-04-21 NOTE — PLAN OF CARE
Goal Outcome Evaluation:   Remains alert and oriented and pleasant with staff. X1 assist for transfers and ambulation. No c/o pain or discomfort noted. Scheduled for Discharge 04/21/22. Bi lateral lower extremities show improved cellulitis, Pt aware and educated on dressing change procedures. Continues on O2 @ 1.5L/NC. Sitting up in recliner, call light in reach.                blood glucose testing

## 2022-04-21 NOTE — DISCHARGE SUMMARY
[facility]         DISCHARGE SUMMARY    Patient Name: Lauren Redd  : 1945  MRN: [id number]    Date of Admission: 3/23/2022  Date of Discharge:   Primary Care Physician: Dennis Kohler MD    Consults     Date and Time Order Name Status Description    4/3/2022 11:00 AM Inpatient Cardiology Consult            Presenting Problem:   Generalized weakness [R53.1]    Active and Resolved Hospital Problems:  Active Hospital Problems    Diagnosis POA   • Atrial fibrillation (HCC) [I48.91] Yes   • Generalized weakness [R53.1] Yes   • CHF (congestive heart failure) (HCC) [I50.9] Yes   • Cellulitis of anterior lower leg [L03.119] Yes      Resolved Hospital Problems   No resolved problems to display.         Hospital Course     Hospital Course:  Lauren Redd is a 77 y.o. female admitted to skilled nursing facility for inpatient rehab patient has chronic cellulitis of both lower extremities and extensive edema related to CHF.  Patient was admitted to hospital treated with IV antibiotics and treated for rapid A. fib.  Cardiologist Dr. Grey saw patient.  Further due to continued weakness and swelling and infection patient was transferred/discharged to skilled nursing facility for continued rehab.    Patient was closely monitored and seen on several occasions.  Her antihypertensives, rate controlling agents as well as diuretics were adjusted.  Cardiologist Dr. Grey followed patient in skilled nursing facility along with wound care nursing team.  Patient slowly improved.  She is still is hypoxic and requiring oxygen.  She will be discharged to home on home oxygen along with nursing care and wound care through home health.  She will be discharged home today.  Her medications were changed and altered according to recommendations from the consultants.  Please see discharge med list for updated meds    Recommends dermatology's opinion if patient's wound does not heal well, to continue  follow-up with wound clinic    DISCHARGE Follow Up Recommendations for labs and diagnostics:   Discharge to home      Day of Discharge     Vital Signs:  Temp:  [98 °F (36.7 °C)-98.1 °F (36.7 °C)] 98.1 °F (36.7 °C)  Heart Rate:  [59-83] 59  Resp:  [18-20] 20  BP: (127)/(87-93) 127/87  Flow (L/min):  [1.5] 1.5    Physical Exam:    Early female not in acute distress  Heart irregular  Lungs clear but diminished breath sounds  Extremities with chronic 2+ edema and ulcers in healing phase  Superficial skin ulcers      Pertinent  and/or Most Recent Results     LAB RESULTS:      Lab 04/15/22  0408   WBC 8.71   HEMOGLOBIN 12.3   HEMATOCRIT 38.9   PLATELETS 225   NEUTROS ABS 4.39   IMMATURE GRANS (ABS) 0.06*   LYMPHS ABS 2.76   MONOS ABS 1.08*   EOS ABS 0.36   MCV 95.8         Lab 04/15/22  0408   SODIUM 143   POTASSIUM 3.5   CHLORIDE 96*   CO2 36.9*   ANION GAP 10.1   BUN 48*   CREATININE 1.70*   EGFR 31.0*   GLUCOSE 105*   CALCIUM 10.0         Lab 04/15/22  0408   TOTAL PROTEIN 7.2   ALBUMIN 3.20*   GLOBULIN 4.0   ALT (SGPT) 16   AST (SGOT) 18   BILIRUBIN 0.8   ALK PHOS 141*                     Brief Urine Lab Results  (Last result in the past 365 days)      Color   Clarity   Blood   Leuk Est   Nitrite   Protein   CREAT   Urine HCG        02/08/22 1045 Yellow   Clear   Negative   Negative   Negative   Negative               Microbiology Results (last 10 days)     ** No results found for the last 240 hours. **          PROCEDURES:    [unfilled]    XR Chest PA & Lateral    Result Date: 4/2/2022  Impression:   1. Mild interstitial and alveolar opacities which could be seen with pulmonary edema or atypical infection.  2. Unchanged cardiomegaly.  3. Possible right-sided trace pleural effusion.      HARITHA GUNN MD       Electronically Signed and Approved By: HARITHA GUNN MD on 4/02/2022 at 15:12               Results for orders placed during the hospital encounter of 03/14/22    Doppler Ankle Brachial Index Single Level  CAR    Interpretation Summary  · Left Conclusion: The left PABLO is normal. Normal digital pressures.  · Right Conclusion: The right PABLO is normal. Normal digital pressures.      Results for orders placed during the hospital encounter of 03/14/22    Doppler Ankle Brachial Index Single Level CAR    Interpretation Summary  · Left Conclusion: The left PABLO is normal. Normal digital pressures.  · Right Conclusion: The right PABLO is normal. Normal digital pressures.      Results for orders placed during the hospital encounter of 02/07/22    Adult Transthoracic Echo Complete W/ Cont if Necessary Per Protocol    Interpretation Summary  · Estimated right ventricular systolic pressure from tricuspid regurgitation is mildly elevated (35-45 mmHg). Calculated right ventricular systolic pressure from tricuspid regurgitation is 40 mmHg.  · The left ventricular cavity is mild to moderately dilated.  · Calculated left ventricular EF = 65% Estimated left ventricular EF was in agreement with the calculated left ventricular EF. Left ventricular systolic function is normal.  · The right atrial cavity is mild to moderately dilated.  · Moderate mitral valve regurgitation is present.  · Moderate tricuspid valve regurgitation is present.  · Mild pulmonary hypertension is present.      Labs Pending at Discharge:        Discharge Details        Discharge Medications      New Medications      Instructions Start Date   DULoxetine 30 MG capsule  Commonly known as: CYMBALTA   30 mg, Oral, Daily      famotidine 40 MG tablet  Commonly known as: PEPCID   40 mg, Oral, Daily      gabapentin 100 MG capsule  Commonly known as: NEURONTIN   100 mg, Oral, Every 12 Hours Scheduled      HYDROcodone-acetaminophen  MG per tablet  Commonly known as: NORCO   1 tablet, Oral, Every 8 Hours PRN      triamcinolone 0.1 % ointment  Commonly known as: KENALOG   1 application, Topical, Every Other Day   Start Date: April 22, 2022        Changes to Medications       Instructions Start Date   metoprolol succinate  MG 24 hr tablet  Commonly known as: TOPROL-XL  What changed:   · medication strength  · how much to take  · when to take this   100 mg, Oral, 2 Times Daily         Continue These Medications      Instructions Start Date   apixaban 5 MG tablet tablet  Commonly known as: ELIQUIS   5 mg, Oral, 2 Times Daily      digoxin 125 MCG tablet  Commonly known as: LANOXIN   125 mcg, Oral, Daily Digoxin      fluticasone 50 MCG/ACT nasal spray  Commonly known as: FLONASE   1 spray, Nasal, Every 24 Hours      loratadine 10 MG tablet  Commonly known as: CLARITIN   10 mg, Oral, Daily      metOLazone 5 MG tablet  Commonly known as: ZAROXOLYN   5 mg, Oral, 2 Times Daily      mineral oil-hydrophilic petrolatum ointment   1 application, Topical, As Needed      spironolactone 25 MG tablet  Commonly known as: ALDACTONE   25 mg, Oral, Daily         Stop These Medications    bumetanide 1 MG tablet  Commonly known as: BUMEX     midodrine 10 MG tablet  Commonly known as: PROAMATINE     traMADol 50 MG tablet  Commonly known as: ULTRAM            Allergies   Allergen Reactions   • Contrast Dye Rash   • Lotrel [Amlodipine Besy-Benazepril Hcl] Unknown - Low Severity         Discharge Disposition:    Home or Self Care    Diet:  Heart healthy diet      Discharge Activity:     Activity Instructions     Activity as Tolerated              No future appointments.    Additional Instructions for the Follow-ups that You Need to Schedule     Discharge Follow-up with PCP   As directed       Currently Documented PCP:    Dennis Kohler MD    PCP Phone Number:    [phone]     Follow Up Details: next week         Discharge Follow-up with Specified Provider: Dr Grey in 2 weeks   As directed      To: Dr Grey in 2 weeks               Time spent on Discharge including face to face service: 29 minutes.            I have dictated this note utilizing Dragon Dictation.             Please note that  portions of this note were completed with a voice recognition program.             Part of this note may be an electronic transcription/translation of spoken language to printed text         using the Dragon Dictation System.       Electronically signed by Pablo Fajardo MD, 04/21/22, 9:38 AM EDT.

## 2022-04-22 NOTE — THERAPY DISCHARGE NOTE
SNF - Physical Therapy Treatment Note/Discharge  MARLA Nayana     Patient Name: Lauren Redd  : 1945  MRN: [id number]  Today's Date: 2022                Admit Date: 3/23/2022    Visit Dx:    ICD-10-CM ICD-9-CM   1. Chronic congestive heart failure, unspecified heart failure type (HCC)  I50.9 428.0   2. Decreased activities of daily living (ADL)  Z78.9 V49.89   3. Difficulty walking  R26.2 719.7   4. Generalized weakness  R53.1 780.79   5. Cellulitis of anterior lower leg  L03.119 682.6     Patient Active Problem List   Diagnosis   • Positive colorectal cancer screening using Cologuard test   • Atrial fibrillation with RVR (HCC)   • CHF (congestive heart failure) (HCC)   • Cellulitis of anterior lower leg   • Generalized weakness   • Atrial fibrillation (HCC)     Past Medical History:   Diagnosis Date   • Afib (HCC)    • Aneurysm (HCC)    • Arthritis    • GERD (gastroesophageal reflux disease)    • Hypertension    • Irregular heart beat      Past Surgical History:   Procedure Laterality Date   • COLONOSCOPY     • HYSTERECTOMY         PT Assessment (last 12 hours)     PT Evaluation and Treatment     Row Name             Wound 22 1235 Right lower leg Blisters    Wound - Properties Group Placement Date: 22  -FL Placement Time: 1235  -FL Present on Hospital Admission: Y  -FL Side: Right  -FL Orientation: lower  -FL Location: leg  -FL Primary Wound Type: Blisters  -FL     Retired Wound - Properties Group Placement Date: 22  -FL Placement Time: 1235  -FL Present on Hospital Admission: Y  -FL Side: Right  -FL Orientation: lower  -FL Location: leg  -FL Primary Wound Type: Blisters  -FL     Retired Wound - Properties Group Date first assessed: 22  -FL Time first assessed: 1235  -FL Present on Hospital Admission: Y  -FL Side: Right  -FL Location: leg  -FL Primary Wound Type: Blisters  -FL     Row Name             Wound 22 0934 Left lower leg    Wound - Properties Group Placement Date:  02/23/22  -BA Placement Time: 0934  -BA Present on Hospital Admission: Y  -BA Side: Left  -BA Orientation: lower  -BA Location: leg  -BA     Retired Wound - Properties Group Placement Date: 02/23/22  -BA Placement Time: 0934 -BA Present on Hospital Admission: Y  -BA Side: Left  -BA Orientation: lower  -BA Location: leg  -BA     Retired Wound - Properties Group Date first assessed: 02/23/22  -BA Time first assessed: 0934  -BA Present on Hospital Admission: Y  -BA Side: Left  -BA Location: leg  -BA     Row Name 04/22/22 0600          Progress Summary (PT)    Reason for Discharge (PT) patient discharged from this facility  -     Row Name 04/22/22 0600          Discharge Summary (PT)    Additional Documentation Discharge Summary (PT) (Group)  -     Row Name 04/22/22 0600          Discharge Summary (PT)    Outcomes Achieved/Progress Made Upon Discharge (PT) progress was made toward goals  -     Transfer to Another Level of Care or Facility (PT) home with home health recommended  -     Discharge Summary Statement (PT) At time of discharge, the patient was ambulating up to 155 feet with rolling walker, standby assist and supplemental O2@2L. She is at a standby assist level with bed mobility and transfers.Strength: BLE - grossly 4/5. Pt was discharged to her home. Home health PT is recommended. D/C date: 4/21/2022  -           User Key  (r) = Recorded By, (t) = Taken By, (c) = Cosigned By    Initials Name Provider Type    Sylvia Olson, RN Registered Nurse    Jorge Fournier PTA Physical Therapist Assistant    Swathi Yuen RN Registered Nurse              Section G              Section GG                    Mobility, Discharge Performance (AT3005)  Roll Left & Right: Mobility Discharge (JW4433T9): independent (06)  Sit to Lying: Mobility Discharge Performance (OQ9368I4): supervision or touching assistance (04)  Lying to Sitting, Side of Bed: Mobility Discharge Performance (EK5660O5): supervision  or touching assistance (04)  Sit to Stand: Mobility Discharge Performance (AR6179X0): supervision or touching assistance (04)  Chair/Bed-Chair Transfer: Mobility Discharge Performance (BD0345B6): supervision or touching assistance (04)  Toilet Transfer: Mobility Discharge Performance (EM4393L7): supervision or touching assistance (04)  Car Transfer: Mobility Discharge Performance (ZW8922G2): not attempted due to environmental limitations (10)  Walk 10 Feet: Mobility Discharge Performance (GN3729E9): supervision or touching assistance (04)  Walk 50 Feet With Two Turns: Mobility Discharge Performance (NV4802Q6): supervision or touching assistance (04)  Walk 150 Feet: Mobility Discharge Performance (XK5605L3): supervision or touching assistance (04)  Walk 10 Ft, Uneven Surfaces: Mobility Discharge Performance (WZ6638L7): not applicable (09)  1 Step/Curb: Mobility Discharge Performance (DQ4498W8): not attempted, medical condition/safety concern (88)  4 Steps: Mobility Discharge Performance (WS6322I0): not attempted, medical condition/safety concern (88)  12 Steps: Mobility Discharge Performance (KS3772O9): not attempted, medical condition/safety concern (88)  Picking up object: Mobility Discharge Performance (NT8731T7): partial/moderate assistance (03)  Wheel 50 Ft Two Turns: Mobility Discharge Performance (SU7039S7): not applicable (09)  Wheel 150 Feet: Mobility Discharge Performance (BJ5394J2): not applicable (09)    Physical Therapy Education                 Title: PT OT SLP Therapies (Resolved)     Topic: Physical Therapy (Resolved)     Point: Mobility training (Resolved)     Learning Progress Summary           Patient Acceptance, E, VU by MARCEL at 4/15/2022 1141    Acceptance, E, VU by MARCEL at 2022 1133    Acceptance, E, VU by KN at 2022 1154    Comment: Cues for safety during functional t/f    Acceptance, E, VU by KN at 2022 1415    Acceptance, E, VU by BAILEY at 2022 1142    Comment: need for staff  assistance for all standing ADL/transfers  safe positioning ADLs    Acceptance, E, VU by KN at 4/1/2022 1149    Acceptance, E,TB, VU by CS at 3/24/2022 1607                   Point: Home exercise program (Resolved)     Learning Progress Summary           Patient Acceptance, E, VU by KN at 4/15/2022 1141    Acceptance, E, VU by KN at 4/14/2022 1133    Acceptance, E, VU by KN at 4/13/2022 1154    Comment: Cues for safety during functional t/f    Acceptance, E, VU by KN at 4/12/2022 1415    Acceptance, E, VU by AV at 4/11/2022 1142    Comment: need for staff assistance for all standing ADL/transfers  safe positioning ADLs    Acceptance, E, VU by KN at 4/1/2022 1149    Acceptance, E,TB, VU by CS at 3/24/2022 1607                   Point: Body mechanics (Resolved)     Learning Progress Summary           Patient Acceptance, E, VU by KN at 4/15/2022 1141    Acceptance, E, VU by KN at 4/14/2022 1133    Acceptance, E, VU by KN at 4/13/2022 1154    Comment: Cues for safety during functional t/f    Acceptance, E, VU by KN at 4/12/2022 1415    Acceptance, E, VU by AV at 4/11/2022 1142    Comment: need for staff assistance for all standing ADL/transfers  safe positioning ADLs    Acceptance, E, VU by KN at 4/1/2022 1149    Acceptance, E,TB, VU by CS at 3/24/2022 1607                   Point: Precautions (Resolved)     Learning Progress Summary           Patient Acceptance, E, VU by KN at 4/15/2022 1141    Acceptance, E, VU by KN at 4/14/2022 1133    Acceptance, E, VU by KN at 4/13/2022 1154    Comment: Cues for safety during functional t/f    Acceptance, E, VU by KN at 4/12/2022 1415    Acceptance, E, VU by AV at 4/11/2022 1142    Comment: need for staff assistance for all standing ADL/transfers  safe positioning ADLs    Acceptance, E, VU by KN at 4/1/2022 1149    Acceptance, E,TB, VU by CS at 3/24/2022 1607                               User Key     Initials Effective Dates Name Provider Type Discipline    BAILEY 06/16/21 -  Charisma,  Juan Carlos, OT Occupational Therapist OT    CS 04/25/21 -  Jojo Ramirez, PT Physical Therapist PT    KN 12/20/21 -  Koby Bass, OT Occupational Therapist OT                PT Recommendation and Plan     Progress Summary (PT)  Progress Toward Functional Goals (PT): progress toward functional goals is fair     Outcome Measures     Row Name 04/20/22 1113 04/19/22 1213          How much help from another person do you currently need...    Turning from your back to your side while in flat bed without using bedrails? 4  -WM 4  -WM     Moving from lying on back to sitting on the side of a flat bed without bedrails? 4  -WM 4  -WM     Moving to and from a bed to a chair (including a wheelchair)? 4  -WM 4  -WM     Standing up from a chair using your arms (e.g., wheelchair, bedside chair)? 4  -WM 4  -WM     Climbing 3-5 steps with a railing? 3  -WM 3  -WM     To walk in hospital room? 3  -WM 3  -WM     AM-PAC 6 Clicks Score (PT) 22  -WM 22  -WM           User Key  (r) = Recorded By, (t) = Taken By, (c) = Cosigned By    Initials Name Provider Type    WM Jorge Casarez PTA Physical Therapist Assistant                 Time Calculation:         PT G-Codes  Outcome Measure Options: AM-PAC 6 Clicks Daily Activity (OT), Optimal Instrument  AM-PAC 6 Clicks Score (PT): 22  AM-PAC 6 Clicks Score (OT): Laurel Casarez PTA  4/22/2022

## 2022-04-23 PROBLEM — J18.9 PNEUMONIA DUE TO INFECTIOUS ORGANISM, UNSPECIFIED LATERALITY, UNSPECIFIED PART OF LUNG: Status: ACTIVE | Noted: 2022-01-01

## 2022-04-23 PROBLEM — J18.9 PNEUMONIA DUE TO INFECTIOUS ORGANISM: Status: ACTIVE | Noted: 2022-01-01

## 2022-04-25 NOTE — THERAPY DISCHARGE NOTE
SNF - Occupational Therapy Discharge   [facility]    Patient Name: Lauren Redd  : 1945    MRN: [id number]                              Today's Date: 2022       Admit Date: 3/23/2022    Visit Dx:     ICD-10-CM ICD-9-CM   1. Chronic congestive heart failure, unspecified heart failure type (HCC)  I50.9 428.0   2. Decreased activities of daily living (ADL)  Z78.9 V49.89   3. Difficulty walking  R26.2 719.7   4. Generalized weakness  R53.1 780.79   5. Cellulitis of anterior lower leg  L03.119 682.6     Patient Active Problem List   Diagnosis   • Positive colorectal cancer screening using Cologuard test   • Atrial fibrillation with RVR (HCC)   • CHF (congestive heart failure) (HCC)   • Acute on chronic diastolic CHF (congestive heart failure) (HCC)   • Cellulitis of anterior lower leg   • Generalized weakness   • Atrial fibrillation (HCC)   • Pneumonia due to infectious organism   • Pneumonia due to infectious organism, unspecified laterality, unspecified part of lung     Past Medical History:   Diagnosis Date   • Afib (HCC)    • Aneurysm (HCC)    • Arthritis    • GERD (gastroesophageal reflux disease)    • Hypertension    • Irregular heart beat      Past Surgical History:   Procedure Laterality Date   • COLONOSCOPY     • HYSTERECTOMY        General Information     Row Name 22 2423          OT Time and Intention    Document Type discharge evaluation/summary  -GC           User Key  (r) = Recorded By, (t) = Taken By, (c) = Cosigned By    Initials Name Provider Type    GC Sammy, Rosalba, OT Occupational Therapist               Mobility/ADL's    No documentation.                Obj/Interventions    No documentation.                Goals/Plan     Row Name 22 2969          Transfer Goal 1 (OT)    Activity/Assistive Device (Transfer Goal 1, OT) transfers, all  -GC     [place] Level/Cues Needed (Transfer Goal 1, OT) modified independence  -GC     Progress/Outcome (Transfer Goal 1, OT) goal met  -GC      Row Name 04/25/22 1130          Bathing Goal 1 (OT)    Activity/Device (Bathing Goal 1, OT) bathing skills, all  -GC     [place] Level/Cues Needed (Bathing Goal 1, OT) supervision required  -GC     Progress/Outcomes (Bathing Goal 1, OT) goal partially met  -GC     Row Name 04/25/22 1130          Dressing Goal 1 (OT)    Activity/Device (Dressing Goal 1, OT) dressing skills, all  -GC     [place]/Cues Needed (Dressing Goal 1, OT) set-up required;supervision required  -GC     Progress/Outcome (Dressing Goal 1, OT) goal partially met  -GC     Row Name 04/25/22 1130          Toileting Goal 1 (OT)    Activity/Device (Toileting Goal 1, OT) toileting skills, all;commode chair  -GC     [place] Level/Cues Needed (Toileting Goal 1, OT) modified independence  -GC     Progress/Outcome (Toileting Goal 1, OT) goal met  -     Row Name 04/25/22 1130          Grooming Goal 1 (OT)    Activity/Device (Grooming Goal 1, OT) grooming skills, all  -GC     [place] (Grooming Goal 1, OT) set-up required  -GC     Progress/Outcome (Grooming Goal 1, OT) goal partially met  -GC     Row Name 04/25/22 1130          Strength Goal 1 (OT)    Strength Goal 1 (OT) Pt. will demonstrate good minus endurance and standing balance to support basic adls.  -GC     Progress/Outcome (Strength Goal 1, OT) goal met  -           User Key  (r) = Recorded By, (t) = Taken By, (c) = Cosigned By    Initials Name Provider Type    Rosalba Mccormick OT Occupational Therapist               Clinical Impression    No documentation.                Outcome Measures    No documentation.               Section G  Mobility  Dressing - self performance: limited assistance (staff provide guided maneuvering of limbs or other non-weight bearing assistance)  Dressing support/assistance: One person assist  Eating - self performance: independent  Eating support/assistance: No setup or physical help  Toileting - self performance: limited assistance (staff provide  guided maneuvering of limbs or other non-weight bearing assistance)  Toileting support/assistance: One person assist  Personal hygiene - self performance: supervision (oversight, encourage)  Personal hygiene support/assistance: One person assist  Bathing  Bathing - self performance: Physical help only to transfer to bathe  Bathing support/assistance: One person assist     Range of Motion  Upper Extremity: No impairment  Section GG  SectionGG: Functional Ability/Goals, Adm  Self Care, Prior Functioning (JI9786H): 3. Independent  Functional Cognition, Prior Functioning (QQ6961M): 3. Independent  Self Care, Admission (Section GG)  Eating: Self-Care Admission Performance (GX4720K7): independent (06)  Oral Hygiene: Self-Care Admission Performance (SS1837Q2): setup or clean-up assistance (05)  Toileting Hygiene: Self-Care Admission Performance (ZX9095R4): supervision or touching assistance (04)  Shower/Bathe Self: Self-Care Admission Performance (NR6922Y2): partial/moderate assistance (03)  Upper Body Dressing: Self-Care Admission Performance (NY1770X5): setup or clean-up assistance (05)  Putting On/Taking Off Footwear: Self-Care Admission Performance (PC3084L6): substantial/maximal assistance (02)  Mobility, Admission Performance (NZ8034)  Toilet Transfer: Mobility Admission Performance (LJ4121D7): supervision or touching assistance (04)  Section GG: Functional Ability/Goals, DC  Eating: Self-Care Discharge Goal (UP8690B8): independent (06)  Oral Hygiene: Self-Care Discharge Goal (FC8549P9): independent (06)  Toileting Hygiene: Self-Care Discharge Goal (OS3239U8): independent (06)  Shower/Bathe Self: Self-Care Discharge Goal (YU3108D1): independent (06)  Upper Body Dressing: Self-Care Discharge Goal (WQ5219H1): independent (06)  Lower Body Dressing: Self-Care Discharge Goal (XW2300L3): independent (06)  Putting On/Taking Off Footwear: Self-Care Discharge Goal (CU0034O6): independent (06)  Mobility (GG), Discharge Goal  (CQ8360)  Toilet Transfer: Mobility Discharge Goal (XF0669L2): independent (06)  Section GG: Functional Ability/Goals, DC  Eating: Self-Care Discharge Performance (FX5731L0): independent (06)  Oral Hygiene: Self-Care Discharge Performance (VO3747E0): setup or clean-up assistance (05)  Toileting Hygiene: Self-Care Discharge Performance (MD8347G5): independent (06)  Shower/Bathe Self: Self-Care Discharge Performance (WP3292A4): supervision or touching assistance (04)  Upper Body Dressing: Self-Care Discharge Performance (JD6556U4): setup or clean-up assistance (05)  Lower Body Dressing: Self-Care Discharge Performance (RQ4489E9): supervision or touching assistance (04)  Putting On/Taking Off Footwear: Self-Care Discharge Performance (PN6635K8): partial/moderate assistance (03)  Mobility, Discharge Performance (TL3443)  Toilet Transfer: Mobility Discharge Performance (IC6309Q2): independent (06)    Occupational Therapy Education                 Title: PT OT SLP Therapies (Resolved)     Topic: Occupational Therapy (Resolved)     Point: ADL training (Resolved)     Description:   Instruct learner(s) on proper safety adaptation and remediation techniques during self care or transfers.   Instruct in proper use of assistive devices.              Learning Progress Summary           Patient Acceptance, E, VU by KN at 4/15/2022 1141    Acceptance, E, VU by KN at 4/14/2022 1133    Acceptance, E, VU by KN at 4/13/2022 1154    Comment: Cues for safety during functional t/f    Acceptance, E, VU by KN at 4/12/2022 1415    Acceptance, E, VU by AV at 4/11/2022 1142    Comment: need for staff assistance for all standing ADL/transfers  safe positioning ADLs    Acceptance, E, VU by KN at 4/1/2022 1149    Acceptance, E, VU,NR by GC at 3/24/2022 1149                   Point: Home exercise program (Resolved)     Description:   Instruct learner(s) on appropriate technique for monitoring, assisting and/or progressing therapeutic  exercises/activities.              Learning Progress Summary           Patient Acceptance, E, VU by KN at 4/15/2022 1141    Acceptance, E, VU by KN at 4/14/2022 1133    Acceptance, E, VU by KN at 4/13/2022 1154    Comment: Cues for safety during functional t/f    Acceptance, E, VU by KN at 4/12/2022 1415    Acceptance, E, VU by AV at 4/11/2022 1142    Comment: need for staff assistance for all standing ADL/transfers  safe positioning ADLs    Acceptance, E, VU by KN at 4/1/2022 1149    Acceptance, E, VU,NR by GC at 3/24/2022 1149                   Point: Precautions (Resolved)     Description:   Instruct learner(s) on prescribed precautions during self-care and functional transfers.              Learning Progress Summary           Patient Acceptance, E, VU by KN at 4/15/2022 1141    Acceptance, E, VU by KN at 4/14/2022 1133    Acceptance, E, VU by KN at 4/13/2022 1154    Comment: Cues for safety during functional t/f    Acceptance, E, VU by KN at 4/12/2022 1415    Acceptance, E, VU by AV at 4/11/2022 1142    Comment: need for staff assistance for all standing ADL/transfers  safe positioning ADLs    Acceptance, E, VU by KN at 4/1/2022 1149    Acceptance, E, VU,NR by GC at 3/24/2022 1149                   Point: Body mechanics (Resolved)     Description:   Instruct learner(s) on proper positioning and spine alignment during self-care, functional mobility activities and/or exercises.              Learning Progress Summary           Patient Acceptance, E, VU by KN at 4/15/2022 1141    Acceptance, E, VU by KN at 4/14/2022 1133    Acceptance, E, VU by KN at 4/13/2022 1154    Comment: Cues for safety during functional t/f    Acceptance, E, VU by KN at 4/12/2022 1415    Acceptance, E, VU by AV at 4/11/2022 1142    Comment: need for staff assistance for all standing ADL/transfers  safe positioning ADLs    Acceptance, E, VU by KN at 4/1/2022 1149    Acceptance, E, VU,NR by GC at 3/24/2022 1149                                User Key     Initials Effective Dates Name Provider Type Discipline    AV 06/16/21 -  Juan Carlos Zafar OT Occupational Therapist OT     06/16/21 -  Rosalba Christianson OT Occupational Therapist OT    KN 12/20/21 -  Koby Bass OT Occupational Therapist OT              OT Recommendation and Plan  Retired Outcome Summary/Treatment Plan (OT)  Anticipated Discharge Disposition (OT): home with home health, home with assist  Planned Therapy Interventions (OT): activity tolerance training, adaptive equipment training, BADL retraining, functional balance retraining, occupation/activity based interventions, patient/caregiver education/training, strengthening exercise, transfer/mobility retraining  Therapy Frequency (OT): 5 times/wk  Plan of Care Review  Plan of Care Reviewed With: patient  Progress: improving  Outcome Evaluation: Pt. is spv to set up w/ most self-care activities and trfs. Pt. d/cing home tomorrow w/ family.  Plan of Care Reviewed With: patient  Outcome Evaluation: Pt. is spv to set up w/ most self-care activities and trfs. Pt. d/cing home tomorrow w/ family.     Time Calculation:              Rosalba Christianson OT  4/25/2022

## 2022-04-28 PROBLEM — E87.6 HYPOKALEMIA: Status: ACTIVE | Noted: 2022-01-01

## 2022-04-29 PROBLEM — E87.6 HYPOKALEMIA: Status: RESOLVED | Noted: 2022-01-01 | Resolved: 2022-01-01

## 2022-04-29 PROBLEM — I50.33 ACUTE ON CHRONIC DIASTOLIC CHF (CONGESTIVE HEART FAILURE) (HCC): Status: RESOLVED | Noted: 2022-01-01 | Resolved: 2022-01-01

## 2022-05-26 NOTE — THERAPY DISCHARGE NOTE
Outpatient Occupational Therapy Lymphedema Progress Note/Discharge Summary       Patient Name: Lauren Redd  : 1945  MRN: [id number]  Today's Date: 2022      Visit Date: 2022    Patient Active Problem List   Diagnosis   • Positive colorectal cancer screening using Cologuard test   • Atrial fibrillation with RVR (HCC)   • CHF (congestive heart failure) (HCC)   • Cellulitis of anterior lower leg   • Generalized weakness   • Atrial fibrillation (HCC)   • Pneumonia due to infectious organism   • Pneumonia due to infectious organism, unspecified laterality, unspecified part of lung        Past Medical History:   Diagnosis Date   • Afib (HCC)    • Aneurysm (HCC)    • Arthritis    • GERD (gastroesophageal reflux disease)    • Hypertension    • Irregular heart beat         Past Surgical History:   Procedure Laterality Date   • COLONOSCOPY     • HYSTERECTOMY             Goals:  1. Uncontrolled lymphedema of bilateral lower extremity/lower quadrant.  LTG1:  90 days:  Volumetric measurements of bilateral lower extremity/lower quadrant will be decreased by 10% or until plateau in reduction is achieved to improve functional mobility.             STATUS: Not met              STG1a:  30 days:  Volumetric measurements of bilateral lower extremity/lower quadrant will be decreased by 5% bilaterally to improve functional mobility.    STATUS: Not met  TREATMENT:  Manual Therapy, Therapeutic exercise, ADL/self-care therapy, Bioimpedence Fluid Analysis, Orthotic management and training, Therapeutic Activity, wound dressing as needed, Modalities: TENS, NMES, Ultrasound, Fluidotherapy,  and Patient and family/caregiver education.     2. Patient with decreased ADL/Self-Care routine for lymphedema management.              LTG 2:  90 days:  Patient/caregiver will independently verbalize/demonstrate ADL/Self-Care routine for lymphedema management.                      STATUS:   Not met              STG 2a:  30 days:   Patient/caregiver will independently perform or verbally dictate compression wrapping technique of the lower extremity/lower quadrant.                      STATUS: Not met              STG2b:  30 days:  Patient will independently verbalize signs and symptoms of infection.                            STATUS:   Not met              STG2c:  30 days:  Patient will independently demonstrate/verbalize proper skin care routine to prevent infection and or wound development.                       STATUS:   Not met  STG2d:  30 days:  Patient will independently perform teach back of HEP routine.                      STATUS: Not met  STG2e:  30 days:  Patient/caregiver will obtain properly fitting compression orthosis, will demonstrate don/doff skill of compression orthosis with modified independence, and independently verbalize wear schedule.          STATUS: Not met  STG2f: 30 days: Patient/caregiver will independently perform self-manual lymphatic drainage of the lower extremity/lower quadrant.          STATUS: Not met  TREATMENT:  Therapeutic Activity, Patient/Caregiver Education, ADL retraining, Manual Therapy, Orthotic Management and training, Modalities: TENS, NMES, Ultrasound, Fluidotherapy Wound dressing if necessary,  Therapeutic Exercise, Modalities                 3. Self-Care Limitations                                    LTG 3: 90 days:  The patient will demonstrate improved quality of life by achieving a score of 25 or less on the Lymphedema Life Impact Scale.                      STATUS:   Not met              STG 3a: 30 days:  The patient will demonstrate improved quality of life by achieving a score of 35 or less on the Lymphedema Life Impact Scale.                      STATUS:   Not met  TREATMENT:  Manual therapy, therapeutic exercise, therapeutic activity, ADL retraining, Patient/caregiver education, Modalities: TENS, NMES, Ultrasound, Fluidotherapy Orthotic Management and Training, Wound dressing as  needed.                       Time Calculation: Nonapplicable.  Note been completed for purposes of documentation only           OP OT Discharge Summary  Date of Discharge: 05/26/22  Reason for Discharge: other (comment)  Outcomes Achieved: Unable to make functional progress toward goals at this time  Discharge Destination: Inpatient rehabilitation facility  Discharge Instructions: Patient was admitted to Hospital.      Jeri Husain OT  5/26/2022

## 2022-06-20 PROBLEM — J81.0 ACUTE PULMONARY EDEMA: Status: ACTIVE | Noted: 2022-01-01

## 2022-06-24 PROBLEM — J81.0 ACUTE PULMONARY EDEMA (HCC): Status: RESOLVED | Noted: 2022-01-01 | Resolved: 2022-01-01

## 2022-07-10 PROBLEM — R04.0 ACUTE POSTERIOR EPISTAXIS: Status: ACTIVE | Noted: 2022-01-01

## 2022-07-10 PROBLEM — R04.0 EPISTAXIS: Status: ACTIVE | Noted: 2022-01-01

## 2022-07-10 NOTE — ED PROVIDER NOTES
Time: 12:10 AM EDT    Chief Complaint: nosebleed    History of Present Illness:  Patient is a 77 y.o. year old female that presents to the emergency department with bright red blood from left nostril which began 3 hours PTA. Patient reports this started suddenly while watching TV, and states it is going down the back of her throat causing her to spit blood up. She tried to pinching her nostril. Patient is unsure of the cause of nosebleed, and denies similar symptoms in the past. Denies lightheadedness, dizziness, and chest pain.         HPI    Similar Symptoms Previously: no  Recently seen: no      Patient Care Team  Primary Care Provider: Dennis Kohler MD    Past Medical History:     Allergies   Allergen Reactions   • Contrast Dye Rash   • Lotrel [Amlodipine Besy-Benazepril Hcl] Unknown - Low Severity     Past Medical History:   Diagnosis Date   • Afib (HCC)    • Aneurysm (HCC)    • Arthritis    • CHF (congestive heart failure) (HCC)    • GERD (gastroesophageal reflux disease)    • Hypertension    • Irregular heart beat      Past Surgical History:   Procedure Laterality Date   • COLONOSCOPY     • HYSTERECTOMY       Family History   Problem Relation Age of Onset   • Hypertension Mother    • Colon cancer Neg Hx        Home Medications:  Prior to Admission medications    Medication Sig Start Date End Date Taking? Authorizing Provider   apixaban (ELIQUIS) 5 MG tablet tablet Take 5 mg by mouth 2 (Two) Times a Day.    Colton Evans MD   atorvastatin (LIPITOR) 20 MG tablet Take 20 mg by mouth Daily.    Colton Evans MD   bumetanide (BUMEX) 2 MG tablet Take 1 tablet by mouth 2 (Two) Times a Day for 15 days. 6/24/22 7/9/22  Pablo Fajardo MD   digoxin (LANOXIN) 125 MCG tablet Take 125 mcg by mouth Daily.    Colton Evans MD   DULoxetine (CYMBALTA) 30 MG capsule Take 30 mg by mouth Daily. 5/16/22   Colton Eavns MD   fluticasone (FLONASE) 50 MCG/ACT nasal spray 1 spray into the nostril(s) as  directed by provider Daily. 12/7/21   Emergency, Nurse [facility], RN   gabapentin (NEURONTIN) 100 MG capsule Take 1 capsule by mouth Every 12 (Twelve) Hours for 15 days. 6/24/22 7/9/22  Pablo Fajardo MD   ipratropium-albuterol (DUO-NEB) 0.5-2.5 mg/3 ml nebulizer Take 3 mL by nebulization 4 (Four) Times a Day for 30 days. 6/24/22 7/24/22  Pablo Fajardo MD   loratadine (CLARITIN) 10 MG tablet Take 10 mg by mouth Daily.    ProviderColton MD   metOLazone (ZAROXOLYN) 5 MG tablet Take 1 tablet by mouth 2 (Two) Times a Day for 30 days. 6/24/22 7/24/22  Pablo Fajardo MD   metoprolol succinate XL (TOPROL-XL) 100 MG 24 hr tablet Take 1 tablet by mouth 2 (Two) Times a Day for 30 days. 6/24/22 7/24/22  Pablo Fajardo MD   midodrine (PROAMATINE) 10 MG tablet Take 0.5 tablets by mouth 3 (Three) Times a Day Before Meals for 30 days. 6/24/22 7/24/22  Pablo Fajardo MD   spironolactone (ALDACTONE) 25 MG tablet Take 25 mg by mouth Daily. 2/14/22   ProviderColton MD   triamcinolone (KENALOG) 0.1 % ointment Apply 1 application topically to the appropriate area as directed Every Other Day for 15 days. 6/24/22 7/9/22  Pablo Fajardo MD        Social History:   Social History     Tobacco Use   • Smoking status: Never Smoker   • Smokeless tobacco: Never Used   Vaping Use   • Vaping Use: Never used   Substance Use Topics   • Alcohol use: Not Currently   • Drug use: Never       Record Review:  I have reviewed the patient's records in [facility].     Review of Systems:  Review of Systems   Constitutional: Negative for chills and fever.   HENT: Positive for nosebleeds. Negative for congestion, rhinorrhea and sore throat.    Eyes: Negative for pain and visual disturbance.   Respiratory: Negative for apnea, cough, chest tightness and shortness of breath.    Cardiovascular: Negative for chest pain and palpitations.   Gastrointestinal: Negative for abdominal pain, diarrhea, nausea and vomiting.   Musculoskeletal: Negative for joint swelling and  "myalgias.   Skin: Negative for color change.   Neurological: Negative for dizziness, seizures, light-headedness and headaches.   Psychiatric/Behavioral: Negative.    All other systems reviewed and are negative.       Physical Exam:  /84   Pulse 104   Temp 98.3 °F (36.8 °C) (Oral)   Resp 16   Ht 172.7 cm (68\")   Wt 107 kg (235 lb)   LMP  (LMP Unknown)   SpO2 95%   BMI 35.73 kg/m²     Physical Exam  Vitals and nursing note reviewed.   Constitutional:       General: She is not in acute distress.     Appearance: Normal appearance. She is not toxic-appearing.      Comments: Coughing up occasional blood clots.    HENT:      Head: Normocephalic and atraumatic.      Jaw: There is normal jaw occlusion.      Nose:      Left Nostril: Epistaxis (bright red blood) present.   Eyes:      General: Lids are normal.      Extraocular Movements: Extraocular movements intact.      Conjunctiva/sclera: Conjunctivae normal.      Pupils: Pupils are equal, round, and reactive to light.   Cardiovascular:      Rate and Rhythm: Normal rate and regular rhythm.      Pulses: Normal pulses.      Heart sounds: Normal heart sounds.   Pulmonary:      Effort: Pulmonary effort is normal. No respiratory distress.      Breath sounds: Normal breath sounds. No wheezing or rhonchi.   Abdominal:      General: Abdomen is flat.      Palpations: Abdomen is soft.      Tenderness: There is no abdominal tenderness. There is no guarding or rebound.   Musculoskeletal:         General: Normal range of motion.      Cervical back: Normal range of motion and neck supple.      Right lower leg: No edema.      Left lower leg: No edema.   Skin:     General: Skin is warm and dry.   Neurological:      Mental Status: She is alert and oriented to person, place, and time. Mental status is at baseline.   Psychiatric:         Mood and Affect: Mood normal.                    Medications in the Emergency Department:  Medications   Cocaine HCl 40 MG/ML nasal solution 160 " mg (160 mg Nasal Given 7/10/22 0012)   lidocaine PF 1% (XYLOCAINE) injection 5 mL (5 mL Nebulization Given 7/10/22 0040)   metoprolol tartrate (LOPRESSOR) injection 5 mg (2.5 mg Intravenous Given 7/10/22 0127)   amoxicillin-clavulanate (AUGMENTIN) 875-125 MG per tablet 1 tablet (1 tablet Oral Given 7/10/22 0224)        Labs  Lab Results (last 24 hours)     Procedure Component Value Units Date/Time    CBC & Differential [[id number]]  (Abnormal) Collected: 07/09/22 2304    Specimen: Blood Updated: 07/09/22 2325    Narrative:      The following orders were created for panel order CBC & Differential.  Procedure                               Abnormality         Status                     ---------                               -----------         ------                     CBC Auto Differential[[id number]]        Abnormal            Final result                 Please view results for these tests on the individual orders.    Comprehensive Metabolic Panel [[id number]]  (Abnormal) Collected: 07/09/22 2304    Specimen: Blood Updated: 07/09/22 2348     Glucose 105 mg/dL      BUN 49 mg/dL      Creatinine 1.27 mg/dL      Sodium 146 mmol/L      Potassium 3.2 mmol/L      Chloride 93 mmol/L      CO2 43.7 mmol/L      Calcium 11.2 mg/dL      Total Protein 8.0 g/dL      Albumin 3.90 g/dL      ALT (SGPT) 13 U/L      AST (SGOT) 22 U/L      Alkaline Phosphatase 136 U/L      Total Bilirubin 1.5 mg/dL      Globulin 4.1 gm/dL      A/G Ratio 1.0 g/dL      BUN/Creatinine Ratio 38.6     Anion Gap 9.3 mmol/L      eGFR 43.9 mL/min/1.73      Comment: National Kidney Foundation and American Society of Nephrology (ASN) Task Force recommended calculation based on the Chronic Kidney Disease Epidemiology Collaboration (CKD-EPI) equation refit without adjustment for race.       Narrative:      GFR Normal >60  Chronic Kidney Disease <60  Kidney Failure <15      CBC Auto Differential [[id number]]  (Abnormal) Collected: 07/09/22 2304    Specimen: Blood  Updated: 07/09/22 2325     WBC 9.53 10*3/mm3      RBC 4.23 10*6/mm3      Hemoglobin 12.1 g/dL      Hematocrit 39.8 %      MCV 94.1 fL      MCH 28.6 pg      MCHC 30.4 g/dL      RDW 18.5 %      RDW-SD 63.6 fl      MPV 10.9 fL      Platelets 202 10*3/mm3      Neutrophil % 68.1 %      Lymphocyte % 18.8 %      Monocyte % 10.1 %      Eosinophil % 2.3 %      Basophil % 0.3 %      Immature Grans % 0.4 %      Neutrophils, Absolute 6.49 10*3/mm3      Lymphocytes, Absolute 1.79 10*3/mm3      Monocytes, Absolute 0.96 10*3/mm3      Eosinophils, Absolute 0.22 10*3/mm3      Basophils, Absolute 0.03 10*3/mm3      Immature Grans, Absolute 0.04 10*3/mm3      nRBC 0.0 /100 WBC     BNP [[id number]]  (Abnormal) Collected: 07/09/22 2304    Specimen: Blood Updated: 07/10/22 0211     proBNP 3,993.0 pg/mL     Narrative:      Among patients with dyspnea, NT-proBNP is highly sensitive for the detection of acute congestive heart failure. In addition NT-proBNP of <300 pg/ml effectively rules out acute congestive heart failure with 99% negative predictive value.    Results may be falsely decreased if patient taking Biotin.      Troponin [[id number]]  (Normal) Collected: 07/09/22 2304    Specimen: Blood Updated: 07/10/22 0211     Troponin T 0.015 ng/mL     Narrative:      Troponin T Reference Range:  <= 0.03 ng/mL-   Negative for AMI  >0.03 ng/mL-     Abnormal for myocardial necrosis.  Clinicians would have to utilize clinical acumen, EKG, Troponin and serial changes to determine if it is an Acute Myocardial Infarction or myocardial injury due to an underlying chronic condition.       Results may be falsely decreased if patient taking Biotin.      Digoxin Level [[id number]]  (Normal) Collected: 07/09/22 2304    Specimen: Blood Updated: 07/10/22 0212     Digoxin 1.10 ng/mL     Magnesium [[id number]]  (Normal) Collected: 07/09/22 2304    Specimen: Blood Updated: 07/10/22 0212     Magnesium 1.6 mg/dL            Imaging:  XR Chest 1 View    Result Date:  7/10/2022  PROCEDURE: XR CHEST 1 VW  COMPARISONS: 6/24/2022, 6/19/2022, & 4/23/2022.  INDICATIONS: COUGH & SHORTNESS OF BREATH.  FINDINGS: A single AP upright portable chest radiograph was performed.  There has been interval improvement in aeration of the lungs since prior studies with suspected decrease in bilateral infiltrates, reflecting a decrease in pulmonary edema and vascular congestion.  There is mild-to-moderate cardiomegaly, as seen on prior exams.  Probably minimal, if any, pleural effusion is appreciated. External artifacts obscure detail.  The thoracic aorta is atherosclerotic.  There is chronic asymmetric elevation of the right diaphragm, as before.  Degenerative changes involve the imaged spine and the bilateral shoulders.  No pneumothorax is seen.         1. Favorable progression is suggested radiographically with decreased bilateral infiltrates since the prior exams.  The findings may represent improvement in pulmonary edema.  Improvement in pneumonia would be in the differential diagnosis.   2. No significant interval change is appreciated in the mild-to-moderate cardiomegaly.      COMMENT:  Part of this note is an electronic transcription of spoken language to printed text. The electronic translation/transcription may permit erroneous, or at times, nonsensical (or even sensical) words or phrases to be inadvertently transcribed or omitted; this  has reviewed the note for such errors (as well as additional errors); however, some may still exist.  RENEE GACRIA JR, MD       Electronically Signed and Approved By: RENEE GARCIA JR, MD on 7/10/2022 at 1:23                Procedures:  Epistaxis Management    Date/Time: 7/10/2022 12:12 AM  Performed by: Сергей Gerber MD  Authorized by: Сергей Gerber MD     Consent:     Consent obtained:  Verbal and emergent situation    Consent given by:  Patient    Risks, benefits, and alternatives were discussed: yes      Risks discussed:  Bleeding,  infection and nasal injury  Universal protocol:     Procedure explained and questions answered to patient or proxy's satisfaction: yes      Patient identity confirmed:  Verbally with patient and arm band  Anesthesia:     Anesthesia method: cocaine.  Procedure details:     Treatment site: Left nare.    Repair method: nasal balloon tampon.  Post-procedure details:     Procedure completion:  Tolerated well, no immediate complications  Comments:      Hemostasis achieved.   ECG 12 Lead      Date/Time: 7/10/2022 3:10 AM  Performed by: Сергей Gerber MD  Authorized by: Сергей Gerber MD   Comparison: compared with previous ECG from 6/19/2022  Similar to previous ECG  Rhythm: atrial fibrillation  BPM: 131  Conduction: right bundle branch block  Q waves: III and aVF  Comments: Nonspecific ST changes. Prolonged QT.           Progress                            Medical Decision Making:  MDM  Number of Diagnoses or Management Options     Amount and/or Complexity of Data Reviewed  Decide to obtain previous medical records or to obtain history from someone other than the patient: yes    Risk of Complications, Morbidity, and/or Mortality  General comments: 01:46 EDT: Patient rechecked. Patient reports she has some chest fullness and discomfort, but no chest pain. She states it feels like there is still blood in her throat. On throat reassessment, there appears to be no blood in her throat. Discussed with patient and  lab results. Patient reports she does not feel comfortable going home. Informed patient and  plan to admit to the hospital for continued evaluation. Patient and  understand and agree with plan. All questions addressed.   02:19 EDT: Discussed patient's case with Dr. Fajardo who agrees to admit the patient.              Final diagnoses:   Acute posterior epistaxis   Chest pain, unspecified type   Atrial fibrillation with RVR ([facility])        Disposition:  ED Disposition     ED Disposition   Decision to  Admit    Condition   --    Comment   Level of Care: Telemetry [5]   Diagnosis: Atrial fibrillation with RVR (HCC) [005077]   Admitting Physician: BRIGITTE GARCÍA [[id number]]   Attending Physician: BRIGITTE GARCÍA [[id number]]               Dictated Utilizing Dragon Dictation    Documentation assistance provided by Ekta Medrano acting as scribe for Сергей Gerber MD. Information recorded by the scribe was done at my direction and has been verified and validated by me.                  Ekta Medrano  07/10/22 0344       Сергей Gerber MD  07/10/22 [id number]

## 2022-07-10 NOTE — ED TRIAGE NOTES
Pt to ED from home with reports of nosebleed x2 hours.      Heavy bleeding noted upon arrival to ED.  Pt reports she takes blood thinner.      Pt denies dizziness, nausea, vomiting.

## 2022-07-10 NOTE — PLAN OF CARE
Goal Outcome Evaluation:           Progress: no change  Outcome Evaluation: no significant changes through shift. pt complained of leg pain radiating upwards, pain was treated per mar. heart rate did increase with ambulation, otherwise stayed consistant. will monitor.

## 2022-07-10 NOTE — H&P
[facility]   HISTORY AND PHYSICAL    Patient Name: Lauren Redd  : 1945  MRN: [id number]  Primary Care Physician:  Dennis Kohler MD  Date of admission: 2022    Subjective   Subjective     Chief Complaint:   Left nostril bleeding    HPI:    Lauren Redd is a 77 y.o. female with multiple medical problems including A. fib and CHF currently on Eliquis reported to ER last night with nosebleed.  Patient has nosebleed for almost 3 hours before she arrived.  Patient was packed with a dressing on the left nostril.  Patient was also short of breath and increased heart rate noted with A. fib with RVR.  And admitted to medical service.  Patient short of breath, unable to speak full sentences.  She was in hospital recently and discharged almost 10 days ago.  Refused to go to nursing home at that time.      Review of Systems:    No chest pain  Shortness of breath  Leg swelling  Nose bleeding stopped  Headache  Reports palpitations    Personal History     Past Medical History:   Diagnosis Date   • Afib (HCC)    • Aneurysm (HCC)    • Arthritis    • CHF (congestive heart failure) (HCC)    • GERD (gastroesophageal reflux disease)    • Hypertension    • Irregular heart beat        Past Surgical History:   Procedure Laterality Date   • COLONOSCOPY     • HYSTERECTOMY         Family History: family history includes Hypertension in her mother. Otherwise pertinent FHx was reviewed and not pertinent to current issue.    Social History:  reports that she has never smoked. She has never used smokeless tobacco. She reports previous alcohol use. She reports that she does not use drugs.    Home Medications:  DULoxetine, acetaminophen, apixaban, atorvastatin, bumetanide, digoxin, docusate sodium, fluticasone, gabapentin, loratadine, melatonin, metOLazone, metoprolol succinate XL, midodrine, spironolactone, and triamcinolone      Allergies:  Allergies   Allergen Reactions   • Contrast Dye Rash   • Lotrel [Amlodipine  Besy-Benazepril Hcl] Unknown - Low Severity       Objective   Objective     Vitals:   Temp:  [97.3 °F (36.3 °C)-98.3 °F (36.8 °C)] 98.1 °F (36.7 °C)  Heart Rate:  [] 109  Resp:  [15-20] 20  BP: ()/(75-91) 121/85    Physical Exam    Elderly obese female, tired looking  Left nostril with packing  Neck supple, using accessory muscle  Heart regular  Lungs diminished breath sounds with crackles abdomen soft and obese  Extremities with edema 2+        I have personally reviewed the results from the time of this admission to 7/10/2022 13:36 EDT and agree with these findings:  [x]  Laboratory  []  Microbiology  [x]  Radiology  [x]  EKG/Telemetry   []  Cardiology/Vascular   []  Pathology  []  Old records  []  Other:    CBC:    WBC   Date Value Ref Range Status   07/09/2022 9.53 3.40 - 10.80 10*3/mm3 Final     RBC   Date Value Ref Range Status   07/09/2022 4.23 3.77 - 5.28 10*6/mm3 Final     Hemoglobin   Date Value Ref Range Status   07/10/2022 11.8 (L) 12.0 - 15.9 g/dL Final     Hematocrit   Date Value Ref Range Status   07/10/2022 37.8 34.0 - 46.6 % Final     MCV   Date Value Ref Range Status   07/09/2022 94.1 79.0 - 97.0 fL Final     MCH   Date Value Ref Range Status   07/09/2022 28.6 26.6 - 33.0 pg Final     MCHC   Date Value Ref Range Status   07/09/2022 30.4 (L) 31.5 - 35.7 g/dL Final     RDW   Date Value Ref Range Status   07/09/2022 18.5 (H) 12.3 - 15.4 % Final     RDW-SD   Date Value Ref Range Status   07/09/2022 63.6 (H) 37.0 - 54.0 fl Final     MPV   Date Value Ref Range Status   07/09/2022 10.9 6.0 - 12.0 fL Final     Platelets   Date Value Ref Range Status   07/09/2022 202 140 - 450 10*3/mm3 Final     Neutrophil %   Date Value Ref Range Status   07/09/2022 68.1 42.7 - 76.0 % Final     Lymphocyte %   Date Value Ref Range Status   07/09/2022 18.8 (L) 19.6 - 45.3 % Final     Monocyte %   Date Value Ref Range Status   07/09/2022 10.1 5.0 - 12.0 % Final     Eosinophil %   Date Value Ref Range Status    07/09/2022 2.3 0.3 - 6.2 % Final     Basophil %   Date Value Ref Range Status   07/09/2022 0.3 0.0 - 1.5 % Final     Immature Grans %   Date Value Ref Range Status   07/09/2022 0.4 0.0 - 0.5 % Final     Neutrophils, Absolute   Date Value Ref Range Status   07/09/2022 6.49 1.70 - 7.00 10*3/mm3 Final     Lymphocytes, Absolute   Date Value Ref Range Status   07/09/2022 1.79 0.70 - 3.10 10*3/mm3 Final     Monocytes, Absolute   Date Value Ref Range Status   07/09/2022 0.96 (H) 0.10 - 0.90 10*3/mm3 Final     Eosinophils, Absolute   Date Value Ref Range Status   07/09/2022 0.22 0.00 - 0.40 10*3/mm3 Final     Basophils, Absolute   Date Value Ref Range Status   07/09/2022 0.03 0.00 - 0.20 10*3/mm3 Final     Immature Grans, Absolute   Date Value Ref Range Status   07/09/2022 0.04 0.00 - 0.05 10*3/mm3 Final     nRBC   Date Value Ref Range Status   07/09/2022 0.0 0.0 - 0.2 /100 WBC Final        BMP:    Lab Results   Component Value Date    GLUCOSE 96 07/10/2022    BUN 48 (H) 07/10/2022    CREATININE 1.19 (H) 07/10/2022    EGFRIFAFRI 36 (L) 02/25/2022    BCR 40.3 (H) 07/10/2022    K 3.3 (L) 07/10/2022    CO2 39.2 (H) 07/10/2022    CALCIUM 10.8 (H) 07/10/2022    ALBUMIN 3.90 07/09/2022    LABIL2 1.2 (L) 03/03/2021    AST 22 07/09/2022    ALT 13 07/09/2022        XR Chest 1 View    Result Date: 7/10/2022     1. Favorable progression is suggested radiographically with decreased bilateral infiltrates since the prior exams.  The findings may represent improvement in pulmonary edema.  Improvement in pneumonia would be in the differential diagnosis.   2. No significant interval change is appreciated in the mild-to-moderate cardiomegaly.      COMMENT:  Part of this note is an electronic transcription of spoken language to printed text. The electronic translation/transcription may permit erroneous, or at times, nonsensical (or even sensical) words or phrases to be inadvertently transcribed or omitted; this  has reviewed the  note for such errors (as well as additional errors); however, some may still exist.  RENEE GARCIA JR, MD       Electronically Signed and Approved By: RENEE GARCIA JR, MD on 7/10/2022 at 1:23                       Assessment & Plan   Assessment / Plan       Current Diagnosis:  Active Hospital Problems    Diagnosis    • Epistaxis    • Acute on chronic diastolic CHF (congestive heart failure) (HCC)    • Atrial fibrillation with RVR (HCC)      Plan:   Patient on anticoagulation with Eliquis, currently with active nosebleed.  Stopped since admission.  ENT specialist Dr. Restrepo consulted and notified by ER.  I will see his recommendations.  Continuing with anticoagulation is going to cause more problems.  We will also consult cardiologist for opinion, continue IV diuretics.  Restart home meds heart rate improved after giving IV Lopressor.  Patient needs nursing home and inpatient care refused last time.  At this point we will reconsult  for discharge planning.      DVT prophylaxis:  No DVT prophylaxis order currently exists.    GI Prophylaxis:    Pepcid  CODE STATUS:    Code Status (Patient has no pulse and is not breathing): CPR (Attempt to Resuscitate)  Medical Interventions (Patient has pulse or is breathing): Full Support    Admission Status:  I believe this patient meets *inpatient status.             I have dictated this note utilizing Dragon Dictation.             Please note that portions of this note were completed with a voice recognition program.             Part of this note may be an electronic transcription/translation of spoken language to printed text         using the Dragon Dictation System.       Electronically signed by Pablo Fajardo MD, 07/10/22, 11:12 AM EDT.    Total time spent with in evaluation and management:

## 2022-07-10 NOTE — CONSULTS
" [facility]   CONSULT NOTE    Patient Name: Lauren Redd  : 1945  MRN: [id number]  Primary Care Physician:  Dennis Kohler MD  Date of admission: 2022    Subjective   Subjective     Chief Complaint: \"I have never had trouble with a nosebleed before\"    HPI:    Lauren Redd is a 77 y.o. female past medical history significant for atrial fibrillation on Eliquis who is seen at the request of Dr. Fajardo for evaluation of left-sided epistaxis.  The patient tells me that this began last night and bled steadily for approximately 3 hours.  She was seen by the emergency department where a Rhino Rocket was placed in her left nasal cavity.  She reports that she has experienced some oozing around it but none recently.  She has never had trouble with nosebleeds before.  She is on continuous oxygen via nasal cannula at home.  She has been using saline sprays but does not otherwise use any sprays in her nose.  She denies any prior nasal trauma or surgery.  She has not had any significant issues with nasal congestion or rhinorrhea. she is currently on Keflex.  Hemoglobin is down to 11.8 from 12.1 on 2022.    Review of Systems   The following systems were reviewed and negative;  constitution, eyes, ENT, respiratory, cardiovascular, gastrointestinal, genitourinary, integument, hematologic / lymphatic, musculoskeletal, neurological, behavioral/psych, endocrine, and allergies / immunologic.    Personal History     Past Medical History:   Diagnosis Date   • Afib (HCC)    • Aneurysm (HCC)    • Arthritis    • CHF (congestive heart failure) (HCC)    • GERD (gastroesophageal reflux disease)    • Hypertension    • Irregular heart beat        Past Surgical History:   Procedure Laterality Date   • COLONOSCOPY     • HYSTERECTOMY         Family History: family history includes Hypertension in her mother. Otherwise pertinent FHx was reviewed and not pertinent to current issue.    Social History:  reports that she has " never smoked. She has never used smokeless tobacco. She reports previous alcohol use. She reports that she does not use drugs.    Home Medications:  DULoxetine, acetaminophen, apixaban, atorvastatin, bumetanide, digoxin, docusate sodium, fluticasone, gabapentin, loratadine, melatonin, metOLazone, metoprolol succinate XL, midodrine, spironolactone, and triamcinolone      Allergies:  Allergies   Allergen Reactions   • Contrast Dye Rash   • Lotrel [Amlodipine Besy-Benazepril Hcl] Unknown - Low Severity       Objective   Objective     Vitals:   Temp:  [97.3 °F (36.3 °C)-98.3 °F (36.8 °C)] 97.3 °F (36.3 °C)  Heart Rate:  [] 108  Resp:  [15-22] 22  BP: ()/(63-91) 129/63  Physical Exam   General: Well developed, well nourished patient of stated age in no acute distress. Voice is strong and clear.   Head: Normocephalic and atraumatic.   Face: No lesions. House-Brackmann I/VI bilaterally.   Eyes: PERRLA, sclerae anicteric, no conjunctival injection. Extra ocular movements are intact and full.   Ears: Auricles are normal in appearance. Bilateral external auditory canals are unremarkable. Bilateral tympanic membranes are clear and without effusion.  Hearing intact to conversational voice.   Nose: External nose is normal in appearance.  Rhino Rocket in place in the left nasal cavity.  No active bleeding.  Right nasal cavity is patent with desiccated mucosa anteriorly.  Septum midline. Turbinates are unremarkable. No lesions.   Oral Cavity: Lips are normal in appearance. Oral mucosa is unremarkable. Gingiva is unremarkable.  Partial dentition for age. Tongue is unremarkable with good movement. Hard palate is unremarkable.   Oropharynx: Soft palate is unremarkable with full movement. Uvula is unremarkable. Bilateral tonsils are unremarkable. Posterior oropharynx is unremarkable.    Neck: Supple, no thyromegaly, no lymphadenopathy, trachea midline   Respiratory: Clear to auscultation bilaterally, nonlabored  respirations    Cardiovascular: Irregular rate and rhythm.  No murmurs, rubs, or gallops, palpable pedal pulses bilaterally   Psychiatric: Appropriate affect, cooperative   Neurologic: Oriented x 3, strength symmetric in all extremities, Cranial Nerves II-XII are grossly intact to confrontation   Skin: No rashes     Result Review    Result Review:  I have personally reviewed the results from the time of this admission to 7/10/2022 19:57 EDT and agree with these findings:  [x]  Laboratory  []  Microbiology  []  Radiology  []  EKG/Telemetry   []  Cardiology/Vascular   []  Pathology  []  Old records  []  Other    Assessment & Plan   Assessment / Plan     Brief Patient Summary:  Lauren Redd is a 77 y.o. female who presents with left-sided epistaxis in the setting of anticoagulation on Eliquis and continuous nasal cannula oxygen secondary to CHF.  We discussed that the bleed is likely secondary to desiccation and trauma from her nasal cannula in the setting of Eliquis.    Active Hospital Problems:  Active Hospital Problems    Diagnosis    • Epistaxis    • Acute posterior epistaxis    • Acute on chronic diastolic CHF (congestive heart failure) (HCC)    • Atrial fibrillation with RVR (HCC)        Plan:  1.  Continue left-sided Rhino Rocket until Tuesday or Wednesday.  2.  May restart Eliquis per primary team or cardiology.  3.  We discussed that she will likely need to use mupirocin in her nose and consider switching to a face mask for her oxygen at home.  4.  Please call me at [phone] with any questions or concerns.      CODE STATUS:    Code Status (Patient has no pulse and is not breathing): CPR (Attempt to Resuscitate)  Medical Interventions (Patient has pulse or is breathing): Full Support      Electronically signed by Josué Restrepo MD, 07/10/22, 7:57 PM EDT.

## 2022-07-11 NOTE — PLAN OF CARE
Goal Outcome Evaluation:  Plan of Care Reviewed With: patient        Progress: no change   Vital signs stable. No new complaints this shift. Patient remains in Afib. Caty Restrepo RN

## 2022-07-11 NOTE — CASE MANAGEMENT/SOCIAL WORK
Pt alert and oriented. Pt admitted for Afib with RVR. Dr Leung following. Pt sees Dr Leung as outpt. BNP >3900 neg trop, EF 65% Feb 2022.     HF cm is familiar with pt. Pt recently discharged from [facility] to [facility] 6/24. Pt was discharged home from rehab 7/6. Pt presented to ed for nose bleed and afib. Pt had follow up appt for 7/12 but now is readmitted. Pt states she has been taking bumex as prescribed.     Pt agreeable to rehab referral to [facility] only. Pt refuses [facility]. Pt needs longterm care but refuses.     Will continue to follow.

## 2022-07-11 NOTE — THERAPY EVALUATION
Acute Care - Physical Therapy Initial Evaluation   Nayana     Patient Name: Lauren Redd  : 1945  MRN: [id number]  Today's Date: 2022      Visit Dx:     ICD-10-CM ICD-9-CM   1. Acute posterior epistaxis  R04.0 784.7   2. Chest pain, unspecified type  R07.9 786.50   3. Atrial fibrillation with RVR (HCC)  I48.91 427.31   4. Decreased activities of daily living (ADL)  Z78.9 V49.89   5. Difficulty walking  R26.2 719.7     Patient Active Problem List   Diagnosis   • Positive colorectal cancer screening using Cologuard test   • Atrial fibrillation with RVR (HCC)   • CHF (congestive heart failure) ([facility])   • Acute on chronic diastolic CHF (congestive heart failure) (HCC)   • Cellulitis of anterior lower leg   • Weakness generalized   • Atrial fibrillation (HCC)   • Pneumonia due to infectious organism   • Pneumonia due to infectious organism, unspecified laterality, unspecified part of lung   • Epistaxis   • Acute posterior epistaxis     Past Medical History:   Diagnosis Date   • Afib ([facility])    • Aneurysm (HCC)    • Arthritis    • CHF (congestive heart failure) ([facility])    • GERD (gastroesophageal reflux disease)    • Hypertension    • Irregular heart beat      Past Surgical History:   Procedure Laterality Date   • COLONOSCOPY     • HYSTERECTOMY       PT Assessment (last 12 hours)     PT Evaluation and Treatment     Row Name 22 1500          Physical Therapy Time and Intention    Subjective Information no complaints  -DP     Document Type evaluation  -DP     Mode of Treatment individual therapy;physical therapy  -DP     Patient Effort adequate  -DP     Row Name 22 1500          General Information    Patient Profile Reviewed yes  -DP     Patient Observations alert;cooperative;agree to therapy  -DP     Prior Level of Function independent:;gait;transfer;bed mobility;ADL's  -DP     Equipment Currently Used at Home cane, straight  -DP     Existing Precautions/Restrictions fall  -DP     Row Name 22  1500          Living Environment    Current Living Arrangements home  -DP     Home Accessibility stairs to enter home  -DP     People in Home alone  -DP     Row Name 07/11/22 1500          Home Main Entrance    Number of Stairs, Main Entrance twelve  -DP     Row Name 07/11/22 1500          Range of Motion (ROM)    Range of Motion bilateral lower extremities;ROM is WFL  -DP     Row Name 07/11/22 1500          Strength (Manual Muscle Testing)    Strength (Manual Muscle Testing) bilateral lower extremities  4-/5  -DP     Row Name 07/11/22 1500          Bed Mobility    Comment, (Bed Mobility) not tested- pt was already sitting in the recliner  -DP     Row Name 07/11/22 1500          Transfers    Transfers sit-stand transfer  -DP     Sit-Stand [place] (Transfers) contact guard;verbal cues  -DP     Row Name 07/11/22 1500          Gait/Stairs (Locomotion)    Gait/Stairs Locomotion gait/ambulation assistive device  -DP     [place] Level (Gait) minimum assist (75% patient effort)  -DP     Assistive Device (Gait) other (see comments)  none  -DP     Distance in Feet (Gait) 14  -DP     Row Name 07/11/22 1500          Balance    Balance Assessment standing dynamic balance  -DP     Dynamic Standing Balance minimal assist  -DP     Row Name             Wound 02/09/22 1235 Right lower leg Blisters    Wound - Properties Group Placement Date: 02/09/22  -FL Placement Time: 1235  -FL Present on Hospital Admission: Y  -FL Side: Right  -FL Orientation: lower  -FL Location: leg  -FL Primary Wound Type: Blisters  -FL     Retired Wound - Properties Group Placement Date: 02/09/22  -FL Placement Time: 1235  -FL Present on Hospital Admission: Y  -FL Side: Right  -FL Orientation: lower  -FL Location: leg  -FL Primary Wound Type: Blisters  -FL     Retired Wound - Properties Group Date first assessed: 02/09/22  -FL Time first assessed: 1235  -FL Present on Hospital Admission: Y  -FL Side: Right  -FL Location: leg  -FL Primary Wound Type:  Blisters  -FL     Row Name             Wound 04/23/22 1853 Right proximal calf    Wound - Properties Group Placement Date: 04/23/22  -AW Placement Time: 1853  -AW Present on Hospital Admission: Y  -AW Side: Right  -AW Orientation: proximal  -AW Location: calf  -AW     Retired Wound - Properties Group Placement Date: 04/23/22  -AW Placement Time: 1853 -AW Present on Hospital Admission: Y  -AW Side: Right  -AW Orientation: proximal  -AW Location: calf  -AW     Retired Wound - Properties Group Date first assessed: 04/23/22  -AW Time first assessed: 1853  -AW Present on Hospital Admission: Y  -AW Side: Right  -AW Location: calf  -AW     Row Name             Wound 06/20/22 1150 Left lateral leg Venous Ulcer    Wound - Properties Group Placement Date: 06/20/22  -FL Placement Time: 1150  -FL Present on Hospital Admission: Y  -FL Side: Left  -FL Orientation: lateral  -FL Location: leg  -FL Primary Wound Type: Venous ulcer  -FL     Retired Wound - Properties Group Placement Date: 06/20/22  -FL Placement Time: 1150  -FL Present on Hospital Admission: Y  -FL Side: Left  -FL Orientation: lateral  -FL Location: leg  -FL Primary Wound Type: Venous ulcer  -FL     Retired Wound - Properties Group Date first assessed: 06/20/22  -FL Time first assessed: 1150  -FL Present on Hospital Admission: Y  -FL Side: Left  -FL Location: leg  -FL Primary Wound Type: Venous ulcer  -FL     Row Name             Wound 06/20/22 0224 Right anterior foot Blisters    Wound - Properties Group Placement Date: 06/20/22  -JK Placement Time: 0224 -JK Side: Right  -JK Orientation: anterior  -JK Location: foot  -JK Primary Wound Type: Blisters  -JK     Retired Wound - Properties Group Placement Date: 06/20/22  -JK Placement Time: 0224  -JK Side: Right  -JK Orientation: anterior  -JK Location: foot  -JK Primary Wound Type: Blisters  -JK     Retired Wound - Properties Group Date first assessed: 06/20/22  -JK Time first assessed: 0224  -JK Side: Right  -JK  Location: foot  -JK Primary Wound Type: Blisters  -JK     Row Name 07/11/22 1500          Plan of Care Review    Plan of Care Reviewed With patient  -DP     Outcome Evaluation Patient presents with decreased strength, transfers, and functional mobility.  She will benefit from skilled PT services to address these deficits and placement in a rehab facility to maximize independence with functional mobility.  -DP     Row Name 07/11/22 1500          Therapy Assessment/Plan (PT)    Rehab Potential (PT) good, to achieve stated therapy goals  -DP     Criteria for Skilled Interventions Met (PT) yes;meets criteria  -DP     Therapy Frequency (PT) daily  -DP     Predicted Duration of Therapy Intervention (PT) 10 days  -DP     Problem List (PT) problems related to;mobility  -DP     Activity Limitations Related to Problem List (PT) unable to transfer safely;unable to ambulate safely  -DP     Row Name 07/11/22 1500          PT Evaluation Complexity    History, PT Evaluation Complexity no personal factors and/or comorbidities  -DP     Examination of Body Systems (PT Eval Complexity) total of 4 or more elements  -DP     Clinical Presentation (PT Evaluation Complexity) stable  -DP     Clinical Decision Making (PT Evaluation Complexity) low complexity  -DP     Overall Complexity (PT Evaluation Complexity) low complexity  -DP     Row Name 07/11/22 1500          Physical Therapy Goals    Transfer Goal Selection (PT) transfer, PT goal 1  -DP     Gait Training Goal Selection (PT) gait training, PT goal 1  -DP     Row Name 07/11/22 1500          Transfer Goal 1 (PT)    Activity/Assistive Device (Transfer Goal 1, PT) sit-to-stand/stand-to-sit  -DP     [place] Level/Cues Needed (Transfer Goal 1, PT) supervision required  -DP     Time Frame (Transfer Goal 1, PT) 10 days  -DP     Row Name 07/11/22 1500          Gait Training Goal 1 (PT)    Activity/Assistive Device (Gait Training Goal 1, PT) assistive device use;walker, rolling  -DP      [place] Level (Gait Training Goal 1, PT) supervision required  -DP     Distance (Gait Training Goal 1, PT) 200  -DP     Time Frame (Gait Training Goal 1, PT) 10 days  -DP           User Key  (r) = Recorded By, (t) = Taken By, (c) = Cosigned By    Initials Name Provider Type    Luly Smith, RN Registered Nurse    Nelia Lew RN Registered Nurse    Sylvia Olson RN Registered Nurse    Alannah Merino PT Physical Therapist                  PT Recommendation and Plan  Anticipated Discharge Disposition (PT): sub acute care setting, inpatient rehabilitation facility  Planned Therapy Interventions (PT): balance training, bed mobility training, gait training, strengthening, transfer training  Therapy Frequency (PT): daily  Plan of Care Reviewed With: patient  Outcome Evaluation: Patient presents with decreased strength, transfers, and functional mobility.  She will benefit from skilled PT services to address these deficits and placement in a rehab facility to maximize independence with functional mobility.   Outcome Measures     Row Name 07/11/22 1500             How much help from another person do you currently need...    Turning from your back to your side while in flat bed without using bedrails? 4  -DP      Moving from lying on back to sitting on the side of a flat bed without bedrails? 3  -DP      Moving to and from a bed to a chair (including a wheelchair)? 3  -DP      Standing up from a chair using your arms (e.g., wheelchair, bedside chair)? 3  -DP      Climbing 3-5 steps with a railing? 3  -DP      To walk in hospital room? 3  -DP      AM-PAC 6 Clicks Score (PT) 19  -DP              Functional Assessment    Outcome Measure Options AM-PAC 6 Clicks Basic Mobility (PT)  -DP            User Key  (r) = Recorded By, (t) = Taken By, (c) = Cosigned By    Initials Name Provider Type    Alannah Merino, PT Physical Therapist                 Time Calculation:    PT Charges     Row Name  07/11/22 1526             Time Calculation    PT Received On 07/11/22  -DP      PT Goal Re-Cert Due Date 07/20/22  -DP              Untimed Charges    PT Eval/Re-eval Minutes 40  -DP              Total Minutes    Untimed Charges Total Minutes 40  -DP       Total Minutes 40  -DP            User Key  (r) = Recorded By, (t) = Taken By, (c) = Cosigned By    Initials Name Provider Type    DP Alannah Cortez, PT Physical Therapist              Therapy Charges for Today     Code Description Service Date Service Provider Modifiers Qty    [id number] HC PT EVAL LOW COMPLEXITY 3 7/11/2022 Alannah Cortez, PT GP 1          PT G-Codes  Outcome Measure Options: AM-PAC 6 Clicks Basic Mobility (PT)  AM-PAC 6 Clicks Score (PT): 19  AM-PAC 6 Clicks Score (OT): 19    Alannah Cortez PT  7/11/2022

## 2022-07-11 NOTE — PLAN OF CARE
Goal Outcome Evaluation:  Plan of Care Reviewed With: patient        Progress: no change  Outcome Evaluation: Patient alert and oriented. Up to bedside commode several  times throughout shift. Patient reports shortness of air with activity. No complaints of pain at this time.

## 2022-07-11 NOTE — PROGRESS NOTES
[facility]     Progress Note    Patient Name: Lauren Redd  : 1945  MRN: [id number]  Primary Care Physician:  Dennis Kohler MD  Date of admission: 2022      Subjective   Brief summary.  Patient admitted with epistaxis and shortness of breath      HPI:  Feeling better.  No chest pain, no shortness of breath at rest but minimal exertion makes her out of breath.  No no further nosebleeds    Review of Systems     Fatigue and weakness  No chest pain  Shortness of breath  Swelling of legs      Objective     Vitals:   Temp:  [97.3 °F (36.3 °C)-97.9 °F (36.6 °C)] 97.7 °F (36.5 °C)  Heart Rate:  [] 112  Resp:  [22] 22  BP: (117-140)/(69-94) 117/89  Flow (L/min):  [4] 4    Physical Exam :     Elderly female not in acute distress,  Left naris packed  No bleeding  Heart regular  Lungs clear, diminished breath sounds  Abdomen obese  Extremities with edema      Result Review:  I have personally reviewed the results from the time of this admission to 2022 19:51 EDT and agree with these findings:  [x]  Laboratory  []  Microbiology  []  Radiology  []  EKG/Telemetry   []  Cardiology/Vascular   []  Pathology  []  Old records  []  Other:           Assessment / Plan       Active Hospital Problems:  Active Hospital Problems    Diagnosis    • Epistaxis    • Acute posterior epistaxis    • Acute on chronic diastolic CHF (congestive heart failure) (HCC)    • Atrial fibrillation with RVR (HCC)        Plan:   No further bleed  Continue diuresis  Discussed with ENT  Cardiologist consulted  We will restart Eliquis from a.m.  PT and OT  Evaluate for nursing home placement       DVT prophylaxis:  Medical DVT prophylaxis orders are present.    CODE STATUS:   Code Status (Patient has no pulse and is not breathing): CPR (Attempt to Resuscitate)  Medical Interventions (Patient has pulse or is breathing): Full Support            Electronically signed by Pablo Fajardo MD, 22, 7:51 PM EDT.

## 2022-07-11 NOTE — THERAPY EVALUATION
Patient Name: Lauren Redd  : 1945    MRN: [id number]                              Today's Date: 2022       Admit Date: 2022    Visit Dx:     ICD-10-CM ICD-9-CM   1. Acute posterior epistaxis  R04.0 784.7   2. Chest pain, unspecified type  R07.9 786.50   3. Atrial fibrillation with RVR (HCC)  I48.91 427.31   4. Decreased activities of daily living (ADL)  Z78.9 V49.89     Patient Active Problem List   Diagnosis   • Positive colorectal cancer screening using Cologuard test   • Atrial fibrillation with RVR (HCC)   • CHF (congestive heart failure) (HCC)   • Acute on chronic diastolic CHF (congestive heart failure) (HCC)   • Cellulitis of anterior lower leg   • Weakness generalized   • Atrial fibrillation (HCC)   • Pneumonia due to infectious organism   • Pneumonia due to infectious organism, unspecified laterality, unspecified part of lung   • Epistaxis   • Acute posterior epistaxis     Past Medical History:   Diagnosis Date   • Afib (HCC)    • Aneurysm (HCC)    • Arthritis    • CHF (congestive heart failure) (HCC)    • GERD (gastroesophageal reflux disease)    • Hypertension    • Irregular heart beat      Past Surgical History:   Procedure Laterality Date   • COLONOSCOPY     • HYSTERECTOMY        General Information     Row Name 22 0907 22 0857       OT Time and Intention    Document Type therapy note (daily note)  -PG therapy note (daily note)  -PG    Mode of Treatment individual therapy;occupational therapy  -PG individual therapy;occupational therapy  -PG    Row Name 22 0900 22 0857       General Information    Patient Profile Reviewed yes  -PG yes  -PG    Prior Level of Function independent:;ADL's;transfer  -PG independent:  -PG    Existing Precautions/Restrictions -- fall  -PG    Barriers to Rehab -- none identified  -PG    Row Name 22 0857          Occupational Profile    Reason for Services/Referral (Occupational Profile) Patient is a pleasant 77-year-old female  admitted for chest pain, atrial fib and recent fall.  No prior OT services indicated.  Patient is being evaluated to assess ADL status and determine possible discharge needs  -PG     Row Name 07/11/22 0857          Living Environment    People in Home spouse  -PG     Row Name 07/11/22 0857          Cognition    Orientation Status (Cognition) oriented x 3  -PG     Row Name 07/11/22 0857          Safety Issues, Functional Mobility    Safety Issues Affecting Function (Mobility) ability to follow commands;awareness of need for assistance  -PG     Impairments Affecting Function (Mobility) balance;endurance/activity tolerance;strength  -PG           User Key  (r) = Recorded By, (t) = Taken By, (c) = Cosigned By    Initials Name Provider Type    PG Rui Roberts, OT Occupational Therapist                 Mobility/ADL's     Row Name 07/11/22 0907 07/11/22 0858       Transfers    Transfers sit-stand transfer  -PG sit-stand transfer  -PG    Sit-Stand [facility] (Transfers) contact guard;verbal cues  -PG contact guard;verbal cues  -PG    Row Name 07/11/22 0907          Sit-Stand Transfer    Assistive Device (Sit-Stand Transfers) walker, front-wheeled  -PG     Row Name 07/11/22 0858          Activities of Daily Living    BADL Assessment/Intervention bathing;upper body dressing;lower body dressing;grooming;toileting  -     Row Name 07/11/22 0858          Bathing Assessment/Intervention    [facility] Level (Bathing) bathing skills;minimum assist (75% patient effort)  -PG     Row Name 07/11/22 0858          Upper Body Dressing Assessment/Training    [facility] Level (Upper Body Dressing) upper body dressing skills;set up  -PG     Row Name 07/11/22 0858          Lower Body Dressing Assessment/Training    [facility] Level (Lower Body Dressing) lower body dressing skills;minimum assist (75% patient effort)  -PG     Row Name 07/11/22 0858          Grooming Assessment/Training    [facility] Level (Grooming) set up  -PG      [facility] Name 07/11/22 0858          Toileting Assessment/Training    [place] Level (Toileting) toileting skills;minimum assist (75% patient effort)  -PG           User Key  (r) = Recorded By, (t) = Taken By, (c) = Cosigned By    Initials Name Provider Type    PG Rui Roberts OT Occupational Therapist               Obj/Interventions     Row Name 07/11/22 0900          Sensory Assessment (Somatosensory)    Sensory Assessment (Somatosensory) sensation intact  -PG     Row Name 07/11/22 0900          Vision Assessment/Intervention    Visual Impairment/Limitations WFL  -PG     [facility] Name 07/11/22 0900          Range of Motion Comprehensive    General Range of Motion no range of motion deficits identified  -PG     [facility] Name 07/11/22 0900          Strength Comprehensive (MMT)    Comment, General Manual Muscle Testing (MMT) Assessment 4/5 bilateral upper extremities  -PG           User Key  (r) = Recorded By, (t) = Taken By, (c) = Cosigned By    Initials Name Provider Type    PG Rui Roberts OT Occupational Therapist               Goals/Plan     Row Name 07/11/22 0902          Transfer Goal 1 (OT)    Activity/Assistive Device (Transfer Goal 1, OT) transfers, all  -PG     [place] Level/Cues Needed (Transfer Goal 1, OT) modified independence  -PG     Time Frame (Transfer Goal 1, OT) long term goal (LTG);10 days  -PG     Row Name 07/11/22 0902          Bathing Goal 1 (OT)    Activity/Device (Bathing Goal 1, OT) bathing skills, all  -PG     [place] Level/Cues Needed (Bathing Goal 1, OT) modified independence  -PG     Time Frame (Bathing Goal 1, OT) long term goal (LTG);10 days  -PG     Row Name 07/11/22 0902          Dressing Goal 1 (OT)    Activity/Device (Dressing Goal 1, OT) dressing skills, all  -PG     [place]/Cues Needed (Dressing Goal 1, OT) modified independence  -PG     Time Frame (Dressing Goal 1, OT) long term goal (LTG);10 days  -PG     Row Name 07/11/22 0902          Toileting Goal 1 (OT)     Activity/Device (Toileting Goal 1, OT) toileting skills, all  -PG     [place] Level/Cues Needed (Toileting Goal 1, OT) modified independence  -PG     Time Frame (Toileting Goal 1, OT) long term goal (LTG);10 days  -PG     Row Name 07/11/22 0902          Grooming Goal 1 (OT)    Activity/Device (Grooming Goal 1, OT) grooming skills, all  -PG     [place] (Grooming Goal 1, OT) modified independence  -PG     Time Frame (Grooming Goal 1, OT) long term goal (LTG);10 days  -PG     Row Name 07/11/22 0902          Problem Specific Goal 1 (OT)    Problem Specific Goal 1 (OT) Patient will improve activity tolerance to fair plus to support independence and engagement with ADL activities  -PG     Time Frame (Problem Specific Goal 1, OT) long term goal (LTG);10 days  -PG     Row Name 07/11/22 0902          Therapy Assessment/Plan (OT)    Planned Therapy Interventions (OT) activity tolerance training;BADL retraining;strengthening exercise;transfer/mobility retraining;patient/caregiver education/training;occupation/activity based interventions  -PG           User Key  (r) = Recorded By, (t) = Taken By, (c) = Cosigned By    Initials Name Provider Type    PG Rui Roberts, HANNAH Occupational Therapist               Clinical Impression     Row Name 07/11/22 0901 07/11/22 0900       Pain Assessment    Pretreatment Pain Rating 0/10 - no pain  -PG 0/10 - no pain  -PG    Posttreatment Pain Rating 0/10 - no pain  -PG 0/10 - no pain  -PG    Row Name 07/11/22 0901          Plan of Care Review    Plan of Care Reviewed With patient  -PG     Progress no change  -PG     Outcome Evaluation Patient presents with limitations affecting strength, activity tolerance, and balance impacting patient's ability to return home safely and independently.  The skills of a therapist will be required to safely and effectively implement the following treatment plan to restore maximal level of function  -PG     Row Name 07/11/22 0901          Therapy  Assessment/Plan (OT)    Patient/Family Therapy Goal Statement (OT) Patient would like to return home independently  -PG     Rehab Potential (OT) good, to achieve stated therapy goals  -PG     Criteria for Skilled Therapeutic Interventions Met (OT) yes;meets criteria;skilled treatment is necessary  -PG     Therapy Frequency (OT) 5 times/wk  -PG     Row Name 07/11/22 0901          Therapy Plan Review/Discharge Plan (OT)    Anticipated Discharge Disposition (OT) home with home health  -PG           User Key  (r) = Recorded By, (t) = Taken By, (c) = Cosigned By    Initials Name Provider Type    PG Rui Roberts, HANNAH Occupational Therapist               Outcome Measures     Row Name 07/11/22 0906          How much help from another is currently needed...    Putting on and taking off regular lower body clothing? 3  -PG     Bathing (including washing, rinsing, and drying) 3  -PG     Toileting (which includes using toilet bed pan or urinal) 3  -PG     Putting on and taking off regular upper body clothing 3  -PG     Taking care of personal grooming (such as brushing teeth) 3  -PG     Eating meals 4  -PG     AM-PAC 6 Clicks Score (OT) 19  -PG     Row Name 07/11/22 0800          How much help from another person do you currently need...    Turning from your back to your side while in flat bed without using bedrails? 4  -AM     Moving from lying on back to sitting on the side of a flat bed without bedrails? 4  -AM     Moving to and from a bed to a chair (including a wheelchair)? 3  -AM     Standing up from a chair using your arms (e.g., wheelchair, bedside chair)? 3  -AM     Climbing 3-5 steps with a railing? 2  -AM     To walk in hospital room? 3  -AM     AM-PAC 6 Clicks Score (PT) 19  -AM     Highest level of mobility 6 --> Walked 10 steps or more  -AM     Row Name 07/11/22 0906          Functional Assessment    Outcome Measure Options AM-PAC 6 Clicks Daily Activity (OT);Optimal Instrument  -PG     Row Name 07/11/22 0906           Optimal Instrument    Optimal Instrument Optimal - 3  -PG     Bending/Stooping 2  -PG     Standing 2  -PG     Reaching 1  -PG     From the list, choose the 3 activities you would most like to be able to do without any difficulty Bending/stooping;Standing;Reaching  -PG     Total Score Optimal - 3 5  -PG           User Key  (r) = Recorded By, (t) = Taken By, (c) = Cosigned By    Initials Name Provider Type    AM Una Vaughn, RN Registered Nurse    PG Rui Roberts OT Occupational Therapist                Occupational Therapy Education                 Title: PT OT SLP Therapies (Done)     Topic: Occupational Therapy (Done)     Point: ADL training (Done)     Description:   Instruct learner(s) on proper safety adaptation and remediation techniques during self care or transfers.   Instruct in proper use of assistive devices.              Learning Progress Summary           Patient Acceptance, E,D, DU by PG at 7/11/2022 0906                   Point: Home exercise program (Done)     Description:   Instruct learner(s) on appropriate technique for monitoring, assisting and/or progressing therapeutic exercises/activities.              Learning Progress Summary           Patient Acceptance, E,D, DU by PG at 7/11/2022 0906                   Point: Precautions (Done)     Description:   Instruct learner(s) on prescribed precautions during self-care and functional transfers.              Learning Progress Summary           Patient Acceptance, E,D, DU by PG at 7/11/2022 0906                   Point: Body mechanics (Done)     Description:   Instruct learner(s) on proper positioning and spine alignment during self-care, functional mobility activities and/or exercises.              Learning Progress Summary           Patient Acceptance, E,D, DU by PG at 7/11/2022 0906                               User Key     Initials Effective Dates Name Provider Type Discipline     06/16/21 -  Rui Roberts OT Occupational Therapist OT               OT Recommendation and Plan  Planned Therapy Interventions (OT): activity tolerance training, BADL retraining, strengthening exercise, transfer/mobility retraining, patient/caregiver education/training, occupation/activity based interventions  Therapy Frequency (OT): 5 times/wk  Plan of Care Review  Plan of Care Reviewed With: patient  Progress: no change  Outcome Evaluation: Patient presents with limitations affecting strength, activity tolerance, and balance impacting patient's ability to return home safely and independently.  The skills of a therapist will be required to safely and effectively implement the following treatment plan to restore maximal level of function     Time Calculation:    Time Calculation- OT     Row Name 07/11/22 0907             Time Calculation- OT    OT Received On 07/11/22  -PG      OT Goal Re-Cert Due Date 07/20/22  -PG              Timed Charges    24009 - OT Therapeutic Activity Minutes 8  -PG              Untimed Charges    OT Eval/Re-eval Minutes 30  -PG              Total Minutes    Timed Charges Total Minutes 8  -PG      Untimed Charges Total Minutes 30  -PG       Total Minutes 38  -PG            User Key  (r) = Recorded By, (t) = Taken By, (c) = Cosigned By    Initials Name Provider Type    PG Rui Roberts OT Occupational Therapist              Therapy Charges for Today     Code Description Service Date Service Provider Modifiers Qty    [id number]  OT THERAPEUTIC ACT EA 15 MIN 7/11/2022 Riu Roberts OT GO 1    [id number] HC OT EVAL LOW COMPLEXITY 2 7/11/2022 Rui Roberts OT GO 1               Rui Roberts OT  7/11/2022

## 2022-07-11 NOTE — CASE MANAGEMENT/SOCIAL WORK
Patient admitted to [facility] 6/19/22 - 6/24/22 (+3 day).

## 2022-07-11 NOTE — PLAN OF CARE
Goal Outcome Evaluation:  Plan of Care Reviewed With: patient           Outcome Evaluation: Patient presents with decreased strength, transfers, and functional mobility.  She will benefit from skilled PT services to address these deficits and placement in a rehab facility to maximize independence with functional mobility.

## 2022-07-11 NOTE — CONSULTS
Cardiology Consult Note  [facility] 4TH FLOOR MEDICAL TELEMETRY UNIT          Patient Identification:  Lauren Redd      [id number]  77 y.o.        female  1945         Reason for Consultation: Atrial fibrillation and congestive heart failure    PCP: Dennis Kohler MD    History of Present Illness:     Patient is a 77-year-old female who has chronic atrial fibrillation as well as diastolic congestive heart failure on Eliquis.  She also had a diffuse cellulitis of both lower extremities with ulcerations and has been treated aggressively for the fluid overload.  She also has a cellulitis of the lower extremities.    Patient started to have a nosebleed which started last night and it was continuous.  No trauma to the nose she does use oxygen cannula at home.  In the emergency room she was in her left nostril was packed iron at the time of examination she is not bleeding anymore.  She also complains of increasing shortness of breath    Past History:  Past Medical History:   Diagnosis Date   • Afib (HCC)    • Aneurysm (HCC)    • Arthritis    • CHF (congestive heart failure) (HCC)    • GERD (gastroesophageal reflux disease)    • Hypertension    • Irregular heart beat      Past Surgical History:   Procedure Laterality Date   • COLONOSCOPY     • HYSTERECTOMY       Allergies   Allergen Reactions   • Contrast Dye Rash   • Lotrel [Amlodipine Besy-Benazepril Hcl] Unknown - Low Severity     Social History     Socioeconomic History   • Marital status:    Tobacco Use   • Smoking status: Never Smoker   • Smokeless tobacco: Never Used   Vaping Use   • Vaping Use: Never used   Substance and Sexual Activity   • Alcohol use: Not Currently   • Drug use: Never   • Sexual activity: Defer     Family History   Problem Relation Age of Onset   • Hypertension Mother    • Colon cancer Neg Hx          Medications:  Prior to Admission medications    Medication Sig Start Date End Date Taking? Authorizing Provider    acetaminophen (TYLENOL) 325 MG tablet Take 650 mg by mouth Every 4 (Four) Hours As Needed for Mild Pain .    Colton Evans MD   apixaban (ELIQUIS) 5 MG tablet tablet Take 5 mg by mouth 2 (Two) Times a Day.    Colton Evans MD   atorvastatin (LIPITOR) 20 MG tablet Take 20 mg by mouth Daily.    Colton Evans MD   bumetanide (BUMEX) 2 MG tablet Take 2 mg by mouth 2 (Two) Times a Day.    Colton Evans MD   digoxin (LANOXIN) 125 MCG tablet Take 125 mcg by mouth Daily.    Colton Evans MD   docusate sodium (COLACE) 100 MG capsule Take 200 mg by mouth 2 (Two) Times a Day As Needed for Constipation.    Colton Evans MD   DULoxetine (CYMBALTA) 30 MG capsule Take 30 mg by mouth Daily. 5/16/22   Colton Evans MD   fluticasone (FLONASE) 50 MCG/ACT nasal spray 1 spray into the nostril(s) as directed by provider Daily. 12/7/21   Emergency, Nurse Epic, RN   gabapentin (NEURONTIN) 100 MG capsule Take 100 mg by mouth 2 (Two) Times a Day.    Colton Evans MD   loratadine (CLARITIN) 10 MG tablet Take 10 mg by mouth Daily.    Colton Evans MD   melatonin 3 MG tablet Take 3 mg by mouth Every Night.    Colton Evans MD   metOLazone (ZAROXOLYN) 5 MG tablet Take 1 tablet by mouth 2 (Two) Times a Day for 30 days. 6/24/22 7/24/22  Pablo Fajardo MD   metoprolol succinate XL (TOPROL-XL) 100 MG 24 hr tablet Take 1 tablet by mouth 2 (Two) Times a Day for 30 days. 6/24/22 7/24/22  Pablo Fajardo MD   midodrine (PROAMATINE) 10 MG tablet Take 0.5 tablets by mouth 3 (Three) Times a Day Before Meals for 30 days. 6/24/22 7/24/22  Pablo Fajardo MD   spironolactone (ALDACTONE) 25 MG tablet Take 25 mg by mouth Daily. 2/14/22   Colton Evans MD   triamcinolone (KENALOG) 0.1 % ointment Apply 1 application topically to the appropriate area as directed Every Other Day.    Colton Evans MD      Current medications:  !NOT ORDERED- apixaban (ELIQUIS), , Does not apply,  "Daily With Dinner  bumetanide, 1 mg, Intravenous, Q12H  cephalexin, 500 mg, Oral, Q8H  digoxin, 125 mcg, Oral, Daily  DULoxetine, 30 mg, Oral, Daily  famotidine, 40 mg, Oral, Daily  gabapentin, 100 mg, Oral, Q12H  metoprolol succinate XL, 100 mg, Oral, BID  senna-docusate sodium, 1 tablet, Oral, BID  spironolactone, 25 mg, Oral, Daily  triamcinolone, 1 application, Topical, Q12H      Current IV drips:         Review of Systems  Review of Systems   Constitutional: Negative for appetite change, fatigue and fever.   HENT: Negative for congestion.         Epistaxis   Respiratory: Positive for shortness of breath and wheezing. Negative for apnea, choking and chest tightness.    Cardiovascular: Negative for chest pain, palpitations and leg swelling.   Gastrointestinal: Negative for diarrhea, nausea and vomiting.   Genitourinary: Negative for dysuria, frequency and hematuria.   Musculoskeletal: Positive for arthralgias. Negative for myalgias.   Skin: Negative for pallor.   Neurological: Negative for dizziness, tremors, syncope and weakness.   Psychiatric/Behavioral: Negative for agitation and confusion.         Physical Exam    /89 (BP Location: Right arm, Patient Position: Lying)   Pulse 112   Temp 97.7 °F (36.5 °C) (Oral)   Resp 22   Ht 172.7 cm (68\")   Wt 92.9 kg (204 lb 12.9 oz)   LMP  (LMP Unknown)   SpO2 99%   BMI 31.14 kg/m²  Body mass index is 31.14 kg/m².   Oxygen saturation   @FLOWAN(10::1)@ SpO2  Min: 91 %  Max: 99 %    General Appearance:   · no acute distress  · Alert and oriented x3  HENT:   · lips not cyanotic  · Atraumatic  Neck:  · thyroid not enlarged  · supple  Respiratory:  · no respiratory distress  · normal breath sounds  · no rales  Cardiovascular:  · no jugular venous distention  · regular rhythm  · apical impulse normal  · S1 normal, S2 normal  · no S3, no S4   · no murmur  · no rub, no thrill  · no carotid bruit  · pedal pulses normal  · lower extremity edema: none  "   Gastrointestinal:   · bowel sounds normal  · non-tender  · no hepatomegaly, no splenomegaly  Musculoskeletal:  · no clubbing of fingers.   · normocephalic, head atraumatic  Skin:   · warm, dry  · no rashes  Neuro/Psychiatric:  · normal mood and affect  · judgement and insight appropriate      Cardiographics:     ECG  (personally reviewed) EKG: Atrial fibrillation with slightly rapid rate and right bundle branch block with ST-T changes   Telemetry:  (personally reviewed) atrial fibrillation with slightly rapid rate   Results for orders placed during the hospital encounter of 02/07/22    Adult Transthoracic Echo Complete W/ Cont if Necessary Per Protocol    Interpretation Summary  · Estimated right ventricular systolic pressure from tricuspid regurgitation is mildly elevated (35-45 mmHg). Calculated right ventricular systolic pressure from tricuspid regurgitation is 40 mmHg.  · The left ventricular cavity is mild to moderately dilated.  · Calculated left ventricular EF = 65% Estimated left ventricular EF was in agreement with the calculated left ventricular EF. Left ventricular systolic function is normal.  · The right atrial cavity is mild to moderately dilated.  · Moderate mitral valve regurgitation is present.  · Moderate tricuspid valve regurgitation is present.  · Mild pulmonary hypertension is present.         No results found for this or any previous visit.      Cardiolite (Tc-99m Sestamibi) stress test   Lab Review:       CBC    CBC 7/9/22 7/10/22 7/10/22 7/10/22 7/11/22 7/11/22     0814 1317 2131 0515 1344   WBC 9.53    9.03    RBC 4.23    4.08    Hemoglobin 12.1 11.2 (A) 11.8 (A) 11.5 (A) 11.6 (A) 11.7 (A)   Hematocrit 39.8 37.0 37.8 36.9 37.4 37.0   MCV 94.1    91.7    MCH 28.6    28.4    MCHC 30.4 (A)    31.0 (A)    RDW 18.5 (A)    18.6 (A)    Platelets 202    192    (A) Abnormal value                CMP    CMP 7/9/22 7/10/22 7/11/22 7/11/22      0515 1535   Glucose 105 (A) 96 116 (A) 138 (A)   BUN 49  (A) 48 (A) 51 (A)    Creatinine 1.27 (A) 1.19 (A) 1.52 (A)    Sodium 146 (A) 141 141 136.5   Potassium 3.2 (A) 3.3 (A) 3.3 (A)    Chloride 93 (A) 93 (A) 91 (A)    Calcium 11.2 (A) 10.8 (A) 10.8 (A)    Albumin 3.90  3.70    Total Bilirubin 1.5 (A)  1.4 (A)    Alkaline Phosphatase 136 (A)  123 (A)    AST (SGOT) 22  26    ALT (SGPT) 13  16    (A) Abnormal value               CARDIAC LABS:      Lab 07/09/22  2304   PROBNP 3,993.0*   TROPONIN T 0.015      Lab Results   Component Value Date    DIGOXIN 1.10 07/09/2022      Lab Results   Component Value Date    TSH 2.430 06/20/2022           Invalid input(s): LDLCALC  No results found for: POCTROP  No components found for: DDIMERQUAN  Lab Results   Component Value Date    MG 1.6 07/09/2022                 Assessment:    Atrial fibrillation with RVR (HCC)    Acute on chronic diastolic CHF (congestive heart failure) (HCC)    Epistaxis    Acute posterior epistaxis  Cellulitis of both legs      Plan:  Patient obviously has chronic atrial fibrillation with slightly rapid rate and high risk for thromboembolism.  Her creatinine is slightly high.  Once her bleeding stops suggest to start her back on Eliquis perhaps it a reduced dose of 2.5 mg twice daily  Continue Toprol digoxin as well as diuretics      Thank you for allowing me to share in [facility].        Lisa Leung MD   7/11/2022    16:23 EDT

## 2022-07-12 NOTE — PROGRESS NOTES
CARDIOLOGY  INPATIENT PROGRESS NOTE                [facility] CARE UNIT 2    7/12/2022      PATIENT IDENTIFICATION:   Name:  Lauren Redd      MRN:  [id number]     77 y.o.  female                 SUBJECTIVE:   Nasal plug fell off last night.  No further epistaxis  Breathing same    OBJECTIVE:  Vitals:    07/12/22 0245 07/12/22 0700 07/12/22 1101 07/12/22 1518   BP: 102/80 123/74 110/60 116/64   BP Location: Right arm Right arm Right arm Right arm   Patient Position: Lying Lying Lying Lying   Pulse: 94 88  75   Resp: 22 18 18 14   Temp: 97.3 °F (36.3 °C) 97.4 °F (36.3 °C) 98.3 °F (36.8 °C) 97.5 °F (36.4 °C)   TempSrc: Oral Axillary Oral Oral   SpO2: 95% 100%  98%   Weight:       Height:               Body mass index is 31.14 kg/m².  No intake or output data in the 24 hours ending 07/12/22 1710    Telemetry:     Physical Exam  General Appearance:   · no acute distress  · Alert and oriented x3  HENT:   · lips not cyanotic  · Atraumatic  Neck:  · No jvd   · supple  Respiratory:  · no respiratory distress  · normal breath sounds  · no rales  Cardiovascular:    · regular rhythm  · no S3, no S4   · no murmur  · no rub  · lower extremity edema: none    Skin:   · warm, dry  · No rashes      Allergies   Allergen Reactions   • Contrast Dye Rash   • Lotrel [Amlodipine Besy-Benazepril Hcl] Unknown - Low Severity       Scheduled meds:  !NOT ORDERED- apixaban (ELIQUIS), , Does not apply, Daily With Dinner  bumetanide, 1 mg, Intravenous, Q12H  cephalexin, 500 mg, Oral, Q8H  digoxin, 125 mcg, Oral, Daily  DULoxetine, 30 mg, Oral, Daily  famotidine, 40 mg, Oral, Daily  gabapentin, 100 mg, Oral, Q12H  metoprolol succinate XL, 100 mg, Oral, BID  mupirocin, , Each Nare, BID  potassium chloride, 20 mEq, Oral, Daily  senna-docusate sodium, 1 tablet, Oral, BID  sodium chloride, 2 spray, Each Nare, TID  spironolactone, 25 mg, Oral, Daily  triamcinolone, 1 application, Topical, Q12H      IV meds:                            Data Review:  CBC    CBC 7/10/22 7/10/22 7/10/22 7/11/22 7/11/22 7/11/22 7/12/22 7/12/22    0814 1317 2131 0515 1344 2310 0535 1320   WBC    9.03       RBC    4.08       Hemoglobin 11.2 (A) 11.8 (A) 11.5 (A) 11.6 (A) 11.7 (A) 11.6 (A) 11.7 (A) 12.3   Hematocrit 37.0 37.8 36.9 37.4 37.0 37.2 38.0 39.6   MCV    91.7       MCH    28.4       MCHC    31.0 (A)       RDW    18.6 (A)       Platelets    192       (A) Abnormal value            CMP    CMP 7/10/22 7/11/22 7/11/22 7/12/22     0515 1535    Glucose 96 116 (A) 138 (A) 108 (A)   BUN 48 (A) 51 (A)  62 (A)   Creatinine 1.19 (A) 1.52 (A)  2.00 (A)   Sodium 141 141 136.5 140   Potassium 3.3 (A) 3.3 (A)  3.2 (A)   Chloride 93 (A) 91 (A)  91 (A)   Calcium 10.8 (A) 10.8 (A)  10.8 (A)   Albumin  3.70     Total Bilirubin  1.4 (A)     Alkaline Phosphatase  123 (A)     AST (SGOT)  26     ALT (SGPT)  16     (A) Abnormal value             CARDIAC LABS:      Lab 07/09/22  2304   PROBNP 3,993.0*   TROPONIN T 0.015        Lab Results   Component Value Date    DIGOXIN 1.10 07/09/2022      Lab Results   Component Value Date    TSH 2.430 06/20/2022           Invalid input(s): LDLCALC  No results found for: POCTROP  Lab Results   Component Value Date    TROPONINT 0.015 07/09/2022   (  Lab Results   Component Value Date    MG 1.6 07/09/2022     Results for orders placed during the hospital encounter of 02/07/22    Adult Transthoracic Echo Complete W/ Cont if Necessary Per Protocol    Interpretation Summary  · Estimated right ventricular systolic pressure from tricuspid regurgitation is mildly elevated (35-45 mmHg). Calculated right ventricular systolic pressure from tricuspid regurgitation is 40 mmHg.  · The left ventricular cavity is mild to moderately dilated.  · Calculated left ventricular EF = 65% Estimated left ventricular EF was in agreement with the calculated left ventricular EF. Left ventricular systolic function is normal.  · The right atrial cavity is mild to moderately  dilated.  · Moderate mitral valve regurgitation is present.  · Moderate tricuspid valve regurgitation is present.  · Mild pulmonary hypertension is present.           ASSESSMENT:     Atrial fibrillation with RVR ([facility])    Acute on chronic diastolic CHF (congestive heart failure) (HCC)    Epistaxis    Acute posterior epistaxis  Cellulitis of both legs       PLAN:   Continue Toprol digoxin as well as diuretic  Start her back on smaller dose of Eliquis            Lisa Leung MD  7/12/2022    17:10 EDT

## 2022-07-12 NOTE — PROGRESS NOTES
[facility]     Progress Note    Patient Name: Lauren Redd  : 1945  MRN: [id number]  Primary Care Physician:  Dennis Kohler MD  Date of admission: 2022      Subjective   Brief summary.  Patient admitted with epistaxis and shortness of breath      HPI:  Patient sleepy today I came twice to examine both time she is sleepy.  No further nosebleed, shortness of breath improving    Review of system    Fatigue and weakness  No nosebleed  Swelling of legs      Objective     Vitals:   Temp:  [97.3 °F (36.3 °C)-98.3 °F (36.8 °C)] 97.5 °F (36.4 °C)  Heart Rate:  [75-94] 75  Resp:  [14-22] 14  BP: (102-123)/(60-80) 116/64  Flow (L/min):  [4] 4    Physical Exam :     Elderly female not in acute distress,  Left naris packed  No bleeding  Heart is regular, grade 2 murmur.  Lungs with crackles at bases  Abdomen obese  Extremities with edema      Result Review:  I have personally reviewed the results from the time of this admission to 2022 19:13 EDT and agree with these findings:  [x]  Laboratory  []  Microbiology  []  Radiology  []  EKG/Telemetry   []  Cardiology/Vascular   []  Pathology  []  Old records  []  Other:           Assessment / Plan       Active Hospital Problems:  Active Hospital Problems    Diagnosis    • Epistaxis    • Acute posterior epistaxis    • Acute on chronic diastolic CHF (congestive heart failure) ([facility])    • Atrial fibrillation with RVR ([facility])        Plan:   Patient stable but sleepy  No further nosebleed  To be seen by ENT tomorrow  Possible removal of packing  Patient potassium still low increase potassium supplements and check mag in a.m..  Waiting for placement       DVT prophylaxis:  Medical DVT prophylaxis orders are present.    CODE STATUS:   Code Status (Patient has no pulse and is not breathing): CPR (Attempt to Resuscitate)  Medical Interventions (Patient has pulse or is breathing): Full Support              Electronically signed by Pablo Fajardo MD, 22, 7:15 PM  EDT.  .

## 2022-07-12 NOTE — PLAN OF CARE
Goal Outcome Evaluation: No nose bleeds this shift. Patient has slept most of the day. No acute problems or concerns this shift. Call light within reach of patient. Simeon BYRD                  Subjective:      Patient ID: Cliff Dial is a 62 y.o. male. HPI  Chief Complaint   Patient presents with    Cough     Started with cough this morning, feels weak     Here for complaint of cough and weakness starting this morning. Was at his grandson's basketball game and was feeling fine and then this morning woke up with this cough. States no fever or runny nose or body aches. Not using anything over-the-counter. Was worried he had bronchitis again  No flu shot  Vitals:    02/17/20 1639   BP: 100/62   Pulse: 81   Temp: 98.8 °F (37.1 °C)   SpO2: 98%   Weight: 222 lb (100.7 kg)   Height: 5' 10\" (1.778 m)     Body mass index is 31.85 kg/m². Wt Readings from Last 3 Encounters:   02/17/20 222 lb (100.7 kg)   12/05/19 217 lb 9.6 oz (98.7 kg)   09/28/18 213 lb (96.6 kg)     BP Readings from Last 3 Encounters:   02/17/20 100/62   12/05/19 122/78   09/28/18 126/82      Results for POC orders placed in visit on 02/17/20   POCT Influenza A/B Antigen   Result Value Ref Range    Inflenza A Ag negative     Influenza B Ag negative        Review of Systems   Constitutional: Negative for chills and fever. HENT: Negative for congestion, postnasal drip, rhinorrhea, sinus pressure and sore throat. Respiratory: Positive for cough. Negative for chest tightness, shortness of breath and wheezing. Cardiovascular: Negative for chest pain. Objective:   Physical Exam  Constitutional:       General: He is not in acute distress. Appearance: He is well-developed. HENT:      Head: Normocephalic. Right Ear: External ear normal.      Left Ear: External ear normal.   Neck:      Musculoskeletal: Neck supple. Cardiovascular:      Rate and Rhythm: Normal rate and regular rhythm. Heart sounds: Normal heart sounds. Pulmonary:      Effort: Pulmonary effort is normal. No respiratory distress. Breath sounds: Normal breath sounds. No wheezing. Assessment:      URI-recommend supportive treatment.

## 2022-07-12 NOTE — PLAN OF CARE
Goal Outcome Evaluation:  Plan of Care Reviewed With: patient        Progress: no change   Vital signs stable. Patient is Afib and will sally at times when asleep. Patient removed rhino rocket this shift, MD aware. No new changes. Caty Restrepo RN

## 2022-07-12 NOTE — PROGRESS NOTES
" [facility]     Progress Note    Patient Name: Lauren Redd  : 1945  MRN: [id number]  Primary Care Physician:  Dennis Kohler MD  Date of admission: 2022    Subjective   Subjective     Chief Complaint: \"It fell out last night\"    HPI:  Patient tells me that the left-sided Rhino Rocket fell out overnight.  She has not experienced any bleeding since.  Her hemoglobin was 12.3 today from 11.7 yesterday.    Review of Systems   The following systems were reviewed and negative;  constitution, eyes, ENT, respiratory, cardiovascular, gastrointestinal, genitourinary, integument, hematologic / lymphatic, musculoskeletal, neurological, behavioral/psych, endocrine, and allergies / immunologic.    Objective   Objective     Vitals:   Temp:  [97.3 °F (36.3 °C)-98.3 °F (36.8 °C)] 97.5 °F (36.4 °C)  Heart Rate:  [75-94] 75  Resp:  [14-22] 14  BP: (102-123)/(60-80) 116/64  Flow (L/min):  [4] 4  Physical Exam   General: Well developed, well nourished patient of stated age in no acute distress. Voice is strong and clear.   Head: Normocephalic and atraumatic.              Face: No lesions. House-Brackmann I/VI bilaterally.   Eyes: PERRLA, sclerae anicteric, no conjunctival injection. Extra ocular movements are intact and full.              Ears: Auricles are normal in appearance. Bilateral external auditory canals are unremarkable. Bilateral tympanic membranes are clear and without effusion.  Hearing intact to conversational voice.              Nose: External nose is normal in appearance.    Nasal cannula oxygen in place.  Bilateral nares with desiccated mucosa and some abrasions on the left. Septum midline. Turbinates are unremarkable. No lesions.   Oral Cavity: Lips are normal in appearance. Oral mucosa is unremarkable. Gingiva is unremarkable.  Partial dentition for age. Tongue is unremarkable with good movement. Hard palate is unremarkable.              Oropharynx: Soft palate is unremarkable with full movement. " Uvula is unremarkable. Bilateral tonsils are unremarkable. Posterior oropharynx is unremarkable.               Neck: Supple, no thyromegaly, no lymphadenopathy, trachea midline              Psychiatric: Appropriate affect, cooperative              Neurologic: Oriented x 3, strength symmetric in all extremities, Cranial Nerves II-XII are grossly intact to confrontation                  Result Review    Result Review:  I have personally reviewed the results from the time of this admission to 7/12/2022 15:39 EDT and agree with these findings:  [x]  Laboratory  []  Microbiology  []  Radiology  []  EKG/Telemetry   []  Cardiology/Vascular   []  Pathology  []  Old records  []  Other:    Assessment & Plan   Assessment / Plan     Brief Patient Summary:  Lauren Redd is a 77 y.o. female who is seen for follow-up of left-sided epistaxis.  Doing well.    Active Hospital Problems:  Active Hospital Problems    Diagnosis    • Epistaxis    • Acute posterior epistaxis    • Acute on chronic diastolic CHF (congestive heart failure) (HCC)    • Atrial fibrillation with RVR (HCC)        Plan:   1.  We will add mupirocin to her current regimen of nasal saline sprays.  2.  I would favor a humidified facemask over nasal cannula if the patient would tolerate it.  3.  I will sign off please call me at [phone] with any questions or concerns.       Electronically signed by Josué Restrepo MD, 07/12/22, 3:39 PM EDT.

## 2022-07-13 NOTE — PLAN OF CARE
Goal Outcome Evaluation:               Patient has been asleep most of night. VSS. Patient as become more alert since around 0315. Patient is alert/oriented but sleeps a lot. No other issues reported. Will continue to monitor.

## 2022-07-13 NOTE — PROGRESS NOTES
CARDIOLOGY  INPATIENT PROGRESS NOTE                [facility] CARE UNIT 2    7/13/2022      PATIENT IDENTIFICATION:   Name:  Lauren Redd      MRN:  [id number]     77 y.o.  female                 SUBJECTIVE:   No further epistaxis  Breathing is stable  Patient wants to go home    OBJECTIVE:  Vitals:    07/13/22 0425 07/13/22 0805 07/13/22 1110 07/13/22 1604   BP: 103/52 120/79 108/75 116/79   BP Location: Left arm Left arm Left arm Left arm   Patient Position: Lying Lying Lying Lying   Pulse: 64 75 84 89   Resp: 18 16 16 16   Temp: 97.3 °F (36.3 °C) 97.5 °F (36.4 °C) 97.3 °F (36.3 °C) 97.5 °F (36.4 °C)   TempSrc: Axillary Oral Oral Oral   SpO2: 100% 100% 100% 100%   Weight:       Height:               Body mass index is 31.14 kg/m².    Intake/Output Summary (Last 24 hours) at 7/13/2022 1659  Last data filed at 7/13/2022 0859  Gross per 24 hour   Intake 240 ml   Output 600 ml   Net -360 ml       Telemetry:     Physical Exam  General Appearance:   · no acute distress  · Alert and oriented x3  HENT:   · lips not cyanotic  · Atraumatic  Neck:  · No jvd   · supple  Respiratory:  · no respiratory distress  · normal breath sounds  · no rales  Cardiovascular:    · regular rhythm  · no S3, no S4   · no murmur  · no rub  · lower extremity edema: none    Skin:   · warm, dry  · No rashes      Allergies   Allergen Reactions   • Contrast Dye Rash   • Lotrel [Amlodipine Besy-Benazepril Hcl] Unknown - Low Severity       Scheduled meds:  !NOT ORDERED- apixaban (ELIQUIS), , Does not apply, Daily With Dinner  bumetanide, 1 mg, Intravenous, Q12H  cephalexin, 500 mg, Oral, Q8H  digoxin, 125 mcg, Oral, Daily  DULoxetine, 30 mg, Oral, Daily  famotidine, 40 mg, Oral, Daily  gabapentin, 100 mg, Oral, Q12H  metoprolol succinate XL, 100 mg, Oral, BID  mupirocin, , Each Nare, BID  potassium chloride, 20 mEq, Oral, BID With Meals  senna-docusate sodium, 1 tablet, Oral, BID  sodium chloride, 2 spray, Each Nare,  TID  spironolactone, 25 mg, Oral, Daily  triamcinolone, 1 application, Topical, Q12H      IV meds:                           Data Review:  CBC    CBC 7/11/22 7/11/22 7/11/22 7/12/22 7/12/22 7/12/22 7/13/22 7/13/22    0515 1344 2310 0535 1320 2217 0754 1320   WBC 9.03          RBC 4.08          Hemoglobin 11.6 (A) 11.7 (A) 11.6 (A) 11.7 (A) 12.3 10.9 (A) 11.7 (A) 12.4   Hematocrit 37.4 37.0 37.2 38.0 39.6 35.0 37.2 40.7   MCV 91.7          MCH 28.4          MCHC 31.0 (A)          RDW 18.6 (A)          Platelets 192          (A) Abnormal value            CMP    CMP 7/11/22 7/11/22 7/12/22 7/13/22    0515 1535     Glucose 116 (A) 138 (A) 108 (A) 89   BUN 51 (A)  62 (A) 68 (A)   Creatinine 1.52 (A)  2.00 (A) 1.90 (A)   Sodium 141 136.5 140 139   Potassium 3.3 (A)  3.2 (A) 3.2 (A)   Chloride 91 (A)  91 (A) 92 (A)   Calcium 10.8 (A)  10.8 (A) 10.2   Albumin 3.70      Total Bilirubin 1.4 (A)      Alkaline Phosphatase 123 (A)      AST (SGOT) 26      ALT (SGPT) 16      (A) Abnormal value             CARDIAC LABS:      Lab 07/09/22  2304   PROBNP 3,993.0*   TROPONIN T 0.015        Lab Results   Component Value Date    DIGOXIN 1.10 07/09/2022      Lab Results   Component Value Date    TSH 2.430 06/20/2022           Invalid input(s): LDLCALC  No results found for: POCTROP  Lab Results   Component Value Date    TROPONINT 0.015 07/09/2022   (  Lab Results   Component Value Date    MG 1.9 07/13/2022     Results for orders placed during the hospital encounter of 02/07/22    Adult Transthoracic Echo Complete W/ Cont if Necessary Per Protocol    Interpretation Summary  · Estimated right ventricular systolic pressure from tricuspid regurgitation is mildly elevated (35-45 mmHg). Calculated right ventricular systolic pressure from tricuspid regurgitation is 40 mmHg.  · The left ventricular cavity is mild to moderately dilated.  · Calculated left ventricular EF = 65% Estimated left ventricular EF was in agreement with the calculated left  ventricular EF. Left ventricular systolic function is normal.  · The right atrial cavity is mild to moderately dilated.  · Moderate mitral valve regurgitation is present.  · Moderate tricuspid valve regurgitation is present.  · Mild pulmonary hypertension is present.           ASSESSMENT:Atrial fibrillation with RVR ([facility])    Acute on chronic diastolic CHF (congestive heart failure) (HCC)    Epistaxis    Acute posterior epistaxis  Cellulitis of both legs           PLAN:     Continue present medications  Probably home tomorrow          Lisa Leung MD  7/13/2022    16:59 EDT

## 2022-07-13 NOTE — PLAN OF CARE
Goal Outcome Evaluation:      Pt doing well, replacement potassium given today no other issues noted.

## 2022-07-13 NOTE — PROGRESS NOTES
[facility]     Progress Note    Patient Name: Lauren Redd  : 1945  MRN: [id number]  Primary Care Physician:  Dennis Kohler MD  Date of admission: 2022      Subjective   Brief summary.  Patient admitted with epistaxis and shortness of breath      HPI:  Nasal packing removed  Patient awake and alert  Still sleepy most of the time  Now wants to go home  Does not want to go to nursing home    Review of system    Fatigue and weakness  No nosebleed  No shortness of breath  Swelling of leg improving      Objective     Vitals:   Temp:  [97.3 °F (36.3 °C)-98.3 °F (36.8 °C)] 97.5 °F (36.4 °C)  Heart Rate:  [64-90] 75  Resp:  [14-20] 16  BP: ()/(52-85) 120/79  Flow (L/min):  [3-4] 3    Physical Exam :     Elderly female not in acute distress,  No nasal bleed, packing removed  Heart is regular, grade 2 murmur.  Lungs with crackles at bases  Abdomen is soft and obese  Extremities with edema      Result Review:  I have personally reviewed the results from the time of this admission to 2022 09:32 EDT and agree with these findings:  [x]  Laboratory  []  Microbiology  []  Radiology  []  EKG/Telemetry   []  Cardiology/Vascular   []  Pathology  []  Old records  []  Other:           Assessment / Plan       Active Hospital Problems:  Active Hospital Problems    Diagnosis    • Epistaxis    • Acute posterior epistaxis    • Acute on chronic diastolic CHF (congestive heart failure) ([facility])    • Atrial fibrillation with RVR ([facility])        Plan:   Epistaxis resolved  Overall patient stable  Respiratory status and cardiac status improving  Patient refusing nursing home and placement  Recurrent admission to hospital, questionable compliance at home  Spoke to patient's  and he wants her to go to rehab or nursing home  Will let  talk to them and decide for further disposition...  Ready for discharge now  DVT prophylaxis:  Medical DVT prophylaxis orders are present.    CODE STATUS:   Code Status  (Patient has no pulse and is not breathing): CPR (Attempt to Resuscitate)  Medical Interventions (Patient has pulse or is breathing): Full Support                Electronically signed by Pablo Fajardo MD, 07/13/22, 9:38 AM EDT.    .

## 2022-07-14 PROBLEM — R04.0 ACUTE POSTERIOR EPISTAXIS: Status: RESOLVED | Noted: 2022-01-01 | Resolved: 2022-01-01

## 2022-07-14 PROBLEM — I48.91 ATRIAL FIBRILLATION WITH RVR: Chronic | Status: RESOLVED | Noted: 2022-01-01 | Resolved: 2022-01-01

## 2022-07-14 PROBLEM — I50.33 ACUTE ON CHRONIC DIASTOLIC CHF (CONGESTIVE HEART FAILURE): Status: RESOLVED | Noted: 2022-01-01 | Resolved: 2022-01-01

## 2022-07-14 NOTE — DISCHARGE SUMMARY
[facility]         DISCHARGE SUMMARY    Patient Name: Lauren Redd  : 1945  MRN: [id number]    Date of Admission: 2022  Date of Discharge: 2022  Primary Care Physician: Dennis Kohler MD    Consults     Date and Time Order Name Status Description    7/10/2022  1:41 PM Inpatient ENT Consult Completed     7/10/2022  1:41 PM Inpatient Cardiology Consult      7/10/2022  2:15 AM Internal Medicine (on-call MD unless specified)            Presenting Problem:   Atrial fibrillation with RVR ([facility]) [I48.91]  Acute posterior epistaxis [R04.0]  Chest pain, unspecified type [R07.9]    Active and Resolved Hospital Problems:  Active Hospital Problems    Diagnosis POA   • Epistaxis [R04.0] Yes      Resolved Hospital Problems    Diagnosis POA   • Acute posterior epistaxis [R04.0] Yes   • Acute on chronic diastolic CHF (congestive heart failure) ([facility]) [I50.33] Yes   • Atrial fibrillation with RVR ([facility]) [I48.91] Yes         Hospital Course     Hospital Course:  Lauren Redd is a 77 y.o. female admitted to hospital for acute nasal bleed.  Patient had severe epistaxis she was on Eliquis.  ER physician packed the nose and admitted to hospital service.  ENT Dr. Restrepo was consulted.    Patient was managed by him conservative management, nasal packing was continued.  Patient had associated issues of increasing shortness of breath, her CHF was worse from baseline.  Also she had A. fib with RVR.  Patient was restarted on home meds, IV diuresis was used.  She did very well with these measures cardiologist Dr. Grey was consulted for management of CHF and A. fib.    Patient continued to improve after for the day ENT physician saw patient and remove the nasal packing.  Patient did not had any further bleeding.  Eliquis was restarted.  Patient was on Lovenox initially.    Mrs. Villavicencio continues to improve she was more alert.  She was recommended to go into rehab as patient has recurrent admissions  to hospital due to several reasons including compliance.  She refused inpatient rehab even she refused home health and she wanted to go home.  I discussed patient's situation with her  patient refused to go anywhere else except home.  Discharge plan was also discussed in detail.  Considering patient's refusal we will discharge her home today with outpatient follow-up        DISCHARGE Follow Up Recommendations for labs and diagnostics:   Discharge to home      Day of Discharge     Vital Signs:  Temp:  [97.2 °F (36.2 °C)-98.3 °F (36.8 °C)] 97.2 °F (36.2 °C)  Heart Rate:  [78-94] 90  Resp:  [16] 16  BP: (101-119)/(72-80) 119/75  Flow (L/min):  [3] 3    Physical Exam:    Elderly female not in acute distress  Heart regular  Lungs clear  Abdomen soft  Extremities with trace of edema      Pertinent  and/or Most Recent Results     LAB RESULTS:      Lab 07/14/22  0520 07/13/22  2106 07/13/22  1320 07/13/22  0754 07/12/22  2217 07/11/22  2310 07/11/22  1535 07/11/22  1344 07/11/22  0515 07/10/22  0814 07/09/22  2304   WBC  --   --   --   --   --   --   --   --  9.03  --  9.53   HEMOGLOBIN 11.2* 11.6* 12.4 11.7* 10.9*   < >  --    < > 11.6*   < > 12.1   HEMATOCRIT 36.6 38.2 40.7 37.2 35.0   < >  --    < > 37.4   < > 39.8   PLATELETS  --   --   --   --   --   --   --   --  192  --  202   NEUTROS ABS  --   --   --   --   --   --   --   --  6.49  --  6.49   IMMATURE GRANS (ABS)  --   --   --   --   --   --   --   --  0.05  --  0.04   LYMPHS ABS  --   --   --   --   --   --   --   --  1.38  --  1.79   MONOS ABS  --   --   --   --   --   --   --   --  0.98*  --  0.96*   EOS ABS  --   --   --   --   --   --   --   --  0.10  --  0.22   MCV  --   --   --   --   --   --   --   --  91.7  --  94.1   LACTATE, ARTERIAL  --   --   --   --   --   --  3.92*  --   --   --   --     < > = values in this interval not displayed.         Lab 07/13/22  0754 07/12/22  0535 07/11/22  1535 07/11/22  0515 07/10/22  0814 07/09/22  2304   SODIUM  139 140  --  141 141 146*   SODIUM, ARTERIAL  --   --  136.5  --   --   --    POTASSIUM 3.2* 3.2*  --  3.3* 3.3* 3.2*   CHLORIDE 92* 91*  --  91* 93* 93*   CO2 36.3* 39.3*  --  36.5* 39.2* 43.7*   ANION GAP 10.7 9.7  --  13.5 8.8 9.3   BUN 68* 62*  --  51* 48* 49*   CREATININE 1.90* 2.00*  --  1.52* 1.19* 1.27*   EGFR 27.1* 25.5*  --  35.4* 47.5* 43.9*   GLUCOSE 89 108*  --  116* 96 105*   GLUCOSE, ARTERIAL  --   --  138*  --   --   --    CALCIUM 10.2 10.8*  --  10.8* 10.8* 11.2*   IONIZED CALCIUM  --   --  1.15  --   --   --    MAGNESIUM 1.9  --   --   --   --  1.6         Lab 07/11/22  0515 07/09/22  2304   TOTAL PROTEIN 7.5 8.0   ALBUMIN 3.70 3.90   GLOBULIN 3.8 4.1   ALT (SGPT) 16 13   AST (SGOT) 26 22   BILIRUBIN 1.4* 1.5*   ALK PHOS 123* 136*         Lab 07/09/22  2304   PROBNP 3,993.0*   TROPONIN T 0.015                 Lab 07/11/22  1535   PH, ARTERIAL 7.455*   PCO2, ARTERIAL 52.8*   PO2 .4*   O2 SATURATION ART 97.5   FIO2 40   HCO3 ART 36.3*   BASE EXCESS ART 10.5*   CARBOXYHEMOGLOBIN 0.7     Brief Urine Lab Results  (Last result in the past 365 days)      Color   Clarity   Blood   Leuk Est   Nitrite   Protein   CREAT   Urine HCG        06/20/22 0550 Dark Yellow   Cloudy   Negative   Trace   Negative   Trace               Microbiology Results (last 10 days)     ** No results found for the last 240 hours. **          PROCEDURES:    [unfilled]    XR Chest 1 View    Result Date: 7/10/2022  Impression:    1. Favorable progression is suggested radiographically with decreased bilateral infiltrates since the prior exams.  The findings may represent improvement in pulmonary edema.  Improvement in pneumonia would be in the differential diagnosis.   2. No significant interval change is appreciated in the mild-to-moderate cardiomegaly.      COMMENT:  Part of this note is an electronic transcription of spoken language to printed text. The electronic translation/transcription may permit erroneous, or at times,  nonsensical (or even sensical) words or phrases to be inadvertently transcribed or omitted; this  has reviewed the note for such errors (as well as additional errors); however, some may still exist.  RENEE GARCIA JR, MD       Electronically Signed and Approved By: RENEE GARCIA JR, MD on 7/10/2022 at 1:23              XR Chest 1 View    Result Date: 6/19/2022  Impression:  No significant interval change is appreciated radiographically since the prior study from 4/23/2022.      COMMENT:  Part of this note is an electronic transcription of spoken language to printed text. The electronic translation/transcription may permit erroneous, or at times, nonsensical (or even sensical) words or phrases to be inadvertently transcribed or omitted; this  has reviewed the note for such errors (as well as additional errors); however, some may still exist.  RENEE GARCIA JR, MD       Electronically Signed and Approved By: RENEE GARCIA JR, MD on 6/19/2022 at 22:50                Results for orders placed during the hospital encounter of 03/14/22    Doppler Ankle Brachial Index Single Level CAR    Interpretation Summary  · Left Conclusion: The left PABLO is normal. Normal digital pressures.  · Right Conclusion: The right PABLO is normal. Normal digital pressures.      Results for orders placed during the hospital encounter of 03/14/22    Doppler Ankle Brachial Index Single Level CAR    Interpretation Summary  · Left Conclusion: The left PABLO is normal. Normal digital pressures.  · Right Conclusion: The right PABLO is normal. Normal digital pressures.      Results for orders placed during the hospital encounter of 02/07/22    Adult Transthoracic Echo Complete W/ Cont if Necessary Per Protocol    Interpretation Summary  · Estimated right ventricular systolic pressure from tricuspid regurgitation is mildly elevated (35-45 mmHg). Calculated right ventricular systolic pressure from tricuspid regurgitation is 40  mmHg.  · The left ventricular cavity is mild to moderately dilated.  · Calculated left ventricular EF = 65% Estimated left ventricular EF was in agreement with the calculated left ventricular EF. Left ventricular systolic function is normal.  · The right atrial cavity is mild to moderately dilated.  · Moderate mitral valve regurgitation is present.  · Moderate tricuspid valve regurgitation is present.  · Mild pulmonary hypertension is present.      Labs Pending at Discharge:        Discharge Details        Discharge Medications      Changes to Medications      Instructions Start Date   triamcinolone 0.1 % ointment  Commonly known as: KENALOG  What changed: Another medication with the same name was removed. Continue taking this medication, and follow the directions you see here.   1 application, Topical, Every Other Day         Continue These Medications      Instructions Start Date   acetaminophen 325 MG tablet  Commonly known as: TYLENOL   650 mg, Oral, Every 4 Hours PRN      apixaban 5 MG tablet tablet  Commonly known as: ELIQUIS   5 mg, Oral, 2 Times Daily      atorvastatin 20 MG tablet  Commonly known as: LIPITOR   20 mg, Oral, Daily      bumetanide 2 MG tablet  Commonly known as: BUMEX   2 mg, Oral, 2 Times Daily      digoxin 125 MCG tablet  Commonly known as: LANOXIN   125 mcg, Oral, Daily      docusate sodium 100 MG capsule  Commonly known as: COLACE   200 mg, Oral, 2 Times Daily PRN      DULoxetine 30 MG capsule  Commonly known as: CYMBALTA   30 mg, Oral, Daily      gabapentin 100 MG capsule  Commonly known as: NEURONTIN   100 mg, Oral, 2 Times Daily      melatonin 3 MG tablet   3 mg, Oral, Nightly      metOLazone 5 MG tablet  Commonly known as: ZAROXOLYN   5 mg, Oral, 2 Times Daily      metoprolol succinate  MG 24 hr tablet  Commonly known as: TOPROL-XL   100 mg, Oral, 2 Times Daily      midodrine 10 MG tablet  Commonly known as: PROAMATINE   5 mg, Oral, 3 Times Daily Before Meals      spironolactone  25 MG tablet  Commonly known as: ALDACTONE   25 mg, Oral, Daily         Stop These Medications    fluticasone 50 MCG/ACT nasal spray  Commonly known as: FLONASE     loratadine 10 MG tablet  Commonly known as: CLARITIN            Allergies   Allergen Reactions   • Contrast Dye Rash   • Lotrel [Amlodipine Besy-Benazepril Hcl] Unknown - Low Severity         Discharge Disposition:  Discharge to home      Diet:    Heart healthy    Discharge Activity:           Follow-up with PCP Dr. Kohler next week  Follow-up with cardiologist as recommended      Time spent on Discharge including face to face service: 23 minutes.            I have dictated this note utilizing Dragon Dictation.             Please note that portions of this note were completed with a voice recognition program.             Part of this note may be an electronic transcription/translation of spoken language to printed text         using the Dragon Dictation System.       Electronically signed by Pablo Fajardo MD, 07/14/22, 9:51 AM EDT.

## 2022-07-14 NOTE — PLAN OF CARE
Goal Outcome Evaluation:               Patient has been pleasant throughout shift. Has been more alert , less sleepy. VSS. No other issues reported. Will continue to monitor

## 2022-07-14 NOTE — DISCHARGE INSTR - APPOINTMENTS
Follow up with Dr Leung on 7/21 @ 1:15. Please bring all current medications in bottles with you to your appt.   Patient needs to follow up with (PCP) on

## 2022-07-14 NOTE — CASE MANAGEMENT/SOCIAL WORK
Pt discharging home today. Pt refuses rehab or [facility]hc.   No new heart medications at [place].   Follow up appt made with Dr Leung on 7/21 @ 1:15.

## 2022-07-15 NOTE — SIGNIFICANT NOTE
07/14/22 1115   Coping/Psychosocial   Observed Emotional State calm;cooperative;happy   Verbalized Emotional State hopefulness;relief   Trust Relationship/Rapport empathic listening provided   Involvement in Care interacting with patient   Additional Documentation Spiritual Care (Group)   Spiritual Care   Use of Spiritual Resources non-[employer]al use of spiritual care   Spiritual Care Source  initiative   Spiritual Care Follow-Up follow-up, none required as presently assessed   Response to Spiritual Care engaged in conversation;receptive of support   Spiritual Care Interventions supportive conversation provided   Spiritual Care Visit Type initial   Receptivity to Spiritual Care visit welcomed

## 2022-07-15 NOTE — OUTREACH NOTE
Prep Survey    Flowsheet Row Responses   Religion facility patient discharged from? [facility]   Is LACE score < 7 ? No   Emergency Room discharge w/ pulse ox? No   Eligibility Readm Mgmt   Discharge diagnosis Epistaxis   Does the patient have one of the following disease processes/diagnoses(primary or secondary)? Other   Does the patient have Home health ordered? No   Is there a DME ordered? No   Prep survey completed? Yes          MATT TELLO - Registered Nurse

## 2022-07-28 NOTE — OUTREACH NOTE
Medical Week 3 Survey    Flowsheet Row Responses   [facility] facility patient discharged from? [facility]   Does the patient have one of the following disease processes/diagnoses(primary or secondary)? Other   Week 3 attempt successful? No   Unsuccessful attempts Attempt 1          SARAHI FERNANDEZ - Registered Nurse

## 2022-10-01 PROBLEM — A41.9 SEPSIS (HCC): Status: ACTIVE | Noted: 2022-01-01

## 2022-10-01 NOTE — ED PROVIDER NOTES
Time: 7:13 PM EDT  Arrived by: ambulance  Chief Complaint: Lower extremity wounds  History provided by: Patient  History is limited by: N/A     History of Present Illness:  Patient is a 77 y.o. year old female who presents to the emergency department with lower extremity wounds.  Patient presents to the ED with chronic lower extremity wounds that are much worse in the past 2 weeks.  She denies fever at home.  She denies any aggravating or alleviating factors to her wounds.  No complaints of vomiting/diarrhea/abdominal or chest pain.    Patient presents today with worsening bilateral lower extremity sloughing. This is an ongoing problem which has been worse in the past two weeks. She has been having diarrhea. She hasn't had a stool today, but had 3 yesterday. She has had blood in her stool this week, but not in the last two days. She denies any subjective fever, chest pain, difficulty breathing, or abdominal pain.          Similar Symptoms Previously: Yes  Recently seen: No      Patient Care Team  Primary Care Provider: Dennis Kohler MD    Past Medical History:     Allergies   Allergen Reactions   • Contrast Dye Rash   • Lotrel [Amlodipine Besy-Benazepril Hcl] Unknown - Low Severity     Past Medical History:   Diagnosis Date   • Afib (HCC)    • Aneurysm (HCC)    • Arthritis    • CHF (congestive heart failure) (HCC)    • GERD (gastroesophageal reflux disease)    • Hypertension    • Irregular heart beat      Past Surgical History:   Procedure Laterality Date   • COLONOSCOPY     • HYSTERECTOMY       Family History   Problem Relation Age of Onset   • Hypertension Mother    • Colon cancer Neg Hx        Home Medications:  Prior to Admission medications    Medication Sig Start Date End Date Taking? Authorizing Provider   acetaminophen (TYLENOL) 325 MG tablet Take 650 mg by mouth Every 4 (Four) Hours As Needed for Mild Pain .    Provider, MD Colton   apixaban (ELIQUIS) 5 MG tablet tablet Take 5 mg by mouth 2 (Two)  Times a Day.    Colton Evans MD   atorvastatin (LIPITOR) 20 MG tablet Take 20 mg by mouth Daily.    Colton Evans MD   bumetanide (BUMEX) 2 MG tablet Take 2 mg by mouth 2 (Two) Times a Day.    Colton Evans MD   digoxin (LANOXIN) 125 MCG tablet Take 125 mcg by mouth Daily.    Colton Evans MD   docusate sodium (COLACE) 100 MG capsule Take 200 mg by mouth 2 (Two) Times a Day As Needed for Constipation.    Colton Evans MD   DULoxetine (CYMBALTA) 30 MG capsule Take 30 mg by mouth Daily. 5/16/22   Colton Evans MD   gabapentin (NEURONTIN) 100 MG capsule Take 100 mg by mouth 2 (Two) Times a Day.    Colton Evans MD   melatonin 3 MG tablet Take 3 mg by mouth Every Night.    Colton Evans MD   metOLazone (ZAROXOLYN) 5 MG tablet Take 1 tablet by mouth 2 (Two) Times a Day for 30 days. 6/24/22 7/24/22  Pablo Fajardo MD   metoprolol succinate XL (TOPROL-XL) 100 MG 24 hr tablet Take 1 tablet by mouth 2 (Two) Times a Day for 30 days. 6/24/22 7/24/22  Pablo Fajardo MD   midodrine (PROAMATINE) 10 MG tablet Take 0.5 tablets by mouth 3 (Three) Times a Day Before Meals for 30 days. 6/24/22 7/24/22  Pablo Fajardo MD   spironolactone (ALDACTONE) 25 MG tablet Take 25 mg by mouth Daily. 2/14/22   Colton Evans MD   triamcinolone (KENALOG) 0.1 % ointment Apply 1 application topically to the appropriate area as directed Every Other Day.    Colton Evans MD        Social History:   Social History     Tobacco Use   • Smoking status: Never Smoker   • Smokeless tobacco: Never Used   Vaping Use   • Vaping Use: Never used   Substance Use Topics   • Alcohol use: Not Currently   • Drug use: Never     Recent travel: no     Review of Systems:  Review of Systems   Constitutional: Negative for chills and fever.   HENT: Negative for congestion, rhinorrhea and sore throat.    Eyes: Negative for pain and visual disturbance.   Respiratory: Negative for apnea, cough, chest  "tightness and shortness of breath.    Cardiovascular: Negative for chest pain and palpitations.   Gastrointestinal: Positive for nausea. Negative for abdominal pain, diarrhea and vomiting.   Genitourinary: Negative for difficulty urinating and dysuria.   Musculoskeletal: Negative for joint swelling and myalgias.   Skin: Positive for color change and wound.   Neurological: Negative for seizures and headaches.   Psychiatric/Behavioral: Negative.    All other systems reviewed and are negative.       Physical Exam:  /93   Pulse 118   Temp 98.4 °F (36.9 °C)   Resp (!) 31   Ht 172.7 cm (68\")   Wt 96.5 kg (212 lb 11.9 oz)   LMP  (LMP Unknown)   SpO2 90%   BMI 32.35 kg/m²     Physical Exam  Vitals and nursing note reviewed.   Constitutional:       Appearance: Normal appearance.   HENT:      Head: Normocephalic and atraumatic.      Nose: Nose normal.      Mouth/Throat:      Mouth: Mucous membranes are dry.   Eyes:      Extraocular Movements: Extraocular movements intact.      Pupils: Pupils are equal, round, and reactive to light.   Cardiovascular:      Rate and Rhythm: Tachycardia present. Rhythm irregular.      Heart sounds: Normal heart sounds.   Pulmonary:      Effort: Pulmonary effort is normal.      Comments: Mild end exp wheeze  Abdominal:      General: Bowel sounds are normal.      Palpations: Abdomen is soft.      Tenderness: There is no abdominal tenderness.   Musculoskeletal:         General: No swelling. Normal range of motion.      Cervical back: Normal range of motion and neck supple.   Skin:     General: Skin is warm and dry.      Coloration: Skin is not jaundiced.      Comments: Bilateral lower extremity near diffuse open wounds from the mid calf down to the ankles.   Neurological:      General: No focal deficit present.      Mental Status: She is alert and oriented to person, place, and time. Mental status is at baseline.   Psychiatric:         Mood and Affect: Mood normal.         Behavior: " Behavior normal.         Judgment: Judgment normal.                Medications in the Emergency Department:  Medications   sodium chloride 0.9 % flush 10 mL (has no administration in time range)   dilTIAZem (CARDIZEM) 125 mg in 125 mL 0.7% sodium chloride  infusion (5 mg/hr Intravenous New Bag 10/1/22 2319)   pantoprazole (PROTONIX) injection 40 mg (has no administration in time range)   dextrose (D50W) (25 g/50 mL) IV injection 25 g (25 g Intravenous Given 10/1/22 1917)   sodium chloride 0.9 % bolus 1,000 mL (0 mL Intravenous Stopped 10/1/22 2021)   cefepime (MAXIPIME) 2 g/100 mL 0.9% NS (mbp) (0 g Intravenous Stopped 10/1/22 2023)   vancomycin 2000 mg/500 mL 0.9% NS IVPB ([facility]) (0 mg/kg × 96.5 kg Intravenous Stopped 10/1/22 2251)   dextrose (D50W) (25 g/50 mL) IV injection 50 mL (50 mL Intravenous Given 10/1/22 1948)   dextrose (D50W) (25 g/50 mL) IV injection 50 mL (50 mL Intravenous Given 10/1/22 2021)   dilTIAZem (CARDIZEM) injection 10 mg (10 mg Intravenous Given 10/1/22 2200)        Labs  Lab Results (last 24 hours)     Procedure Component Value Units Date/Time    CBC & Differential [[id number]]  (Abnormal) Collected: 10/01/22 1929    Specimen: Blood Updated: 10/01/22 1954    Narrative:      The following orders were created for panel order CBC & Differential.  Procedure                               Abnormality         Status                     ---------                               -----------         ------                     CBC Auto Differential[[id number]]        Abnormal            Final result                 Please view results for these tests on the individual orders.    Comprehensive Metabolic Panel [[id number]]  (Abnormal) Collected: 10/01/22 1929    Specimen: Blood Updated: 10/01/22 2036     Glucose 237 mg/dL      BUN 83 mg/dL      Creatinine 1.66 mg/dL      Sodium 134 mmol/L      Potassium 3.6 mmol/L      Chloride 103 mmol/L      CO2 19.6 mmol/L      Calcium 7.3 mg/dL      Total Protein 4.6 g/dL       Albumin 2.20 g/dL      ALT (SGPT) 8 U/L      AST (SGOT) 14 U/L      Alkaline Phosphatase 101 U/L      Total Bilirubin 0.6 mg/dL      Globulin 2.4 gm/dL      A/G Ratio 0.9 g/dL      BUN/Creatinine Ratio 50.0     Anion Gap 11.4 mmol/L      eGFR 31.8 mL/min/1.73      Comment: National Kidney Foundation and American Society of Nephrology (ASN) Task Force recommended calculation based on the Chronic Kidney Disease Epidemiology Collaboration (CKD-EPI) equation refit without adjustment for race.       Narrative:      GFR Normal >60  Chronic Kidney Disease <60  Kidney Failure <15      Lactic Acid, Plasma [[id number]]  (Abnormal) Collected: 10/01/22 1929    Specimen: Blood Updated: 10/01/22 2026     Lactate 3.3 mmol/L     Blood Culture - Blood, Arm, Right [[id number]] Collected: 10/01/22 1929    Specimen: Blood from Arm, Right Updated: 10/01/22 1938    CBC Auto Differential [[id number]]  (Abnormal) Collected: 10/01/22 1929    Specimen: Blood Updated: 10/01/22 1954     WBC 15.65 10*3/mm3      RBC 2.53 10*6/mm3      Hemoglobin 6.6 g/dL      Hematocrit 21.5 %      MCV 85.0 fL      MCH 26.1 pg      MCHC 30.7 g/dL      RDW 21.0 %      RDW-SD 63.3 fl      MPV 10.0 fL      Platelets 267 10*3/mm3      Neutrophil % 79.7 %      Lymphocyte % 10.8 %      Monocyte % 8.1 %      Eosinophil % 0.5 %      Basophil % 0.1 %      Immature Grans % 0.8 %      Neutrophils, Absolute 12.47 10*3/mm3      Lymphocytes, Absolute 1.69 10*3/mm3      Monocytes, Absolute 1.26 10*3/mm3      Eosinophils, Absolute 0.08 10*3/mm3      Basophils, Absolute 0.02 10*3/mm3      Immature Grans, Absolute 0.13 10*3/mm3      nRBC 0.4 /100 WBC     Digoxin Level [[id number]]  (Abnormal) Collected: 10/01/22 1929    Specimen: Blood Updated: 10/01/22 2227     Digoxin 0.34 ng/mL     BNP [[id number]]  (Abnormal) Collected: 10/01/22 1929    Specimen: Blood Updated: 10/01/22 2303     proBNP 4,675.0 pg/mL     Narrative:      Among patients with dyspnea, NT-proBNP is highly sensitive  for the detection of acute congestive heart failure. In addition NT-proBNP of <300 pg/ml effectively rules out acute congestive heart failure with 99% negative predictive value.    Results may be falsely decreased if patient taking Biotin.      Blood Culture - Blood, Arm, Left [[id number]] Collected: 10/01/22 1930    Specimen: Blood from Arm, Left Updated: 10/01/22 1939    POC Glucose Once [[id number]]  (Abnormal) Collected: 10/01/22 1936    Specimen: Blood Updated: 10/01/22 1938     Glucose 15 mg/dL      Comment: Serial Number: [id number]:  [id number]       POC Glucose Once [[id number]]  (Abnormal) Collected: 10/01/22 1942    Specimen: Blood Updated: 10/01/22 1944     Glucose 29 mg/dL      Comment: Serial Number: [id number]:  [id number]       POC Glucose Once [[id number]]  (Abnormal) Collected: 10/01/22 2039    Specimen: Blood Updated: 10/01/22 2041     Glucose 260 mg/dL      Comment: Serial Number: [id number]:  [id number]       Occult Blood, Fecal By Immunoassay - Stool, Per Rectum [[id number]]  (Abnormal) Collected: 10/01/22 2059    Specimen: Stool from Per Rectum Updated: 10/01/22 2115     Occult Blood, Fecal by Immunoassay Positive    POC Glucose Once [[id number]]  (Abnormal) Collected: 10/01/22 2119    Specimen: Blood Updated: 10/01/22 2120     Glucose 158 mg/dL      Comment: Serial Number: [id number]:  [id number]       POC Glucose Once [[id number]]  (Abnormal) Collected: 10/01/22 2137    Specimen: Blood Updated: 10/01/22 2159     Glucose 137 mg/dL      Comment: Serial Number: [id number]:  [id number]       POC Glucose Once [[id number]]  (Normal) Collected: 10/01/22 2223    Specimen: Blood Updated: 10/01/22 2225     Glucose 81 mg/dL      Comment: Serial Number: [id number]:  [id number]       POC Glucose Once [[id number]]  (Normal) Collected: 10/01/22 2244    Specimen: Blood Updated: 10/01/22 2247     Glucose 89 mg/dL      Comment: Serial Number: [id number]:  [id number]       STAT  Lactic Acid, Reflex [[id number]] Collected: 10/01/22 2256    Specimen: Blood Updated: 10/01/22 2300           Imaging:  XR Chest 1 View    Result Date: 10/1/2022  PROCEDURE: XR CHEST 1 VW  COMPARISON: [facility], CR, XR CHEST PA AND LATERAL, 4/02/2022, 14:31.  [facility], CR, XR CHEST 1 VW, 4/23/2022, 13:01.  [facility], CR, XR CHEST 1 VW, 6/19/2022, 22:25.  [facility], CR, XR CHEST PA AND LATERAL, 6/24/2022, 11:08.  [facility], CR, XR CHEST 1 VW, 7/10/2022, 0:55.  INDICATIONS: soa  FINDINGS:  Chronic changes are again seen throughout the lungs.  No definitive new consolidations or pleural effusions are observed. The cardiac silhouette and mediastinum are unchanged.  There is stable cardiomegaly.  No definitive acute osseous abnormalities are seen on this single view.        1. Chronic changes are again noted.  There is no definitive evidence for acute cardiopulmonary process.         LUCINDA DILLON MD       Electronically Signed and Approved By: LUCINDA DILLON MD on 10/01/2022 at 22:58               Procedures:  Procedures    Progress                            Medical Decision Making:  MDM  Number of Diagnoses or Management Options  Anemia, unspecified type: new and requires workup  Atrial fibrillation with RVR (HCC): established and worsening  Upper GI bleed: new and requires workup  Venous stasis ulcers of both lower extremities (HCC): established and worsening     Amount and/or Complexity of Data Reviewed  Clinical lab tests: reviewed  Tests in the radiology section of CPT®: reviewed  Tests in the medicine section of CPT®: reviewed  Decide to obtain previous medical records or to obtain history from someone other than the patient: yes  Discuss the patient with other providers: yes  Independent visualization of images, tracings, or specimens: yes    Risk of Complications, Morbidity, and/or Mortality  Presenting problems: high  Management  options: moderate    Critical Care  Total time providing critical care:  minutes    Patient Progress  Patient progress: improved       Final diagnoses:   Anemia, unspecified type   Upper GI bleed   Atrial fibrillation with RVR (HCC)   Venous stasis ulcers of both lower extremities ([facility])        Disposition:  ED Disposition     ED Disposition   Decision to Admit    Condition   --    Comment   Level of Care: Critical Care [6]   Diagnosis: Sepsis ([facility]) [[id number]]   Admitting Physician: BRIGITTE GARCÍA [[id number]]   Attending Physician: BRIGITTE GARCÍA [[id number]]   Isolate for COVID?: No [0]   Certification: I Certify That Inpatient Hospital Services Are Medically Necessary For Greater Than 2 Midnights               This medical record created using voice recognition software.    Documentation assistance provided by Mateusz Dumas MD acting as scribe for Mateusz Dumas MD.  Information recorded by the scribe was done at my direction and has been verified and validated by me.       Maggy Middleton  10/01/22 1920       Maggy Middleton  10/01/22 1931       Maggy Middleton  10/01/22 1940       Maggy Middleton  10/01/22 2232       Mateusz Dumas MD  10/01/22 2236       Mateusz Dumas MD  10/01/22 2320

## 2022-10-02 NOTE — CONSULTS
Pulmonary / Critical Care Consult Note      Patient Name: Lauren Redd  : 1945  MRN: [id number]  Primary Care Physician:  Dennis Kohler MD  Referring Physician: Pablo Fajardo MD  Date of admission: 10/1/2022    Subjective   Subjective     Reason for Consult/ Chief Complaint: Altered mental status, shock, lower extremity wound, GI bleeding    HPI:  Lauren Redd is a 77 y.o. female who presented to ER with lower extremity wounds, patient has chronic lower extremity wounds that are worsening over the last 2 weeks.  There was significant sloughing of lower extremity wounds.  She was also having diarrhea.  Per report, patient was having blood in her stool for the last several days.  She was having severe lower extremity pain.  Patient has had lower extremity ulcer but was not getting any treatment for that.  In the ER, her hemoglobin was noted to be 6.6 g/dL.  She was in A. fib RVR, was started on Cardizem drip.  Was also started on broad-spectrum antibiotics.  Overnight, patient was having multiple episodes of hypoglycemia and had rapid ventricular rate.  She is admitted to ICU with shock, likely septic with wound infection, GI bleeding, A. fib with RVR, altered mental status.  Pulmonary and critical care services involved for assistance with management of her acute issues.  She was found to have lactate of 5.8.  ABG with pH 7.4, PCO2 32, PO2 112.  She was transfused 2 units packed red blood cells.  Continued to be hypotensive and was started on Levophed drip.  Has poor IV access.    Review of Systems  Confusion, altered mental status, lower extremity wound, bloody bowel movements at times  Otherwise could not be obtained, patient not optimally responsive        Personal History     Past Medical History:   Diagnosis Date   • Afib (HCC)    • Aneurysm (HCC)    • Arthritis    • CHF (congestive heart failure) (HCC)    • GERD (gastroesophageal reflux disease)    • Hypertension    • Irregular heart beat         Past Surgical History:   Procedure Laterality Date   • COLONOSCOPY     • HYSTERECTOMY         Family History: family history includes Hypertension in her mother. Otherwise pertinent FHx was reviewed.    Social History:  reports that she has never smoked. She has never used smokeless tobacco. She reports previous alcohol use. She reports that she does not use drugs.    Home Medications:  DULoxetine, acetaminophen, apixaban, atorvastatin, bumetanide, digoxin, docusate sodium, gabapentin, melatonin, metOLazone, metoprolol succinate XL, midodrine, spironolactone, and triamcinolone    Allergies:  Allergies   Allergen Reactions   • Contrast Dye Rash   • Lotrel [Amlodipine Besy-Benazepril Hcl] Unknown - Low Severity       Objective    Objective     Vitals:   Temp:  [98 °F (36.7 °C)-100.3 °F (37.9 °C)] 100.3 °F (37.9 °C)  Heart Rate:  [] 92  Resp:  [19-33] 33  BP: ()/() 82/42  Flow (L/min):  [2] 2    Physical Exam:  Vital Signs Reviewed   Critically ill, hypotensive, in mild distress, moaning at times  HEENT:  PERRL, EOMI.  OP, nares clear, no sinus tenderness  Neck:  Supple, no JVD, no thyromegaly  Lymph: no axillary, cervical, supraclavicular lymphadenopathy noted bilaterally  Chest:   Bilateral diminished breath sounds, no wheezing, crackles or rhonchi, resonant percussion bilaterally  CV: Tachycardic and hypertensive, no MGR, pulses 2+, equal  Abd:  Soft, NT, ND, + BS, no HSM  EXT:  no clubbing, no cyanosis, +edema, no joint tenderness, bilateral significant ulcerated wound which is bandaged in the shin area  Neuro: Somnolent, arousable, oriented x1, generalized weakness  Skin: No rashes or lesions noted      Result Review    Result Review:  I have personally reviewed the results from the time of this admission to 10/2/2022 07:27 EDT and agree with these findings:  [x]  Laboratory  [x]  Microbiology  [x]  Radiology  [x]  EKG/Telemetry   [x]  Cardiology/Vascular   []  Pathology  []  Old  records  []  Other:  Most notable findings include:       Lab 10/02/22  0743 10/02/22  0722 10/02/22  0614 10/01/22  1929   WBC  --   --  28.64* 15.65*   HEMOGLOBIN  --  7.9* 8.0* 6.6*   HEMATOCRIT  --  24.4* 24.8* 21.5*   PLATELETS  --   --  219 267   SODIUM  --   --  136 134*   SODIUM, ARTERIAL 129.3*  --   --   --    POTASSIUM  --   --  4.3 3.6   CHLORIDE  --   --  98 103   CO2  --   --  22.6 19.6*   BUN  --   --  100* 83*   CREATININE  --   --  2.23* 1.66*   GLUCOSE  --   --  111* 237*   GLUCOSE, ARTERIAL 90  --   --   --    CALCIUM  --   --  9.3 7.3*   PHOSPHORUS  --   --  3.7  --    TOTAL PROTEIN  --   --   --  4.6*   ALBUMIN  --   --  2.50* 2.20*   GLOBULIN  --   --   --  2.4           Assessment & Plan   Assessment / Plan     Active Hospital Problems:  Active Hospital Problems    Diagnosis    • Sepsis (HCC)          Impression:  Shock, likely septic and hypovolemic  GI bleed  Acute blood loss anemia  Lactic acidosis  Bilateral lower extremity wound, ulcerated  A. fib with RVR  Hypoglycemia  Chronic kidney disease  Peripheral arterial disease  Multifactorial anemia    Plan:  Check wound culture and lower extremities.  Check blood culture.  Check procalcitonin.  Continue with IV vancomycin.  Change IV cefepime to Zosyn  Morphine as needed for pain  Amiodarone drip if needed for A. Fib.  Levophed if needed to keep MAP more than 65 mmHg.  Continue with LR at 125 mL/h  Transfused units packed red blood cells per  Central line was placed at bedside  CT scan of the bilateral lower extremities.  Will likely need GI consult    Reviewed labs, imaging and provider notes.  Discussed with bedside nurse and primary service.      Patient is critically ill in ICU with shock, likely septic and hypovolemic, bilateral lower extremity wounds with ulcerations and infected area, likely GI bleed, acute blood loss anemia.  I spent 30 minutes of critical care time, excluding any procedure notes, in review, analysis, obtaining  history and physical, formulating care plan, and I led multi-disciplinary critical care rounds with bedside nurse, respiratory therapist, clinical pharmacist and other allied services. I have discussed the case with primary service and other consultants as well.     Electronically signed by Francisco Javier Long MD, 10/2/2022, 07:27 EDT.

## 2022-10-02 NOTE — ED NOTES
RN began emergent blood at 2035, 2 Units.   1st unit [id number] product [id number] expires 10/27/2022 2359  2unit [id number] product [id number] expires 10/27/2022 2359

## 2022-10-02 NOTE — H&P
[facility]   HISTORY AND PHYSICAL  [facility]   HISTORY AND PHYSICAL    Patient Name: Lauren Redd  : 1945  MRN: [id number]  Primary Care Physician: Dennis Kohler MD  Date of admission: 10/1/2022    Subjective   Subjective     Chief Complaint:   Pain in the legs    History of Present Illness   Patient is a 77-year-old woman, she came to the emergency department by ambulance last night with complaints of severe lower leg pain  History was obtained from the patient  She stated she has been experiencing leg pain for the past 1 month, the pain is constant and is mild to moderate in severity but the pain increased over the course of 2 days and therefore she called the ambulance and came to the emergency department  She did not complain of fever or chills  The patient has longstanding skin ulcers and it does not appear that she is receiving treatment for them at home  Examination in the emergency department revealed low blood pressure, foul-smelling open wounds of the lower extremities and rapid atrial fibrillation.  Hemoglobin was noted to be 6.6 g and 1 unit of packed red blood cells were transfused  IV antibiotics and diltiazem infusion was started  Patient was admitted to ICU  Over the course of the night she was noted to have multiple episodes of hypoglycemia and continued to have rapid atrial fibrillation    Review of Systems   Constitutional: Positive for activity change and appetite change.   HENT: Negative.    Eyes: Negative.    Respiratory: Negative.    Cardiovascular: Negative.    Gastrointestinal: Negative.    Endocrine: Negative.    Genitourinary: Negative.    Musculoskeletal: Positive for arthralgias.   Skin: Positive for color change and wound.   Allergic/Immunologic: Negative.    Neurological: Negative.    Hematological: Negative.    Psychiatric/Behavioral: Negative.         Personal History     Past Medical History:   Diagnosis Date   • Afib (HCC)    • Aneurysm (HCC)    •  Arthritis    • CHF (congestive heart failure) (HCC)    • GERD (gastroesophageal reflux disease)    • Hypertension    • Irregular heart beat        Past Surgical History:   Procedure Laterality Date   • COLONOSCOPY     • HYSTERECTOMY         Family History: family history includes Hypertension in her mother. Otherwise pertinent FHx was reviewed and not pertinent to current issue.    Social History:  reports that she has never smoked. She has never used smokeless tobacco. She reports previous alcohol use. She reports that she does not use drugs.    Home Medications:  DULoxetine, acetaminophen, apixaban, atorvastatin, bumetanide, digoxin, docusate sodium, gabapentin, melatonin, metOLazone, metoprolol succinate XL, midodrine, spironolactone, and triamcinolone      Allergies:  Allergies   Allergen Reactions   • Contrast Dye Rash   • Lotrel [Amlodipine Besy-Benazepril Hcl] Unknown - Low Severity       Objective    Objective     Vitals:  Temp:  [97.8 °F (36.6 °C)-100.3 °F (37.9 °C)] 97.8 °F (36.6 °C)  Heart Rate:  [] 104  Resp:  [19-33] 33  BP: ()/() 99/66  Flow (L/min):  [2] 2    Physical Exam  Constitutional:       General: She is not in acute distress.     Appearance: Normal appearance.   HENT:      Head: Normocephalic and atraumatic.      Mouth/Throat:      Mouth: Mucous membranes are moist.   Eyes:      General: No scleral icterus.        Right eye: No discharge.         Left eye: No discharge.      Extraocular Movements: Extraocular movements intact.      Pupils: Pupils are equal, round, and reactive to light.   Cardiovascular:      Rate and Rhythm: Tachycardia present. Rhythm irregular.      Heart sounds: No murmur heard.    No friction rub.   Pulmonary:      Effort: Pulmonary effort is normal. No respiratory distress.      Breath sounds: No stridor. No wheezing, rhonchi or rales.   Chest:      Chest wall: No tenderness.   Breasts:      Right: No supraclavicular adenopathy.      Left: No  supraclavicular adenopathy.       Abdominal:      General: Abdomen is flat. Bowel sounds are normal.      Palpations: Abdomen is soft.      Tenderness: There is no abdominal tenderness.   Musculoskeletal:         General: Normal range of motion.      Cervical back: Normal range of motion and neck supple.   Lymphadenopathy:      Cervical: No cervical adenopathy.      Upper Body:      Right upper body: No supraclavicular adenopathy.      Left upper body: No supraclavicular adenopathy.      Lower Body: No right inguinal adenopathy. No left inguinal adenopathy.   Skin:     General: Skin is warm and dry.      Capillary Refill: Capillary refill takes 2 to 3 seconds.      Comments: Open wounds of lower extremities.  Foul-smelling   Neurological:      General: No focal deficit present.      Mental Status: She is alert.   Psychiatric:         Mood and Affect: Mood normal.         Behavior: Behavior normal.         Thought Content: Thought content normal.         Judgment: Judgment normal.         Result Review    Result Review:  I have personally reviewed the results from the time of this admission to 10/02/22 12:11 PM EDT and agree with these findings:  [x]  Laboratory  [x]  Microbiology  []  Radiology  []  EKG/Telemetry   []  Cardiology/Vascular   []  Pathology  []  Old records  []  Other:      Current Facility-Administered Medications:   •  cefepime (MAXIPIME) 2 g/100 mL 0.9% NS (mbp), 2 g, Intravenous, Q12H, Pablo Fajardo MD, 2 g at 10/02/22 1049  •  dextrose (D50W) (25 g/50 mL) IV injection 25 g, 25 g, Intravenous, Q15 Min PRN, Issa Dixon MD, 25 g at 10/02/22 0419  •  dextrose (GLUTOSE) oral gel 15 g, 15 g, Oral, Q15 Min PRN, Issa Dixon MD  •  dextrose 5 % and sodium chloride 0.9 % infusion, 100 mL/hr, Intravenous, Continuous, Francisco Javier Long MD, Last Rate: 100 mL/hr at 10/02/22 1052, 100 mL/hr at 10/02/22 1052  •  dilTIAZem (CARDIZEM) 125 mg in 125 mL 0.7% sodium chloride  infusion, 5-15 mg/hr, Intravenous,  Titrated, Issa Dixon MD, Last Rate: 10 mL/hr at 10/01/22 2325, 10 mg/hr at 10/01/22 2325  •  glucagon (human recombinant) (GLUCAGEN DIAGNOSTIC) injection 1 mg, 1 mg, Intramuscular, Q15 Min PRN, Issa Dixon MD  •  lactated ringers infusion, 75 mL/hr, Intravenous, Continuous, Issa Dixon MD, Last Rate: 75 mL/hr at 10/02/22 0111, 75 mL/hr at 10/02/22 0111  •  metoprolol tartrate (LOPRESSOR) injection 5 mg, 5 mg, Intravenous, Q4H PRN, Issa Dixon MD  •  morphine injection 2 mg, 2 mg, Intravenous, Q2H PRN, Issa Dixon MD, 2 mg at 10/02/22 0419  •  norepinephrine (LEVOPHED) 8 mg in 250 mL NS infusion (premix), 0.02-0.3 mcg/kg/min, Intravenous, Titrated, Francisco Javier Long MD, Last Rate: 27.2 mL/hr at 10/02/22 1138, 0.16 mcg/kg/min at 10/02/22 1138  •  ondansetron (ZOFRAN) injection 4 mg, 4 mg, Intravenous, Q6H PRN, Issa Dixon MD  •  pantoprazole (PROTONIX) injection 40 mg, 40 mg, Intravenous, Q AM, Issa Dixon MD, 40 mg at 10/02/22 0639  •  Pharmacy to Dose Cefepime, , Does not apply, Continuous PRNDestiny Syed R, MD  •  Pharmacy to dose vancomycin, , Does not apply, Continuous PRNDestiny Syed R, MD  •  sodium chloride 0.9 % flush 10 mL, 10 mL, Intravenous, PRN, Issa Dixon MD  •  vancomycin 750 mg/250 mL 0.9% NS IVPB ([facility]), 750 mg, Intravenous, Once, Pablo Fajardo MD       Assessment & Plan   Assessment / Plan   Septic shock, likely secondary to wound infection  Atrial fibrillation with rapid ventricular response  Hypoglycemia  Chronic open wounds of the lower extremities  Chronic kidney disease  Peripheral arterial disease  Anemia, multifactorial    Active Hospital Problems:  Active Hospital Problems    Diagnosis    • Sepsis (HCC)        Plan:   Admit to ICU  Start lactated Ringer at 125 mL/h  Start IV vancomycin and cefepime  Continue Cardizem infusion  Start morphine 2 mg IV every 2 hours as needed  Obtain blood cultures  Consult critical care medicine             Electronically  signed by Issa Dixon MD, 10/02/22, 12:11 PM EDT.

## 2022-10-02 NOTE — PROCEDURES
Linear probe ultrasound was used to evaluate and visualize left femoral vein. Left femoral vein was patent and next to right femoral artery.   Vascular needle entry was visualized in real time with ultrasound and guide wire insertion was done.     Ultrasound image of vascular entry of needle and guide wire is recorded online.    CPT code 84513.

## 2022-10-02 NOTE — PLAN OF CARE
Patient remains very anxious and hypotensive this shift. Currently on 0.18 of Levo. Wound consulted for very infected looking wounds. Wound cultures sent, antibiotics started. Patient is alert and oriented. All needs currently met at this time.    Zee Conway RN.

## 2022-10-02 NOTE — PROGRESS NOTES
"Pharmacy to dose Vancomycin Day: 1  Duration of therapy: 5 DAYS  Clinical indication: SEPSIS  172.7 cm (68\")       10/02/22  0000      Weight: 90.6 kg (199 lb 11.8 oz)         Lab Results  Component  Value  Date    CREATININE  1.66 (H)  10/01/2022     Estimated Creatinine Clearance: 34 mL/min (A) (by C-G formula based on SCr of 1.66 mg/dL (H)).   HD/PD/CRRT?: NO  Lab Results   Component Value Date    WBC 28.64 (H) 10/02/2022     Temp (24hrs), Av.6 °F (37 °C), Min:98 °F (36.7 °C), Max:100.3 °F (37.9 °C)     I/O last 3 completed shifts:  In: 1100 [IV Piggyback:1100]  Out: -      Contrast Administered: OK    Relevant Micro:   Microbiology Results (last 10 days)       Procedure Component Value - Date/Time    Wound Culture - Wound, Leg, Left [[id number]] Collected: 10/02/22 0127    Lab Status: Preliminary result Specimen: Wound from Leg, Left Updated: 10/02/22 0300     Gram Stain Few (2+) Gram positive bacilli      Rare (1+) Gram negative bacilli      Few (2+) Gram positive cocci          Relevant Radiology:   CHEST X-RAY:  IMPRESSION:        1. Chronic changes are again noted.  There is no definitive evidence for acute cardiopulmonary process.     Other Antimicrobial Therapy:  CEFEPIME    Assessment/Plan  VANCOMYCIN LEVEL THIS AM REPORTED AS 23.2.  WILL PROVIDE VANCOMYCIN 750MG ONCE ONE TODAY AT 1400 ONCE LEVEL IS BELOW 20.  RANDOM LEVEL ORDERED FOR TOMORROW AM  "

## 2022-10-02 NOTE — PROCEDURES
Procedure Note    Central Line Placement Procedure Note    Indication: Shock    Consent obtained: Yes, verbal.    Time Out: performed at bedside    Procedure: The patient was positioned appropriately and the skin over the left femoral vein was prepped with ChloraPrep and draped in sterile fashion.  Local anesthesia over the insertion site was applied with 1% lidocaine.  A large bore needle was then advanced with ultrasound guidance until there was return of dark blood. Guidewire was inserted and site confirmed with ultrasound again. Site was dilated with dilator, and triple lumen catheter was then inserted into the vessel over the guidewire using the Seldinger technique.  All three ports showed good, free flowing blood return and easily flushed with saline.  The catheter was then securely fastened to the skin with sutures and covered with a sterile dressing.     The patient tolerated the procedure well.    Complications: None.

## 2022-10-03 PROBLEM — D64.9 SEVERE ANEMIA: Status: ACTIVE | Noted: 2022-01-01

## 2022-10-03 PROBLEM — K92.2 GI BLEED: Status: ACTIVE | Noted: 2022-01-01

## 2022-10-03 NOTE — PLAN OF CARE
Goal Outcome Evaluation:      Lower bilateral extremity CT completed. Levo at 0.04 at this time. Morphine given once for pain in lower extremities. Blood glucose stable overnight Carla Kumar RN

## 2022-10-03 NOTE — PROGRESS NOTES
"Pharmacy to dose Vancomycin Day: 3  Duration of therapy: 5 DAYS  Clinical indication: SEPSIS  172.7 cm (68\")       10/02/22  0000      Weight: 90.6 kg (199 lb 11.8 oz)         Lab Results  Component  Value  Date    CREATININE  2.59 (H)  10/03/2022     Estimated Creatinine Clearance: 21.8 mL/min (A) (by C-G formula based on SCr of 2.59 mg/dL (H)).   HD/PD/CRRT?: NO  Lab Results   Component Value Date    WBC 27.87 (H) 10/03/2022     Temp (24hrs), Av.7 °F (36.5 °C), Min:97.1 °F (36.2 °C), Max:98 °F (36.7 °C)     I/O last 3 completed shifts:  In: 3669 [I.V.:2569; IV Piggyback:1100]  Out: 775 [Urine:775]     Contrast Administered: OK    Relevant Micro:   Microbiology Results (last 10 days)       Procedure Component Value - Date/Time    Wound Culture - Wound, Leg, Right [[id number]] Collected: 10/02/22 1609    Lab Status: Preliminary result Specimen: Wound from Leg, Right Updated: 10/02/22 1708     Gram Stain Moderate (3+) Gram positive cocci in pairs, chains and clusters      Moderate (3+) Gram negative bacilli      Moderate (3+) Gram positive bacilli    Wound Culture - Wound, Leg, Left [[id number]]  (Abnormal) Collected: 10/02/22 0127    Lab Status: Preliminary result Specimen: Wound from Leg, Left Updated: 10/03/22 0909     Wound Culture Heavy growth (4+) Mixed Gram Negative Sabina     Gram Stain Few (2+) Gram positive bacilli      Rare (1+) Gram negative bacilli      Few (2+) Gram positive cocci    Blood Culture - Blood, Arm, Left [[id number]]  (Normal) Collected: 10/01/22 193    Lab Status: Preliminary result Specimen: Blood from Arm, Left Updated: 10/02/22 194     Blood Culture No growth at 24 hours    Blood Culture - Blood, Arm, Right [[id number]]  (Abnormal) Collected: 10/01/22 192    Lab Status: Preliminary result Specimen: Blood from Arm, Right Updated: 10/02/22 175     Blood Culture Abnormal Stain     Gram Stain Aerobic Bottle Gram positive cocci in clusters    Blood Culture ID, PCR - Blood, Arm, Right " [[id number]]  (Abnormal) Collected: 10/01/22 1929    Lab Status: Final result Specimen: Blood from Arm, Right Updated: 10/02/22 1917     BCID, PCR Staph aureus. mecA/C and MREJ (methicillin resistance gene) NOT detected. Identification by BCID2 PCR    Narrative:      Infectious disease consultation is highly recommended to rule out distant foci of infection.          Relevant Radiology:   CHEST X-RAY:  IMPRESSION:        1. Chronic changes are again noted.  There is no definitive evidence for acute cardiopulmonary process.     Other Antimicrobial Therapy:  CEFEPIME    Assessment/Plan  Random level today of 20.33 mcg/mL drawn approximately 11 hours after previous dose.  Continuing with vancomycin therapy but will hold dose today with level above 20 mcg/mL    Level: Random level for 10/4 @0600

## 2022-10-03 NOTE — CONSULTS
Cardiology Consult Note  [facility] INTENSIVE CARE UNIT          Patient Identification:  Lauren Redd      [id number]  77 y.o.        female  1945         Reason for Consultation: Atrial fibrillation with RVR    PCP: Dennis Kohler MD    History of Present Illness:     Patient is a 77-year-old female who who has chronic atrial fibrillation has severe ulcerating lesions on both lower extremities congestive heart failure diastolic came to the emergency room with altered mental status shock GI bleed.  Her hemoglobin in the emergency room was 6.6 and she received blood transfusion.  She was also having atrial fibrillation with rapid rate and was started on Cardizem drip.  However lower extremity wounds were getting worse for the last 2 weeks with sloughing of the skin.  She was also in shock probably septic shock from infection in the legs and was started on Levophed drip.  She was also having some blood in the stools    Past History:  Past Medical History:   Diagnosis Date   • Afib (HCC)    • Aneurysm (HCC)    • Arthritis    • CHF (congestive heart failure) (HCC)    • GERD (gastroesophageal reflux disease)    • Hypertension    • Irregular heart beat      Past Surgical History:   Procedure Laterality Date   • COLONOSCOPY     • HYSTERECTOMY       Allergies   Allergen Reactions   • Contrast Dye Rash   • Lotrel [Amlodipine Besy-Benazepril Hcl] Unknown - Low Severity     Social History     Socioeconomic History   • Marital status:    Tobacco Use   • Smoking status: Never Smoker   • Smokeless tobacco: Never Used   Vaping Use   • Vaping Use: Never used   Substance and Sexual Activity   • Alcohol use: Not Currently   • Drug use: Never   • Sexual activity: Defer     Family History   Problem Relation Age of Onset   • Hypertension Mother    • Colon cancer Neg Hx          Medications:  Prior to Admission medications    Medication Sig Start Date End Date Taking? Authorizing Provider   acetaminophen  (TYLENOL) 325 MG tablet Take 650 mg by mouth Every 4 (Four) Hours As Needed for Mild Pain .   Yes Colton Evans MD   apixaban (ELIQUIS) 5 MG tablet tablet Take 5 mg by mouth 2 (Two) Times a Day.   Yes Colton Evans MD   docusate sodium (COLACE) 100 MG capsule Take 100 mg by mouth 2 (Two) Times a Day As Needed for Constipation.   Yes Colton Evans MD   DULoxetine (CYMBALTA) 30 MG capsule Take 30 mg by mouth Daily. 5/16/22  Yes Colton Evans MD   fluticasone (FLONASE) 50 MCG/ACT nasal spray 2 sprays into the nostril(s) as directed by provider Daily.   Yes Colton Evans MD   melatonin 3 MG tablet Take 3 mg by mouth Every Night.   Yes Colton Evans MD   triamcinolone (KENALOG) 0.1 % ointment Apply 1 application topically to the appropriate area as directed Every Other Day.   Yes Colton Evans MD   atorvastatin (LIPITOR) 20 MG tablet Take 20 mg by mouth Daily.    Colton Evans MD   bumetanide (BUMEX) 2 MG tablet Take 2 mg by mouth 2 (Two) Times a Day.    Colton Evans MD   digoxin (LANOXIN) 125 MCG tablet Take 125 mcg by mouth Daily.    Colton Evans MD   metOLazone (ZAROXOLYN) 5 MG tablet Take 1 tablet by mouth 2 (Two) Times a Day for 30 days. 6/24/22 10/3/22  Pablo Fajardo MD   metoprolol succinate XL (TOPROL-XL) 100 MG 24 hr tablet Take 1 tablet by mouth 2 (Two) Times a Day for 30 days. 6/24/22 10/3/22  Pablo Fajardo MD   midodrine (PROAMATINE) 10 MG tablet Take 0.5 tablets by mouth 3 (Three) Times a Day Before Meals for 30 days. 6/24/22 10/3/22  Pablo Fajardo MD   spironolactone (ALDACTONE) 25 MG tablet Take 25 mg by mouth Daily. 2/14/22   Colton Evans MD   gabapentin (NEURONTIN) 100 MG capsule Take 100 mg by mouth 2 (Two) Times a Day.  10/3/22  Colton Evans MD      Current medications:  cefepime, 2 g, Intravenous, Once  [START ON 10/4/2022] cefepime, 2 g, Intravenous, Q24H  metoprolol succinate XL, 25 mg, Oral,  "Q12H  midodrine, 10 mg, Oral, TID AC  pantoprazole, 40 mg, Intravenous, Q AM      Current IV drips:  dextrose 5 % and sodium chloride 0.9 %, 100 mL/hr, Last Rate: 100 mL/hr (10/02/22 1052)  dilTIAZem, 5-15 mg/hr, Last Rate: 10 mg/hr (10/01/22 2274)  lactated ringers, 75 mL/hr, Last Rate: 75 mL/hr (10/02/22 0111)  norepinephrine, 0.02-0.3 mcg/kg/min, Last Rate: 0.04 mcg/kg/min (10/03/22 9426)  Pharmacy to Dose Cefepime,   Pharmacy to dose vancomycin,           Review of Systems  Review of Systems   Constitutional: Positive for fatigue. Negative for appetite change and fever.   HENT: Negative for congestion.    Respiratory: Positive for shortness of breath. Negative for apnea, choking, chest tightness and wheezing.    Cardiovascular: Positive for palpitations and leg swelling. Negative for chest pain.   Gastrointestinal: Negative for diarrhea, nausea and vomiting.   Genitourinary: Negative for dysuria, frequency and hematuria.   Musculoskeletal: Positive for arthralgias. Negative for myalgias.   Skin: Negative for pallor.   Neurological: Positive for dizziness. Negative for tremors, syncope and weakness.   Psychiatric/Behavioral: Positive for confusion. Negative for agitation.         Physical Exam    BP 92/81   Pulse 103   Temp 97.1 °F (36.2 °C) (Axillary)   Resp 14   Ht 172.7 cm (68\")   Wt 90.6 kg (199 lb 11.8 oz)   LMP  (LMP Unknown)   SpO2 100%   BMI 30.37 kg/m²  Body mass index is 30.37 kg/m².   Oxygen saturation   @FLOWAN(10::1)@ SpO2  Min: 64 %  Max: 100 %    General Appearance:   · no acute distress  · Alert and oriented x3  HENT:   · lips not cyanotic  · Atraumatic  Neck:  · thyroid not enlarged  · supple  Respiratory:  · no respiratory distress  · normal breath sounds  · no rales  Cardiovascular:  · no jugular venous distention  · regular rhythm  · apical impulse normal  · S1 normal, S2 normal  · no S3, no S4   · no murmur  · no rub, no thrill  · no carotid bruit  · pedal pulses normal  · lower " extremity edema: none    Gastrointestinal:   · bowel sounds normal  · non-tender  · no hepatomegaly, no splenomegaly  Musculoskeletal:  · no clubbing of fingers.   · normocephalic, head atraumatic  Skin:   · warm, dry  · no rashes  Neuro/Psychiatric:  · normal mood and affect  · judgement and insight appropriate      Cardiographics:     ECG  (personally reviewed) EKG: Atrial fibrillation with rapid rate and right bundle branch block   Telemetry:  (personally reviewed) atrial fibrillation with rapid rate   Results for orders placed during the hospital encounter of 02/07/22    Adult Transthoracic Echo Complete W/ Cont if Necessary Per Protocol    Interpretation Summary  · Estimated right ventricular systolic pressure from tricuspid regurgitation is mildly elevated (35-45 mmHg). Calculated right ventricular systolic pressure from tricuspid regurgitation is 40 mmHg.  · The left ventricular cavity is mild to moderately dilated.  · Calculated left ventricular EF = 65% Estimated left ventricular EF was in agreement with the calculated left ventricular EF. Left ventricular systolic function is normal.  · The right atrial cavity is mild to moderately dilated.  · Moderate mitral valve regurgitation is present.  · Moderate tricuspid valve regurgitation is present.  · Mild pulmonary hypertension is present.         No results found for this or any previous visit.      Cardiolite (Tc-99m Sestamibi) stress test   Lab Review:       CBC    CBC 10/1/22 10/2/22 10/2/22 10/2/22 10/3/22 10/3/22     0614 0722 1754 0020 0411   WBC 15.65 (A) 28.64 (A)    27.87 (A)   RBC 2.53 (A) 2.93 (A)    3.13 (A)   Hemoglobin 6.6 (A) 8.0 (A) 7.9 (A) 8.6 (A) 8.6 (A) 8.5 (A)   Hematocrit 21.5 (A) 24.8 (A) 24.4 (A) 26.9 (A) 27.2 (A) 26.7 (A)   MCV 85.0 84.6    85.3   MCH 26.1 (A) 27.3    27.2   MCHC 30.7 (A) 32.3    31.8   RDW 21.0 (A) 19.1 (A)    19.7 (A)   Platelets 267 219    224   (A) Abnormal value                CMP    CMP 10/1/22 10/2/22 10/2/22  10/3/22     0614 0743    Glucose 237 (A) 111 (A) 90 154 (A)   BUN 83 (A) 100 (A)  91 (A)   Creatinine 1.66 (A) 2.23 (A)  2.59 (A)   Sodium 134 (A) 136 129.3 (A) 132 (A)   Potassium 3.6 4.3  4.4   Chloride 103 98  97 (A)   Calcium 7.3 (A) 9.3  9.0   Albumin 2.20 (A) 2.50 (A)     Total Bilirubin 0.6      Alkaline Phosphatase 101      AST (SGOT) 14      ALT (SGPT) 8      (A) Abnormal value               CARDIAC LABS:      Lab 10/01/22  1929   PROBNP 4,675.0*      Lab Results   Component Value Date    DIGOXIN 0.34 (L) 10/01/2022      Lab Results   Component Value Date    TSH 2.430 06/20/2022           Invalid input(s): LDLCALC  No results found for: POCTROP  No components found for: DDIMERQUAN  Lab Results   Component Value Date    MG 2.2 10/03/2022                 Assessment:  Shock probably septic  Atrial fibrillation with RVR  Severe anemia probably due to GI bleed  Lactic acidosis  Bilateral lower extremity wounds ulcerations and sloughing of skin  Chronic kidney disease      Plan:  Patient is on Cardizem drip at present time.  We will start her on oral beta-blockers and gradually build up the dosage to wean off the Cardizem drip  Patient was on Eliquis at home for chronic atrial fibrillation.  However with her GI bleed and anemia requiring blood transfusion we will hold off the anticoagulation  Pancultures and appropriate antibiotic  Pressors including Levophed as needed to keep MAP more than 65 mm      Thank you for allowing me to share in [facility].        Lisa Leung MD   10/3/2022    11:01 EDT

## 2022-10-03 NOTE — SIGNIFICANT NOTE
Wound Eval / Progress Noted    MARLA Kraus     Patient Name: Lauren Redd  : 1945  MRN: [id number]  Today's Date: 10/3/2022                 Admit Date: 10/1/2022    Visit Dx:    ICD-10-CM ICD-9-CM   1. Anemia, unspecified type  D64.9 285.9   2. Upper GI bleed  K92.2 578.9   3. Atrial fibrillation with RVR ([facility])  I48.91 427.31   4. Venous stasis ulcers of both lower extremities ([facility])  I83.019 459.81    I83.029 707.10    L97.919     L97.929        Patient Active Problem List   Diagnosis   • Positive colorectal cancer screening using Cologuard test   • CHF (congestive heart failure) ([facility])   • Cellulitis of anterior lower leg   • Weakness generalized   • Atrial fibrillation (HCC)   • Pneumonia due to infectious organism   • Pneumonia due to infectious organism, unspecified laterality, unspecified part of lung   • Epistaxis   • Sepsis ([facility])        Past Medical History:   Diagnosis Date   • Afib ([facility])    • Aneurysm ([facility])    • Arthritis    • CHF (congestive heart failure) ([facility])    • GERD (gastroesophageal reflux disease)    • Hypertension    • Irregular heart beat         Past Surgical History:   Procedure Laterality Date   • COLONOSCOPY     • HYSTERECTOMY           Physical Assessment:  Wound 22 1235 Right lower leg Blisters (Active)   Wound Image   10/03/22 1100   Dressing Appearance intact;moist drainage 10/03/22 1100   Base slough;necrotic;yellow 10/03/22 1100   Yellow (%), Wound Tissue Color 100 10/03/22 1100   Periwound intact;dry;edematous 10/03/22 1100   Periwound Temperature warm 10/03/22 1100   Periwound Skin Turgor firm 10/03/22 1100   Edges open 10/03/22 1100   Wound Length (cm) 4.5 cm 10/03/22 1100   Wound Width (cm) 6 cm 10/03/22 1100   Wound Depth (cm) 0.4 cm 10/03/22 1100   Wound Surface Area (cm^2) 27 cm^2 10/03/22 1100   Wound Volume (cm^3) 10.8 cm^3 10/03/22 1100   Drainage Characteristics/Odor malodorous;purulent 10/03/22 1100   Drainage Amount moderate 10/03/22 1100   Care, Wound  cleansed with;irrigated with;sterile normal saline 10/03/22 1100   Dressing Care dressing changed 10/03/22 1335   Periwound Care absorptive dressing applied 10/03/22 1100       Wound 04/23/22 1853 Right proximal calf (Active)   Wound Image   10/03/22 1100   Dressing Appearance intact;moist drainage 10/03/22 1100   Closure None 10/03/22 1100   Base moist;red;slough 10/03/22 1100   Red (%), Wound Tissue Color 100 10/03/22 1100   Periwound intact;dry;swelling 10/03/22 1100   Periwound Temperature warm 10/03/22 1100   Periwound Skin Turgor soft 10/03/22 1100   Edges open 10/03/22 1100   Wound Length (cm) 22.5 cm 10/03/22 1100   Wound Width (cm) 10 cm 10/03/22 1100   Wound Surface Area (cm^2) 225 cm^2 10/03/22 1100   Drainage Characteristics/Odor serosanguineous 10/03/22 1100   Drainage Amount small 10/03/22 1100   Care, Wound cleansed with;sterile normal saline 10/03/22 1100   Dressing Care dressing changed 10/03/22 1335   Periwound Care absorptive dressing applied 10/03/22 1100       Wound 06/20/22 1150 Left lateral leg Venous Ulcer (Active)   Wound Image   10/03/22 1100   Dressing Appearance moist drainage;intact 10/03/22 1100   Closure None 10/03/22 1100   Base necrotic;moist;slough;yellow 10/03/22 1100   Yellow (%), Wound Tissue Color 100 10/03/22 1100   Periwound intact;dry;swelling 10/03/22 1100   Periwound Temperature warm 10/03/22 1100   Periwound Skin Turgor soft 10/03/22 1100   Edges open 10/03/22 1100   Wound Length (cm) 6 cm 10/03/22 1100   Wound Width (cm) 4.5 cm 10/03/22 1100   Wound Surface Area (cm^2) 27 cm^2 10/03/22 1100   Drainage Characteristics/Odor purulent;serosanguineous;creamy;malodorous 10/03/22 1100   Drainage Amount small 10/03/22 1100   Care, Wound sterile normal saline;cleansed with 10/03/22 1100   Dressing Care dressing changed 10/03/22 1335   Periwound Care absorptive dressing applied 10/03/22 1100       Wound 10/03/22 1543 Right medial leg Venous Ulcer (Active)   Wound Image   10/03/22  1100   Dressing Appearance intact;moist drainage 10/03/22 1100   Closure None 10/03/22 1100   Base black eschar;necrotic;moist;slough;yellow 10/03/22 1100   Yellow (%), Wound Tissue Color 100 10/03/22 1100   Periwound intact;dry;swelling 10/03/22 1100   Periwound Temperature warm 10/03/22 1100   Periwound Skin Turgor firm 10/03/22 1100   Edges open 10/03/22 1100   Wound Length (cm) 6 cm 10/03/22 1100   Wound Width (cm) 4.2 cm 10/03/22 1100   Wound Depth (cm) 0.5 cm 10/03/22 1100   Wound Surface Area (cm^2) 25.2 cm^2 10/03/22 1100   Wound Volume (cm^3) 12.6 cm^3 10/03/22 1100   Drainage Characteristics/Odor purulent;creamy;tan;malodorous 10/03/22 1100   Drainage Amount moderate 10/03/22 1100   Care, Wound cleansed with;sterile normal saline 10/03/22 1100   Dressing Care dressing applied;silver impregnated;hydrofiber;gauze;tubular wrap 10/03/22 1100   Periwound Care absorptive dressing applied 10/03/22 1100       Wound 10/03/22 1547 Left medial leg (Active)   Wound Image   10/03/22 1100   Dressing Appearance moist drainage;intact 10/03/22 1100   Closure None 10/03/22 1100   Base red;dry;slough;yellow 10/03/22 1100   Red (%), Wound Tissue Color 50 10/03/22 1100   Yellow (%), Wound Tissue Color 50 10/03/22 1100   Periwound intact;dry;swelling 10/03/22 1100   Periwound Temperature warm 10/03/22 1100   Periwound Skin Turgor firm 10/03/22 1100   Edges open 10/03/22 1100   Wound Length (cm) 10 cm 10/03/22 1100   Wound Width (cm) 10.5 cm 10/03/22 1100   Wound Surface Area (cm^2) 105 cm^2 10/03/22 1100   Drainage Amount none 10/03/22 1100   Care, Wound cleansed with;sterile normal saline 10/03/22 1100   Dressing Care dressing applied;silver impregnated;hydrofiber;gauze;tubular wrap 10/03/22 1100   Periwound Care absorptive dressing applied 10/03/22 1100       Wound 10/03/22 1551 Right heel Pressure Injury (Active)   Wound Image   10/03/22 1100   Pressure Injury Stage DTPI 10/03/22 1100   Dressing Appearance intact;dry  10/03/22 1100   Closure None 10/03/22 1100   Base non-blanchable;maroon/purple 10/03/22 1100   Periwound intact;dry;non-blanchable 10/03/22 1100   Periwound Temperature warm 10/03/22 1100   Periwound Skin Turgor soft 10/03/22 1100   Edges rolled/closed 10/03/22 1100   Drainage Amount none 10/03/22 1100   Care, Wound cleansed with;sterile normal saline 10/03/22 1100   Dressing Care dressing applied;gauze;tubular wrap 10/03/22 1100   Periwound Care absorptive dressing applied 10/03/22 1100        Wound Check / Follow-up:  Patient seen today for a wound consult. Patient with multiple chronic non healing wounds to bilateral lower legs. Patient reports that it has been a while since she has done wound care or skin care to BLE. Right lower leg with two ulcerations and left leg that have purulent yellow/serosanguineous drainage noted. Ulcerations to right lower leg are red and moist with sloughing tissue noted, left lower leg ulceration with gray thick tightly adhered necrotic tissue. All wound malodorous. Posterior and anterior right lower leg and left anterior lower leg with moist pink tissue with some sloughing tissue noted. Right heel boggy, DTPI noted to heel. All tissue intact at this time.  Recommend application of 1/4 strength Dakins moistened gauze to all wounds and secure with dry gauze and gauze roll to BLE.    Patient in a great deal of pain during wound care. Buttocks clean and intact, tissue blanchable.      Impression: chronic non-healing wounds     Short term goals: regain skin integrity, pressure reduction, moisture prevention    Jeannine Wagner RN    10/3/2022    15:52 EDT

## 2022-10-03 NOTE — CONSULTS
[facility]   VASCULAR SURGERY CONSULT    Patient Name: Lauren Redd  : 1945  MRN: [id number]  Primary Care Physician:  Dennis Kohler MD  Date of admission: 10/1/2022    Subjective   Subjective     Chief Complaint: Bilateral leg pain    HPI:    Lauren Redd is a 77 y.o. female who presented to the emergency room with bilateral leg pain.  Following evaluation in the emergency room multiple other issues have been identified and the patient was admitted to the ICU.  I have been asked to evaluate her from the vascular standpoint regarding her lower extremity wounds.  No other immediate complaints from her other than pain in her legs.    Review of Systems    Noncontributory except for the history of present illness.    Personal History     Past Medical History:   Diagnosis Date   • Afib (HCC)    • Aneurysm (HCC)    • Arthritis    • CHF (congestive heart failure) (HCC)    • GERD (gastroesophageal reflux disease)    • Hypertension    • Irregular heart beat        Past Surgical History:   Procedure Laterality Date   • COLONOSCOPY     • HYSTERECTOMY         Family History: family history includes Hypertension in her mother. Otherwise pertinent FHx was reviewed and not pertinent to current issue.    Social History:  reports that she has never smoked. She has never used smokeless tobacco. She reports previous alcohol use. She reports that she does not use drugs.    Home Medications:  DULoxetine, acetaminophen, apixaban, atorvastatin, bumetanide, digoxin, docusate sodium, fluticasone, melatonin, metOLazone, metoprolol succinate XL, midodrine, spironolactone, and triamcinolone      Allergies:  Allergies   Allergen Reactions   • Contrast Dye Rash   • Lotrel [Amlodipine Besy-Benazepril Hcl] Unknown - Low Severity       Objective   Objective     Vitals:   Temp:  [97.1 °F (36.2 °C)-98.3 °F (36.8 °C)] 98.3 °F (36.8 °C)  Heart Rate:  [] 112  Resp:  [14-26] 14  BP: ()/() 87/61  Flow (L/min):  [2]  2    Physical Exam   General: Alert, no acute distress.  Neck: Supple  Heart: Regular rate  Lungs: Clear  Abdomen: Benign  Extremities: Symmetric.  With multiple superficial ulcerations of both lower extremities with extensive areas of complete denervation in both lower extremities more significant on the right than the left.  Pulses: Unable to palpate bilateral pedal pulses.    Diagnostic studies: A CT scan of the bilateral lower extremities dated 10/2/2022 has been reviewed.  No evidence of abscess.  No evidence of osteomyelitis.  Diffuse soft tissue cellulitis.  I relatively recent arterial Doppler from 3/18/2022 demonstrates normal bilateral ABIs at 1.2 on the right, 1.1 on the left.  The right TBI is 1, the left TBI is 0.87.    Assessment & Plan   Assessment / Plan     Active Hospital Problems:  Active Hospital Problems    Diagnosis    • Sepsis (HCC)        Assessment/plan:   Mrs. Redd presents with bilateral lower extremity wounds which are extensive but superficial.  No evidence of abscess formation by CT scan.  She does not appear to have any immediate clinically significant arterial insufficiency.  At this time I would recommend wound care.  We will follow.        Electronically signed by Marco Wood MD, 10/03/22, 4:12 PM EDT.

## 2022-10-03 NOTE — PROGRESS NOTES
[facility]     Progress Note    Patient Name: Lauren Redd  : 1945  MRN: [id number]  Primary Care Physician:  Dennis Kohler MD  Date of admission: 10/1/2022      Subjective   Brief summary.  Patient admitted to ICU with sepsis and anemia      HPI:  Patient seen earlier this morning.  Awake and alert.  Patient having drainage from both legs and very malodorous.  Pain in legs.  Requesting pain meds.  No fever.  Heart rate still high.  Blood pressure low requiring vasopressors    Review of Systems     Fatigue and weakness.  No chest pain.  Complains of palpitations.  Shortness of breath.  No nausea or vomiting  .  Dizzy at times.  Pain in the legs  Drainage from both leg      Objective     Vitals:   Temp:  [97.1 °F (36.2 °C)-98.3 °F (36.8 °C)] 98.3 °F (36.8 °C)  Heart Rate:  [] 112  Resp:  [14-26] 14  BP: ()/() 87/61  Flow (L/min):  [2] 2    Physical Exam :     Elderly female, tired looking and depressed.  HEENT with pallor.  Neck supple.  Heart irregular and tacky   lungs diminished breath sound.  Neuro awake and alert  Abdomen obese and soft  Remedies with 3+ to 4+ edema with extensive ulceration and drainage.  Cellulitis changes      Result Review:  I have personally reviewed the results from the time of this admission to 10/3/2022 19:03 EDT and agree with these findings:  [x]  Laboratory  []  Microbiology  []  Radiology  []  EKG/Telemetry   []  Cardiology/Vascular   []  Pathology  []  Old records  []  Other:           Assessment / Plan       Active Hospital Problems:  Active Hospital Problems    Diagnosis    • Severe anemia    • GI bleed    • Sepsis (HCC)    • CHF (congestive heart failure) (HCC)    • Cellulitis of anterior lower leg    • Atrial fibrillation with RVR (HCC)        Plan:   Patient with severe anemia, sepsis and GI bleed  GI bleed minimal, currently not bleeding actively  On PPI  We will add iron supplements  Consult cardiologist Dr. Grey who follows patient  for A. fib and CHF  Blood pressure low requiring vasopressors.  Continue Levophed.  Rate control agents, Cardizem and beta-blockers on board.  Continue IV antibiotic for sepsis pending cultures and sensitivity.  Vascular surgeon consulted.  Repeat labs in a.m..  (Overall poor prognosis given patient's chronic issues and acute sepsis  Patient refused nursing home placement and rehab on several occasions in the past.  Appears to have compliance issue, has not seen her primary doctor as well as consultants as recommended.)    DVT prophylaxis:  Medical DVT prophylaxis orders are present.    CODE STATUS:   Code Status (Patient has no pulse and is not breathing): CPR (Attempt to Resuscitate)  Medical Interventions (Patient has pulse or is breathing): Full Support  Release to patient: Routine Release            Electronically signed by Pablo Fajardo MD, 10/03/22, 7:00 PM EDT.

## 2022-10-03 NOTE — PROGRESS NOTES
Reason for follow-up shock    Subjective  Critically ill in the ICU with septic shock  Remains on Levophed  Right leg very malodorous with pustules draining copious amounts of purulent secretions  Blood cultures are positive        Vitals:    10/03/22 0545 10/03/22 0600 10/03/22 0800 10/03/22 0900   BP: 100/63 99/69  92/81   BP Location:       Patient Position:       Pulse: 96 101  103   Resp:  14     Temp:   97.1 °F (36.2 °C)    TempSrc:   Axillary    SpO2:    100%   Weight:       Height:             Physical Exam:  Vital Signs Reviewed   Critically ill, hypotensive, in mild distress, moaning at times  HEENT:  PERRL, EOMI.  OP, nares clear, no sinus tenderness  Neck:  Supple, no JVD, no thyromegaly  Lymph: no axillary, cervical, supraclavicular lymphadenopathy noted bilaterally  Chest:   Bilateral diminished breath sounds, no wheezing, crackles or rhonchi, resonant percussion bilaterally  CV: Tachycardic and hypertensive, no MGR, pulses 2+, equal  Abd:  Soft, NT, ND, + BS, no HSM  EXT:  no clubbing, no cyanosis, +edema, no joint tenderness, bilateral significant ulcerated wound which is bandaged in the shin area  Neuro: Somnolent, arousable, oriented x1, generalized weakness  Skin: Significant wounds in the right lower extremity with areas of large pustules with draining purulent secretions, the patient does have significant denuding of the skin on the left leg          Impression:  Shock, likely septic and hypovolemic  GI bleed  Acute blood loss anemia  Lactic acidosis  Bilateral lower extremity wound, ulcerated  A. fib with RVR  Hypoglycemia  Chronic kidney disease  Peripheral arterial disease  Multifactorial anemia    Plan  Respiratory: On 2 L of oxygen via nasal cannula      Cardiac: Cardiology following continue metoprolol, continue midodrine, continue Levophed for shock, continue diltiazem      Gi: Continue PPI      Renal: Continue fluids at current rate      Neuro: Currently at baseline neurological  status      Endo: Sugars well controlled at this time      Heme: Eliquis on hold due to concern for possible bleeding      ID: Patient does have MSSA bacteremia does have a wound culture that is pending, will continue broad-spectrum antibiotics at this time until cultures are back and final, I will consult vascular surgery the patient's right leg looks very bad is very malodorous and has evidence of draining pustules   I have consulted vascular surgery who is agreed to look at the patient  However the patient does not have any evidence of vascular disease      FEN: We will start patient on regular diet, follow electrolytes      PPX: Hold on Eliquis at this time due to bleeding concerns, continue PPI    Patient is critically ill in ICU with  septic shock, presumed GI bleeding  I spent 33 minutes of critical care time excluding any procedure notes, spent in review, analysis, obtaining history and physical, formulating care plan, and I led multi-disciplinary critical care rounds with bedside nurse, respiratory therapist, clinical pharmacist and other allied services. I have discussed the case with primary service and other consultants as well.    Electronically signed by Jorge Gabriel DO, 10/03/22, 1:40 PM EDT.

## 2022-10-04 NOTE — PROGRESS NOTES
[facility]     Progress Note    Patient Name: Lauren Redd  : 1945  MRN: [id number]  Primary Care Physician:  Dennis Kohler MD  Date of admission: 10/1/2022      Subjective   Brief summary.  Patient admitted to ICU with sepsis and anemia      HPI:  Continues to remain hypotensive, continues to remain in A. fib with rapid rate.  Swelling of legs persist with drainage.  No fever chills reported    Review of Systems     Fatigue and weakness.  Complains of palpitations.  Shortness of breath.  No nausea or vomiting.  Pain in the legs  Drainage from both leg      Objective     Vitals:   Temp:  [97.4 °F (36.3 °C)-98.6 °F (37 °C)] 97.4 °F (36.3 °C)  Heart Rate:  [] 130  BP: ()/(48-94) 91/66  Flow (L/min):  [2] 2    Physical Exam :     Elderly female, tired looking and depressed.  HEENT with pallor..  Heart irregular and tacky   lungs diminished breath sound.  Neuro awake and alert  Abdomen obese and soft  Remedies with 3+ to 4+ edema with extensive ulceration and drainage.  Open ulcers in both legs      Result Review:  I have personally reviewed the results from the time of this admission to 10/4/2022 19:30 EDT and agree with these findings:  [x]  Laboratory  []  Microbiology  []  Radiology  []  EKG/Telemetry   []  Cardiology/Vascular   []  Pathology  []  Old records  []  Other:    Reviewed      Assessment / Plan       Active Hospital Problems:  Active Hospital Problems    Diagnosis    • Severe anemia    • GI bleed    • Sepsis ([facility])    • CHF (congestive heart failure) ([facility])    • Cellulitis of anterior lower leg    • Atrial fibrillation with RVR ([facility])        Plan:   Patient remains hypotensive,  Continues to have A. fib with rapid rate  Overall patient has not improved at all  At this point patient prognosis is guarded with advanced cellulitis and ulceration of legs, nonhealing ulcers and sepsis at this point  Continue supportive care  Patient seen by GI for anemia and possible GI bleed,  Not a  candidate for GI work-up at this time until she has active bleed  Discussed with consultants  Recommendations of cardiologist, intensivist as well as gastroenterologist reviewed  Continue current antibiotics and check labs in a.m.    DVT prophylaxis:  Medical DVT prophylaxis orders are present.    CODE STATUS:   Code Status (Patient has no pulse and is not breathing): CPR (Attempt to Resuscitate)  Medical Interventions (Patient has pulse or is breathing): Full Support  Release to patient: Routine Release            Electronically signed by Pablo Fajardo MD, 10/04/22, 7:00 PM EDT.

## 2022-10-04 NOTE — PLAN OF CARE
Goal Outcome Evaluation:      Levo on hold since 10/3 at 2215. Pressures have been soft. Pt HR increases to 130s-140s with pain. Morphine and Dilaudid given PRN. New wound care orders initiated. Prabha Almazan RN

## 2022-10-04 NOTE — PROGRESS NOTES
"Pharmacy to dose Vancomycin Day: 4  Duration of therapy: 5 DAYS  Clinical indication: SEPSIS  172.7 cm (68\")       10/02/22  0000      Weight: 90.6 kg (199 lb 11.8 oz)         Lab Results   Component Value Date    CREATININE 2.32 (H) 10/04/2022      Estimated Creatinine Clearance: 24.3 mL/min (A) (by C-G formula based on SCr of 2.32 mg/dL (H)).   HD/PD/CRRT?: NO  Lab Results   Component Value Date    WBC 22.08 (H) 10/04/2022     Temp (24hrs), Av.7 °F (36.5 °C), Min:97.1 °F (36.2 °C), Max:98.3 °F (36.8 °C)     I/O last 3 completed shifts:  In: 4241 [I.V.:4241]  Out: 1475 [Urine:1475]     Contrast Administered: OK    Relevant Micro:   Microbiology Results (last 10 days)       Procedure Component Value - Date/Time    Wound Culture - Wound, Leg, Right [[id number]] Collected: 10/02/22 1609    Lab Status: Preliminary result Specimen: Wound from Leg, Right Updated: 10/03/22 1331     Wound Culture Culture in progress     Gram Stain Moderate (3+) Gram positive cocci in pairs, chains and clusters      Moderate (3+) Gram negative bacilli      Moderate (3+) Gram positive bacilli    Wound Culture - Wound, Leg, Left [[id number]]  (Abnormal) Collected: 10/02/22 0127    Lab Status: Preliminary result Specimen: Wound from Leg, Left Updated: 10/03/22 0909     Wound Culture Heavy growth (4+) Mixed Gram Negative Sabina     Gram Stain Few (2+) Gram positive bacilli      Rare (1+) Gram negative bacilli      Few (2+) Gram positive cocci    Blood Culture - Blood, Arm, Left [[id number]]  (Abnormal) Collected: 10/01/22 193    Lab Status: Preliminary result Specimen: Blood from Arm, Left Updated: 10/03/22 1451     Blood Culture Abnormal Stain     Gram Stain Anaerobic Bottle Gram positive bacilli    Blood Culture ID, PCR - Blood, Arm, Left [[id number]]  (Normal) Collected: 10/01/22 193    Lab Status: Final result Specimen: Blood from Arm, Left Updated: 10/03/22 1620     BCID, PCR Negative by BCID PCR. Culture to Follow.    Blood Culture - " Blood, Arm, Right [[id number]]  (Abnormal) Collected: 10/01/22 1929    Lab Status: Preliminary result Specimen: Blood from Arm, Right Updated: 10/03/22 1059     Blood Culture Staphylococcus aureus     Comment:   Infectious disease consultation is highly recommended to rule out distant foci of infection.        Isolated from Aerobic Bottle     Gram Stain Aerobic Bottle Gram positive cocci in clusters    Blood Culture ID, PCR - Blood, Arm, Right [[id number]]  (Abnormal) Collected: 10/01/22 1929    Lab Status: Final result Specimen: Blood from Arm, Right Updated: 10/02/22 1917     BCID, PCR Staph aureus. mecA/C and MREJ (methicillin resistance gene) NOT detected. Identification by BCID2 PCR    Narrative:      Infectious disease consultation is highly recommended to rule out distant foci of infection.          Relevant Radiology:   CHEST X-RAY:  IMPRESSION:        1. Chronic changes are again noted.  There is no definitive evidence for acute cardiopulmonary process.     Other Antimicrobial Therapy:  CEFEPIME    Assessment/Plan  Random level today of 14.10 mcg/mL drawn today (10/4), last dose received was 10/2. Will re-dose today with 750 mg.    Level: Random level for 10/6 @0600

## 2022-10-04 NOTE — PROGRESS NOTES
CARDIOLOGY  INPATIENT PROGRESS NOTE                [facility] INTENSIVE CARE UNIT    10/4/2022      PATIENT IDENTIFICATION:   Name:  Lauren Redd      MRN:  [id number]     77 y.o.  female                 SUBJECTIVE:   Claims she is feeling better  Blood pressure is still low around 80 systolic and her heart rate is around 100 110 bpm.  On Cardizem drip 10 mg/h  Still on Levophed drip    OBJECTIVE:  Vitals:    10/04/22 0945 10/04/22 1000 10/04/22 1015 10/04/22 1030   BP: 99/51 (!) 77/65 (!) 88/63 (!) 84/68   Pulse: (!) 125 102 104 109   Resp:       Temp:       TempSrc:       SpO2: 100% 100% 100% 97%   Weight:       Height:               Body mass index is 30.37 kg/m².    Intake/Output Summary (Last 24 hours) at 10/4/2022 1056  Last data filed at 10/4/2022 0518  Gross per 24 hour   Intake 2899.9 ml   Output 1900 ml   Net 999.9 ml       Telemetry:     Physical Exam  General Appearance:   · no acute distress  · Alert and oriented x3  HENT:   · lips not cyanotic  · Atraumatic  Neck:  · No jvd   · supple  Respiratory:  · no respiratory distress  · normal breath sounds  · no rales  Cardiovascular:    · regular rhythm  · no S3, no S4   · no murmur  · no rub  · lower extremity edema: none    Skin:   · warm, dry  · No rashes      Allergies   Allergen Reactions   • Contrast Dye Rash   • Lotrel [Amlodipine Besy-Benazepril Hcl] Unknown - Low Severity       Scheduled meds:  !NOT ORDERED- apixaban (ELIQUIS), , Does not apply, Daily With Dinner  acetaminophen, 500 mg, Oral, Q6H  cefepime, 2 g, Intravenous, Q24H  metoprolol succinate XL, 25 mg, Oral, Q12H  midodrine, 10 mg, Oral, TID AC  pantoprazole, 80 mg, Intravenous, BID AC  Sodium Hypochlorite, , Topical, 2 times per day  triamcinolone, 1 application, Topical, 2 times per day      IV meds:                      dextrose 5 % and sodium chloride 0.9 %, 100 mL/hr, Last Rate: 100 mL/hr (10/04/22 0616)  dilTIAZem, 5-15 mg/hr, Last Rate: 10 mg/hr (10/01/22  1336)  lactated ringers, 75 mL/hr, Last Rate: 75 mL/hr (10/02/22 0111)  norepinephrine, 0.02-0.3 mcg/kg/min, Last Rate: Stopped (10/03/22 2215)  Pharmacy to Dose Cefepime,   Pharmacy to dose vancomycin,         Data Review:  CBC    CBC 10/2/22 10/2/22 10/2/22 10/3/22 10/3/22 10/3/22 10/3/22 10/3/22 10/4/22    0614 0722 1754 0020 0411 1344 1849 2340    WBC 28.64 (A)    27.87 (A)    22.08 (A)   RBC 2.93 (A)    3.13 (A)    2.83 (A)   Hemoglobin 8.0 (A) 7.9 (A) 8.6 (A) 8.6 (A) 8.5 (A) 8.5 (A) 8.1 (A) 7.5 (A) 7.7 (A)   Hematocrit 24.8 (A) 24.4 (A) 26.9 (A) 27.2 (A) 26.7 (A) 26.9 (A) 25.4 (A) 23.8 (A) 24.0 (A)   MCV 84.6    85.3    84.8   MCH 27.3    27.2    27.2   MCHC 32.3    31.8    32.1   RDW 19.1 (A)    19.7 (A)    19.4 (A)   Platelets 219    224    192   (A) Abnormal value            CMP    CMP 10/2/22 10/2/22 10/3/22 10/4/22    0614 0743     Glucose 111 (A) 90 154 (A) 136 (A)    (A)  91 (A) 81 (A)   Creatinine 2.23 (A)  2.59 (A) 2.32 (A)   Sodium 136 129.3 (A) 132 (A) 135 (A)   Potassium 4.3  4.4 4.0   Chloride 98  97 (A) 102   Calcium 9.3  9.0 8.5 (A)   Albumin 2.50 (A)   2.30 (A)   Total Bilirubin    0.8   Alkaline Phosphatase    129 (A)   AST (SGOT)    25   ALT (SGPT)    15   (A) Abnormal value             CARDIAC LABS:      Lab 10/01/22  1929   PROBNP 4,675.0*        Lab Results   Component Value Date    DIGOXIN 0.34 (L) 10/01/2022      Lab Results   Component Value Date    TSH 2.430 06/20/2022           Invalid input(s): LDLCALC  No results found for: POCTROP  Lab Results   Component Value Date    TROPONINT 0.015 07/09/2022   (  Lab Results   Component Value Date    MG 2.1 10/04/2022     Results for orders placed during the hospital encounter of 02/07/22    Adult Transthoracic Echo Complete W/ Cont if Necessary Per Protocol    Interpretation Summary  · Estimated right ventricular systolic pressure from tricuspid regurgitation is mildly elevated (35-45 mmHg). Calculated right ventricular systolic  pressure from tricuspid regurgitation is 40 mmHg.  · The left ventricular cavity is mild to moderately dilated.  · Calculated left ventricular EF = 65% Estimated left ventricular EF was in agreement with the calculated left ventricular EF. Left ventricular systolic function is normal.  · The right atrial cavity is mild to moderately dilated.  · Moderate mitral valve regurgitation is present.  · Moderate tricuspid valve regurgitation is present.  · Mild pulmonary hypertension is present.           ASSESSMENT:  Shock probably septic  Atrial fibrillation with RVR  Severe anemia probably due to GI bleed  Lactic acidosis  Bilateral lower extremity wounds ulcerations and sloughing of skin  Chronic kidney disease         PLAN:   We will increase the beta-blockers p.o. as her blood pressure tolerates.  In the meantime continue Manuel Leung MD  10/4/2022    10:56 EDT

## 2022-10-04 NOTE — SIGNIFICANT NOTE
10/03/22 2231   Provider Notification   Reason for Communication Patient Pain   Provider Name Dr. Fajardo   Notification Route Phone call   Response See orders

## 2022-10-04 NOTE — PROGRESS NOTES
Reason for follow-up shock    Subjective  Critically ill in the ICU with septic shock  Levophed has been weaned off however blood pressure still very soft   With a map around 65 and systolic blood pressures in the 80s  No complaints of shortness of breath  Hemoglobin start to slowly drift down  GI following  No overt signs of bleeding      Vitals:    10/04/22 1141 10/04/22 1145 10/04/22 1200 10/04/22 1215   BP:  98/75 103/79 (!) 85/58   Pulse:  106 97 93   Resp:       Temp: 98.6 °F (37 °C)      TempSrc: Oral      SpO2:  100% 95% 99%   Weight:       Height:             Physical Exam:  Vital Signs Reviewed   Critically ill, hypotensive, in mild distress, moaning at times  HEENT:  PERRL, EOMI.  OP, nares clear, no sinus tenderness  Neck:  Supple, no JVD, no thyromegaly  Lymph: no axillary, cervical, supraclavicular lymphadenopathy noted bilaterally  Chest:   Bilateral diminished breath sounds, no wheezing, crackles or rhonchi, resonant percussion bilaterally  CV: Tachycardic and hypertensive, no MGR, pulses 2+, equal  Abd:  Soft, NT, ND, + BS, no HSM  EXT:  no clubbing, no cyanosis, +edema, no joint tenderness, bilateral significant ulcerated wound which is bandaged in the shin area  Neuro: Somnolent, arousable, oriented x1, generalized weakness  Skin: Significant wounds in the right lower extremity with areas of large pustules with draining purulent secretions, the patient does have significant denuding of the skin on the left leg          Impression:  Shock, likely septic and hypovolemic  GI bleed  Acute blood loss anemia  Lactic acidosis  Bilateral lower extremity wound, ulcerated  A. fib with RVR  Hypoglycemia  Chronic kidney disease  Peripheral arterial disease  Multifactorial anemia    Plan  Respiratory: On 2 L of oxygen via nasal cannula      Cardiac: Cardiology following continue metoprolol, continue midodrine,   Beta-blocker being adjusted per cardiology  Levophed as needed as blood pressure still very  soft    Gi: Continue PPI      Renal: Continue fluids at current rate      Neuro: Currently at baseline neurological status      Endo: Sugars well controlled at this time      Heme: Eliquis on hold due to concern for possible bleeding GI has been consulted continue to follow H&H no need for transfusion at this time      ID appreciate vascular surgery consulting, discontinue vancomycin continue cefepime, await cultures may change antibiotics based clinical wound cultures, repeat blood cultures today      FEN:    PPX: Hold on Eliquis at this time due to bleeding concerns, continue PPI    Patient is critically ill in ICU with  septic shock, presumed GI bleeding  I spent 30 minutes of critical care time excluding any procedure notes, spent in review, analysis, obtaining history and physical, formulating care plan, and I led multi-disciplinary critical care rounds with bedside nurse, respiratory therapist, clinical pharmacist and other allied services. I have discussed the case with primary service and other consultants as well.    Electronically signed by Jorge Gabriel DO, 10/04/22, 3:06 PM EDT.

## 2022-10-04 NOTE — CONSULTS
Chief Complaint  Leg Pain    History of Present Illness  Lauren Redd is a 77 y.o. female with history of atrial fibrillation, aneurysm, arthritis, seizures, GERD, hypertension who presents to [facility] INTENSIVE CARE UNIT on referral from Pablo Fajardo MD for a gastroenterology evaluation of anemia.  Patient occult blood stool is positive.  No active bleeding there is no overt bleeding.  She denied any GI symptoms        Labs Result Review Imaging    Past Medical History:   Diagnosis Date   • Afib (HCC)    • Aneurysm (HCC)    • Arthritis    • CHF (congestive heart failure) (HCC)    • GERD (gastroesophageal reflux disease)    • Hypertension    • Irregular heart beat        Past Surgical History:   Procedure Laterality Date   • COLONOSCOPY     • HYSTERECTOMY           Current Facility-Administered Medications:   •  !NOT ORDERED- apixaban (ELIQUIS), , Does not apply, Daily With Dinner, Pablo Fajardo MD  •  acetaminophen (TYLENOL) tablet 500 mg, 500 mg, Oral, Q6H, Pablo Fajardo MD, 500 mg at 10/04/22 1246  •  albumin human 25 % IV SOLN 25 g, 25 g, Intravenous, Once, Lauren Goss PA  •  cefepime (MAXIPIME) 2 g/100 mL 0.9% NS (mbp), 2 g, Intravenous, Q24H, Jorge Gabriel DO, 2 g at 10/04/22 1652  •  dextrose (D50W) (25 g/50 mL) IV injection 25 g, 25 g, Intravenous, Q15 Min PRN, Issa Dixon MD, 25 g at 10/02/22 0419  •  dextrose (GLUTOSE) oral gel 15 g, 15 g, Oral, Q15 Min PRN, Issa Dixon MD  •  dextrose 5 % and sodium chloride 0.9 % infusion, 100 mL/hr, Intravenous, Continuous, Francisco Javier Long MD, Last Rate: 100 mL/hr at 10/04/22 1550, 100 mL/hr at 10/04/22 1550  •  dilTIAZem (CARDIZEM) 125 mg in 125 mL 0.7% sodium chloride  infusion, 5-15 mg/hr, Intravenous, Titrated, Issa Dixon MD, Last Rate: 10 mL/hr at 10/01/22 2325, 10 mg/hr at 10/01/22 2325  •  glucagon (human recombinant) (GLUCAGEN DIAGNOSTIC) injection 1 mg, 1 mg, Intramuscular, Q15 Min PRN, Issa Dixon MD  •   HYDROcodone-acetaminophen (NORCO) 7.5-325 MG per tablet 1 tablet, 1 tablet, Oral, TID, Pablo Fajardo MD  •  HYDROmorphone (DILAUDID) injection 0.5 mg, 0.5 mg, Intravenous, Q2H PRN, Pablo Fajardo MD, 0.5 mg at 10/04/22 1246  •  lactated ringers infusion, 75 mL/hr, Intravenous, Continuous, Issa Dixon MD, Last Rate: 75 mL/hr at 10/02/22 0111, 75 mL/hr at 10/02/22 0111  •  metoprolol succinate XL (TOPROL-XL) 24 hr tablet 25 mg, 25 mg, Oral, Q12H, Lisa Leung MD, 25 mg at 10/04/22 0801  •  metoprolol tartrate (LOPRESSOR) injection 5 mg, 5 mg, Intravenous, Q6H PRN, Lauren Goss PA  •  midodrine (PROAMATINE) tablet 10 mg, 10 mg, Oral, TID Xochitl ESCAMILLA Mohammad K, MD, 10 mg at 10/04/22 1652  •  morphine injection 2 mg, 2 mg, Intravenous, Q2H PRN, Issa Dixon MD, 2 mg at 10/03/22 2120  •  norepinephrine (LEVOPHED) 8 mg in 250 mL NS infusion (premix), 0.02-0.3 mcg/kg/min, Intravenous, Titrated, Francisco Javier Long MD, Held at 10/03/22 2215  •  ondansetron (ZOFRAN) injection 4 mg, 4 mg, Intravenous, Q6H PRN, Issa Dixon MD  •  pantoprazole (PROTONIX) injection 80 mg, 80 mg, Intravenous, BID Tana ESCAMILLA Ayaz, MD, 80 mg at 10/04/22 1652  •  Pharmacy to Dose Cefepime, , Does not apply, Continuous PRN, Jorge Gabriel,   •  sodium chloride 0.9 % flush 10 mL, 10 mL, Intravenous, PRN, Issa Dixon MD  •  Sodium Hypochlorite (DAKIN'S) 0.125 % topical solution 0.125% (quarter strength), , Topical, 2 times per day, Pablo Fajardo MD, Given at 10/04/22 1109  •  triamcinolone (KENALOG) 0.1 % ointment 1 application, 1 application, Topical, 2 times per day, Pablo Fajardo MD, 1 application at 10/04/22 1109     Allergies   Allergen Reactions   • Contrast Dye Rash   • Lotrel [Amlodipine Besy-Benazepril Hcl] Unknown - Low Severity       Family History   Problem Relation Age of Onset   • Hypertension Mother    • Colon cancer Neg Hx         Social History     Social History Narrative   • Not on file  "    Social history negative for smoking, alcohol abuse, drug  Immunization:  Immunization History   Administered Date(s) Administered   • Influenza, Unspecified 09/14/2021   • PPD Test 03/23/2022, 03/30/2022   • Pneumococcal Polysaccharide (PPSV23) 09/14/2021        Objective     Review of Systems patient does have complains of leg pain and that is why she came to the hospital otherwise 10 system review was negative except was mentioned HPI    Vital Signs:   BP 96/63   Pulse 117   Temp 97.4 °F (36.3 °C) (Oral)   Resp 14   Ht 172.7 cm (68\")   Wt 90.6 kg (199 lb 11.8 oz)   SpO2 98%   BMI 30.37 kg/m²       Physical Exam  Constitutional:       General: She is awake. She is not in acute distress.     Appearance: Normal appearance. She is well-developed and well-groomed.   HENT:      Head: Normocephalic and atraumatic.      Mouth/Throat:      Mouth: Mucous membranes are moist.      Comments: Keily dental hygiene is good  Eyes:      General: Lids are normal.      Conjunctiva/sclera: Conjunctivae normal.      Pupils: Pupils are equal, round, and reactive to light.   Neck:      Thyroid: No thyroid mass.      Trachea: Trachea normal.   Cardiovascular:      Rate and Rhythm: Normal rate and regular rhythm.      Heart sounds: Normal heart sounds.   Pulmonary:      Effort: Pulmonary effort is normal.      Breath sounds: Normal breath sounds and air entry.   Abdominal:      General: Abdomen is flat. Bowel sounds are normal. There is no distension.      Palpations: Abdomen is soft. There is no mass.      Tenderness: There is no abdominal tenderness. There is no guarding.   Musculoskeletal:      Cervical back: Neck supple.      Right lower leg: No edema.      Left lower leg: No edema.   Feet:      Comments: Cellulitis of the lower extremity  Skin:     General: Skin is warm and moist.      Coloration: Skin is not cyanotic, jaundiced or pale.      Findings: No rash.      Nails: There is no clubbing.   Neurological:      Mental " Status: She is alert and oriented to person, place, and time.   Psychiatric:         Attention and Perception: Attention normal.         Mood and Affect: Mood and affect normal.         Speech: Speech normal.         Labs:  Results from last 7 days   Lab Units 10/04/22  1109 10/04/22  0435 10/03/22  2340 10/03/22  1344 10/03/22  0411 10/02/22  0722 10/02/22  0614   WBC 10*3/mm3  --  22.08*  --   --  27.87*  --  28.64*   HEMOGLOBIN g/dL 7.6* 7.7* 7.5*   < > 8.5*   < > 8.0*   HEMATOCRIT % 23.7* 24.0* 23.8*   < > 26.7*   < > 24.8*   PLATELETS 10*3/mm3  --  192  --   --  224  --  219    < > = values in this interval not displayed.      Results from last 7 days   Lab Units 10/04/22  1653 10/04/22  0435 10/03/22  0411 10/02/22  0614 10/01/22  1929   SODIUM mmol/L 136 135* 132*   < > 134*   SODIUM, ARTERIAL   --   --   --    < >  --    POTASSIUM mmol/L 3.9 4.0 4.4   < > 3.6   CHLORIDE mmol/L 104 102 97*   < > 103   CO2 mmol/L 23.1 23.5 22.8   < > 19.6*   BUN mg/dL 75* 81* 91*   < > 83*   CREATININE mg/dL 2.17* 2.32* 2.59*   < > 1.66*   CALCIUM mg/dL 8.8 8.5* 9.0   < > 7.3*   BILIRUBIN mg/dL  --  0.8  --   --  0.6   ALK PHOS U/L  --  129*  --   --  101   ALT (SGPT) U/L  --  15  --   --  8   AST (SGOT) U/L  --  25  --   --  14   GLUCOSE mg/dL 114* 136* 154*   < > 237*   GLUCOSE, ARTERIAL   --   --   --    < >  --     < > = values in this interval not displayed.             Assessment & Plan:  Active Problems:    Atrial fibrillation with RVR ([facility])    CHF (congestive heart failure) ([facility])    Cellulitis of anterior lower leg    Sepsis (HCC)    Severe anemia    GI bleed     Will do endoscopy evaluation when patient is out of intensive care unit.  Patient was given instructions and counseling regarding her condition or for health maintenance advice. Please see specific information pulled into the AVS if appropriate.        Signed:  Emma Armstrong MD  10/04/22  17:54 EDT

## 2022-10-04 NOTE — H&P (VIEW-ONLY)
Chief Complaint  Leg Pain    History of Present Illness  Lauren Redd is a 77 y.o. female with history of atrial fibrillation, aneurysm, arthritis, seizures, GERD, hypertension who presents to [facility] INTENSIVE CARE UNIT on referral from Pablo Fajardo MD for a gastroenterology evaluation of anemia.  Patient occult blood stool is positive.  No active bleeding there is no overt bleeding.  She denied any GI symptoms        Labs Result Review Imaging    Past Medical History:   Diagnosis Date   • Afib (HCC)    • Aneurysm (HCC)    • Arthritis    • CHF (congestive heart failure) (HCC)    • GERD (gastroesophageal reflux disease)    • Hypertension    • Irregular heart beat        Past Surgical History:   Procedure Laterality Date   • COLONOSCOPY     • HYSTERECTOMY           Current Facility-Administered Medications:   •  !NOT ORDERED- apixaban (ELIQUIS), , Does not apply, Daily With Dinner, Pablo Fajardo MD  •  acetaminophen (TYLENOL) tablet 500 mg, 500 mg, Oral, Q6H, Pablo Fajardo MD, 500 mg at 10/04/22 1246  •  albumin human 25 % IV SOLN 25 g, 25 g, Intravenous, Once, Lauren Goss PA  •  cefepime (MAXIPIME) 2 g/100 mL 0.9% NS (mbp), 2 g, Intravenous, Q24H, Jorge Gabriel DO, 2 g at 10/04/22 1652  •  dextrose (D50W) (25 g/50 mL) IV injection 25 g, 25 g, Intravenous, Q15 Min PRN, Issa Dixon MD, 25 g at 10/02/22 0419  •  dextrose (GLUTOSE) oral gel 15 g, 15 g, Oral, Q15 Min PRN, Issa Dixon MD  •  dextrose 5 % and sodium chloride 0.9 % infusion, 100 mL/hr, Intravenous, Continuous, Francisco Javier Long MD, Last Rate: 100 mL/hr at 10/04/22 1550, 100 mL/hr at 10/04/22 1550  •  dilTIAZem (CARDIZEM) 125 mg in 125 mL 0.7% sodium chloride  infusion, 5-15 mg/hr, Intravenous, Titrated, Issa Dixon MD, Last Rate: 10 mL/hr at 10/01/22 2325, 10 mg/hr at 10/01/22 2325  •  glucagon (human recombinant) (GLUCAGEN DIAGNOSTIC) injection 1 mg, 1 mg, Intramuscular, Q15 Min PRN, Issa Dixon MD  •   HYDROcodone-acetaminophen (NORCO) 7.5-325 MG per tablet 1 tablet, 1 tablet, Oral, TID, Pablo Fajardo MD  •  HYDROmorphone (DILAUDID) injection 0.5 mg, 0.5 mg, Intravenous, Q2H PRN, Pablo Fajardo MD, 0.5 mg at 10/04/22 1246  •  lactated ringers infusion, 75 mL/hr, Intravenous, Continuous, Issa Dixon MD, Last Rate: 75 mL/hr at 10/02/22 0111, 75 mL/hr at 10/02/22 0111  •  metoprolol succinate XL (TOPROL-XL) 24 hr tablet 25 mg, 25 mg, Oral, Q12H, Lisa Leung MD, 25 mg at 10/04/22 0801  •  metoprolol tartrate (LOPRESSOR) injection 5 mg, 5 mg, Intravenous, Q6H PRN, Lauren Goss PA  •  midodrine (PROAMATINE) tablet 10 mg, 10 mg, Oral, TID Xochitl ESCAMILLA Mohammad K, MD, 10 mg at 10/04/22 1652  •  morphine injection 2 mg, 2 mg, Intravenous, Q2H PRN, Issa Dixon MD, 2 mg at 10/03/22 2120  •  norepinephrine (LEVOPHED) 8 mg in 250 mL NS infusion (premix), 0.02-0.3 mcg/kg/min, Intravenous, Titrated, Francisco Javier Long MD, Held at 10/03/22 2215  •  ondansetron (ZOFRAN) injection 4 mg, 4 mg, Intravenous, Q6H PRN, Issa Dixon MD  •  pantoprazole (PROTONIX) injection 80 mg, 80 mg, Intravenous, BID Tana ESCAMILLA Ayaz, MD, 80 mg at 10/04/22 1652  •  Pharmacy to Dose Cefepime, , Does not apply, Continuous PRN, Jorge Gabriel,   •  sodium chloride 0.9 % flush 10 mL, 10 mL, Intravenous, PRN, Issa Dixon MD  •  Sodium Hypochlorite (DAKIN'S) 0.125 % topical solution 0.125% (quarter strength), , Topical, 2 times per day, Pablo Fajardo MD, Given at 10/04/22 1109  •  triamcinolone (KENALOG) 0.1 % ointment 1 application, 1 application, Topical, 2 times per day, Pablo Fajardo MD, 1 application at 10/04/22 1109     Allergies   Allergen Reactions   • Contrast Dye Rash   • Lotrel [Amlodipine Besy-Benazepril Hcl] Unknown - Low Severity       Family History   Problem Relation Age of Onset   • Hypertension Mother    • Colon cancer Neg Hx         Social History     Social History Narrative   • Not on file  "    Social history negative for smoking, alcohol abuse, drug  Immunization:  Immunization History   Administered Date(s) Administered   • Influenza, Unspecified 09/14/2021   • PPD Test 03/23/2022, 03/30/2022   • Pneumococcal Polysaccharide (PPSV23) 09/14/2021        Objective     Review of Systems patient does have complains of leg pain and that is why she came to the hospital otherwise 10 system review was negative except was mentioned HPI    Vital Signs:   BP 96/63   Pulse 117   Temp 97.4 °F (36.3 °C) (Oral)   Resp 14   Ht 172.7 cm (68\")   Wt 90.6 kg (199 lb 11.8 oz)   SpO2 98%   BMI 30.37 kg/m²       Physical Exam  Constitutional:       General: She is awake. She is not in acute distress.     Appearance: Normal appearance. She is well-developed and well-groomed.   HENT:      Head: Normocephalic and atraumatic.      Mouth/Throat:      Mouth: Mucous membranes are moist.      Comments: Keily dental hygiene is good  Eyes:      General: Lids are normal.      Conjunctiva/sclera: Conjunctivae normal.      Pupils: Pupils are equal, round, and reactive to light.   Neck:      Thyroid: No thyroid mass.      Trachea: Trachea normal.   Cardiovascular:      Rate and Rhythm: Normal rate and regular rhythm.      Heart sounds: Normal heart sounds.   Pulmonary:      Effort: Pulmonary effort is normal.      Breath sounds: Normal breath sounds and air entry.   Abdominal:      General: Abdomen is flat. Bowel sounds are normal. There is no distension.      Palpations: Abdomen is soft. There is no mass.      Tenderness: There is no abdominal tenderness. There is no guarding.   Musculoskeletal:      Cervical back: Neck supple.      Right lower leg: No edema.      Left lower leg: No edema.   Feet:      Comments: Cellulitis of the lower extremity  Skin:     General: Skin is warm and moist.      Coloration: Skin is not cyanotic, jaundiced or pale.      Findings: No rash.      Nails: There is no clubbing.   Neurological:      Mental " Status: She is alert and oriented to person, place, and time.   Psychiatric:         Attention and Perception: Attention normal.         Mood and Affect: Mood and affect normal.         Speech: Speech normal.         Labs:  Results from last 7 days   Lab Units 10/04/22  1109 10/04/22  0435 10/03/22  2340 10/03/22  1344 10/03/22  0411 10/02/22  0722 10/02/22  0614   WBC 10*3/mm3  --  22.08*  --   --  27.87*  --  28.64*   HEMOGLOBIN g/dL 7.6* 7.7* 7.5*   < > 8.5*   < > 8.0*   HEMATOCRIT % 23.7* 24.0* 23.8*   < > 26.7*   < > 24.8*   PLATELETS 10*3/mm3  --  192  --   --  224  --  219    < > = values in this interval not displayed.      Results from last 7 days   Lab Units 10/04/22  1653 10/04/22  0435 10/03/22  0411 10/02/22  0614 10/01/22  1929   SODIUM mmol/L 136 135* 132*   < > 134*   SODIUM, ARTERIAL   --   --   --    < >  --    POTASSIUM mmol/L 3.9 4.0 4.4   < > 3.6   CHLORIDE mmol/L 104 102 97*   < > 103   CO2 mmol/L 23.1 23.5 22.8   < > 19.6*   BUN mg/dL 75* 81* 91*   < > 83*   CREATININE mg/dL 2.17* 2.32* 2.59*   < > 1.66*   CALCIUM mg/dL 8.8 8.5* 9.0   < > 7.3*   BILIRUBIN mg/dL  --  0.8  --   --  0.6   ALK PHOS U/L  --  129*  --   --  101   ALT (SGPT) U/L  --  15  --   --  8   AST (SGOT) U/L  --  25  --   --  14   GLUCOSE mg/dL 114* 136* 154*   < > 237*   GLUCOSE, ARTERIAL   --   --   --    < >  --     < > = values in this interval not displayed.             Assessment & Plan:  Active Problems:    Atrial fibrillation with RVR ([facility])    CHF (congestive heart failure) ([facility])    Cellulitis of anterior lower leg    Sepsis (HCC)    Severe anemia    GI bleed     Will do endoscopy evaluation when patient is out of intensive care unit.  Patient was given instructions and counseling regarding her condition or for health maintenance advice. Please see specific information pulled into the AVS if appropriate.        Signed:  Emma Armstrong MD  10/04/22  17:54 EDT

## 2022-10-05 PROBLEM — N18.9 ACUTE KIDNEY INJURY SUPERIMPOSED ON CHRONIC KIDNEY DISEASE (HCC): Status: ACTIVE | Noted: 2022-01-01

## 2022-10-05 PROBLEM — N17.9 ACUTE KIDNEY INJURY SUPERIMPOSED ON CHRONIC KIDNEY DISEASE (HCC): Status: ACTIVE | Noted: 2022-01-01

## 2022-10-05 NOTE — PROGRESS NOTES
[facility]     Progress Note    Patient Name: Lauren Redd  : 1945  MRN: [id number]  Primary Care Physician:  Dennis Kohler MD  Date of admission: 10/1/2022      Subjective   Brief summary.  Patient admitted to ICU with sepsis and anemia.        HPI:  Continues to remain hypotensive,   Heart rate up and down  Hemoglobin dropped again below 7  No active bleed noted  Less pain with oral narcotics    Review of Systems   Patient more awake and alert  Fatigue and weakness.  Complains of palpitations.  Denies any chest pain or shortness of breath today  No nausea or vomiting.  Pain in the legs  Drainage from both leg slightly but      Objective     Vitals:   Temp:  [97.4 °F (36.3 °C)-98.2 °F (36.8 °C)] 98.2 °F (36.8 °C)  Heart Rate:  [] 129  Resp:  [17-32] 32  BP: ()/(55-88) 101/74  Flow (L/min):  [2-4] 4    Physical Exam :     Elderly female, tired looking and depressed.  HEENT with pallor..  No icterus  Heart irregular and tacky  Bilateral diminished breath  Neuro awake and alert  Abdomen obese and soft  Extremities with 3+ to 4+ edema with extensive ulceration and drainage.  Open ulcers in both legs      Result Review:  I have personally reviewed the results from the time of this admission to 10/5/2022 19:20 EDT and agree with these findings:  [x]  Laboratory  []  Microbiology  []  Radiology  []  EKG/Telemetry   []  Cardiology/Vascular   []  Pathology  []  Old records  []  Other:    Reviewed  Hemoglobin less than 7    Assessment / Plan       Active Hospital Problems:  Active Hospital Problems    Diagnosis    • **Sepsis (HCC)    • Acute kidney injury superimposed on chronic kidney disease (HCC)    • Severe anemia    • GI bleed    • CHF (congestive heart failure) ([facility])    • Cellulitis of anterior lower leg    • Atrial fibrillation with RVR (HCC)        Plan:   Patient remains hypotensive with tachyarrhythmias.  Cardiologist Dr. Grey on board.  We will see his recommendations  Continue  fluids and vasopressors as needed  Rate control agents per cardiologist  Patient has chronic venous stasis and cellulitis of lower extremities  Vascular surgeon seen, not much to offer at this time  Continue antibiotics  Patient dropping hemoglobin, several and multiple etiologies of this blood loss including bleeding from the legs and cellulitis and drainage.  Also patient with chronic kidney disease and chronic anemia  Questionable underlying GI bleed at this time.  Monitor for bleeding.  Monitor H&H.  Transfuse 2 units.  Patient's kidney functions improving.  Underlying chronic kidney disease  Continue to monitor in ICU  Check labs in a.m.    DVT prophylaxis:  Medical DVT prophylaxis orders are present.    CODE STATUS:   Code Status (Patient has no pulse and is not breathing): CPR (Attempt to Resuscitate)  Medical Interventions (Patient has pulse or is breathing): Full Support  Release to patient: Routine Release            Electronically signed by Pablo Fajardo MD, 10/05/22, 7:00 PM EDT.

## 2022-10-05 NOTE — PROGRESS NOTES
CARDIOLOGY  INPATIENT PROGRESS NOTE                [facility] INTENSIVE CARE UNIT    10/5/2022      PATIENT IDENTIFICATION:   Name:  Lauren Redd      MRN:  [id number]     77 y.o.  female                 SUBJECTIVE:   Patient claims she is feeling better  She is off the Levophed drip now  Continues to have atrial fibrillation with slightly rapid rate    OBJECTIVE:  Vitals:    10/05/22 0813 10/05/22 0900 10/05/22 0913 10/05/22 1000   BP:  115/88 115/88 95/78   BP Location:  Right arm     Patient Position:  Lying     Pulse:  (!) 140 (!) 130 (!) 125   Resp:       Temp: 97.4 °F (36.3 °C)      TempSrc: Oral      SpO2:  100%  100%   Weight:       Height:               Body mass index is 30.37 kg/m².    Intake/Output Summary (Last 24 hours) at 10/5/2022 1355  Last data filed at 10/5/2022 0500  Gross per 24 hour   Intake --   Output 2100 ml   Net -2100 ml       Telemetry:     Physical Exam  General Appearance:   · no acute distress  · Alert and oriented x3  HENT:   · lips not cyanotic  · Atraumatic  Neck:  · No jvd   · supple  Respiratory:  · no respiratory distress  · normal breath sounds  · no rales  Cardiovascular:    · regular rhythm  · no S3, no S4   · no murmur  · no rub  · lower extremity edema: none    Skin:   · warm, dry  · No rashes      Allergies   Allergen Reactions   • Contrast Dye Rash   • Lotrel [Amlodipine Besy-Benazepril Hcl] Unknown - Low Severity       Scheduled meds:  !NOT ORDERED- apixaban (ELIQUIS), , Does not apply, Daily With Dinner  acetaminophen, 500 mg, Oral, Q6H  cefepime, 2 g, Intravenous, Q24H  HYDROcodone-acetaminophen, 1 tablet, Oral, TID  metoprolol succinate XL, 50 mg, Oral, Q12H  midodrine, 10 mg, Oral, TID AC  pantoprazole, 80 mg, Intravenous, BID AC  Sodium Hypochlorite, , Topical, 2 times per day  triamcinolone, 1 application, Topical, 2 times per day      IV meds:                      dextrose 5 % and sodium chloride 0.9 %, 100 mL/hr, Last Rate: 100 mL/hr (10/05/22  1138)  dilTIAZem, 5-15 mg/hr, Last Rate: 10 mg/hr (10/01/22 2325)  norepinephrine, 0.02-0.3 mcg/kg/min, Last Rate: Stopped (10/03/22 2215)  Pharmacy to Dose Cefepime,         Data Review:  CBC    CBC 10/3/22 10/3/22 10/3/22 10/3/22 10/3/22 10/4/22 10/4/22 10/4/22 10/4/22 10/5/22 10/5/22    0020 0411 1344 1849 2340 0435 1109 1804 2354 0512 1135   WBC  27.87 (A)    22.08 (A)    17.40 (A)    RBC  3.13 (A)    2.83 (A)    2.63 (A)    Hemoglobin 8.6 (A) 8.5 (A) 8.5 (A) 8.1 (A) 7.5 (A) 7.7 (A) 7.6 (A) 7.3 (A) 7.2 (A) 7.0 (A) 6.9 (A)   Hematocrit 27.2 (A) 26.7 (A) 26.9 (A) 25.4 (A) 23.8 (A) 24.0 (A) 23.7 (A) 23.4 (A) 22.5 (A) 22.8 (A) 21.9 (A)   MCV  85.3    84.8    86.7    MCH  27.2    27.2    26.6    MCHC  31.8    32.1    30.7 (A)    RDW  19.7 (A)    19.4 (A)    19.7 (A)    Platelets  224    192    182    (A) Abnormal value            CMP    CMP 10/3/22 10/4/22 10/4/22 10/5/22     0435 1653    Glucose 154 (A) 136 (A) 114 (A) 104 (A)   BUN 91 (A) 81 (A) 75 (A) 69 (A)   Creatinine 2.59 (A) 2.32 (A) 2.17 (A) 2.06 (A)   Sodium 132 (A) 135 (A) 136 136   Potassium 4.4 4.0 3.9 4.0   Chloride 97 (A) 102 104 105   Calcium 9.0 8.5 (A) 8.8 9.1   Albumin  2.30 (A) 2.20 (A) 2.60 (A)   Total Bilirubin  0.8  0.7   Alkaline Phosphatase  129 (A)  99   AST (SGOT)  25  14   ALT (SGPT)  15  14   (A) Abnormal value             CARDIAC LABS:      Lab 10/01/22  1929   PROBNP 4,675.0*        Lab Results   Component Value Date    DIGOXIN 0.34 (L) 10/01/2022      Lab Results   Component Value Date    TSH 2.430 06/20/2022           Invalid input(s): LDLCALC  No results found for: POCTROP  Lab Results   Component Value Date    TROPONINT 0.015 07/09/2022   (  Lab Results   Component Value Date    MG 2.1 10/05/2022     Results for orders placed during the hospital encounter of 10/01/22    Adult Transthoracic Echo Complete W/ Cont if Necessary Per Protocol    Interpretation Summary  · Left ventricular wall thickness is consistent with mild to moderate  concentric hypertrophy.  · Calculated left ventricular EF = 59% Estimated left ventricular EF was in agreement with the calculated left ventricular EF. Left ventricular systolic function is normal.  · The right atrial cavity is mild to moderately dilated.  · Estimated right ventricular systolic pressure from tricuspid regurgitation is normal (<35 mmHg).  · Mild aortic valve regurgitation is present  · Mild mitral valve regurgitation is present.           ASSESSMENT:  Shock probably septic  Atrial fibrillation with RVR  Severe anemia probably due to GI bleed  Lactic acidosis  Bilateral lower extremity wounds ulcerations and sloughing of skin  Chronic kidney disease      PLAN:   Increase Toprol-XL to 50 mg twice daily to control the heart rate          Lisa Leung MD  10/5/2022    13:55 EDT

## 2022-10-05 NOTE — PROGRESS NOTES
[facility]     Progress Note    Patient Name: Lauren Redd  : 1945  MRN: [id number]  Primary Care Physician:  Dennis Kohler MD  Date of admission: 10/1/2022    Subjective   Subjective     Here for follow-up due to extensive bilateral lower extremity wounds    Feeling somewhat better.  She indicates that her wounds developed after she was developing blisters and was accumulating fluid in her legs.  The blisters broke and left open wounds.    The patient now off pressors.    Objective   Objective     Vitals:   Temp:  [97.4 °F (36.3 °C)-97.9 °F (36.6 °C)] 97.4 °F (36.3 °C)  Heart Rate:  [] 125  Resp:  [17-32] 31  BP: ()/(55-88) 95/78  Flow (L/min):  [2] 2    Physical Exam   General: Alert, no acute distress  Extremities: Dressed.  Mild tenderness.    WBC: 17.4 down from 22.8 down from 27.87    Assessment & Plan   Assessment / Plan     Assessment/Plan:    Extensive bilateral lower extremity wounds, continue wound care.    Active Hospital Problems:  Active Hospital Problems    Diagnosis    • Severe anemia    • GI bleed    • Sepsis (HCC)    • CHF (congestive heart failure) (HCC)    • Cellulitis of anterior lower leg    • Atrial fibrillation with RVR (HCC)            Electronically signed by Marco Wood MD, 10/05/22, 12:46 PM EDT.

## 2022-10-05 NOTE — PROGRESS NOTES
Reason for follow-up shock    Subjective  Critically ill in the ICU with septic shock  Blood pressure still soft at this time  Is more tachycardic today  Cardiology managing with beta-blocker  Hemoglobin did drop to 6.9  Patient is going to receive blood today      Vitals:    10/05/22 1000 10/05/22 1200 10/05/22 1300 10/05/22 1400   BP: 95/78 99/70 (!) 89/67 113/72   BP Location: Right arm Right arm Right arm Right arm   Patient Position: Lying Lying Lying Lying   Pulse: (!) 125 113 99 (!) 124   Resp:       Temp:       TempSrc:       SpO2: 100% 100% 96% (!) 89%   Weight:       Height:             Physical Exam:  Vital Signs Reviewed   Critically ill, hypotensive, in mild distress, moaning at times  HEENT:  PERRL, EOMI.  OP, nares clear, no sinus tenderness  Neck:  Supple, no JVD, no thyromegaly  Lymph: no axillary, cervical, supraclavicular lymphadenopathy noted bilaterally  Chest:   Bilateral diminished breath sounds, no wheezing, crackles or rhonchi, resonant percussion bilaterally  CV: Tachycardic and hypertensive, no MGR, pulses 2+, equal  Abd:  Soft, NT, ND, + BS, no HSM  EXT:  no clubbing, no cyanosis, +edema, no joint tenderness, bilateral significant ulcerated wound which is bandaged in the shin area  Neuro: Somnolent, arousable, oriented x1, generalized weakness  Skin: Significant wounds in the right lower extremity with areas of large pustules with draining purulent secretions, the patient does have significant denuding of the skin on the left leg          Impression:  Shock, likely septic and hypovolemic  GI bleed  Acute blood loss anemia  Lactic acidosis  Bilateral lower extremity wound, ulcerated  A. fib with RVR  Hypoglycemia  Chronic kidney disease  Peripheral arterial disease  Multifactorial anemia    Plan  Respiratory: On 2 L of oxygen via nasal cannula      Cardiac: Cardiology following continue metoprolol, continue midodrine,   Beta-blocker being adjusted per cardiology  Midodrine for  hypotension  Levophed has been weaned off    Gi: Continue PPI      Renal: Continue to follow daily renal panels renal function appears to have plateaued at this time      Neuro: Currently at baseline neurological status      Endo: Sugars well controlled at this time      Heme: Eliquis on hold due to concern for possible bleeding GI has been consulted continue to follow did once again have drop in hemoglobin overnight we will transfuse blood again today      ID appreciate vascular surgery consulting,, repeat blood cultures negative at this time,  continue antibiotics    FEN: Continue nutrition    PPX: Hold on Eliquis at this time due to bleeding concerns, continue PPI    Patient is critically ill in ICU with  septic shock, presumed GI bleeding  I spent 30 minutes of critical care time excluding any procedure notes, spent in review, analysis, obtaining history and physical, formulating care plan, and I led multi-disciplinary critical care rounds with bedside nurse, respiratory therapist, clinical pharmacist and other allied services. I have discussed the case with primary service and other consultants as well.    Electronically signed by Jorge Gabriel DO, 10/05/22, 4:37 PM EDT.

## 2022-10-05 NOTE — PLAN OF CARE
Goal Outcome Evaluation:      Metoprolol 25mg BID increased to 50mg BID by cardiologist to begin with scheduled dose tonight. 5mg IVP Lopressor given x1 for rate control without much improvement as pt cont with intermittent periods of ST and A-fib RVR up to 150's-160's. Premedicated pt for evening wound care with scheduled po pain med and dilaudid iv, pt HR sustained 170-180's. Stopped wound care, stat ekg ordered. Janice Chapman, RN

## 2022-10-06 NOTE — PROGRESS NOTES
CARDIOLOGY  INPATIENT PROGRESS NOTE                [facility] INTENSIVE CARE UNIT    10/6/2022      PATIENT IDENTIFICATION:   Name:  Lauren Redd      MRN:  [id number]     77 y.o.  female                 SUBJECTIVE:   Patient was still hypotensiveon Levophed drip  Atrial fibrillation with rapid rate on Cardizem drip    OBJECTIVE:  Vitals:    10/06/22 0500 10/06/22 0600 10/06/22 0700 10/06/22 0946   BP: 113/73 108/90  116/86   Pulse: 90 96  (!) 139   Resp: 22 25     Temp:   95.5 °F (35.3 °C)    TempSrc:   Oral    SpO2:       Weight:       Height:               Body mass index is 30.37 kg/m².    Intake/Output Summary (Last 24 hours) at 10/6/2022 1123  Last data filed at 10/6/2022 0600  Gross per 24 hour   Intake 3834 ml   Output 2200 ml   Net 1634 ml       Telemetry:     Physical Exam  General Appearance:   · no acute distress  · Alert and oriented x3  HENT:   · lips not cyanotic  · Atraumatic  Neck:  · No jvd   · supple  Respiratory:  · no respiratory distress  · normal breath sounds  · no rales  Cardiovascular:    · regular rhythm  · no S3, no S4   · no murmur  · no rub  · lower extremity edema: none    Skin:   · warm, dry  · No rashes      Allergies   Allergen Reactions   • Contrast Dye Rash   • Lotrel [Amlodipine Besy-Benazepril Hcl] Unknown - Low Severity       Scheduled meds:  !NOT ORDERED- apixaban (ELIQUIS), , Does not apply, Daily With Dinner  acetaminophen, 500 mg, Oral, Q6H  cefepime, 2 g, Intravenous, Q24H  HYDROcodone-acetaminophen, 1 tablet, Oral, TID  metoprolol succinate XL, 75 mg, Oral, Q12H  midodrine, 10 mg, Oral, TID AC  pantoprazole, 80 mg, Intravenous, BID AC  Sodium Hypochlorite, , Topical, 2 times per day  triamcinolone, 1 application, Topical, 2 times per day      IV meds:                      dextrose 5 % and sodium chloride 0.9 %, 100 mL/hr, Last Rate: 100 mL/hr (10/06/22 0949)  dilTIAZem, 5-15 mg/hr, Last Rate: 10 mg/hr (10/01/22 9220)  norepinephrine, 0.02-0.3 mcg/kg/min,  Last Rate: Stopped (10/03/22 4392)  Pharmacy to Dose Cefepime,         Data Review:  CBC    CBC 10/4/22 10/4/22 10/4/22 10/4/22 10/5/22 10/5/22 10/5/22 10/5/22 10/6/22    0435 1109 1804 2354 0512 1135 1321 2354    WBC 22.08 (A)    17.40 (A)    17.53 (A)   RBC 2.83 (A)    2.63 (A)    2.67 (A)   Hemoglobin 7.7 (A) 7.6 (A) 7.3 (A) 7.2 (A) 7.0 (A) 6.9 (A) 7.2 (A) 7.4 (A) 7.2 (A)   Hematocrit 24.0 (A) 23.7 (A) 23.4 (A) 22.5 (A) 22.8 (A) 21.9 (A) 22.9 (A) 24.2 (A) 23.5 (A)   MCV 84.8    86.7    88.0   MCH 27.2    26.6    27.0   MCHC 32.1    30.7 (A)    30.6 (A)   RDW 19.4 (A)    19.7 (A)    20.1 (A)   Platelets 192    182    210   (A) Abnormal value            CMP    CMP 10/4/22 10/4/22 10/5/22 10/6/22    0435 1653     Glucose 136 (A) 114 (A) 104 (A) 107 (A)   BUN 81 (A) 75 (A) 69 (A) 67 (A)   Creatinine 2.32 (A) 2.17 (A) 2.06 (A) 1.93 (A)   Sodium 135 (A) 136 136 137   Potassium 4.0 3.9 4.0 4.4   Chloride 102 104 105 105   Calcium 8.5 (A) 8.8 9.1 9.1   Albumin 2.30 (A) 2.20 (A) 2.60 (A) 2.60 (A)   Total Bilirubin 0.8  0.7 0.7   Alkaline Phosphatase 129 (A)  99 117   AST (SGOT) 25  14 15   ALT (SGPT) 15  14 14   (A) Abnormal value             CARDIAC LABS:      Lab 10/01/22  1929   PROBNP 4,675.0*        Lab Results   Component Value Date    DIGOXIN 0.34 (L) 10/01/2022      Lab Results   Component Value Date    TSH 2.430 06/20/2022           Invalid input(s): LDLCALC  No results found for: POCTROP  Lab Results   Component Value Date    TROPONINT 0.015 07/09/2022   (  Lab Results   Component Value Date    MG 2.2 10/06/2022     Results for orders placed during the hospital encounter of 10/01/22    Adult Transthoracic Echo Complete W/ Cont if Necessary Per Protocol    Interpretation Summary  · Left ventricular wall thickness is consistent with mild to moderate concentric hypertrophy.  · Calculated left ventricular EF = 59% Estimated left ventricular EF was in agreement with the calculated left ventricular EF. Left  ventricular systolic function is normal.  · The right atrial cavity is mild to moderately dilated.  · Estimated right ventricular systolic pressure from tricuspid regurgitation is normal (<35 mmHg).  · Mild aortic valve regurgitation is present  · Mild mitral valve regurgitation is present.           ASSESSMENT:  Shock probably septic  Atrial fibrillation with RVR  Severe anemia probably due to GI bleed  Lactic acidosis  Bilateral lower extremity wounds ulcerations and sloughing of skin  Chronic kidney disease        PLAN:   Increase Toprol-XL to 75 mg twice a day  Continue Manuel Leung MD  10/6/2022    11:23 EDT

## 2022-10-06 NOTE — CONSULTS
"Nutrition Services    Patient Name: Lauren Redd  YOB: 1945  MRN: [id number]  Admission date: 10/1/2022      CLINICAL NUTRITION ASSESSMENT      Reason for Assessment  Follow-up protocol, Nonhealing wound or pressure ulcer     H&P:    Past Medical History:   Diagnosis Date   • Afib (HCC)    • Aneurysm (HCC)    • Arthritis    • CHF (congestive heart failure) ([facility])    • GERD (gastroesophageal reflux disease)    • Hypertension    • Irregular heart beat         Current Problems:   Active Hospital Problems    Diagnosis    • **Sepsis ([facility])    • Acute kidney injury superimposed on chronic kidney disease ([facility])    • Severe anemia    • GI bleed    • CHF (congestive heart failure) ([facility])    • Cellulitis of anterior lower leg    • Atrial fibrillation with RVR ([facility])         Nutrition/Diet History         Narrative     Patient admitted  w/ leg pain. Has multiple areas of impaired skin integrity to lower extremities. Would benefit from the addition of Ryan BID. Explained to patient the benefit and use of Ryan to promote wound healing. Patient verbalized understanding and is agreeable to Ryan BID. Provided with information sheet and explained where to purchase if she wishes to continue supplement after discharge home. Will CTM per protocol.     Anthropometrics        Current Height, Weight Height: 172.7 cm (68\")  Weight: 90.6 kg (199 lb 11.8 oz)   Current BMI Body mass index is 30.37 kg/m².       Weight Hx  Wt Readings from Last 30 Encounters:   10/02/22 0000 90.6 kg (199 lb 11.8 oz)   10/01/22 1922 96.5 kg (212 lb 11.9 oz)   07/14/22 0625 98.5 kg (217 lb 2.5 oz)   07/10/22 0524 92.9 kg (204 lb 12.9 oz)   07/10/22 0425 92.9 kg (204 lb 12.9 oz)   07/09/22 2252 107 kg (235 lb)   06/24/22 0600 112 kg (246 lb 14.6 oz)   06/22/22 1211 113 kg (249 lb 1.9 oz)   06/20/22 0130 109 kg (240 lb 4.8 oz)   06/19/22 2210 109 kg (240 lb 4.8 oz)   04/23/22 1241 112 kg (246 lb 4.1 oz)   04/20/22 0630 113 kg (248 lb 10.9 oz) "   04/19/22 0722 112 kg (247 lb 2.2 oz)   04/18/22 0500 112 kg (246 lb 4.1 oz)   04/16/22 0500 113 kg (249 lb 1.9 oz)   04/15/22 0503 115 kg (252 lb 6.8 oz)   04/14/22 0603 113 kg (250 lb 3.6 oz)   04/12/22 0600 114 kg (251 lb 1.7 oz)   04/11/22 0514 113 kg (248 lb 0.3 oz)   04/10/22 0500 114 kg (250 lb 10.6 oz)   04/09/22 0500 114 kg (251 lb 1.7 oz)   04/08/22 0502 114 kg (251 lb 5.2 oz)   04/07/22 0600 116 kg (255 lb 11.7 oz)   04/06/22 0500 115 kg (254 lb 10.1 oz)   04/05/22 0500 117 kg (257 lb 8 oz)   03/23/22 1500 106 kg (233 lb 7.5 oz)   03/14/22 1833 106 kg (233 lb 11 oz)   03/14/22 1521 107 kg (235 lb 0.2 oz)   03/14/22 0931 120 kg (265 lb)   03/10/22 0842 120 kg (265 lb)   03/09/22 0840 120 kg (265 lb)   03/08/22 0831 120 kg (265 lb)   02/28/22 0921 120 kg (265 lb)   02/23/22 0842 120 kg (265 lb)   02/07/22 2339 115 kg (252 lb 6.8 oz)   02/07/22 1558 114 kg (251 lb 15.8 oz)   02/07/22 1333 102 kg (225 lb)   01/20/22 1727 104 kg (229 lb)   12/14/21 1543 103 kg (227 lb)   03/19/19 0000 107 kg (235 lb)   01/07/19 0000 107 kg (235 lb)            Wt Change Observation 15% weight loss x 3 months      Estimated/Assessed Needs       Energy Requirements 25-30 kcal/kg IBW    EST Needs (kcal/day) 0861-6680       Protein Requirements 1.3 g/kg IBW    EST Daily Needs (g/day) 83       Fluid Requirements 1 ml/kcal     Estimated Needs (mL/day) 4210-4544     Labs/Medications         Pertinent Labs Reviewed.   Results from last 7 days   Lab Units 10/06/22  0457 10/05/22  0512 10/04/22  1653 10/04/22  0435   SODIUM mmol/L 137 136 136 135*   POTASSIUM mmol/L 4.4 4.0 3.9 4.0   CHLORIDE mmol/L 105 105 104 102   CO2 mmol/L 22.4 24.2 23.1 23.5   BUN mg/dL 67* 69* 75* 81*   CREATININE mg/dL 1.93* 2.06* 2.17* 2.32*   CALCIUM mg/dL 9.1 9.1 8.8 8.5*   BILIRUBIN mg/dL 0.7 0.7  --  0.8   ALK PHOS U/L 117 99  --  129*   ALT (SGPT) U/L 14 14  --  15   AST (SGOT) U/L 15 14  --  25   GLUCOSE mg/dL 107* 104* 114* 136*     Results from last 7  days   Lab Units 10/06/22  0457 10/05/22  1135 10/05/22  0512 10/04/22  1804 10/04/22  1653   MAGNESIUM mg/dL 2.2  --  2.1  --  2.1   PHOSPHORUS mg/dL 2.8  --  2.9  --  3.1   HEMOGLOBIN g/dL 7.2*   < > 7.0*   < >  --    HEMATOCRIT % 23.5*   < > 22.8*   < >  --     < > = values in this interval not displayed.     COVID19   Date Value Ref Range Status   04/23/2022 Not Detected Not Detected - Ref. Range Final     No results found for: HGBA1C      Pertinent Medications Reviewed.     Current Nutrition Orders & Evaluation of Intake       Oral Nutrition     Current PO Diet Diet Regular; Cardiac   Supplement Orders Placed This Encounter      Dietary Nutrition Supplements Ryan; orange       Malnutrition Severity Assessment                Nutrition Diagnosis         Nutrition Dx Problem 1 Increased nutrient needs related to increased demand for protein to promote wound healing as evidenced by multiple areas of impaired skin integrity.       Nutrition Intervention         Ryan BID to promote wound healing and follow up for nutrition interview.     Medical Nutrition Therapy/Nutrition Education          Learner     Readiness Patient  Acceptance     Method     Response Explanation and Written Material  Verbalizes understanding     Monitor/Evaluation        Monitor PO intake, Supplement intake, Skin status       Nutrition Discharge Plan         To be determined       Electronically signed by:  Blake Han RD  10/06/22 11:04 EDT

## 2022-10-06 NOTE — PROGRESS NOTES
Reason for follow-up shock    Subjective  Critically ill in the ICU with septic shock  Remains in A. fib with an RVR  Hemoglobin stable today  More short of breath today  More coughing    Vitals:    10/06/22 1100 10/06/22 1200 10/06/22 1346 10/06/22 1700   BP: 115/79      Pulse: (!) 134 (!) 129     Resp:   24    Temp: 97.5 °F (36.4 °C)   98.4 °F (36.9 °C)   TempSrc: Oral   Axillary   SpO2: 100% 90%     Weight:       Height:             Physical Exam:  Vital Signs Reviewed   Critically ill, hypotensive, in mild distress, moaning at times  HEENT:  PERRL, EOMI.  OP, nares clear, no sinus tenderness  Neck:  Supple, no JVD, no thyromegaly  Lymph: no axillary, cervical, supraclavicular lymphadenopathy noted bilaterally  Chest:    Patient with near persistent cough with talking now auscultation reveals expiratory wheezes heard throughout all lung fields  CV: Tachycardic and hypertensive, no MGR, pulses 2+, equal  Abd:  Soft, NT, ND, + BS, no HSM  EXT:  no clubbing, no cyanosis, +edema, no joint tenderness, bilateral significant ulcerated wound which is bandaged in the shin area  Neuro: Somnolent, arousable, oriented x1, generalized weakness  Skin: Significant wounds in the right lower extremity with areas of large pustules with draining purulent secretions, the patient does have significant denuding of the skin on the left leg          Impression:  Shock, likely septic and hypovolemic  GI bleed  Acute blood loss anemia  Lactic acidosis  Bilateral lower extremity wound, ulcerated  A. fib with RVR  Hypoglycemia  Chronic kidney disease  Peripheral arterial disease  Multifactorial anemia    Plan  Patient with significant cough and wheezing  We will start patient on as needed Xopenex due to her significant atrial fibrillation  Cardiac medications per cardiology service  Will likely need to start diuresing tomorrow if blood pressure can hold  Seen to follow H&H transfuse to hemoglobin less than 7  Continue to hold Eliquis at  this time  Continue PPI  From an antibiotic standpoint we will discontinue cefepime and start patient on cefazolin  Will likely need 2 weeks of cefazolin  We will hold off on treating the E faecalis from the wound culture patient appears to be improving clinically we will check procalcitonin to see if procalcitonin is trending down and ESR and CRP  There is no evidence of osteomyelitis on recent CT  This culture was a superficial wound culture and I do not suspect that the faecalis is causing systemic issues and she is off pressors now and she has improved  However if inflammatory or infectious markers not improved we will consider starting ampicillin however will hold at this time  Agree with iron supplementation  Obtain chest x-ray today  Start Tussionex for cough    Patient is critically ill in ICU with  septic shock, presumed GI bleeding  I spent 30 minutes of critical care time excluding any procedure notes, spent in review, analysis, obtaining history and physical, formulating care plan, and I led multi-disciplinary critical care rounds with bedside nurse, respiratory therapist, clinical pharmacist and other allied services. I have discussed the case with primary service and other consultants as well.      Electronically signed by Jorge Gabriel DO, 10/06/22, 6:46 PM EDT.

## 2022-10-06 NOTE — PROGRESS NOTES
[facility]     Progress Note    Patient Name: Lauren Redd  : 1945  MRN: [id number]  Primary Care Physician:  Dennis Kohler MD  Date of admission: 10/1/2022      Subjective   Brief summary.  Admitted to ICU with sepsis and anemia and cellulitis of legs      HPI:  Active bleed hemoglobin hovering around 7.  Patient's pain slightly better.   at bedside.  Drainage improving.  No fever chills.  No blood in the stools.    Review of Systems     Fatigue and weakness  Palpitations  Increased heart rate  No chest pain  No abdominal pain  Bilateral leg pain      Objective     Vitals:   Temp:  [95.5 °F (35.3 °C)-98.4 °F (36.9 °C)] 98.4 °F (36.9 °C)  Heart Rate:  [] 129  Resp:  [20-33] 24  BP: ()/() 115/79  Flow (L/min):  [4-6] 6    Physical Exam :     *Elderly female, sitting upright in bed  HEENT with pallor..  No icterus  Heart irregular and tacky  Bilateral diminished breath  Neuro awake and alert  Abdomen obese and soft nontender  Extremities with 3+ to 4+ edema with extensive ulceration and drainage.  Open ulcers in both legs      Result Review:  I have personally reviewed the results from the time of this admission to 10/6/2022 17:35 EDT and agree with these findings:  [x]  Laboratory  []  Microbiology  []  Radiology  []  EKG/Telemetry   []  Cardiology/Vascular   []  Pathology  []  Old records  []  Other:       Hemoglobin 7.2  Assessment / Plan       Active Hospital Problems:  Active Hospital Problems    Diagnosis    • **Sepsis ([facility])    • Acute kidney injury superimposed on chronic kidney disease ([facility])    • Severe anemia    • GI bleed    • CHF (congestive heart failure) ([facility])    • Cellulitis of anterior lower leg    • Atrial fibrillation with RVR ([facility])        Plan:   Slow improvement  Did not receive transfusion yesterday as hemoglobin crossed 7 g  Antibiotic changed to Ancef  Noted Toprol dose increased  We will add IV iron supplements  Check a ferritin level  If continues to  remain stable transfer out of unit tomorrow  Discussed with patient's   RN notified       DVT prophylaxis:  Medical DVT prophylaxis orders are present.    CODE STATUS:   Code Status (Patient has no pulse and is not breathing): CPR (Attempt to Resuscitate)  Medical Interventions (Patient has pulse or is breathing): Full Support  Release to patient: Routine Release            Electronically signed by Pablo Fajardo MD, 10/06/22, 5:35 PM EDT.

## 2022-10-07 NOTE — CONSULTS
Midline Line Insertion Procedure Note    Procedure: Insertion of #20G/10CM PowerGlide    Indications:  Poor Access    Procedure Details   Sterile technique was used including antiseptics, gown, hand hygiene, mask and sheet.    #20G/10CM PowerGlide inserted to the R Brachial vein per hospital protocol.   Blood return:  yes    Findings:  Catheter inserted to 10 cm, with 0 cm. Exposed. There were no changes to vital signs. Catheter was flushed with 20 cc NS. Patient did tolerate procedure well.    LOT: [id number]  Expiration date: 2023-08-31    Recommendations:  Midline Brochure given to patient with teaching instruction.

## 2022-10-07 NOTE — PROGRESS NOTES
[facility]     Progress Note    Patient Name: Lauren Redd  : 1945  MRN: [id number]  Primary Care Physician:  Dennis Kohler MD  Date of admission: 10/1/2022      Subjective   Brief summary.  Admitted to ICU with sepsis and anemia and cellulitis of legs      HPI:  Heart rate increased to 140 when I saw her today  Blood pressure stable  No chest pain      Review of Systems     Fatigue and weakness  Palpitations  Increased heart rate  No chest pain  No abdominal pain  Bilateral leg pain, drainage improving      Objective     Vitals:   Temp:  [97.1 °F (36.2 °C)-98.2 °F (36.8 °C)] 98.1 °F (36.7 °C)  Heart Rate:  [110-134] 132  Resp:  [13-33] 27  BP: (100-124)/(67-98) 109/76  Flow (L/min):  [4] 4    Physical Exam :     Elderly female, sitting upright in bed  Eating her lunch  HEENT with pallor..  No icterus  Heart irregular and tacky  Bilateral diminished breath sounds  Abdomen obese and soft nontender  Extremities with 3+ to 4+ edema with extensive ulceration and drainage.  Open ulcers in both legs      Result Review:  I have personally reviewed the results from the time of this admission to 10/7/2022 17:23 EDT and agree with these findings:  [x]  Laboratory  []  Microbiology  []  Radiology  []  EKG/Telemetry   []  Cardiology/Vascular   []  Pathology  []  Old records  []  Other:         Assessment / Plan       Active Hospital Problems:  Active Hospital Problems    Diagnosis    • **Sepsis ([facility])    • Acute kidney injury superimposed on chronic kidney disease ([facility])    • Severe anemia    • GI bleed    • CHF (congestive heart failure) ([facility])    • Cellulitis of anterior lower leg    • Atrial fibrillation with RVR ([facility])        Plan:   Slow improvement  Blood pressure stabilized now  No active bleed  Continues to remain in A. fib with rapid rate  Transfer out of ICU  Appreciate consultants help  Monitor labs    DVT prophylaxis:  Medical DVT prophylaxis orders are present.    CODE STATUS:   Code Status (Patient  has no pulse and is not breathing): CPR (Attempt to Resuscitate)  Medical Interventions (Patient has pulse or is breathing): Full Support  Release to patient: Routine Release            Electronically signed by Pablo Fajardo MD, 10/07/22, 5:25 PM EDT.

## 2022-10-07 NOTE — PROGRESS NOTES
Date of service: 10/7/2022    Subjective: Resting comfortably after being given analgesics for leg pain.  She does not seem in any pain or respiratory distress.    Review of systems: Unobtainable at this time as stated above    Physical examination:  HEENT: Sclerae nonicteric.  Neck: No JVD or carotid bruit.  Chest: CTA.  Breath sounds decreased all over.  Cardiac: Irregularly irregular.  No S3 gallop.  Distant S1-S2.  Abdomen: Soft, obese and nontender.  Extremities: 2/4 lower leg and ankle edema.  Open sore on the lower chin.  Pulses: Diminished due to edema.     Records: Reviewed     Assessment and plan:     1.  Atrial fibrillation with RVR.  Never started on IV Cardizem drip per her RN; Janice.  2.  Probable septic shock  3.  Hypertensive heart disease with LVH  5.  Severe acute anemia, probably due to GI bleed  4.  CRD  5.  Peripheral vascular disease  6.  Change metoprolol succinate to 100 mg p.o. twice daily  7.  A.m. labs

## 2022-10-07 NOTE — PLAN OF CARE
Goal Outcome Evaluation:  Plan of Care Reviewed With: patient    ASSESSMENT/ PLAN OF CARE:  Pt presents with limitations, noted below, that impede patient's ability to tolerate regular solids and thin liquids safely and independently. The skills of a therapist will be required to safely and effectively implement the following treatment plan to restore maximal level of function.    PROBLEMS:  1.  Risk of aspiration, swallow delay  TREATMENT: Speech therapy for dysphagia, education of strategies and tolerance of least restrictive diet.    FREQUENCY/DURATION: Daily, 5 days a week    REHAB POTENTIAL:  Pt has good rehab potential.  The following limitations may influence improvement/ length of tx medical status.    RECOMMENDATIONS:   1.   DIET: Regular solids, thin liquids    2.  POSITION: Upright for all p.o. intake, 30 minutes following    3.  COMPENSATORY STRATEGIES: Small bites and sips, single controlled sips if utilizing straw

## 2022-10-07 NOTE — PROGRESS NOTES
Reason for follow-up shock    Subjective  Critically ill in the ICU with septic shock  Remains in A. fib with an RVR  Heart rate remains elevated  Hemoglobin stable today  Cough and shortness of breath improved with diuretics  Still very weak  Vitals:    10/07/22 1325 10/07/22 1400 10/07/22 1500 10/07/22 1600   BP: 117/85 109/76     BP Location:       Patient Position:       Pulse: (!) 128 (!) 132     Resp:       Temp:       TempSrc:       SpO2:  96% 94% 100%   Weight:       Height:             Physical Exam:  Vital Signs Reviewed   Critically ill, hypotensive, in mild distress, moaning at times  HEENT:  PERRL, EOMI.  OP, nares clear, no sinus tenderness  Neck:  Supple, no JVD, no thyromegaly  Lymph: no axillary, cervical, supraclavicular lymphadenopathy noted bilaterally  Chest:    Patient with near persistent cough with talking now auscultation reveals expiratory wheezes heard throughout all lung fields  CV: Tachycardic and hypertensive, no MGR, pulses 2+, equal  Abd:  Soft, NT, ND, + BS, no HSM  EXT:  no clubbing, no cyanosis, +edema, no joint tenderness, bilateral significant ulcerated wound which is bandaged in the shin area  Neuro: Somnolent, arousable, oriented x1, generalized weakness  Skin: Significant wounds in the right lower extremity with areas of large pustules with draining purulent secretions, the patient does have significant denuding of the skin on the left leg          Impression:  Shock, likely septic and hypovolemic  GI bleed  Acute blood loss anemia  Lactic acidosis  Bilateral lower extremity wound, ulcerated  A. fib with RVR  Hypoglycemia  Chronic kidney disease  Peripheral arterial disease  Multifactorial anemia    Plan  Cough much improved with nebulizers  X-ray reviewed from yesterday patient does have increasing pleural effusions  We will give patient a dose of diuretic today  Continue to trend H&H  Continue to hold Eliquis for anemia of unexplained etiology  Beta-blockers per cardiology  service  Continue for total of 2 weeks for the patient's bloodstream infection likely related to her wounds  We will not treat the Enterococcus faecalis in her wounds as this was a superficial wound culture patient has responded clinically no longer in shock and has decreasing procalcitonin with no significant elevation inflammatory markers  Continue as needed Tussionex      Patient is critically ill in ICU with  septic shock, presumed GI bleeding  I spent 30 minutes of critical care time excluding any procedure notes, spent in review, analysis, obtaining history and physical, formulating care plan, and I led multi-disciplinary critical care rounds with bedside nurse, respiratory therapist, clinical pharmacist and other allied services. I have discussed the case with primary service and other consultants as well.      Electronically signed by Jorge Gabriel DO, 10/07/22, 5:02 PM EDT.

## 2022-10-07 NOTE — THERAPY EVALUATION
Acute Care - Speech Language Pathology   Swallow Initial Evaluation MARLA Kraus     Patient Name: Lauren Redd  : 1945  MRN: [id number]  Today's Date: 10/7/2022               Admit Date: 10/1/2022    Visit Dx:     ICD-10-CM ICD-9-CM   1. Anemia, unspecified type  D64.9 285.9   2. Upper GI bleed  K92.2 578.9   3. Atrial fibrillation with RVR ([facility])  I48.91 427.31   4. Venous stasis ulcers of both lower extremities ([facility])  I83.019 459.81    I83.029 707.10    L97.919     L97.929    5. Oropharyngeal dysphagia  R13.12 787.22     Patient Active Problem List   Diagnosis   • Positive colorectal cancer screening using Cologuard test   • Atrial fibrillation with RVR ([facility])   • CHF (congestive heart failure) ([facility])   • Cellulitis of anterior lower leg   • Weakness generalized   • Atrial fibrillation ([facility])   • Pneumonia due to infectious organism   • Pneumonia due to infectious organism, unspecified laterality, unspecified part of lung   • Epistaxis   • Sepsis ([facility])   • Severe anemia   • GI bleed   • Acute kidney injury superimposed on chronic kidney disease ([facility])     Past Medical History:   Diagnosis Date   • Afib ([facility])    • Aneurysm ([facility])    • Arthritis    • CHF (congestive heart failure) ([facility])    • GERD (gastroesophageal reflux disease)    • Hypertension    • Irregular heart beat      Past Surgical History:   Procedure Laterality Date   • COLONOSCOPY     • HYSTERECTOMY           Inpatient Speech Pathology Dysphagia Evaluation        PAIN SCALE: None indicated    PRECAUTIONS/CONTRAINDICATIONS: Standard, fall    SUSPECTED ABUSE/NEGLECT/EXPLOITATION: None identified    SOCIAL/PSYCHOLOGICAL NEEDS/BARRIERS: None identified    PAST SOCIAL HISTORY: 77-year-old female, lives at home    PRIOR FUNCTION: Indicates no prior swallowing difficulty until 10/6/2022    PATIENT GOALS/EXPECTATIONS: To feel better and return home    HISTORY: 77-year-old female referred for speech pathology dysphagia evaluation for assessment of swallow  function.  Patient and nursing both state coughing and becoming choked while eating and drinking on 10/6/2022.  Chest x-ray completed with new right lung atelectasis.  Nursing also reports that patient's lung sounds with wheezes.  Patient states that she eats and drinks regular food at home without any apparent difficulty.  No prior speech pathology services.    CURRENT DIET LEVEL: Regular solids, thin liquids    OBJECTIVE:    TEST ADMINISTERED: Clinical dysphagia evaluation    COGNITION/SAFETY AWARENESS: Appears appropriate for environment have not thoroughly evaluated    BEHAVIORAL OBSERVATIONS: Alert and cooperative.      ORAL MOTOR EXAM: Mild decreased oral motor strength/range of motion 4+/5    VOICE QUALITY: Mild hoarseness however patient states this is her baseline    REFLEX EXAM: Dry nonproductive cough    POSTURE: Sitting fully upright    FEEDING/SWALLOWING FUNCTION: Assessed with thin liquids, nectar thick liquids, purée solids and regular solids    CLINICAL OBSERVATIONS: Nectar thick liquids by spoon, cup and controlled straw drink.  Swallows completed with mild delay.  Vocal quality clear and laryngeal sounds clear with cervical auscultation.  Thin liquids by spoon and by cup with swallows completed with mild delay.  Thin liquids by straw with single sip.  Swallows completed with mild delay.  Free drinking of thin liquid via straw with swallow completed with mild delay with coughing observed.  Second trial of controlled straw drink of thin liquids with swallows completed without any overt signs of aspiration.  Purée solids with swallows completed with mild delay.  Regular solids with anterior chewing.  Swallow completed with mild delay.  Some diffuse oral residue observed however patient clears with minimal cues for lingual sweep and liquid wash assist.  Laryngeal elevation noted palpation.  Patient demonstrating signs of aspiration with free drinking of thin liquids via straw.  Exhibits mild swallow  delay with all consistencies.  Silent aspiration cannot be ruled out at bedside.    DYSPHAGIA CRITERIA: Risk of aspiration due to swallow delay    FUNCTIONAL ASSESSMENT INSTRUMENT: Patient currently scored a level 6 of 7 on Functional Communication Measures for swallowing indicating a 1-19% limitation in function.    ASSESSMENT/ PLAN OF CARE:  Pt presents with limitations, noted below, that impede patient's ability to tolerate regular solids and thin liquids safely and independently. The skills of a therapist will be required to safely and effectively implement the following treatment plan to restore maximal level of function.    PROBLEMS:  1.  Risk of aspiration, swallow delay   LTG 1: 30 days.  Patient will increase functional communication measures for swallowing to level 7 of 7, indicating 0% limitation in function.   STG 1a: 14 days.  Patient will tolerate least restrictive diet of regular solids and thin liquids with minimal to no signs or symptoms of aspiration.   STG 1b: 14 days.  Patient will tolerate least restrictive diet of regular solids and thin liquids utilizing strategies independently.   STG 1c: 14 days.  Patient will tolerate least restrictive diet of regular solids and thin liquids utilizing strategies with minimal cueing.   TREATMENT: Speech therapy for dysphagia, education of strategies and tolerance of least restrictive diet.    FREQUENCY/DURATION: Daily, 5 days a week    REHAB POTENTIAL:  Pt has good rehab potential.  The following limitations may influence improvement/ length of tx medical status.    RECOMMENDATIONS:   1.   DIET: Regular solids, thin liquids    2.  POSITION: Upright for all p.o. intake, 30 minutes following    3.  COMPENSATORY STRATEGIES: Small bites and sips, single controlled sips if utilizing straw    Pt/responsible party agrees with plan of care and has been informed of all alternatives, risks and benefits.  SLP Recommendation and Plan                          Anticipated  Discharge Disposition (SLP): home with home health, inpatient rehabilitation facility (10/07/22 1046)                                                         EDUCATION  The patient has been educated in the following areas:   Dysphagia (Swallowing Impairment).              Time Calculation:    Time Calculation- SLP     Row Name 10/07/22 1046             Time Calculation- SLP    SLP Start Time 0945  -SN      SLP Stop Time 1045  -SN      SLP Time Calculation (min) 60 min  -SN      SLP Received On 10/07/22  -SN              Untimed Charges    [id number] Eval Oral Pharyng Swallow Minutes 60  -SN              Total Minutes    Untimed Charges Total Minutes 60  -SN       Total Minutes 60  -SN            User Key  (r) = Recorded By, (t) = Taken By, (c) = Cosigned By    Initials Name Provider Type    SN Latia Miranda SLP Speech and Language Pathologist                Therapy Charges for Today     Code Description Service Date Service Provider Modifiers Qty    [id number] HC ST EVAL ORAL PHARYNG SWALLOW 4 10/7/2022 Latia Miranda SLP GN 1               SUKHJINDER Bray  10/7/2022

## 2022-10-08 NOTE — CONSULTS
Discharge Planning Assessment  MARLA Kraus     Patient Name: Lauren Redd  MRN: [id number]  Today's Date: 10/8/2022    Admit Date: 10/1/2022    Plan: CM assessment completed in room with patient.  CM role explained and discharge planning discussed. Demographics, PCP and Pharmacy verified and updated as appropriate. Patient is current with listed PCP.    Patient lives at home with . Patient states was independent and drives. Patient states is continually getting weaker and has wound issues on legs and buttocks. Patient would like to discharge to rehab. PT/OT ordered, pending. Prefer [facility]. SW notified.   Discharge Needs Assessment     Row Name 10/08/22 1042       Living Environment    People in Home spouse    Current Living Arrangements home    Primary Care Provided by self    Provides Primary Care For no one    Family Caregiver if Needed spouse    Quality of Family Relationships helpful;involved;supportive    Able to Return to Prior Arrangements yes       Resource/Environmental Concerns    Resource/Environmental Concerns none    Transportation Concerns none  PATIENT AND  DRIVE       Transition Planning    Patient/Family Anticipates Transition to inpatient rehabilitation facility    Patient/Family Anticipated Services at Transition skilled nursing;rehabilitation services    Transportation Anticipated family or friend will provide       Discharge Needs Assessment    Readmission Within the Last 30 Days no previous admission in last 30 days    Equipment Currently Used at Home cane, straight;rollator;oxygen;wound care supplies    Concerns to be Addressed discharge planning    Anticipated Changes Related to Illness inability to care for self    Equipment Needed After Discharge nebulizer    Outpatient/Agency/Support Group Needs skilled nursing facility;inpatient rehabilitation facility    Discharge Facility/Level of Care Needs rehabilitation facility;nursing facility, skilled    Current  Discharge Risk physical impairment               Discharge Plan     Row Name 10/08/22 1108       Plan    Plan CM assessment completed in room with patient.  CM role explained and discharge planning discussed. Demographics, PCP and Pharmacy verified and updated as appropriate. Patient is current with listed PCP.    Patient lives at home with . Patient states was independent and drives. Patient states is continually getting weaker and has wound issues on legs and buttocks. Patient would like to discharge to rehab. PT/OT ordered, pending. [facility]. SW notified.    Patient/Family in Agreement with Plan yes              Continued Care and Services - Admitted Since 10/1/2022    Coordination has not been started for this encounter.          Demographic Summary     Row Name 10/08/22 1040       General Information    Admission Type inpatient    Arrived From emergency department    Referral Source admission list    Reason for Consult discharge planning    Preferred Language English               Functional Status     Row Name 10/08/22 1041       Functional Status    Usual Activity Tolerance moderate    Current Activity Tolerance poor       Functional Status, IADL    Medications independent    Meal Preparation independent    Housekeeping independent    Laundry independent    Shopping completely dependent       Mental Status    General Appearance WDL WDL       Mental Status Summary    Recent Changes in Mental Status/Cognitive Functioning no changes       Employment/    Employment Status retired               Psychosocial    No documentation.                Abuse/Neglect    No documentation.                Legal    No documentation.                Substance Abuse    No documentation.                Patient Forms    No documentation.                   Bethany Montelongo

## 2022-10-08 NOTE — PROGRESS NOTES
Date of service: 10/8/2022    Subjective: Has bilateral upper chest pain-discomfort.  Breathing is better.  No palpitations or dizziness.    Review of systems: She is eating at this time without any nausea, vomiting or abdominal pain.  No headaches, vertigo, fever or chills.    Physical examination:  HEENT: Sclerae nonicteric.  Neck: No JVD or carotid bruit.  Chest: CTA.    Bilateral upper chest wall tendernes was noted.  Cardiac: Irregularly irregular.  No S3 gallop.  Distant S1-S2.  Abdomen: Soft, obese and nontender.  BS: Present  Extremities: 2/4 lower leg and ankle edema.  Open sores on the lower chins.  Pulses: Diminished due to edema.     Records: Reviewed    Assessment and plan:     1.  Atrial fibrillation with controlled ventricular rate.  2.  Probable septic shock  3.  Hypertensive heart disease with LVH  5.  Severe acute anemia, probably due to GI bleed  4.  Musculoskeletal chest pain.  Tylenol 650 mg p.o. twice daily for a few days  5.  Chronic renal disease-stage III  6.  Peripheral vascular disease  7.  Continue metoprolol succinate 100 mg p.o. twice daily  8.  A.m. labs

## 2022-10-08 NOTE — PROGRESS NOTES
[facility]     Progress Note    Patient Name: Lauren Redd  : 1945  MRN: [id number]  Primary Care Physician:  Dennis Kohler MD  Date of admission: 10/1/2022      Subjective   Brief summary.  Admitted to ICU with sepsis and anemia and cellulitis of legs      HPI:    Interval follow-up;  A. fib rate controlled.  Off Cardizem drip.  On 3 L nasal cannula.  At home uses 2 L.  Denies any chest pain or shortness of breath.  Remains weak.  Blood pressure stable  ABG noted      Review of Systems     Fatigue and weakness  Palpitations  Increased heart rate  No chest pain  No abdominal pain  Bilateral leg pain, drainage improving      Objective     Vitals:   Temp:  [97.3 °F (36.3 °C)-98.6 °F (37 °C)] 98.6 °F (37 °C)  Heart Rate:  [] 96  Resp:  [12-20] 16  BP: (100-119)/(59-83) 112/76  Flow (L/min):  [3-5] 3    Physical Exam :     Elderly female, sitting upright in bed  Eating her lunch  HEENT with pallor..  No icterus  Heart irregular   Bilateral diminished breath sounds  Abdomen obese and soft nontender  Extremities with 3+ to 4+ edema with extensive ulceration and drainage.  Open ulcers in both legs.  Both extremities wrapped      Result Review:  I have personally reviewed the results from the time of this admission to 10/8/2022 13:51 EDT and agree with these findings:  [x]  Laboratory  [x]  Microbiology  []  Radiology  []  EKG/Telemetry   []  Cardiology/Vascular   []  Pathology  []  Old records  [x]  Other: Medications         Assessment / Plan       Active Hospital Problems:  Active Hospital Problems    Diagnosis    • **Sepsis ([facility])    • Acute kidney injury superimposed on chronic kidney disease ([facility])    • Severe anemia    • GI bleed    • CHF (congestive heart failure) ([facility])    • Cellulitis of anterior lower leg    • Atrial fibrillation with RVR ([facility])    MSSA bacteremia in .  Subsequent blood cultures  are negative.  Acute on chronic diastolic CHF.  CKD.  Bilateral pleural  effusions due to CHF.  Peripheral vascular disease.  Chronic hypoxemic respiratory failure on home oxygen 2 L.    Plan:   May need diuresis  IV Ancef for total of 2 weeks after clearing of blood culture on October 3.  S/p midline placement right upper extremity.  Holding Eliquis due to anemia.  Resume as per cardiology  Appreciate cardiology input.  Continue metoprolol  Appreciate GI input.  Appreciate intensivist input.  Evaluated by vascular  S/p IV iron transfusion.  On Midrin.  May DC since blood pressure is up now.  IV Protonix.  Wound care  Monitor labs  PT OT  Patient [facility]    DVT prophylaxis:  Medical DVT prophylaxis orders are present.    CODE STATUS:   Code Status (Patient has no pulse and is not breathing): CPR (Attempt to Resuscitate)  Medical Interventions (Patient has pulse or is breathing): Full Support  Release to patient: Routine Release            Electronically signed by Eric Zambrano MD, 10/08/22, 1:56 PM EDT.

## 2022-10-09 NOTE — PLAN OF CARE
Goal Outcome Evaluation:   Patient hemoglobin low this morning, 2 units of blood given throughout shift, tolerated well, no reactions noted.  Patient refused wound care this shift, patient educated on infection potential.  Patient more alert and oriented

## 2022-10-09 NOTE — PROGRESS NOTES
Date of service: 10/9/2022    Subjective: No chest pain, SOB at rest, palpitations or lightheadedness.  Eating at this time.    Review of systems: No headaches, vertigo, fever, chills, nausea, vomiting, abdominal pain or TIA like symptoms.    Physical examination:  HEENT: Sclerae nonicteric.  Neck: No JVD or carotid bruit.  Chest: CTA.      No chest wall tenderness.  Cardiac: Irregularly irregular.  No S3 gallop.  Distant S1-S2.  Abdomen: Soft, obese and nontender.  BS: Present  Extremities: 2/4 lower leg and ankle edema.  Open sores on the lower chins.  Pulses: Diminished due to edema.     Records: Reviewed     Assessment and plan:    1.  Atrial fibrillation with controlled ventricular rate.  2.  Probable septic shock  3.  Hypertensive heart disease with LVH  5.  Severe acute anemia, probably due to GI bleed.  Given 1 unit of packed red cells.  4.  Musculoskeletal chest pain, resolved.  5.  Chronic renal disease-stage III  6.  Peripheral vascular disease  7.  Continue  the same cardiac medications.  8.  We will give another unit of packed red cells discussed with Dr. Zambrano.    9.  A.m. labs

## 2022-10-09 NOTE — PROGRESS NOTES
[facility]     Progress Note    Patient Name: Lauren Redd  : 1945  MRN: [id number]  Primary Care Physician:  Dennis Kohler MD  Date of admission: 10/1/2022      Subjective   Brief summary.  Admitted to ICU with sepsis and anemia and cellulitis of legs      HPI:    Interval follow-up;  A. fib with occasional RVR due to pain from lower extremity dressing changes.  Off Cardizem drip.  On 3 L nasal cannula.  At home uses 2 L.  Denies any chest pain or shortness of breath.  Dropped hemoglobin again.  No evidence of active bleeding.  No blood in the stools as per nursing staff.  No vomiting blood or abdominal pain or distention.  Remains weak.  Blood pressure stable        Review of Systems     Fatigue and weakness  Palpitations  Increased heart rate  No chest pain  No abdominal pain  Bilateral leg pain, drainage improving      Objective     Vitals:   Temp:  [97.4 °F (36.3 °C)-99 °F (37.2 °C)] 99 °F (37.2 °C)  Heart Rate:  [] 77  Resp:  [15-20] 20  BP: ()/(54-83) 115/75  Flow (L/min):  [3] 3    Physical Exam :     Elderly female, sitting upright in bed  Eating her lunch  HEENT with pallor..  No icterus  Heart irregular   Bilateral diminished breath sounds  Abdomen obese and soft nontender  Extremities with 3+ to 4+ edema with extensive ulceration and drainage.  Open ulcers in both legs.  Both extremities wrapped      Result Review:  I have personally reviewed the results from the time of this admission to 10/9/2022 13:45 EDT and agree with these findings:  [x]  Laboratory  [x]  Microbiology  []  Radiology  []  EKG/Telemetry   []  Cardiology/Vascular   []  Pathology  []  Old records  [x]  Other: Medications         Assessment / Plan       Active Hospital Problems:  Active Hospital Problems    Diagnosis    • **Sepsis ([facility])    • Acute kidney injury superimposed on chronic kidney disease ([facility])    • Severe anemia    • GI bleed    • CHF (congestive heart failure) ([facility])    • Cellulitis of anterior  lower leg    • Atrial fibrillation with RVR ([facility])    MSSA bacteremia in October 1.  Subsequent blood cultures October 3rd are negative.  Acute on chronic diastolic CHF.  CKD.  Bilateral pleural effusions due to CHF.  Peripheral vascular disease.  Chronic hypoxemic respiratory failure on home oxygen 2 L.    Plan:   May need diuresis  Transfuse 2 units of blood October 9  Trend hemoglobin transfuse if less than 7  Check reticulocyte count and haptoglobin  IV Ancef for total of 2 weeks after clearing of blood culture on October 3.  S/p midline placement right upper extremity.  Holding Eliquis due to anemia.  Resume as per cardiology  Appreciate cardiology input.  Discussed with cardiology continue metoprolol  Appreciate GI input.  For EGD in a.m.  May need CT scan abdomen pelvis for evaluation of anemia  Appreciate intensivist input.  Evaluated by vascular  S/p IV iron transfusion.  On Midrin.    IV Protonix.  Wound care/pain control with IV and oral narcotics.  Monitor labs  PT OT  [facility]    DVT prophylaxis:  Medical DVT prophylaxis orders are present.  Lovenox.  Holding home Eliquis    CODE STATUS:   Code Status (Patient has no pulse and is not breathing): CPR (Attempt to Resuscitate)  Medical Interventions (Patient has pulse or is breathing): Full Support  Release to patient: Routine Release              Electronically signed by Eric Zambrano MD, 10/09/22, 1:48 PM EDT.

## 2022-10-10 NOTE — SIGNIFICANT NOTE
10/10/22 1100   Coping/Psychosocial   Observed Emotional State calm;cooperative   Verbalized Emotional State hopefulness   Trust Relationship/Rapport empathic listening provided   Involvement in Care interacting with patient   Additional Documentation Spiritual Care (Group)   Spiritual Care   Use of Spiritual Resources spirituality for coping, indicated strong use of   Spiritual Care Source  initiative   Spiritual Care Follow-Up follow-up, none required as presently assessed   Response to Spiritual Care receptive of support   Spiritual Care Interventions supportive conversation provided   Spiritual Care Visit Type initial   Receptivity to Spiritual Care visit welcomed

## 2022-10-10 NOTE — PLAN OF CARE
Goal Outcome Evaluation:         VSS. Patient medicated through shift for pain per mar. Patient requested to wait for dressing change as she has been crying in pain. MD to change dosing on pain medication for better pain control.

## 2022-10-10 NOTE — PLAN OF CARE
Goal Outcome Evaluation:  Plan of Care Reviewed With: patient        Progress: no change (first session: evaluation)  Outcome Evaluation: Patient presents with limitations of balance and endurance/ activity tolerance impacting ADL/transfer independence. OT is needed to remediate/compensate for deficits to maximize independence and safety.    DC Recommendation: inpatient rehab, sub-acute rehab

## 2022-10-10 NOTE — THERAPY EVALUATION
Patient Name: Lauren Redd  : 1945    MRN: [id number]                              Today's Date: 10/10/2022       Admit Date: 10/1/2022    Visit Dx:     ICD-10-CM ICD-9-CM   1. Anemia, unspecified type  D64.9 285.9   2. Upper GI bleed  K92.2 578.9   3. Atrial fibrillation with RVR ([facility])  I48.91 427.31   4. Venous stasis ulcers of both lower extremities ([facility])  I83.019 459.81    I83.029 707.10    L97.919     L97.929    5. Oropharyngeal dysphagia  R13.12 787.22   6. Gastrointestinal hemorrhage, unspecified gastrointestinal hemorrhage type  K92.2 578.9   7. Difficulty in walking  R26.2 719.7   8. Decreased activities of daily living (ADL)  Z78.9 V49.89     Patient Active Problem List   Diagnosis   • Positive colorectal cancer screening using Cologuard test   • Atrial fibrillation with RVR ([facility])   • CHF (congestive heart failure) ([facility])   • Cellulitis of anterior lower leg   • Weakness generalized   • Atrial fibrillation ([facility])   • Pneumonia due to infectious organism   • Pneumonia due to infectious organism, unspecified laterality, unspecified part of lung   • Epistaxis   • Sepsis ([facility])   • Severe anemia   • GI bleed   • Acute kidney injury superimposed on chronic kidney disease ([facility])     Past Medical History:   Diagnosis Date   • Afib ([facility])    • Aneurysm ([facility])    • Arthritis    • CHF (congestive heart failure) ([facility])    • GERD (gastroesophageal reflux disease)    • Hypertension    • Irregular heart beat      Past Surgical History:   Procedure Laterality Date   • COLONOSCOPY     • HYSTERECTOMY        General Information     Row Name 10/10/22 1125          OT Time and Intention    Document Type evaluation  -AV     Mode of Treatment individual therapy;occupational therapy  -AV     Row Name 10/10/22 1125          General Information    Patient Profile Reviewed yes  -AV     Prior Level of Function independent:;ADL's;home management;all household mobility  stands at sink to groom. uses walk-in shower with seat,grab  bar and hand held nozzle. regular commode. ambulates with STC. 2L continuous O2.  -AV     Existing Precautions/Restrictions fall;oxygen therapy device and L/min  impaired skin integrity: buttocks and legs  -AV     Barriers to Rehab none identified  -AV     Row Name 10/10/22 1125          Occupational Profile    Reason for Services/Referral (Occupational Profile) Patient is a 77 year old female admitted to [facility] on 10/1/2022 with lower extremity pain. She is currently in PCU2/ on 3L O2. OT consulted due to recent decline in ADL/transfer independence. No previous OT services for current condition.  -AV     Row Name 10/10/22 1125          Living Environment    People in Home spouse  -AV     Row Name 10/10/22 1125          Home Main Entrance    Number of Stairs, Main Entrance two  -AV     Row Name 10/10/22 1125          Stairs Within Home, Primary    Stairs, Within Home, Primary flight  -AV     Row Name 10/10/22 1125          Cognition    Orientation Status (Cognition) --  alert, pleasant and cooperative. requires some repeated directions to follow commands.  -AV     Row Name 10/10/22 1125          Safety Issues, Functional Mobility    Impairments Affecting Function (Mobility) balance;endurance/activity tolerance  -AV           User Key  (r) = Recorded By, (t) = Taken By, (c) = Cosigned By    Initials Name Provider Type    Juan Carlos Alarcon OT Occupational Therapist                 Mobility/ADL's     Row Name 10/10/22 1128          Transfers    Comment, (Transfers) supine to long sitting min assist. further testing deferred due to low Hgb.  -AV     Row Name 10/10/22 1128          Activities of Daily Living    BADL Assessment/Intervention --  (I) feeding and grooming with setup while seated. max assist bathing/dressing. dependent toilet hygient: purewick catheter. bedpan  -AV           User Key  (r) = Recorded By, (t) = Taken By, (c) = Cosigned By    Initials Name Provider Type    Juan Carlos Alarcon OT  Occupational Therapist               Obj/Interventions     Row Name 10/10/22 1129          Sensory Assessment (Somatosensory)    Sensory Assessment (Somatosensory) UE sensation intact  -AV     Row Name 10/10/22 1129          Vision Assessment/Intervention    Visual Impairment/Limitations WFL;corrective lenses full-time  -AV     Row Name 10/10/22 1129          Range of Motion Comprehensive    General Range of Motion bilateral upper extremity ROM WFL  AROM. left hand edema noted  -AV     Row Name 10/10/22 1129          Strength Comprehensive (MMT)    Comment, General Manual Muscle Testing (MMT) Assessment 4/5 LUE. 4+/5 RUE.  -AV     Row Name 10/10/22 1129          Motor Skills    Motor Skills coordination;functional endurance  -AV     Coordination WFL  right dominant  -AV     Functional Endurance poor plus  -AV           User Key  (r) = Recorded By, (t) = Taken By, (c) = Cosigned By    Initials Name Provider Type    Juan Carlos Alarcon OT Occupational Therapist               Goals/Plan     Row Name 10/10/22 1131          Transfer Goal 1 (OT)    Activity/Assistive Device (Transfer Goal 1, OT) transfers, all;walker, rolling  -AV     [place] Level/Cues Needed (Transfer Goal 1, OT) modified independence  -AV     Time Frame (Transfer Goal 1, OT) 10 days;long term goal (LTG)  -AV     Row Name 10/10/22 1131          Bathing Goal 1 (OT)    Activity/Device (Bathing Goal 1, OT) bathing skills, all;shower chair  -AV     [place] Level/Cues Needed (Bathing Goal 1, OT) modified independence  -AV     Time Frame (Bathing Goal 1, OT) long term goal (LTG);10 days  -AV     Row Name 10/10/22 1131          Dressing Goal 1 (OT)    Activity/Device (Dressing Goal 1, OT) dressing skills, all  -AV     [place]/Cues Needed (Dressing Goal 1, OT) modified independence  -AV     Time Frame (Dressing Goal 1, OT) long term goal (LTG);10 days  -AV     Row Name 10/10/22 1131          Toileting Goal 1 (OT)    Activity/Device (Toileting Goal  1, OT) toileting skills, all;raised toilet seat  -AV     [place] Level/Cues Needed (Toileting Goal 1, OT) modified independence  -AV     Time Frame (Toileting Goal 1, OT) long term goal (LTG);10 days  -AV     Row Name 10/10/22 1131          Grooming Goal 1 (OT)    Activity/Device (Grooming Goal 1, OT) grooming skills, all  -AV     [place] (Grooming Goal 1, OT) modified independence  -AV     Time Frame (Grooming Goal 1, OT) long term goal (LTG);10 days  -AV     Row Name 10/10/22 1131          Problem Specific Goal 1 (OT)    Problem Specific Goal 1 (OT) Patient will demonstrate fair/ fair plus endurance/activity tolerance needed to support ADLs.  -AV     Time Frame (Problem Specific Goal 1, OT) long term goal (LTG);10 days  -AV     Row Name 10/10/22 1131          Therapy Assessment/Plan (OT)    Planned Therapy Interventions (OT) activity tolerance training;BADL retraining;functional balance retraining;IADL retraining;occupation/activity based interventions;patient/caregiver education/training;transfer/mobility retraining  -AV           User Key  (r) = Recorded By, (t) = Taken By, (c) = Cosigned By    Initials Name Provider Type    AV Juan Carlos Zafar OT Occupational Therapist               Clinical Impression     Row Name 10/10/22 1130          Pain Assessment    Additional Documentation Pain Scale: FACES Pre/Post-Treatment (Group)  -AV     Row Name 10/10/22 1130          Pain Scale: FACES Pre/Post-Treatment    Pain: FACES Scale, Pretreatment 2-->hurts little bit  -AV     Posttreatment Pain Rating 2-->hurts little bit  -AV     Row Name 10/10/22 1130          Plan of Care Review    Plan of Care Reviewed With patient  -AV     Progress no change  first session: evaluation  -AV     Outcome Evaluation Patient presents with limitations of balance and endurance/ activity tolerance impacting ADL/transfer independence. OT is needed to remediate/compensate for deficits to maximize independence and safety.  -AV     Row  Name 10/10/22 1130          Therapy Assessment/Plan (OT)    Patient/Family Therapy Goal Statement (OT) regain independence  -AV     Rehab Potential (OT) good, to achieve stated therapy goals  -AV     Criteria for Skilled Therapeutic Interventions Met (OT) yes;meets criteria;skilled treatment is necessary  -AV     Therapy Frequency (OT) 5 times/wk  -AV     Row Name 10/10/22 1130          Therapy Plan Review/Discharge Plan (OT)    Anticipated Discharge Disposition (OT) inpatient rehabilitation facility;sub acute care setting  -AV     Row Name 10/10/22 1130          Vital Signs    O2 Delivery Pre Treatment nasal cannula  -AV     O2 Delivery Intra Treatment nasal cannula  -AV     O2 Delivery Post Treatment nasal cannula  -AV           User Key  (r) = Recorded By, (t) = Taken By, (c) = Cosigned By    Initials Name Provider Type    Juan Carlos Alarcon, HANNAH Occupational Therapist               Outcome Measures     Row Name 10/10/22 1132          How much help from another is currently needed...    Putting on and taking off regular lower body clothing? 2  -AV     Bathing (including washing, rinsing, and drying) 2  -AV     Toileting (which includes using toilet bed pan or urinal) 1  -AV     Putting on and taking off regular upper body clothing 3  -AV     Taking care of personal grooming (such as brushing teeth) 4  -AV     Eating meals 4  -AV     AM-PAC 6 Clicks Score (OT) 16  -AV     Row Name 10/10/22 1100 10/10/22 0734       How much help from another person do you currently need...    Turning from your back to your side while in flat bed without using bedrails? 2  -ANA 2  -MD    Moving from lying on back to sitting on the side of a flat bed without bedrails? 2  -ANA 2  -MD    Moving to and from a bed to a chair (including a wheelchair)? 1  -ANA 1  -MD    Standing up from a chair using your arms (e.g., wheelchair, bedside chair)? 1  -ANA 1  -MD    Climbing 3-5 steps with a railing? 1  -ANA 1  -MD    To walk in hospital room? 1  -ANA  1  -MD    AM-PAC 6 Clicks Score (PT) 8  -ANA 8  -MD    Highest level of mobility 3 --> Sat at edge of bed  -ANA 3 --> Sat at edge of bed  -MD    Row Name 10/10/22 1132 10/10/22 1100       Functional Assessment    Outcome Measure Options AM-PAC 6 Clicks Daily Activity (OT);Optimal Instrument  -AV AM-PAC 6 Clicks Basic Mobility (PT)  -ANA    Row Name 10/10/22 1132          Optimal Instrument    Optimal Instrument Optimal - 3  -AV     Bending/Stooping 4  -AV     Standing 5  -AV     Reaching 1  -AV     From the list, choose the 3 activities you would most like to be able to do without any difficulty Bending/stooping;Standing;Reaching  -AV     Total Score Optimal - 3 10  -AV           User Key  (r) = Recorded By, (t) = Taken By, (c) = Cosigned By    Initials Name Provider Type    AV Juan Cralos Zafar, OT Occupational Therapist    Nino Robles, PT Physical Therapist    Anastasiya Haywood, RN Registered Nurse                Occupational Therapy Education     Title: PT OT SLP Therapies (Done)     Topic: Occupational Therapy (Done)     Point: ADL training (Done)     Description:   Instruct learner(s) on proper safety adaptation and remediation techniques during self care or transfers.   Instruct in proper use of assistive devices.              Learning Progress Summary           Patient Acceptance, E, VU by AV at 10/10/2022 1133                   Point: Home exercise program (Done)     Description:   Instruct learner(s) on appropriate technique for monitoring, assisting and/or progressing therapeutic exercises/activities.              Learning Progress Summary           Patient Acceptance, E, VU by AV at 10/10/2022 1133                   Point: Precautions (Done)     Description:   Instruct learner(s) on prescribed precautions during self-care and functional transfers.              Learning Progress Summary           Patient Acceptance, E, VU by AV at 10/10/2022 1133                   Point: Body mechanics (Done)      Description:   Instruct learner(s) on proper positioning and spine alignment during self-care, functional mobility activities and/or exercises.              Learning Progress Summary           Patient Acceptance, E, VU by AV at 10/10/2022 1133                               User Key     Initials Effective Dates Name Provider Type Discipline     06/16/21 -  Juan Carlos Zafar OT Occupational Therapist OT              OT Recommendation and Plan  Planned Therapy Interventions (OT): activity tolerance training, BADL retraining, functional balance retraining, IADL retraining, occupation/activity based interventions, patient/caregiver education/training, transfer/mobility retraining  Therapy Frequency (OT): 5 times/wk  Plan of Care Review  Plan of Care Reviewed With: patient  Progress: no change (first session: evaluation)  Outcome Evaluation: Patient presents with limitations of balance and endurance/ activity tolerance impacting ADL/transfer independence. OT is needed to remediate/compensate for deficits to maximize independence and safety.     Time Calculation:    Time Calculation- OT     Row Name 10/10/22 1134             Time Calculation- OT    OT Received On 10/10/22  -AV      OT Goal Re-Cert Due Date 10/19/22  -AV         Untimed Charges    OT Eval/Re-eval Minutes 35  -AV         Total Minutes    Untimed Charges Total Minutes 35  -AV       Total Minutes 35  -AV            User Key  (r) = Recorded By, (t) = Taken By, (c) = Cosigned By    Initials Name Provider Type    AV Juan Carlos Zafar OT Occupational Therapist              Therapy Charges for Today     Code Description Service Date Service Provider Modifiers Qty    66258933259 HC OT EVAL LOW COMPLEXITY 3 10/10/2022 Juan Carlos Zafar OT GO 1               Juan Carlos Zafar OT  10/10/2022

## 2022-10-10 NOTE — PLAN OF CARE
Goal Outcome Evaluation:  Plan of Care Reviewed With: patient           Outcome Evaluation: Patient presents with decreased strength, transfers and ambulation.  Skilled physical therapy services will be required to address these mobility deficits.  Recommend subacute rehab placement upon discharge from the hospital

## 2022-10-10 NOTE — PROGRESS NOTES
[facility]     Progress Note    Patient Name: Lauren Redd  : 1945  MRN: [id number]  Primary Care Physician:  Dennis Kohler MD  Date of admission: 10/1/2022      Subjective   Brief summary.  Admitted to ICU with sepsis and anemia and cellulitis of legs      HPI:  Continues to have increased pain.  Both legs hurting.  Crying from pain.  Emotional and depressed.        Review of Systems     Fatigue and weakness  Crying from pain, medication not enough  Leg pain  Stools positive for Hemoccult  No marylin blood in the stool      Objective     Vitals:   Temp:  [97.2 °F (36.2 °C)-98.1 °F (36.7 °C)] 97.2 °F (36.2 °C)  Heart Rate:  [] 118  Resp:  [16-18] 18  BP: (108-126)/(70-82) 126/78  Flow (L/min):  [3] 3    Physical Exam :     Elderly female, sitting upright in bed  HEENT with pallor..  No icterus  Heart irregular and tacky  Bilateral diminished breath sounds  Abdomen obese and soft nontender  Extremities with 3+ to 4+ edema with extensive ulceration and drainage.  Open ulcers in both legs      Result Review:  I have personally reviewed the results from the time of this admission to 10/10/2022 15:57 EDT and agree with these findings:  [x]  Laboratory  []  Microbiology  []  Radiology  []  EKG/Telemetry   []  Cardiology/Vascular   []  Pathology  []  Old records  []  Other:  Hemoccult positive       Assessment / Plan       Active Hospital Problems:  Active Hospital Problems    Diagnosis    • **Sepsis ([facility])    • Acute kidney injury superimposed on chronic kidney disease ([facility])    • Severe anemia    • GI bleed    • CHF (congestive heart failure) ([facility])    • Cellulitis of anterior lower leg    • Atrial fibrillation with RVR ([facility])        Plan:   Increased pain meds.  Cardizem added for A. Fib  Patient being followed by cardiologist.  Heme positive stools.  Seen by GI.  Monitor H&H.        DVT prophylaxis:  Medical DVT prophylaxis orders are present.    CODE STATUS:   Code Status (Patient has no pulse and  is not breathing): CPR (Attempt to Resuscitate)  Medical Interventions (Patient has pulse or is breathing): Full Support  Release to patient: Routine Release              Electronically signed by Pablo Fajardo MD, 10/10/22, 3:58 PM EDT.

## 2022-10-10 NOTE — PROGRESS NOTES
CARDIOLOGY  INPATIENT PROGRESS NOTE                [facility] CARE UNIT 2    10/10/2022      PATIENT IDENTIFICATION:   Name:  Lauren Redd      MRN:  [id number]     77 y.o.  female                 SUBJECTIVE:   Weak and tired  Breathing is better  Still has atrial fibrillation with slightly rapid rate.  Off Cardizem drip    OBJECTIVE:  Vitals:    10/09/22 2050 10/09/22 2335 10/10/22 0455 10/10/22 0724   BP: 113/79 114/75 112/82 109/77   BP Location: Left arm Left arm Left arm Left arm   Patient Position: Lying Lying Lying Lying   Pulse: 114 108 95 110   Resp: 18 18 18 16   Temp:  98.1 °F (36.7 °C) 98 °F (36.7 °C) 98.1 °F (36.7 °C)   TempSrc:  Oral Axillary Axillary   SpO2: 98% 100% 100% 100%   Weight:       Height:               Body mass index is 30.37 kg/m².    Intake/Output Summary (Last 24 hours) at 10/10/2022 1109  Last data filed at 10/10/2022 0724  Gross per 24 hour   Intake 1072.5 ml   Output 700 ml   Net 372.5 ml       Telemetry:     Physical Exam  General Appearance:   · no acute distress  · Alert and oriented x3  HENT:   · lips not cyanotic  · Atraumatic  Neck:  · No jvd   · supple  Respiratory:  · no respiratory distress  · normal breath sounds  · no rales  Cardiovascular:    · regular rhythm  · no S3, no S4   · no murmur  · no rub  · lower extremity edema: none    Skin:   · warm, dry  · No rashes      Allergies   Allergen Reactions   • Contrast Dye Rash   • Lotrel [Amlodipine Besy-Benazepril Hcl] Unknown - Low Severity       Scheduled meds:  !NOT ORDERED- apixaban (ELIQUIS), , Does not apply, Daily With Dinner  ceFAZolin, 2 g, Intravenous, Q8H  dilTIAZem CD, 180 mg, Oral, Q24H  enoxaparin, 30 mg, Subcutaneous, Q24H  HYDROcodone-acetaminophen, 1 tablet, Oral, TID  metoprolol succinate XL, 100 mg, Oral, Q12H  midodrine, 10 mg, Oral, TID AC  pantoprazole, 80 mg, Intravenous, BID AC  Sodium Hypochlorite, , Topical, 2 times per day  triamcinolone, 1 application, Topical, 2 times per  day      IV meds:                      dilTIAZem, 5-15 mg/hr, Last Rate: 10 mg/hr (10/01/22 7737)        Data Review:  CBC    CBC 10/6/22 10/6/22 10/6/22 10/7/22 10/7/22 10/7/22 10/9/22    0457 1156 1737 0044 0652 1212    WBC 17.53 (A)      16.65 (A)   RBC 2.67 (A)      2.55 (A)   Hemoglobin 7.2 (A) 7.2 (A) 7.3 (A) 7.4 (A) 7.5 (A) 7.2 (A) 6.8 (A)   Hematocrit 23.5 (A) 23.7 (A) 24.3 (A) 24.4 (A) 24.2 (A) 23.4 (A) 22.3 (A)   MCV 88.0      87.5   MCH 27.0      26.7   MCHC 30.6 (A)      30.5 (A)   RDW 20.1 (A)      20.1 (A)   Platelets 210      310   (A) Abnormal value            CMP    CMP 10/7/22 10/8/22 10/9/22   Glucose 134 (A) 96 117 (A)   BUN 68 (A) 69 (A) 74 (A)   Creatinine 1.73 (A) 1.65 (A) 1.89 (A)   Sodium 134 (A) 136 135 (A)   Potassium 4.5 3.9 4.6   Chloride 102 104 102   Calcium 9.7 9.4 9.8   Albumin   2.50 (A)   (A) Abnormal value             CARDIAC LABS:         Lab Results   Component Value Date    DIGOXIN 0.34 (L) 10/01/2022      Lab Results   Component Value Date    TSH 2.430 06/20/2022           Invalid input(s): LDLCALC  No results found for: POCTROP  Lab Results   Component Value Date    TROPONINT 0.015 07/09/2022   (  Lab Results   Component Value Date    MG 2.1 10/07/2022     Results for orders placed during the hospital encounter of 10/01/22    Adult Transthoracic Echo Complete W/ Cont if Necessary Per Protocol    Interpretation Summary  · Left ventricular wall thickness is consistent with mild to moderate concentric hypertrophy.  · Calculated left ventricular EF = 59% Estimated left ventricular EF was in agreement with the calculated left ventricular EF. Left ventricular systolic function is normal.  · The right atrial cavity is mild to moderately dilated.  · Estimated right ventricular systolic pressure from tricuspid regurgitation is normal (<35 mmHg).  · Mild aortic valve regurgitation is present  · Mild mitral valve regurgitation is present.           ASSESSMENT:  Shock probably septic  improved  Atrial fibrillation with RVR  Severe anemia probably due to GI bleed  Bilateral lower extremity wounds and ulcerations  Chronic kidney disease      PLAN:   Start on Cardizem  mg once a day to prevent rapid atrial fib  Continue other medication          Lisa Leung MD  10/10/2022    11:09 EDT

## 2022-10-10 NOTE — THERAPY EVALUATION
Acute Care - Physical Therapy Initial Evaluation  MARLA Kraus     Patient Name: Lauren Redd  : 1945  MRN: [id number]  Today's Date: 10/10/2022     Admit date: 10/1/2022     Referring Physician: Pablo Fajardo MD     Surgery Date:10/10/2022   Procedure(s) (LRB):  ESOPHAGOGASTRODUODENOSCOPY (N/A)            Visit Dx:     ICD-10-CM ICD-9-CM   1. Anemia, unspecified type  D64.9 285.9   2. Upper GI bleed  K92.2 578.9   3. Atrial fibrillation with RVR ([facility])  I48.91 427.31   4. Venous stasis ulcers of both lower extremities ([facility])  I83.019 459.81    I83.029 707.10    L97.919     L97.929    5. Oropharyngeal dysphagia  R13.12 787.22   6. Gastrointestinal hemorrhage, unspecified gastrointestinal hemorrhage type  K92.2 578.9   7. Difficulty in walking  R26.2 719.7     Patient Active Problem List   Diagnosis   • Positive colorectal cancer screening using Cologuard test   • Atrial fibrillation with RVR ([facility])   • CHF (congestive heart failure) ([facility])   • Cellulitis of anterior lower leg   • Weakness generalized   • Atrial fibrillation ([facility])   • Pneumonia due to infectious organism   • Pneumonia due to infectious organism, unspecified laterality, unspecified part of lung   • Epistaxis   • Sepsis ([facility])   • Severe anemia   • GI bleed   • Acute kidney injury superimposed on chronic kidney disease (HCC)     Past Medical History:   Diagnosis Date   • Afib ([facility])    • Aneurysm ([facility])    • Arthritis    • CHF (congestive heart failure) ([facility])    • GERD (gastroesophageal reflux disease)    • Hypertension    • Irregular heart beat      Past Surgical History:   Procedure Laterality Date   • COLONOSCOPY     • HYSTERECTOMY       PT Assessment (last 12 hours)     PT Evaluation and Treatment     Row Name 10/10/22 1100          Physical Therapy Time and Intention    Subjective Information no complaints  -ANA     Document Type evaluation  -ANA     Mode of Treatment individual therapy;physical therapy  -ANA     Patient Effort good  -ANA     Row Name  10/10/22 1100          General Information    Patient Observations alert;cooperative;agree to therapy  -ANA     Prior Level of Function independent:;all household mobility  -ANA     Equipment Currently Used at Home cane, straight  -ANA     Existing Precautions/Restrictions fall  -ANA     Barriers to Rehab none identified  -ANA     Row Name 10/10/22 1100          Living Environment    Current Living Arrangements home  -ANA     People in Home spouse  -ANA     Row Name 10/10/22 1100          Range of Motion (ROM)    Range of Motion ROM is WFL  only limited due to LE wounds with significant pain during ROM  -ANA     Row Name 10/10/22 1100          Strength (Manual Muscle Testing)    Strength (Manual Muscle Testing) bilateral lower extremities  3/5  -ANA     Row Name 10/10/22 1100          Bed Mobility    Bed Mobility bed mobility (all) activities;supine-sit  -ANA     All Activities, [place] (Bed Mobility) minimum assist (75% patient effort)  -ANA     Supine-Sit [place] (Bed Mobility) moderate assist (50% patient effort)  -ANA     Row Name 10/10/22 1100          Transfers    Comment, (Transfers) pt unable to tolerate transfer to stand todaty  -ANA     Row Name 10/10/22 1100          Safety Issues, Functional Mobility    Impairments Affecting Function (Mobility) balance;pain;strength  -ANA     Row Name 10/10/22 1100          Balance    Balance Assessment sitting static balance  -ANA     Static Sitting Balance contact guard  -ANA     Position, Sitting Balance sitting edge of bed;unsupported  -ANA     Row Name             Wound 02/09/22 1235 Right lower leg Blisters    Wound - Properties Group Placement Date: 02/09/22  -FL Placement Time: 1235  -FL Present on Hospital Admission: Y  -FL Side: Right  -FL Orientation: lower  -FL Location: leg  -FL Primary Wound Type: Blisters  -FL    Retired Wound - Properties Group Placement Date: 02/09/22  -FL Placement Time: 1235  -FL Present on Hospital Admission: Y  -FL Side: Right  -FL  Orientation: lower  -FL Location: leg  -FL Primary Wound Type: Blisters  -FL    Retired Wound - Properties Group Date first assessed: 02/09/22  -FL Time first assessed: 1235  -FL Present on Hospital Admission: Y  -FL Side: Right  -FL Location: leg  -FL Primary Wound Type: Blisters  -FL    Row Name             Wound 04/23/22 1853 Right proximal calf    Wound - Properties Group Placement Date: 04/23/22  -AW Placement Time: 1853  -AW Present on Hospital Admission: Y  -AW Side: Right  -AW Orientation: proximal  -AW Location: calf  -AW    Retired Wound - Properties Group Placement Date: 04/23/22  -AW Placement Time: 1853 -AW Present on Hospital Admission: Y  -AW Side: Right  -AW Orientation: proximal  -AW Location: calf  -AW    Retired Wound - Properties Group Date first assessed: 04/23/22  -AW Time first assessed: 1853 -AW Present on Hospital Admission: Y  -AW Side: Right  -AW Location: calf  -AW    Row Name             Wound 06/20/22 1150 Left lateral leg Venous Ulcer    Wound - Properties Group Placement Date: 06/20/22  -FL Placement Time: 1150  -FL Present on Hospital Admission: Y  -FL Side: Left  -FL Orientation: lateral  -FL Location: leg  -FL Primary Wound Type: Venous ulcer  -FL    Retired Wound - Properties Group Placement Date: 06/20/22  -FL Placement Time: 1150  -FL Present on Hospital Admission: Y  -FL Side: Left  -FL Orientation: lateral  -FL Location: leg  -FL Primary Wound Type: Venous ulcer  -FL    Retired Wound - Properties Group Date first assessed: 06/20/22  -FL Time first assessed: 1150  -FL Present on Hospital Admission: Y  -FL Side: Left  -FL Location: leg  -FL Primary Wound Type: Venous ulcer  -FL    Row Name             Wound 10/03/22 1543 Right medial leg Venous Ulcer    Wound - Properties Group Placement Date: 10/03/22  -GASTON Placement Time: 1543  -GASTON Present on Hospital Admission: Y  -GASTON Side: Right  -GASTON Orientation: medial  -GASTON Location: leg  -GASTON Primary Wound Type: Venous ulcer  -GASTON     "Retired Wound - Properties Group Placement Date: 10/03/22  -GASTON Placement Time: 1543  -GASTON Present on Hospital Admission: Y  -GASTON Side: Right  -GASTON Orientation: medial  -GASTON Location: leg  -GASTON Primary Wound Type: Venous ulcer  -GASTON    Retired Wound - Properties Group Date first assessed: 10/03/22  -GASTON Time first assessed: 1543  -GASTON Present on Hospital Admission: Y  -GASTON Side: Right  -GASTON Location: leg  -GASTON Primary Wound Type: Venous ulcer  -GASTON    Row Name             Wound 10/03/22 1547 Left medial leg    Wound - Properties Group Placement Date: 10/03/22  -GASTON Placement Time: 1547  -GASTON Present on Hospital Admission: Y  -GASTON Side: Left  -GASTON Orientation: medial  -GASTON Location: leg  -GASTON    Retired Wound - Properties Group Placement Date: 10/03/22  -GASTON Placement Time: 1547  -GASTON Present on Hospital Admission: Y  -GASTON Side: Left  -GASTON Orientation: medial  -GASTON Location: leg  -GASTON    Retired Wound - Properties Group Date first assessed: 10/03/22  -GASTON Time first assessed: 1547  -GASTON Present on Hospital Admission: Y  -GASTON Side: Left  -GASTON Location: leg  -GASTON    Row Name             Wound 10/03/22 1551 Right heel Pressure Injury    Wound - Properties Group Placement Date: 10/03/22  -GASTON Placement Time: 1551  -GASTON Side: Right  -GASTON Location: heel  -GASTON Primary Wound Type: Pressure inj  -GASTON    Retired Wound - Properties Group Placement Date: 10/03/22  -GASTON Placement Time: 1551  -GASTON Side: Right  -GASTON Location: heel  -GASTON Primary Wound Type: Pressure inj  -GASTON    Retired Wound - Properties Group Date first assessed: 10/03/22  -GASTON Time first assessed: 1551  -GASTON Side: Right  -GASTON Location: heel  -GASTON Primary Wound Type: Pressure inj  -GASTON    Row Name             Wound 10/03/22 1639 Left posterior elbow    Wound - Properties Group Placement Date: 10/03/22  -JS Placement Time: 1639  -JS Present on Hospital Admission: Y  -JS Side: Left  -JS Orientation: posterior  -JS Location: elbow  -JS Additional Comments: pt states she \"rubs\" her elbow on her chair at home  " "-JS    Retired Wound - Properties Group Placement Date: 10/03/22  -JS Placement Time: 1639  -JS Present on Hospital Admission: Y  -JS Side: Left  -JS Orientation: posterior  -JS Location: elbow  -JS Additional Comments: pt states she \"rubs\" her elbow on her chair at home  -JS    Retired Wound - Properties Group Date first assessed: 10/03/22  -JS Time first assessed: 1639  -JS Present on Hospital Admission: Y  -JS Side: Left  -JS Location: elbow  -JS Additional Comments: pt states she \"rubs\" her elbow on her chair at home  -JS    Row Name 10/10/22 1100          Plan of Care Review    Plan of Care Reviewed With patient  -ANA     Outcome Evaluation Patient presents with decreased strength, transfers and ambulation.  Skilled physical therapy services will be required to address these mobility deficits.  Recommend subacute rehab placement upon discharge from the hospital  -ANA     Row Name 10/10/22 1100          Therapy Assessment/Plan (PT)    Rehab Potential (PT) good, to achieve stated therapy goals  -ANA     Criteria for Skilled Interventions Met (PT) skilled treatment is necessary  -ANA     Therapy Frequency (PT) daily  -ANA     Predicted Duration of Therapy Intervention (PT) 10 days  -ANA     Problem List (PT) problems related to;balance;mobility;strength;pain  -ANA     Activity Limitations Related to Problem List (PT) unable to ambulate safely;unable to transfer safely  -ANA     Row Name 10/10/22 1100          PT Evaluation Complexity    History, PT Evaluation Complexity no personal factors and/or comorbidities  -ANA     Examination of Body Systems (PT Eval Complexity) total of 4 or more elements  -ANA     Clinical Presentation (PT Evaluation Complexity) stable  -ANA     Clinical Decision Making (PT Evaluation Complexity) low complexity  -ANA     Overall Complexity (PT Evaluation Complexity) low complexity  -ANA     Row Name 10/10/22 1100          Therapy Plan Review/Discharge Plan (PT)    Therapy Plan Review (PT) " evaluation/treatment results reviewed;participants voiced agreement with care plan;participants included;patient  -ANA     Row Name 10/10/22 1100          Physical Therapy Goals    Transfer Goal Selection (PT) transfer, PT goal 1  -ANA     Gait Training Goal Selection (PT) gait training, PT goal 1  -ANA     Row Name 10/10/22 1100          Transfer Goal 1 (PT)    Activity/Assistive Device (Transfer Goal 1, PT) transfers, all  -ANA     [place] Level/Cues Needed (Transfer Goal 1, PT) independent  -ANA     Time Frame (Transfer Goal 1, PT) long term goal (LTG);10 days  -ANA     Row Name 10/10/22 1100          Gait Training Goal 1 (PT)    Activity/Assistive Device (Gait Training Goal 1, PT) gait (walking locomotion);assistive device use;walker, rolling  -ANA     [place] Level (Gait Training Goal 1, PT) independent  -ANA     Distance (Gait Training Goal 1, PT) 100  -ANA     Time Frame (Gait Training Goal 1, PT) long term goal (LTG);10 days  -ANA           User Key  (r) = Recorded By, (t) = Taken By, (c) = Cosigned By    Initials Name Provider Type    Luly Smith, RN Registered Nurse    Jeannine Saleh RN Registered Nurse    Sylvia Olson, RN Registered Nurse    Katie Wheatley RN Registered Nurse    Nino Robles PT Physical Therapist                Physical Therapy Education     Title: PT OT SLP Therapies (Done)     Topic: Physical Therapy (Done)     Point: Mobility training (Done)     Learning Progress Summary           Patient Acceptance, E,TB, VU by ANA at 10/10/2022 1118                   Point: Precautions (Done)     Learning Progress Summary           Patient Acceptance, E,TB, VU by ANA at 10/10/2022 1118                               User Key     Initials Effective Dates Name Provider Type Discipline    ANA 06/03/21 -  Nino Sagastume, PT Physical Therapist PT              PT Recommendation and Plan  Anticipated Discharge Disposition (PT): sub acute care setting  Planned Therapy  Interventions (PT): balance training, bed mobility training, gait training, home exercise program, strengthening, transfer training  Therapy Frequency (PT): daily  Plan of Care Reviewed With: patient  Outcome Evaluation: Patient presents with decreased strength, transfers and ambulation.  Skilled physical therapy services will be required to address these mobility deficits.  Recommend subacute rehab placement upon discharge from the hospital   Outcome Measures     Row Name 10/10/22 1100             How much help from another person do you currently need...    Turning from your back to your side while in flat bed without using bedrails? 2  -ANA      Moving from lying on back to sitting on the side of a flat bed without bedrails? 2  -ANA      Moving to and from a bed to a chair (including a wheelchair)? 1  -ANA      Standing up from a chair using your arms (e.g., wheelchair, bedside chair)? 1  -ANA      Climbing 3-5 steps with a railing? 1  -ANA      To walk in hospital room? 1  -ANA      AM-PAC 6 Clicks Score (PT) 8  -ANA         Functional Assessment    Outcome Measure Options AM-PAC 6 Clicks Basic Mobility (PT)  -ANA            User Key  (r) = Recorded By, (t) = Taken By, (c) = Cosigned By    Initials Name Provider Type    Nino Robles PT Physical Therapist                 Time Calculation:    PT Charges     Row Name 10/10/22 1111             Time Calculation    PT Received On 10/10/22  -ANA      PT Goal Re-Cert Due Date 10/19/22  -ANA         Untimed Charges    PT Eval/Re-eval Minutes 31  -ANA         Total Minutes    Untimed Charges Total Minutes 31  -ANA       Total Minutes 31  -ANA            User Key  (r) = Recorded By, (t) = Taken By, (c) = Cosigned By    Initials Name Provider Type    Nino Robles PT Physical Therapist              Therapy Charges for Today     Code Description Service Date Service Provider Modifiers Qty    57965680875 HC PT EVAL LOW COMPLEXITY 3 10/10/2022 Nino Sagastume PT GP 1           PT G-Codes  Outcome Measure Options: AM-PAC 6 Clicks Basic Mobility (PT)  AM-PAC 6 Clicks Score (PT): 8    Nino Sagastume, PT  10/10/2022

## 2022-10-11 NOTE — PROGRESS NOTES
CARDIOLOGY  INPATIENT PROGRESS NOTE                [facility] CARE UNIT 2    10/11/2022      PATIENT IDENTIFICATION:   Name:  Lauren Redd      MRN:  [id number]     77 y.o.  female                 SUBJECTIVE:   Feels weak and tired  No chest pain      OBJECTIVE:  Vitals:    10/10/22 1925 10/10/22 2350 10/11/22 0315 10/11/22 0728   BP: 111/68 108/61 107/69 99/66   BP Location: Left arm Left arm Left arm Left arm   Patient Position: Lying Lying Lying Lying   Pulse: 115 112 110 106   Resp: 18 18 18 18   Temp: 97.2 °F (36.2 °C) 98.2 °F (36.8 °C) 97.5 °F (36.4 °C) 97.5 °F (36.4 °C)   TempSrc: Oral Oral Oral Oral   SpO2: 100% 100% 100% 99%   Weight:       Height:               Body mass index is 30.37 kg/m².    Intake/Output Summary (Last 24 hours) at 10/11/2022 1203  Last data filed at 10/11/2022 0919  Gross per 24 hour   Intake 480 ml   Output 800 ml   Net -320 ml       Telemetry:     Physical Exam  General Appearance:   · no acute distress  · Alert and oriented x3  HENT:   · lips not cyanotic  · Atraumatic  Neck:  · No jvd   · supple  Respiratory:  · no respiratory distress  · normal breath sounds  · no rales  Cardiovascular:    · regular rhythm  · no S3, no S4   · no murmur  · no rub  · lower extremity edema: none    Skin:   · warm, dry  · No rashes      Allergies   Allergen Reactions   • Contrast Dye Rash   • Lotrel [Amlodipine Besy-Benazepril Hcl] Unknown - Low Severity       Scheduled meds:  !NOT ORDERED- apixaban (ELIQUIS), , Does not apply, Daily With Dinner  ceFAZolin, 2 g, Intravenous, Q8H  dilTIAZem CD, 180 mg, Oral, Q24H  enoxaparin, 30 mg, Subcutaneous, Q24H  HYDROcodone-acetaminophen, 1 tablet, Oral, 4x Daily  metoprolol succinate XL, 100 mg, Oral, Q12H  midodrine, 10 mg, Oral, TID AC  pantoprazole, 80 mg, Intravenous, BID AC  Sodium Hypochlorite, , Topical, 2 times per day  triamcinolone, 1 application, Topical, 2 times per day      IV meds:                      dilTIAZem, 5-15  mg/hr, Last Rate: 10 mg/hr (10/01/22 4695)        Data Review:  CBC    CBC 10/9/22 10/10/22 10/11/22   WBC 16.65 (A) 16.49 (A) 17.17 (A)   RBC 2.55 (A) 3.40 (A) 3.49 (A)   Hemoglobin 6.8 (A) 9.4 (A) 9.6 (A)   Hematocrit 22.3 (A) 30.1 (A) 31.4 (A)   MCV 87.5 88.5 90.0   MCH 26.7 27.6 27.5   MCHC 30.5 (A) 31.2 (A) 30.6 (A)   RDW 20.1 (A) 21.2 (A) 21.8 (A)   Platelets 310 298 300   (A) Abnormal value            CMP    CMP 10/9/22 10/10/22 10/11/22   Glucose 117 (A) 127 (A) 110 (A)   BUN 74 (A) 82 (A) 78 (A)   Creatinine 1.89 (A) 2.00 (A) 1.91 (A)   Sodium 135 (A) 134 (A) 135 (A)   Potassium 4.6 4.6 4.7   Chloride 102 101 101   Calcium 9.8 10.2 9.9   Albumin 2.50 (A) 2.70 (A) 2.60 (A)   Total Bilirubin   0.6   Alkaline Phosphatase   181 (A)   AST (SGOT)   20   ALT (SGPT)   <5   (A) Abnormal value             CARDIAC LABS:         Lab Results   Component Value Date    DIGOXIN 0.34 (L) 10/01/2022      Lab Results   Component Value Date    TSH 2.430 06/20/2022           Invalid input(s): LDLCALC  No results found for: POCTROP  Lab Results   Component Value Date    TROPONINT 0.015 07/09/2022   (  Lab Results   Component Value Date    MG 2.1 10/07/2022     Results for orders placed during the hospital encounter of 10/01/22    Adult Transthoracic Echo Complete W/ Cont if Necessary Per Protocol    Interpretation Summary  · Left ventricular wall thickness is consistent with mild to moderate concentric hypertrophy.  · Calculated left ventricular EF = 59% Estimated left ventricular EF was in agreement with the calculated left ventricular EF. Left ventricular systolic function is normal.  · The right atrial cavity is mild to moderately dilated.  · Estimated right ventricular systolic pressure from tricuspid regurgitation is normal (<35 mmHg).  · Mild aortic valve regurgitation is present  · Mild mitral valve regurgitation is present.           ASSESSMENT:  Shock probably septic improved  Atrial fibrillation with RVR  Severe anemia  probably due to GI bleed  Bilateral lower extremity wounds and ulcerations  Chronic kidney disease      PLAN:   Continue present medication  CMP in a.m.          Lisa Leung MD  10/11/2022    12:03 EDT

## 2022-10-11 NOTE — PLAN OF CARE
Goal Outcome Evaluation: Patient resting on assessment experiencing extreme pain in her legs premedicated  to complete the dressing change. Hourly rounding will continue to monitor

## 2022-10-11 NOTE — SIGNIFICANT NOTE
Wound Eval / Progress Noted    MARLA Kraus     Patient Name: Lauren Redd  : 1945  MRN: [id number]  Today's Date: 10/11/2022                 Admit Date: 10/1/2022    Visit Dx:    ICD-10-CM ICD-9-CM   1. Anemia, unspecified type  D64.9 285.9   2. Upper GI bleed  K92.2 578.9   3. Atrial fibrillation with RVR (HCC)  I48.91 427.31   4. Venous stasis ulcers of both lower extremities ([facility])  I83.019 459.81    I83.029 707.10    L97.919     L97.929    5. Oropharyngeal dysphagia  R13.12 787.22   6. Gastrointestinal hemorrhage, unspecified gastrointestinal hemorrhage type  K92.2 578.9   7. Difficulty in walking  R26.2 719.7   8. Decreased activities of daily living (ADL)  Z78.9 V49.89       Patient Active Problem List   Diagnosis    Positive colorectal cancer screening using Cologuard test    Atrial fibrillation with RVR (HCC)    CHF (congestive heart failure) (HCC)    Cellulitis of anterior lower leg    Weakness generalized    Atrial fibrillation (HCC)    Pneumonia due to infectious organism    Pneumonia due to infectious organism, unspecified laterality, unspecified part of lung    Epistaxis    Sepsis (HCC)    Severe anemia    GI bleed    Acute kidney injury superimposed on chronic kidney disease (HCC)        Past Medical History:   Diagnosis Date    Afib (HCC)     Aneurysm (HCC)     Arthritis     CHF (congestive heart failure) (HCC)     GERD (gastroesophageal reflux disease)     Hypertension     Irregular heart beat         Past Surgical History:   Procedure Laterality Date    COLONOSCOPY      HYSTERECTOMY           Physical Assessment:  Wound 22 1235 Right lower leg Blisters (Active)   Wound Image   10/11/22 1120   Pressure Injury Stage U 10/10/22 1925   Dressing Appearance intact;moist drainage 10/11/22 1120   Base red;slough;moist 10/11/22 1120   Red (%), Wound Tissue Color 60 10/11/22 1120   Yellow (%), Wound Tissue Color 40 10/11/22 1120   Periwound redness;swelling;warm 10/11/22 1120   Periwound  Temperature warm 10/11/22 1120   Periwound Skin Turgor soft 10/11/22 1120   Edges open 10/11/22 1120   Wound Length (cm) 5.4 cm 10/11/22 1120   Wound Width (cm) 6 cm 10/11/22 1120   Wound Depth (cm) 0.2 cm 10/11/22 1120   Wound Surface Area (cm^2) 32.4 cm^2 10/11/22 1120   Wound Volume (cm^3) 6.48 cm^3 10/11/22 1120   Drainage Characteristics/Odor serosanguineous;serous 10/11/22 1120   Drainage Amount moderate 10/11/22 1120   Care, Wound cleansed with;irrigated with;sterile normal saline 10/11/22 1120   Dressing Care dressing applied;abdominal pad;gauze;gauze, [id number] 10/11/22 1120       Wound 04/23/22 1853 Right proximal calf (Active)   Wound Image   10/11/22 1120   Dressing Appearance intact;moist drainage 10/11/22 1120   Closure None 10/11/22 1120   Base moist;red 10/11/22 1120   Red (%), Wound Tissue Color 100 10/11/22 1120   Periwound redness;swelling;warm 10/11/22 1120   Periwound Temperature warm 10/11/22 1120   Periwound Skin Turgor soft 10/11/22 1120   Edges open 10/11/22 1120   Drainage Characteristics/Odor serous;serosanguineous 10/11/22 1120   Drainage Amount moderate 10/11/22 1120   Care, Wound cleansed with;irrigated with;sterile normal saline 10/11/22 1120   Dressing Care dressing applied;gauze, [id number];gauze;abdominal pad 10/11/22 1120   Periwound Care absorptive dressing applied 10/11/22 1120       Wound 06/20/22 1150 Left lateral leg Venous Ulcer (Active)   Wound Image   10/11/22 1120   Pressure Injury Stage U 10/10/22 1925   Dressing Appearance intact;moist drainage 10/11/22 1120   Closure None 10/11/22 1120   Base moist;slough;red 10/11/22 1120   Red (%), Wound Tissue Color 75 10/11/22 1120   Yellow (%), Wound Tissue Color 25 10/11/22 1120   Periwound redness;swelling;warm 10/11/22 1120   Periwound Temperature warm 10/11/22 1120   Periwound Skin Turgor soft 10/11/22 1120   Edges open 10/11/22 1120   Wound Length (cm) 7 cm 10/11/22 1120   Wound Width (cm) 5 cm 10/11/22 1120   Wound Depth  (cm) 0.1 cm 10/11/22 1120   Wound Surface Area (cm^2) 35 cm^2 10/11/22 1120   Wound Volume (cm^3) 3.5 cm^3 10/11/22 1120   Drainage Characteristics/Odor serous 10/11/22 1120   Drainage Amount moderate;small 10/11/22 1120   Care, Wound cleansed with;irrigated with;sterile normal saline 10/11/22 1120   Dressing Care dressing applied;gauze, uhkw-[id number]-gtlj;gauze;abdominal pad 10/11/22 1120   Periwound Care absorptive dressing applied 10/11/22 1120       Wound 10/03/22 1543 Right medial leg Venous Ulcer (Active)   Wound Image   10/11/22 1120   Dressing Appearance intact;moist drainage 10/11/22 1120   Closure None 10/11/22 1120   Base moist;slough 10/11/22 1120   Yellow (%), Wound Tissue Color 100 10/11/22 1120   Periwound redness;swelling 10/11/22 1120   Periwound Temperature warm 10/11/22 1120   Periwound Skin Turgor soft 10/11/22 1120   Edges open 10/11/22 1120   Wound Length (cm) 5.4 cm 10/11/22 1120   Wound Width (cm) 4.6 cm 10/11/22 1120   Wound Depth (cm) 0.2 cm 10/11/22 1120   Wound Surface Area (cm^2) 24.84 cm^2 10/11/22 1120   Wound Volume (cm^3) 4.968 cm^3 10/11/22 1120   Drainage Characteristics/Odor serous;serosanguineous 10/11/22 1120   Drainage Amount moderate 10/11/22 1120   Care, Wound cleansed with;irrigated with;sterile normal saline 10/11/22 1120   Dressing Care dressing applied;gauze, [id number]-br-jjvq;abdominal pad;gauze 10/11/22 1120   Periwound Care absorptive dressing applied 10/11/22 1120       Wound 10/03/22 1547 Left medial leg (Active)   Wound Image   10/11/22 1120   Dressing Appearance intact;moist drainage 10/11/22 1120   Closure None 10/11/22 1120   Base moist;red 10/11/22 1120   Black (%), Wound Tissue Color 100 10/11/22 1120   Periwound redness;swelling;warm 10/11/22 1120   Periwound Temperature warm 10/11/22 1120   Periwound Skin Turgor soft 10/11/22 1120   Edges open 10/11/22 1120   Wound Length (cm) 10 cm 10/11/22 1120   Wound Width (cm) 10 cm 10/11/22 1120   Wound Surface Area (cm^2) 100  cm^2 10/11/22 1120   Drainage Characteristics/Odor serous 10/11/22 1120   Drainage Amount moderate;small 10/11/22 1120   Care, Wound cleansed with;irrigated with;sterile normal saline 10/11/22 1120   Dressing Care dressing applied;abdominal pad;gauze, [id number];gauze 10/11/22 1120   Periwound Care absorptive dressing applied 10/11/22 1120       Wound 10/03/22 1551 Right heel Pressure Injury (Active)   Wound Image   10/11/22 1120   Pressure Injury Stage DTPI 10/10/22 1925   Dressing Appearance open to air 10/11/22 1120   Base non-blanchable;purple 10/11/22 1120   Periwound intact;pink;dry 10/11/22 1120   Periwound Temperature warm 10/11/22 1120   Periwound Skin Turgor soft 10/11/22 1120   Edges rolled/closed 10/11/22 1120   Wound Length (cm) 4.3 cm 10/11/22 1120   Wound Width (cm) 2.8 cm 10/11/22 1120   Wound Surface Area (cm^2) 12.04 cm^2 10/11/22 1120   Drainage Amount none 10/11/22 1120   Care, Wound cleansed with;soap and water 10/11/22 1120   Dressing Care gauze 10/11/22 1120   Periwound Care topical treatment applied 10/11/22 1120            Wound Check / Follow-up:  Patient seen today for wound follow-up. Patient with intact dressings to BLE with visible drainage to outer dressing. Dressings saturated prior to removal due to painful removals previously. Dressings removed and BLE and feet cleansed with warm water and cleansing foam. Wounds cleansed with NS and gauze. Triamcinolone applied to bilateral lower legs and feet and then wound care completed with dakins moisted gauze to open wounds, covered with ABD pads and then gauze roll applied to BLE from toes extending beyond dressings.   Slough to wounds on Rt medial and latera leg as well as to left lateral leg is thinning and becoming more loosely adherent allowing for visualization of healthy tissue.   Wounds with red moist tissue are shallow and will likely be ready to transition to a different dressing soon for wound management.   Recommending to  continue diligent skin care and topical treatments      Impression: Chronic edema and ulcerations to BLE     Short term goals:  Regain skin integrity. BID dressing changes and skin care.     Sylvia Lara RN    10/11/2022    15:00 EDT

## 2022-10-11 NOTE — THERAPY TREATMENT NOTE
Acute Care - Physical Therapy Treatment Note  MARLA Kraus     Patient Name: Lauren Redd  : 1945  MRN: [id number]  Today's Date: 10/11/2022      Visit Dx:     ICD-10-CM ICD-9-CM   1. Anemia, unspecified type  D64.9 285.9   2. Upper GI bleed  K92.2 578.9   3. Atrial fibrillation with RVR ([facility])  I48.91 427.31   4. Venous stasis ulcers of both lower extremities ([facility])  I83.019 459.81    I83.029 707.10    L97.919     L97.929    5. Oropharyngeal dysphagia  R13.12 787.22   6. Gastrointestinal hemorrhage, unspecified gastrointestinal hemorrhage type  K92.2 578.9   7. Difficulty in walking  R26.2 719.7   8. Decreased activities of daily living (ADL)  Z78.9 V49.89     Patient Active Problem List   Diagnosis   • Positive colorectal cancer screening using Cologuard test   • Atrial fibrillation with RVR ([facility])   • CHF (congestive heart failure) ([facility])   • Cellulitis of anterior lower leg   • Weakness generalized   • Atrial fibrillation ([facility])   • Pneumonia due to infectious organism   • Pneumonia due to infectious organism, unspecified laterality, unspecified part of lung   • Epistaxis   • Sepsis ([facility])   • Severe anemia   • GI bleed   • Acute kidney injury superimposed on chronic kidney disease ([facility])     Past Medical History:   Diagnosis Date   • Afib ([facility])    • Aneurysm ([facility])    • Arthritis    • CHF (congestive heart failure) ([facility])    • GERD (gastroesophageal reflux disease)    • Hypertension    • Irregular heart beat      Past Surgical History:   Procedure Laterality Date   • COLONOSCOPY     • HYSTERECTOMY       PT Assessment (last 12 hours)     PT Evaluation and Treatment     Row Name 10/11/22 1403          Physical Therapy Time and Intention    Subjective Information complains of;pain (P)   -KE     Document Type therapy note (daily note) (P)   -KE     Mode of Treatment individual therapy;physical therapy (P)   -KE     Patient Effort adequate (P)   -KE     Symptoms Noted During/After Treatment increased pain (P)   -KE      Row Name 10/11/22 1403          Bed Mobility    Bed Mobility supine-sit-supine (P)   -     Supine-Sit-Supine [place] (Bed Mobility) moderate assist (50% patient effort);maximum assist (25% patient effort);1 person assist (P)   -     Assistive Device (Bed Mobility) bed rails;draw sheet;head of bed elevated (P)   -     Row Name 10/11/22 1403          Transfers    Transfers other (see comments) (P)   Patient declined transfers due to pain in bilateral lower extremities.  -MARTY     Comment, (Transfers) unable to tolerate (P)   -     Row Name 10/11/22 1403          Balance    Balance Assessment sitting static balance (P)   -     Static Sitting Balance contact guard (P)   -MARTY     Position, Sitting Balance sitting edge of bed (P)   -     Row Name 10/11/22 1403          Motor Skills    Therapeutic Exercise hip;knee;ankle (P)   -     Row Name 10/11/22 1403          Hip (Therapeutic Exercise)    Hip (Therapeutic Exercise) AROM (active range of motion) (P)   -MARTY     Hip AROM (Therapeutic Exercise) bilateral;flexion;aBduction;aDduction;20 repititions (P)   -     Row Name 10/11/22 1403          Knee (Therapeutic Exercise)    Knee (Therapeutic Exercise) AROM (active range of motion) (P)   -KE     Knee AROM (Therapeutic Exercise) bilateral;LAQ (long arc quad);20 repititions (P)   -     Row Name 10/11/22 1403          Ankle (Therapeutic Exercise)    Ankle (Therapeutic Exercise) AROM (active range of motion) (P)   -MARTY     Ankle AROM (Therapeutic Exercise) bilateral;dorsiflexion;plantarflexion;20 repititions (P)   -     Row Name             Wound 02/09/22 1235 Right lower leg Blisters    Wound - Properties Group Placement Date: 02/09/22  -FL Placement Time: 1235  -FL Present on Hospital Admission: Y  -FL Side: Right  -FL Orientation: lower  -FL Location: leg  -FL Primary Wound Type: Blisters  -FL    Retired Wound - Properties Group Placement Date: 02/09/22  -FL Placement Time: 1235  -FL Present on Hospital  Admission: Y  -FL Side: Right  -FL Orientation: lower  -FL Location: leg  -FL Primary Wound Type: Blisters  -FL    Retired Wound - Properties Group Date first assessed: 02/09/22  -FL Time first assessed: 1235  -FL Present on Hospital Admission: Y  -FL Side: Right  -FL Location: leg  -FL Primary Wound Type: Blisters  -FL    Row Name             Wound 04/23/22 1853 Right proximal calf    Wound - Properties Group Placement Date: 04/23/22  -AW Placement Time: 1853  -AW Present on Hospital Admission: Y  -AW Side: Right  -AW Orientation: proximal  -AW Location: calf  -AW    Retired Wound - Properties Group Placement Date: 04/23/22  -AW Placement Time: 1853 -AW Present on Hospital Admission: Y  -AW Side: Right  -AW Orientation: proximal  -AW Location: calf  -AW    Retired Wound - Properties Group Date first assessed: 04/23/22  -AW Time first assessed: 1853  -AW Present on Hospital Admission: Y  -AW Side: Right  -AW Location: calf  -AW    Row Name             Wound 06/20/22 1150 Left lateral leg Venous Ulcer    Wound - Properties Group Placement Date: 06/20/22  -FL Placement Time: 1150  -FL Present on Hospital Admission: Y  -FL Side: Left  -FL Orientation: lateral  -FL Location: leg  -FL Primary Wound Type: Venous ulcer  -FL    Retired Wound - Properties Group Placement Date: 06/20/22  -FL Placement Time: 1150  -FL Present on Hospital Admission: Y  -FL Side: Left  -FL Orientation: lateral  -FL Location: leg  -FL Primary Wound Type: Venous ulcer  -FL    Retired Wound - Properties Group Date first assessed: 06/20/22  -FL Time first assessed: 1150  -FL Present on Hospital Admission: Y  -FL Side: Left  -FL Location: leg  -FL Primary Wound Type: Venous ulcer  -FL    Row Name             Wound 10/03/22 1543 Right medial leg Venous Ulcer    Wound - Properties Group Placement Date: 10/03/22  -GASTON Placement Time: 1543  -GASTON Present on Hospital Admission: Y  -GASTON Side: Right  -GASTON Orientation: medial  -GASTON Location: leg  -GASTON Primary  "Wound Type: Venous ulcer  -GASTON    Retired Wound - Properties Group Placement Date: 10/03/22  -GASTON Placement Time: 1543  -GASTON Present on Hospital Admission: Y  -GASTON Side: Right  -GASTON Orientation: medial  -GASTON Location: leg  -GASTON Primary Wound Type: Venous ulcer  -GASTON    Retired Wound - Properties Group Date first assessed: 10/03/22  -GASTON Time first assessed: 1543  -GASTON Present on Hospital Admission: Y  -GASTON Side: Right  -GASTON Location: leg  -GASTON Primary Wound Type: Venous ulcer  -GASTON    Row Name             Wound 10/03/22 1547 Left medial leg    Wound - Properties Group Placement Date: 10/03/22  -GASTON Placement Time: 1547  -GASTON Present on Hospital Admission: Y  -GASTON Side: Left  -GASTON Orientation: medial  -GASTON Location: leg  -GASTON    Retired Wound - Properties Group Placement Date: 10/03/22  -GASTON Placement Time: 1547  -GASTON Present on Hospital Admission: Y  -GASTON Side: Left  -GASTON Orientation: medial  -GASTON Location: leg  -GASTON    Retired Wound - Properties Group Date first assessed: 10/03/22  -GASTON Time first assessed: 1547  -GASTON Present on Hospital Admission: Y  -GASTON Side: Left  -GASTON Location: leg  -GASTON    Row Name             Wound 10/03/22 1551 Right heel Pressure Injury    Wound - Properties Group Placement Date: 10/03/22  -GASTON Placement Time: 1551  -GASTON Side: Right  -GASTON Location: heel  -GASTON Primary Wound Type: Pressure inj  -GASTON    Retired Wound - Properties Group Placement Date: 10/03/22  -GASTON Placement Time: 1551  -GASTON Side: Right  -GASTON Location: heel  -GASTON Primary Wound Type: Pressure inj  -GASTON    Retired Wound - Properties Group Date first assessed: 10/03/22  -GASTON Time first assessed: 1551  -GASTON Side: Right  -GASTON Location: heel  -GASTON Primary Wound Type: Pressure inj  -GASTON    Row Name             Wound 10/03/22 1639 Left posterior elbow    Wound - Properties Group Placement Date: 10/03/22  -JS Placement Time: 1639  -JS Present on Hospital Admission: Y  -JS Side: Left  -JS Orientation: posterior  -JS Location: elbow  -JS Additional Comments: pt states she \"rubs\" " "her elbow on her chair at home  -JS    Retired Wound - Properties Group Placement Date: 10/03/22  -JS Placement Time: 1639  -JS Present on Hospital Admission: Y  -JS Side: Left  -JS Orientation: posterior  -JS Location: elbow  -JS Additional Comments: pt states she \"rubs\" her elbow on her chair at home  -JS    Retired Wound - Properties Group Date first assessed: 10/03/22  -JS Time first assessed: 1639  -JS Present on Hospital Admission: Y  -JS Side: Left  -JS Location: elbow  -JS Additional Comments: pt states she \"rubs\" her elbow on her chair at home  -JS    Row Name 10/11/22 1403          Progress Summary (PT)    Progress Toward Functional Goals (PT) progress toward functional goals is fair (P)   -KE     Daily Progress Summary (PT) Patient reported extreme pain and discomfort in bilateral lower extremities today during bed mobility and exercises at edge of bed. Patient declined attempting transfers today secondary to pain. (P)   -KE           User Key  (r) = Recorded By, (t) = Taken By, (c) = Cosigned By    Initials Name Provider Type    AW Luly Alvarenga, RN Registered Nurse    Jeannine Saleh RN Registered Nurse    Sylvia Olson, RN Registered Nurse    Katie Wheatley, RN Registered Nurse    Arpit Lynn, PT Student PT Student                Physical Therapy Education     Title: PT OT SLP Therapies (Done)     Topic: Physical Therapy (Done)     Point: Mobility training (Done)     Learning Progress Summary           Patient Acceptance, E, VU by AV at 10/10/2022 1133    Acceptance, E,TB, VU by ANA at 10/10/2022 1118                   Point: Precautions (Done)     Learning Progress Summary           Patient Acceptance, E, VU by AV at 10/10/2022 1133    Acceptance, E,TB, VU by ANA at 10/10/2022 1118                               User Key     Initials Effective Dates Name Provider Type Discipline    AV 06/16/21 -  Juan Carlos Zafar OT Occupational Therapist OT    ANA 06/03/21 -  Nino Sagastume, " PT Physical Therapist PT              PT Recommendation and Plan     Progress Summary (PT)  Progress Toward Functional Goals (PT): (P) progress toward functional goals is fair  Daily Progress Summary (PT): (P) Patient reported extreme pain and discomfort in bilateral lower extremities today during bed mobility and exercises at edge of bed. Patient declined attempting transfers today secondary to pain.   Outcome Measures     Row Name 10/11/22 1400 10/10/22 1100          How much help from another person do you currently need...    Turning from your back to your side while in flat bed without using bedrails? 2 (P)   -KE 2  -ANA     Moving from lying on back to sitting on the side of a flat bed without bedrails? 2 (P)   -KE 2  -ANA     Moving to and from a bed to a chair (including a wheelchair)? 1 (P)   -KE 1  -ANA     Standing up from a chair using your arms (e.g., wheelchair, bedside chair)? 1 (P)   -KE 1  -ANA     Climbing 3-5 steps with a railing? 1 (P)   -KE 1  -ANA     To walk in hospital room? 1 (P)   -KE 1  -ANA     AM-PAC 6 Clicks Score (PT) 8 (P)   -KE 8  -ANA        Functional Assessment    Outcome Measure Options AM-PAC 6 Clicks Basic Mobility (PT) (P)   -KE AM-PAC 6 Clicks Basic Mobility (PT)  -ANA           User Key  (r) = Recorded By, (t) = Taken By, (c) = Cosigned By    Initials Name Provider Type    ANA Nino Sagastume, PT Physical Therapist    Arpit Lynn, PT Student PT Student                 Time Calculation:    PT Charges     Row Name 10/11/22 1409             Time Calculation    PT Received On 10/11/22 (P)   -KE         Timed Charges    14757 - PT Therapeutic Exercise Minutes 9 (P)   -KE      28044 - PT Therapeutic Activity Minutes 15 (P)   -KE         Total Minutes    Timed Charges Total Minutes 24 (P)   -KE       Total Minutes 24 (P)   -KE            User Key  (r) = Recorded By, (t) = Taken By, (c) = Cosigned By    Initials Name Provider Type    Arpit Lynn, PT Student PT Student               Therapy Charges for Today     Code Description Service Date Service Provider Modifiers Qty    34918197158  PT THER PROC EA 15 MIN 10/11/2022 Arpit Natarajan, PT Student GP 1    [id number]  PT THERAPEUTIC ACT EA 15 MIN 10/11/2022 Arpit Natarajan, PT Student GP 1          PT G-Codes  Outcome Measure Options: (P) AM-PAC 6 Clicks Basic Mobility (PT)  AM-PAC 6 Clicks Score (PT): (P) 8  AM-PAC 6 Clicks Score (OT): 16    Arpit Natarajan, PT Student  10/11/2022

## 2022-10-11 NOTE — PLAN OF CARE
Goal Outcome Evaluation: No significant changes this shift. Wound care completed by wound care nurse. Pt premedicated and also medicated with PRN pain medication after for severe pain. VSS. No needs at this time.

## 2022-10-12 NOTE — PROGRESS NOTES
[facility]     Progress Note    Patient Name: Luaren Redd  : 1945  MRN: [id number]  Primary Care Physician:  Dennis Kohler MD  Date of admission: 10/1/2022      Subjective   Brief summary.  Admitted to ICU with sepsis and anemia and cellulitis of legs  Patient also with severe anemia and heme positive stools      HPI:  She is status post EGD today.  Somewhat sleepy  Heart rate better  Denies abdominal pain .  Continues to have pain in legs        Review of Systems     Fatigue and weakness  Leg pain  No marylin blood in the stool      Objective     Vitals:   Temp:  [97.1 °F (36.2 °C)-98 °F (36.7 °C)] 97.1 °F (36.2 °C)  Heart Rate:  [69-91] 72  Resp:  [12-18] 15  BP: ()/(56-83) 100/63  Flow (L/min):  [1-2] 2    Physical Exam :     Elderly female, sleepy and tired looking  HEENT with pallor  Heart irregular, lungs clear but diminished breath sound  Abdomen obese and soft nontender  Lower extremity with 3+ edema, surgical dressing and wound dressing  Neurologically awake alert    Result Review:  I have personally reviewed the results from the time of this admission to 10/12/2022 15:12 EDT and agree with these findings:  [x]  Laboratory  []  Microbiology  []  Radiology  []  EKG/Telemetry   []  Cardiology/Vascular   []  Pathology  []  Old records  []  Other:         Assessment / Plan       Active Hospital Problems:  Active Hospital Problems    Diagnosis    • **Sepsis ([facility])    • Acute kidney injury superimposed on chronic kidney disease ([facility])    • Severe anemia    • GI bleed    • CHF (congestive heart failure) ([facility])    • Cellulitis of anterior lower leg    • Atrial fibrillation with RVR ([facility])        Plan:   Patient with gastritis and nonbleeding ulcer.  Continue Protonix  Continue diuretics and Cardizem and Toprol.  Check labs in a.m..  Waiting for placement      DVT prophylaxis:  Medical DVT prophylaxis orders are present.    CODE STATUS:   Code Status (Patient has no pulse and is not breathing):  CPR (Attempt to Resuscitate)  Medical Interventions (Patient has pulse or is breathing): Full Support  Release to patient: Routine Release                Electronically signed by Pablo Fajardo MD, 10/12/22, 3:14 PM EDT.

## 2022-10-12 NOTE — PROGRESS NOTES
CARDIOLOGY  INPATIENT PROGRESS NOTE                [facility] CARE UNIT 2    10/12/2022      PATIENT IDENTIFICATION:   Name:  Lauren Redd      MRN:  [id number]     77 y.o.  female                 SUBJECTIVE:   Still has a lot of pain in the legs  No chest pains  Ulcers in the legs seem to be healing  Off Cardizem drip and rate controlled    OBJECTIVE:  Vitals:    10/11/22 2325 10/12/22 0245 10/12/22 0731 10/12/22 1128   BP: 111/66 112/63 100/68 120/83   BP Location: Left arm Left arm     Patient Position: Lying Lying     Pulse: 89 76 91 89   Resp: 16 16 16 18   Temp: 97.3 °F (36.3 °C) 97.3 °F (36.3 °C) 97.3 °F (36.3 °C) 97.7 °F (36.5 °C)   TempSrc: Oral Oral     SpO2: 93% 100% 100% 100%   Weight:       Height:               Body mass index is 30.37 kg/m².    Intake/Output Summary (Last 24 hours) at 10/12/2022 1244  Last data filed at 10/12/2022 0557  Gross per 24 hour   Intake 460 ml   Output 700 ml   Net -240 ml       Telemetry:     Physical Exam  General Appearance:   · no acute distress  · Alert and oriented x3  HENT:   · lips not cyanotic  · Atraumatic  Neck:  · No jvd   · supple  Respiratory:  · no respiratory distress  · normal breath sounds  · no rales  Cardiovascular:    · regular rhythm  · no S3, no S4   · no murmur  · no rub  · lower extremity edema: none    Skin:   · warm, dry  · No rashes      Allergies   Allergen Reactions   • Contrast Dye Rash   • Lotrel [Amlodipine Besy-Benazepril Hcl] Unknown - Low Severity       Scheduled meds:  !NOT ORDERED- apixaban (ELIQUIS), , Does not apply, Daily With Dinner  ceFAZolin, 2 g, Intravenous, Q12H  dilTIAZem CD, 180 mg, Oral, Q24H  enoxaparin, 30 mg, Subcutaneous, Q24H  HYDROcodone-acetaminophen, 1 tablet, Oral, 4x Daily  metoprolol succinate XL, 100 mg, Oral, Q12H  midodrine, 10 mg, Oral, TID AC  pantoprazole, 80 mg, Intravenous, BID AC  Sodium Hypochlorite, , Topical, 2 times per day  triamcinolone, 1 application, Topical, 2 times per  day      IV meds:                      dilTIAZem, 5-15 mg/hr, Last Rate: 10 mg/hr (10/01/22 3077)        Data Review:  CBC    CBC 10/10/22 10/11/22 10/12/22   WBC 16.49 (A) 17.17 (A) 18.69 (A)   RBC 3.40 (A) 3.49 (A) 3.49 (A)   Hemoglobin 9.4 (A) 9.6 (A) 9.9 (A)   Hematocrit 30.1 (A) 31.4 (A) 31.1 (A)   MCV 88.5 90.0 89.1   MCH 27.6 27.5 28.4   MCHC 31.2 (A) 30.6 (A) 31.8   RDW 21.2 (A) 21.8 (A) 22.2 (A)   Platelets 298 300 294   (A) Abnormal value            CMP    CMP 10/10/22 10/11/22 10/12/22   Glucose 127 (A) 110 (A) 113 (A)   BUN 82 (A) 78 (A) 90 (A)   Creatinine 2.00 (A) 1.91 (A) 2.16 (A)   Sodium 134 (A) 135 (A) 134 (A)   Potassium 4.6 4.7 4.8   Chloride 101 101 98   Calcium 10.2 9.9 10.5   Albumin 2.70 (A) 2.60 (A) 2.60 (A)   Total Bilirubin  0.6    Alkaline Phosphatase  181 (A)    AST (SGOT)  20    ALT (SGPT)  <5    (A) Abnormal value             CARDIAC LABS:         Lab Results   Component Value Date    DIGOXIN 0.34 (L) 10/01/2022      Lab Results   Component Value Date    TSH 2.430 06/20/2022           Invalid input(s): LDLCALC  No results found for: POCTROP  Lab Results   Component Value Date    TROPONINT 0.015 07/09/2022   (  Lab Results   Component Value Date    MG 2.1 10/07/2022     Results for orders placed during the hospital encounter of 10/01/22    Adult Transthoracic Echo Complete W/ Cont if Necessary Per Protocol    Interpretation Summary  · Left ventricular wall thickness is consistent with mild to moderate concentric hypertrophy.  · Calculated left ventricular EF = 59% Estimated left ventricular EF was in agreement with the calculated left ventricular EF. Left ventricular systolic function is normal.  · The right atrial cavity is mild to moderately dilated.  · Estimated right ventricular systolic pressure from tricuspid regurgitation is normal (<35 mmHg).  · Mild aortic valve regurgitation is present  · Mild mitral valve regurgitation is present.           ASSESSMENT:  Shock probably septic  improved  Atrial fibrillation with RVR  Severe anemia probably due to GI bleed  Bilateral lower extremity wounds and ulcerations  Chronic kidney disease      PLAN:   Continue p.o. Toprol and Cardizem  Continue diuresis            Lisa Leung MD  10/12/2022    12:44 EDT

## 2022-10-12 NOTE — ANESTHESIA PREPROCEDURE EVALUATION
Anesthesia Evaluation     Patient summary reviewed and Nursing notes reviewed   no history of anesthetic complications:  NPO Solid Status: > 8 hours  NPO Liquid Status: > 2 hours           Airway   Mallampati: II  TM distance: >3 FB  Neck ROM: full  No difficulty expected  Dental - normal exam     Pulmonary - normal exam    breath sounds clear to auscultation  (+) pneumonia stable ,   Cardiovascular   Exercise tolerance: good (4-7 METS)    ECG reviewed  Patient on routine beta blocker and Beta blocker given within 24 hours of surgery  Rhythm: irregular  Rate: abnormal    (+) hypertension, dysrhythmias Atrial Fib, CHF Diastolic >=55%,     ROS comment: Atrial fibrillation  Right bundle branch block    EF 59% aortic valve exhibits sclerosis. Mild aortic valve regurgitation is present.    Neuro/Psych- negative ROS  GI/Hepatic/Renal/Endo    (+)  GERD, GI bleeding active bleeding, renal disease CRI,     Musculoskeletal     Abdominal    Substance History - negative use     OB/GYN negative ob/gyn ROS         Other   arthritis, blood dyscrasia anemia,     ROS/Med Hx Other: PAT Nursing Notes unavailable.                   Anesthesia Plan    ASA 4     general     (Patient understands anesthesia not responsible for dental damage.)  intravenous induction     Anesthetic plan, risks, benefits, and alternatives have been provided, discussed and informed consent has been obtained with: patient.  Pre-procedure education provided  Use of blood products discussed with patient .   Plan discussed with CRNA.        CODE STATUS:    Code Status (Patient has no pulse and is not breathing): CPR (Attempt to Resuscitate)  Medical Interventions (Patient has pulse or is breathing): Full Support  Release to patient: Routine Release

## 2022-10-12 NOTE — ANESTHESIA POSTPROCEDURE EVALUATION
Patient: Lauren Redd    Procedure Summary     Date: 10/12/22 Room / Location: [facility] ENDOSCOPY 3 / [facility] ENDOSCOPY    Anesthesia Start: 1348 Anesthesia Stop: 1413    Procedure: ESOPHAGOGASTRODUODENOSCOPY WITH BIOPSIES Diagnosis:       Gastrointestinal hemorrhage, unspecified gastrointestinal hemorrhage type      (Gastrointestinal hemorrhage, unspecified gastrointestinal hemorrhage type [K92.2])    Surgeons: Issa Armstrong MD Provider: Howard Santos MD    Anesthesia Type: general ASA Status: 4          Anesthesia Type: general    Vitals  Vitals Value Taken Time   BP 98/74 10/12/22 1437   Temp 36.2 °C (97.1 °F) 10/12/22 1427   Pulse 74 10/12/22 1437   Resp 15 10/12/22 1427   SpO2 100 % 10/12/22 1437   Vitals shown include unvalidated device data.        Post Anesthesia Care and Evaluation    Patient location during evaluation: bedside  Patient participation: complete - patient participated  Level of consciousness: awake  Pain management: adequate    Airway patency: patent  Anesthetic complications: No anesthetic complications  PONV Status: none  Cardiovascular status: acceptable and stable  Respiratory status: acceptable  Hydration status: acceptable    Comments: An Anesthesiologist personally participated in the most demanding procedures (including induction and emergence if applicable) in the anesthesia plan, monitored the course of anesthesia administration at frequent intervals and remained physically present and available for immediate diagnosis and treatment of emergencies.

## 2022-10-12 NOTE — PLAN OF CARE
Goal Outcome Evaluation:  Plan of Care Reviewed With: patient        Progress: improving     Patient slept well overnight. VS remained stable. Had significant amount of discomfort with dressing changes. Was able to return to a state of comfort after 2 doses of IV medication. NPO after midnight for possible EGD this AM. Will continue to monitor.

## 2022-10-13 NOTE — PLAN OF CARE
Goal Outcome Evaluation:      No complaints per pt. Pitting edema noted to ble- md aware.

## 2022-10-13 NOTE — CONSULTS
"Nutrition Services    Patient Name: Lauren Redd  YOB: 1945  MRN: [id number]  Admission date: 10/1/2022      CLINICAL NUTRITION ASSESSMENT      Reason for Assessment  Follow-up protocol     H&P:    Past Medical History:   Diagnosis Date   • Afib (HCC)    • Aneurysm (HCC)    • Arthritis    • CHF (congestive heart failure) (HCC)    • GERD (gastroesophageal reflux disease)    • Hypertension    • Irregular heart beat         Current Problems:   Active Hospital Problems    Diagnosis    • **Sepsis (HCC)    • Acute kidney injury superimposed on chronic kidney disease (HCC)    • Severe anemia    • GI bleed    • CHF (congestive heart failure) (HCC)    • Cellulitis of anterior lower leg    • Atrial fibrillation with RVR (HCC)         Nutrition/Diet History         Narrative     Patient is consuming ~75% of meals on a regular diet.  Receiving Ryan BID for impaired skin integrity.  No additional nutrition intervention indicated at this time.       Anthropometrics        Current Height, Weight Height: 172.7 cm (68\")  Weight: 90.6 kg (199 lb 11.8 oz)   Current BMI Body mass index is 30.37 kg/m².       Weight Hx  Wt Readings from Last 30 Encounters:   10/02/22 0000 90.6 kg (199 lb 11.8 oz)   10/01/22 1922 96.5 kg (212 lb 11.9 oz)   07/14/22 0625 98.5 kg (217 lb 2.5 oz)   07/10/22 0524 92.9 kg (204 lb 12.9 oz)   07/10/22 0425 92.9 kg (204 lb 12.9 oz)   07/09/22 2252 107 kg (235 lb)   06/24/22 0600 112 kg (246 lb 14.6 oz)   06/22/22 1211 113 kg (249 lb 1.9 oz)   06/20/22 0130 109 kg (240 lb 4.8 oz)   06/19/22 2210 109 kg (240 lb 4.8 oz)   04/23/22 1241 112 kg (246 lb 4.1 oz)   04/20/22 0630 113 kg (248 lb 10.9 oz)   04/19/22 0722 112 kg (247 lb 2.2 oz)   04/18/22 0500 112 kg (246 lb 4.1 oz)   04/16/22 0500 113 kg (249 lb 1.9 oz)   04/15/22 0503 115 kg (252 lb 6.8 oz)   04/14/22 0603 113 kg (250 lb 3.6 oz)   04/12/22 0600 114 kg (251 lb 1.7 oz)   04/11/22 0514 113 kg (248 lb 0.3 oz)   04/10/22 0500 114 kg (250 lb " 10.6 oz)   04/09/22 0500 114 kg (251 lb 1.7 oz)   04/08/22 0502 114 kg (251 lb 5.2 oz)   04/07/22 0600 116 kg (255 lb 11.7 oz)   04/06/22 0500 115 kg (254 lb 10.1 oz)   04/05/22 0500 117 kg (257 lb 8 oz)   03/23/22 1500 106 kg (233 lb 7.5 oz)   03/14/22 1833 106 kg (233 lb 11 oz)   03/14/22 1521 107 kg (235 lb 0.2 oz)   03/14/22 0931 120 kg (265 lb)   03/10/22 0842 120 kg (265 lb)   03/09/22 0840 120 kg (265 lb)   03/08/22 0831 120 kg (265 lb)   02/28/22 0921 120 kg (265 lb)   02/23/22 0842 120 kg (265 lb)   02/07/22 2339 115 kg (252 lb 6.8 oz)   02/07/22 1558 114 kg (251 lb 15.8 oz)   02/07/22 1333 102 kg (225 lb)   01/20/22 1727 104 kg (229 lb)   12/14/21 1543 103 kg (227 lb)   03/19/19 0000 107 kg (235 lb)   01/07/19 0000 107 kg (235 lb)            Wt Change Observation 15% weight loss x 3 months      Estimated/Assessed Needs       Energy Requirements 25-30 kcal/kg IBW    EST Needs (kcal/day) 0374-5995       Protein Requirements 1.3 g/kg IBW    EST Daily Needs (g/day) 83       Fluid Requirements 1 ml/kcal     Estimated Needs (mL/day) 7559-3505     Labs/Medications         Pertinent Labs Reviewed.   Results from last 7 days   Lab Units 10/13/22  0609 10/12/22  0413 10/11/22  0540   SODIUM mmol/L 132* 134* 135*   POTASSIUM mmol/L 5.6* 4.8 4.7   CHLORIDE mmol/L 98 98 101   CO2 mmol/L 24.6 23.0 25.4   BUN mg/dL 93* 90* 78*   CREATININE mg/dL 2.40* 2.16* 1.91*   CALCIUM mg/dL 10.0 10.5 9.9   BILIRUBIN mg/dL  --   --  0.6   ALK PHOS U/L  --   --  181*   ALT (SGPT) U/L  --   --  <5   AST (SGOT) U/L  --   --  20   GLUCOSE mg/dL 114* 113* 110*     Results from last 7 days   Lab Units 10/13/22  0609 10/08/22  0752 10/07/22  1212   MAGNESIUM mg/dL  --   --  2.1   PHOSPHORUS mg/dL 3.8   < >  --    HEMOGLOBIN g/dL 10.4*   < > 7.2*   HEMATOCRIT % 33.6*   < > 23.4*    < > = values in this interval not displayed.     COVID19   Date Value Ref Range Status   04/23/2022 Not Detected Not Detected - Ref. Range Final     No results  found for: HGBA1C      Pertinent Medications Reviewed.     Current Nutrition Orders & Evaluation of Intake       Oral Nutrition     Current PO Diet Diet Regular   Supplement Orders Placed This Encounter      Dietary Nutrition Supplements Ryan; orange       Malnutrition Severity Assessment                Nutrition Diagnosis         Nutrition Dx Problem 1 Increased nutrient needs related to increased demand for protein to promote wound healing as evidenced by multiple areas of impaired skin integrity.       Nutrition Intervention         Ryan BID      Medical Nutrition Therapy/Nutrition Education          Learner     Readiness Patient  Acceptance     Method     Response Explanation and Written Material  Verbalizes understanding     Monitor/Evaluation        Monitor PO intake, Supplement intake, Skin status       Nutrition Discharge Plan         To be determined       Electronically signed by:  Rosa Perkins RD  10/13/22 08:47 EDT

## 2022-10-13 NOTE — THERAPY TREATMENT NOTE
Acute Care - Speech Language Pathology   Swallow Treatment Note  Nayana     Patient Name: Lauren Redd  : 1945  MRN: [id number]  Today's Date: 10/13/2022               Admit Date: 10/1/2022    Visit Dx:     ICD-10-CM ICD-9-CM   1. Anemia, unspecified type  D64.9 285.9   2. Upper GI bleed  K92.2 578.9   3. Atrial fibrillation with RVR ([facility])  I48.91 427.31   4. Venous stasis ulcers of both lower extremities ([facility])  I83.019 459.81    I83.029 707.10    L97.919     L97.929    5. Oropharyngeal dysphagia  R13.12 787.22   6. Gastrointestinal hemorrhage, unspecified gastrointestinal hemorrhage type  K92.2 578.9   7. Difficulty in walking  R26.2 719.7   8. Decreased activities of daily living ([facility])  Z78.9 V49.89     Patient Active Problem List   Diagnosis   • Positive colorectal cancer screening using Cologuard test   • Atrial fibrillation with RVR ([facility])   • CHF (congestive heart failure) ([facility])   • Cellulitis of anterior lower leg   • Weakness generalized   • Atrial fibrillation ([facility])   • Pneumonia due to infectious organism   • Pneumonia due to infectious organism, unspecified laterality, unspecified part of lung   • Epistaxis   • Sepsis ([facility])   • Severe anemia   • GI bleed   • Acute kidney injury superimposed on chronic kidney disease ([facility])     Past Medical History:   Diagnosis Date   • Afib ([facility])    • Aneurysm ([facility])    • Arthritis    • CHF (congestive heart failure) ([facility])    • GERD (gastroesophageal reflux disease)    • Hypertension    • Irregular heart beat      Past Surgical History:   Procedure Laterality Date   • COLONOSCOPY     • ENDOSCOPY N/A 10/12/2022    Procedure: ESOPHAGOGASTRODUODENOSCOPY WITH BIOPSIES;  Surgeon: Issa Armstrong MD;  Location: [facility] ENDOSCOPY;  Service: Gastroenterology;  Laterality: N/A;  GASTRIC ULCER, EROSIVE GASTRITIS   • HYSTERECTOMY       SPEECH PATHOLOGY DYSPHAGIA TREATMENT    Subjective/Behavioral Observations: Alert and cooperative.  Patient not reporting any further  choking episodes.        Day/time of Treatment: 10/13/22        Current Diet: Regular        Treatment received: Follow-up treatment from evaluation.  Patient was n.p.o. for procedure on 10/12/2022.          Results of treatment: Patient self-feeding.  Able to verbalize her compensatory strategies of small bites and sips.  Patient is utilizing compensatory strategies independently.  Taking small bites without any overt signs or symptoms of aspiration with regular solids.  Thin liquids by cup and straw with no overt signs or symptoms of aspiration.        Progress toward goals: Goals met        Barriers to Achieving goals: N/A        Plan of care:/changes in plan: Patient is tolerating current diet of regular solids and thin liquids.  She is able to verbalize and utilize compensatory strategies independently.  No further direct speech pathology services are needed at this time.                SLP Recommendation and Plan                                                                         Plan of Care Reviewed With: patient          EDUCATION  The patient has been educated in the following areas:   Dysphagia (Swallowing Impairment).              Time Calculation:    Time Calculation- SLP     Row Name 10/13/22 1220             Time Calculation- SLP    SLP Stop Time 1100  -SN      SLP Received On 10/13/22  -SN         Untimed Charges    90607-OC Treatment Swallow Minutes 45  -SN         Total Minutes    Untimed Charges Total Minutes 45  -SN       Total Minutes 45  -SN            User Key  (r) = Recorded By, (t) = Taken By, (c) = Cosigned By    Initials Name Provider Type    Latia Franco SLP Speech and Language Pathologist                Therapy Charges for Today     Code Description Service Date Service Provider Modifiers Qty    [id number]  ST TREATMENT SWALLOW 3 10/13/2022 Latia Miranda SLP GN 1               SUKHJINDER Bray  10/13/2022   Term [place] Vaginal Delivery (>/= 37 weeks)

## 2022-10-13 NOTE — PLAN OF CARE
Goal Outcome Evaluation:  Plan of Care Reviewed With: patient        Progress: improving   Patient slept well overnight. Had a period of discomfort following her BLE dressing changes despite premedication. Was able to return to a comfortable state after 2 hours. Denies any pain at this time. VS remained stable overnight. Will continue to monitor.

## 2022-10-13 NOTE — PROGRESS NOTES
CARDIOLOGY  INPATIENT PROGRESS NOTE                [facility] CARE UNIT 2    10/13/2022      PATIENT IDENTIFICATION:   Name:  Lauren Redd      MRN:  [id number]     77 y.o.  female                 SUBJECTIVE:   Feeling better  Shortness of breath and swelling of the feet are improving  Atrial fibrillation with controlled rate off the Cardizem drip    OBJECTIVE:  Vitals:    10/12/22 2310 10/13/22 0306 10/13/22 0830 10/13/22 1051   BP:  120/74 93/64 (!) 88/57   BP Location:  Left arm Left arm    Patient Position:  Sitting Lying    Pulse: 76 103 85    Resp: 20 20 19    Temp: 97.8 °F (36.6 °C) 97.6 °F (36.4 °C) 97.3 °F (36.3 °C) 97.3 °F (36.3 °C)   TempSrc: Axillary Axillary Oral    SpO2: 98% 94% 100%    Weight:       Height:               Body mass index is 30.37 kg/m².    Intake/Output Summary (Last 24 hours) at 10/13/2022 1057  Last data filed at 10/13/2022 0931  Gross per 24 hour   Intake 517.98 ml   Output 475 ml   Net 42.98 ml       Telemetry:     Physical Exam  General Appearance:   · no acute distress  · Alert and oriented x3  HENT:   · lips not cyanotic  · Atraumatic  Neck:  · No jvd   · supple  Respiratory:  · no respiratory distress  · normal breath sounds  · no rales  Cardiovascular:    · regular rhythm  · no S3, no S4   · no murmur  · no rub  · lower extremity edema: none    Skin:   · warm, dry  · No rashes      Allergies   Allergen Reactions   • Contrast Dye Rash   • Lotrel [Amlodipine Besy-Benazepril Hcl] Unknown - Low Severity       Scheduled meds:  !NOT ORDERED- apixaban (ELIQUIS), , Does not apply, Daily With Dinner  ceFAZolin, 2 g, Intravenous, Q12H  dilTIAZem CD, 180 mg, Oral, Q24H  enoxaparin, 30 mg, Subcutaneous, Q24H  HYDROcodone-acetaminophen, 1 tablet, Oral, 4x Daily  metoprolol succinate XL, 100 mg, Oral, Q12H  midodrine, 10 mg, Oral, TID AC  pantoprazole, 80 mg, Intravenous, BID AC  Sodium Hypochlorite, , Topical, 2 times per day  triamcinolone, 1 application,  Topical, 2 times per day      IV meds:                      lactated ringers, 30 mL/hr, Last Rate: 50 mL/hr (10/12/22 1348)        Data Review:  CBC    CBC 10/11/22 10/12/22 10/13/22   WBC 17.17 (A) 18.69 (A) 17.51 (A)   RBC 3.49 (A) 3.49 (A) 3.68 (A)   Hemoglobin 9.6 (A) 9.9 (A) 10.4 (A)   Hematocrit 31.4 (A) 31.1 (A) 33.6 (A)   MCV 90.0 89.1 91.3   MCH 27.5 28.4 28.3   MCHC 30.6 (A) 31.8 31.0 (A)   RDW 21.8 (A) 22.2 (A) 23.5 (A)   Platelets 300 294 300   (A) Abnormal value            CMP    CMP 10/11/22 10/12/22 10/13/22   Glucose 110 (A) 113 (A) 114 (A)   BUN 78 (A) 90 (A) 93 (A)   Creatinine 1.91 (A) 2.16 (A) 2.40 (A)   Sodium 135 (A) 134 (A) 132 (A)   Potassium 4.7 4.8 5.6 (A)   Chloride 101 98 98   Calcium 9.9 10.5 10.0   Albumin 2.60 (A) 2.60 (A) 2.70 (A)   Total Bilirubin 0.6     Alkaline Phosphatase 181 (A)     AST (SGOT) 20     ALT (SGPT) <5     (A) Abnormal value             CARDIAC LABS:         Lab Results   Component Value Date    DIGOXIN 0.34 (L) 10/01/2022      Lab Results   Component Value Date    TSH 2.430 06/20/2022           Invalid input(s): LDLCALC  No results found for: POCTROP  Lab Results   Component Value Date    TROPONINT 0.015 07/09/2022   (  Lab Results   Component Value Date    MG 2.1 10/07/2022     Results for orders placed during the hospital encounter of 10/01/22    Adult Transthoracic Echo Complete W/ Cont if Necessary Per Protocol    Interpretation Summary  · Left ventricular wall thickness is consistent with mild to moderate concentric hypertrophy.  · Calculated left ventricular EF = 59% Estimated left ventricular EF was in agreement with the calculated left ventricular EF. Left ventricular systolic function is normal.  · The right atrial cavity is mild to moderately dilated.  · Estimated right ventricular systolic pressure from tricuspid regurgitation is normal (<35 mmHg).  · Mild aortic valve regurgitation is present  · Mild mitral valve regurgitation is present.            ASSESSMENT:  Shock probably septic improved  Atrial fibrillation with RVR  Severe anemia probably due to GI bleed  Bilateral lower extremity wounds and ulcerations  Chronic kidney disease      PLAN:   Continue p.o. Toprol and Manuel Leung MD  10/13/2022    10:57 EDT

## 2022-10-13 NOTE — PROGRESS NOTES
[facility]     Progress Note    Patient Name: Lauren Redd  : 1945  MRN: [id number]  Primary Care Physician:  Dennis Kohler MD  Date of admission: 10/1/2022      Subjective   Brief summary.  Admitted to ICU with sepsis and anemia and cellulitis of legs  Patient also with severe anemia and heme positive stools      HPI:  Patient sleepy today,  at bedside.  No shortness of breath  Pain less in her legs        Review of Systems     Fatigue and weakness  No chest pain, no shortness of breath  Denies abdominal pain, continues to have some leg pain      Objective     Vitals:   Temp:  [97.3 °F (36.3 °C)-97.8 °F (36.6 °C)] 97.3 °F (36.3 °C)  Heart Rate:  [] 82  Resp:  [16-20] 19  BP: ()/(60-74) 96/65  Flow (L/min):  [2] 2    Physical Exam :     Elderly female, sleepy and tired looking  HEENT with pallor, no icterus  Heart irregular, lungs clear but diminished breath sound  Abdomen obese and soft nontender  Lower extremity with 3+ edema, surgical dressing and wound dressing  Neurologically awake alert    Result Review:  I have personally reviewed the results from the time of this admission to 10/13/2022 18:46 EDT and agree with these findings:  [x]  Laboratory  []  Microbiology  []  Radiology  []  EKG/Telemetry   []  Cardiology/Vascular   []  Pathology  []  Old records  []  Other:    Potassium 5.6    Assessment / Plan       Active Hospital Problems:  Active Hospital Problems    Diagnosis    • **Sepsis ([facility])    • Acute kidney injury superimposed on chronic kidney disease ([facility])    • Severe anemia    • GI bleed    • CHF (congestive heart failure) ([facility])    • Cellulitis of anterior lower leg    • Atrial fibrillation with RVR ([facility])        Plan:   Patient with gastritis and nonbleeding ulcer.  On Protonix stable, continue to monitor labs including H&H  Diuretics on hold.  Creatinine up.  Potassium up.  Patient not on any supplements of potassium  We will recheck potassium and labs in  a.m.  Discussed with patient's , will need inpatient/nursing home placement,  aware, working on placement      DVT prophylaxis:  Medical DVT prophylaxis orders are present.    CODE STATUS:   Code Status (Patient has no pulse and is not breathing): CPR (Attempt to Resuscitate)  Medical Interventions (Patient has pulse or is breathing): Full Support  Release to patient: Routine Release                Electronically signed by Pablo Fajardo MD, 10/13/22, 5:14 PM EDT.

## 2022-10-13 NOTE — THERAPY TREATMENT NOTE
Acute Care - Physical Therapy Treatment Note   N[place]ana     Patient Name: Lauren Redd  : 1945  MRN: [id number]  Today's Date: 10/13/2022      Visit Dx:     ICD-10-CM ICD-9-CM   1. Anemia, unspecified type  D64.9 285.9   2. Upper GI bleed  K92.2 578.9   3. Atrial fibrillation with RVR ([facility])  I48.91 427.31   4. Venous stasis ulcers of both lower extremities ([facility])  I83.019 459.81    I83.029 707.10    L97.919     L97.929    5. Oropharyngeal dysphagia  R13.12 787.22   6. Gastrointestinal hemorrhage, unspecified gastrointestinal hemorrhage type  K92.2 578.9   7. Difficulty in walking  R26.2 719.7   8. Decreased activities of daily living (ADL)  Z78.9 V49.89     Patient Active Problem List   Diagnosis   • Positive colorectal cancer screening using Cologuard test   • Atrial fibrillation with RVR (HCC)   • CHF (congestive heart failure) ([facility])   • Cellulitis of anterior lower leg   • Weakness generalized   • Atrial fibrillation (HCC)   • Pneumonia due to infectious organism   • Pneumonia due to infectious organism, unspecified laterality, unspecified part of lung   • Epistaxis   • Sepsis ([facility])   • Severe anemia   • GI bleed   • Acute kidney injury superimposed on chronic kidney disease (HCC)     Past Medical History:   Diagnosis Date   • Afib (HCC)    • Aneurysm (HCC)    • Arthritis    • CHF (congestive heart failure) ([facility])    • GERD (gastroesophageal reflux disease)    • Hypertension    • Irregular heart beat      Past Surgical History:   Procedure Laterality Date   • COLONOSCOPY     • ENDOSCOPY N/A 10/12/2022    Procedure: ESOPHAGOGASTRODUODENOSCOPY WITH BIOPSIES;  Surgeon: Issa Armstrong MD;  Location: [facility] ENDOSCOPY;  Service: Gastroenterology;  Laterality: N/A;  GASTRIC ULCER, EROSIVE GASTRITIS   • HYSTERECTOMY       PT Assessment (last 12 hours)     PT Evaluation and Treatment     Row Name 10/13/22 1400          Physical Therapy Time and Intention    Document Type therapy note (daily note)  -AV      Mode of Treatment individual therapy;physical therapy  -AV     Row Name 10/13/22 1400          Bed Mobility    Comment, (Bed Mobility) Declined out of bed activity but agreeable to supine exercises  -AV     Row Name 10/13/22 1400          Motor Skills    Therapeutic Exercise hip;knee;ankle  -AV     Row Name 10/13/22 1400          Hip (Therapeutic Exercise)    Hip (Therapeutic Exercise) AAROM (active assistive range of motion)  -AV     Hip AAROM (Therapeutic Exercise) bilateral;flexion;extension;aBduction;aDduction;supine;2 sets;10 repetitions  -AV     Row Name 10/13/22 1400          Knee (Therapeutic Exercise)    Knee (Therapeutic Exercise) AAROM (active assistive range of motion)  -AV     Knee AAROM (Therapeutic Exercise) bilateral;flexion;extension;supine;2 sets;10 repetitions  -AV     Row Name 10/13/22 1400          Ankle (Therapeutic Exercise)    Ankle (Therapeutic Exercise) AAROM (active assistive range of motion)  -AV     Ankle AROM (Therapeutic Exercise) bilateral;dorsiflexion;plantarflexion;supine;2 sets;10 repetitions  -AV     Row Name             Wound 02/09/22 1235 Right lower leg Blisters    Wound - Properties Group Placement Date: 02/09/22  -FL Placement Time: 1235  -FL Present on Hospital Admission: Y  -FL Side: Right  -FL Orientation: lower  -FL Location: leg  -FL Primary Wound Type: Blisters  -FL    Retired Wound - Properties Group Placement Date: 02/09/22  -FL Placement Time: 1235  -FL Present on Hospital Admission: Y  -FL Side: Right  -FL Orientation: lower  -FL Location: leg  -FL Primary Wound Type: Blisters  -FL    Retired Wound - Properties Group Date first assessed: 02/09/22  -FL Time first assessed: 1235  -FL Present on Hospital Admission: Y  -FL Side: Right  -FL Location: leg  -FL Primary Wound Type: Blisters  -FL    Row Name             Wound 04/23/22 1853 Right proximal calf    Wound - Properties Group Placement Date: 04/23/22  -AW Placement Time: 1853  -AW Present on Hospital Admission: Y   -AW Side: Right  -AW Orientation: proximal  -AW Location: calf  -AW    Retired Wound - Properties Group Placement Date: 04/23/22  -AW Placement Time: 1853 -AW Present on Hospital Admission: Y  -AW Side: Right  -AW Orientation: proximal  -AW Location: calf  -AW    Retired Wound - Properties Group Date first assessed: 04/23/22  -AW Time first assessed: 1853 -AW Present on Hospital Admission: Y  -AW Side: Right  -AW Location: calf  -AW    Row Name             Wound 06/20/22 1150 Left lateral leg Venous Ulcer    Wound - Properties Group Placement Date: 06/20/22  -FL Placement Time: 1150  -FL Present on Hospital Admission: Y  -FL Side: Left  -FL Orientation: lateral  -FL Location: leg  -FL Primary Wound Type: Venous ulcer  -FL    Retired Wound - Properties Group Placement Date: 06/20/22  -FL Placement Time: 1150  -FL Present on Hospital Admission: Y  -FL Side: Left  -FL Orientation: lateral  -FL Location: leg  -FL Primary Wound Type: Venous ulcer  -FL    Retired Wound - Properties Group Date first assessed: 06/20/22  -FL Time first assessed: 1150  -FL Present on Hospital Admission: Y  -FL Side: Left  -FL Location: leg  -FL Primary Wound Type: Venous ulcer  -FL    Row Name             Wound 10/03/22 1543 Right medial leg Venous Ulcer    Wound - Properties Group Placement Date: 10/03/22  -GASTON Placement Time: 1543  -GASTON Present on Hospital Admission: Y  -GASTON Side: Right  -GASTON Orientation: medial  -GASTON Location: leg  -GASTON Primary Wound Type: Venous ulcer  -GASTON    Retired Wound - Properties Group Placement Date: 10/03/22  -GASTON Placement Time: 1543  -GASTON Present on Hospital Admission: Y  -GASTON Side: Right  -GASTON Orientation: medial  -GASTON Location: leg  -GASTON Primary Wound Type: Venous ulcer  -GASTON    Retired Wound - Properties Group Date first assessed: 10/03/22  -GASTON Time first assessed: 1543  -GASTON Present on Hospital Admission: Y  -GASTON Side: Right  -GASTON Location: leg  -GASTON Primary Wound Type: Venous ulcer  -GASTON    Row Name             Wound  "10/03/22 1547 Left medial leg    Wound - Properties Group Placement Date: 10/03/22  -GASTON Placement Time: 1547  -GASTON Present on Hospital Admission: Y  -GASTNO Side: Left  -GASTON Orientation: medial  -GASTON Location: leg  -GASTON    Retired Wound - Properties Group Placement Date: 10/03/22  -GASTON Placement Time: 1547  -GASTON Present on Hospital Admission: Y  -GASTON Side: Left  -GASTON Orientation: medial  -GASTON Location: leg  -GASTON    Retired Wound - Properties Group Date first assessed: 10/03/22  -GASTON Time first assessed: 1547  -GASTON Present on Hospital Admission: Y  -GASTON Side: Left  -GASTON Location: leg  -GASTON    Row Name             Wound 10/03/22 1551 Right heel Pressure Injury    Wound - Properties Group Placement Date: 10/03/22  -GASTON Placement Time: 1551  -GASTON Side: Right  -GASTON Location: heel  -GASTON Primary Wound Type: Pressure inj  -GASTON    Retired Wound - Properties Group Placement Date: 10/03/22  -GASTON Placement Time: 1551  -GASTON Side: Right  -GASTON Location: heel  -GASTON Primary Wound Type: Pressure inj  -GASTON    Retired Wound - Properties Group Date first assessed: 10/03/22  -GASTON Time first assessed: 1551  -GASTON Side: Right  -GASTON Location: heel  -GASTON Primary Wound Type: Pressure inj  -GASTON    Row Name             Wound 10/03/22 1639 Left posterior elbow    Wound - Properties Group Placement Date: 10/03/22  -JS Placement Time: 1639  -JS Present on Hospital Admission: Y  -JS Side: Left  -JS Orientation: posterior  -JS Location: elbow  -JS Additional Comments: pt states she \"rubs\" her elbow on her chair at home  -JS    Retired Wound - Properties Group Placement Date: 10/03/22  -JS Placement Time: 1639  -JS Present on Hospital Admission: Y  -JS Side: Left  -JS Orientation: posterior  -JS Location: elbow  -JS Additional Comments: pt states she \"rubs\" her elbow on her chair at home  -JS    Retired Wound - Properties Group Date first assessed: 10/03/22  -JS Time first assessed: 1639  -JS Present on Hospital Admission: Y  -JS Side: Left  -JS Location: elbow  -JS Additional " "Comments: pt states she \"rubs\" her elbow on her chair at home  -JS    Row Name 10/13/22 1400          Progress Summary (PT)    Progress Toward Functional Goals (PT) progress toward functional goals is fair  -AV           User Key  (r) = Recorded By, (t) = Taken By, (c) = Cosigned By    Initials Name Provider Type    AW Luly Alvarenga, RN Registered Nurse    Jeannine Saleh, RN Registered Nurse    Sylvia Olson, RN Registered Nurse    Katie Wheatley RN Registered Nurse    Robert Sparrow, PT Physical Therapist                Physical Therapy Education     Title: PT OT SLP Therapies (Done)     Topic: Physical Therapy (Done)     Point: Mobility training (Done)     Learning Progress Summary           Patient Acceptance, E, VU by AV at 10/10/2022 1133    Acceptance, E,TB, VU by ANA at 10/10/2022 1118                   Point: Precautions (Done)     Learning Progress Summary           Patient Acceptance, E, VU by AV at 10/10/2022 1133    Acceptance, E,TB, VU by ANA at 10/10/2022 1118                               User Key     Initials Effective Dates Name Provider Type Discipline    AV 06/16/21 -  Juan Carlos Zafar, OT Occupational Therapist OT    ANA 06/03/21 -  Nino Sagastume, PT Physical Therapist PT              PT Recommendation and Plan     Progress Summary (PT)  Progress Toward Functional Goals (PT): progress toward functional goals is fair   Outcome Measures     Row Name 10/13/22 1400 10/11/22 1400          How much help from another person do you currently need...    Turning from your back to your side while in flat bed without using bedrails? 2  -AV 2  -AV (r) KE (t) AV (c)     Moving from lying on back to sitting on the side of a flat bed without bedrails? 2  -AV 2  -AV (r) KE (t) AV (c)     Moving to and from a bed to a chair (including a wheelchair)? 1  -AV 1  -AV (r) KE (t) AV (c)     Standing up from a chair using your arms (e.g., wheelchair, bedside chair)? 1  -AV 1  -AV (r) KE (t) AV " (c)     Climbing 3-5 steps with a railing? 1  -AV 1  -AV (r) KE (t) AV (c)     To walk in hospital room? 1  -AV 1  -AV (r) KE (t) AV (c)     AM-PAC 6 Clicks Score (PT) 8  -AV 8  -AV (r) KE (t)        Functional Assessment    Outcome Measure Options AM-PAC 6 Clicks Basic Mobility (PT)  -AV AM-PAC 6 Clicks Basic Mobility (PT)  -AV (r) KE (t) AV (c)           User Key  (r) = Recorded By, (t) = Taken By, (c) = Cosigned By    Initials Name Provider Type    AV Robert Anderson, PT Physical Therapist    Arpit Lynn, PT Student PT Student                 Time Calculation:    PT Charges     Row Name 10/13/22 1413             Time Calculation    PT Received On 10/13/22  -AV         Timed Charges    40642 - PT Therapeutic Exercise Minutes 10  -AV         Total Minutes    Timed Charges Total Minutes 10  -AV       Total Minutes 10  -AV            User Key  (r) = Recorded By, (t) = Taken By, (c) = Cosigned By    Initials Name Provider Type    AV Robert Anderson, PT Physical Therapist              Therapy Charges for Today     Code Description Service Date Service Provider Modifiers Qty    [id number] HC PT THER PROC EA 15 MIN 10/13/2022 Robert Anderson, PT GP 1          PT G-Codes  Outcome Measure Options: AM-PAC 6 Clicks Basic Mobility (PT)  AM-PAC 6 Clicks Score (PT): 8  AM-PAC 6 Clicks Score (OT): 16    Robert Anderson PT  10/13/2022

## 2022-10-14 PROBLEM — I48.91 ATRIAL FIBRILLATION WITH RVR (HCC): Chronic | Status: RESOLVED | Noted: 2022-01-01 | Resolved: 2022-01-01

## 2022-10-14 NOTE — PLAN OF CARE
Goal Outcome Evaluation:  Plan of Care Reviewed With: patient        Progress: improving   Patient rested well overnight with no events. VS stable, wound care completed with pre- and post medication with IV pain meds. Tolerated cares better this evening. MAUDE midline not patent. MD aware. Obtained PIV access for IV pain meds and IV ABX. Will continue to monitor.

## 2022-10-14 NOTE — PROGRESS NOTES
[facility]     Progress Note    Patient Name: Lauren Redd  : 1945  MRN: [id number]  Primary Care Physician:  Dennis Kohler MD  Date of admission: 10/1/2022      Subjective   Brief summary.  Admitted to ICU with sepsis and anemia and cellulitis of legs  Patient also with severe anemia and heme positive stools      HPI:    Patient seen earlier this morning, no new issues, continues to have fatigue and weakness.  Continues to have pain  Denies any palpitations or chest pain      Review of Systems     Fatigue and weakness  Denies abdominal pain, continues to have some leg pain        Objective     Vitals:   Temp:  [97.2 °F (36.2 °C)-97.6 °F (36.4 °C)] 97.2 °F (36.2 °C)  Heart Rate:  [80-93] 93  Resp:  [12-20] 14  BP: ()/(60-88) 108/78  Flow (L/min):  [2] 2    Physical Exam :     Elderly female, not in any acute distress  HEENT with pallor, no icterus  Heart irregular, .  Lungs diminished breath sounds  Abdomen obese and soft nontender  Lower extremity with 3+ edema, surgical dressing and wound dressing  Neurologically awake alert    Result Review:  I have personally reviewed the results from the time of this admission to 10/14/2022 08:55 EDT and agree with these findings:  [x]  Laboratory  []  Microbiology  []  Radiology  []  EKG/Telemetry   []  Cardiology/Vascular   []  Pathology  []  Old records  []  Other:    Potassium 5.6 yesterday    Assessment / Plan       Active Hospital Problems:  Active Hospital Problems    Diagnosis    • **Sepsis ([facility])    • Acute kidney injury superimposed on chronic kidney disease ([facility])    • Severe anemia    • GI bleed    • CHF (congestive heart failure) ([facility])    • Cellulitis of anterior lower leg    • Atrial fibrillation with RVR ([facility])        Plan:   Labs stable  Heart rate doing well  Potassium was high yesterday and labs from today still pending  Not on any supplements  The potassium remains high we will use medications to bring it down  Continue PT OT  efforts  Waiting for placement      DVT prophylaxis:  Medical DVT prophylaxis orders are present.    CODE STATUS:   Code Status (Patient has no pulse and is not breathing): CPR (Attempt to Resuscitate)  Medical Interventions (Patient has pulse or is breathing): Full Support  Release to patient: Routine Release                  Electronically signed by Pablo Fajardo MD, 10/14/22, 8:57 AM EDT.

## 2022-10-14 NOTE — CASE MANAGEMENT/SOCIAL WORK
[facility]   Social Work Discharge Plan    Patient Name: Lauren Redd  : 1945  MRN: [id number]  Attending Physician:  Pablo Fajardo MD  Date of admission: 10/1/2022      /Case Management Discharge Plan     Inpatient Rehab:    Pt will discharge to [facility] in [place].    Pt will admit to [facility].      Room # 439  Admitting MD: Dr. Helena Natarajan   [address] [facility] [phone]    EMS tranporation Pickup: 7:30 pm

## 2022-10-14 NOTE — DISCHARGE SUMMARY
[facility]         DISCHARGE SUMMARY    Patient Name: Lauren Redd  : 1945  MRN: [id number]    Date of Admission: 10/1/2022  Date of Discharge: 2022  Primary Care Physician: Dennis Kohler MD    Consults     Date and Time Order Name Status Description    10/3/2022  3:02 PM Inpatient Gastroenterology Consult Completed     10/3/2022 10:35 AM Inpatient Vascular Surgery Consult Completed     10/3/2022  9:26 AM Inpatient Cardiology Consult            Presenting Problem:   Upper GI bleed [K92.2]  Atrial fibrillation with RVR (HCC) [I48.91]  Sepsis (HCC) [A41.9]  Venous stasis ulcers of both lower extremities (HCC) [I83.019, I83.029, L97.919, L97.929]  Anemia, unspecified type [D64.9]    Active and Resolved Hospital Problems:  Active Hospital Problems    Diagnosis POA   • **Sepsis (HCC) [A41.9] Yes   • Acute kidney injury superimposed on chronic kidney disease (HCC) [N17.9, N18.9] Yes   • Severe anemia [D64.9] Yes   • GI bleed [K92.2] Yes   • CHF (congestive heart failure) (HCC) [I50.9] Yes   • Cellulitis of anterior lower leg [L03.119] Yes   • Atrial fibrillation with RVR (HCC) [I48.91] Yes      Resolved Hospital Problems   No resolved problems to display.         Hospital Course     Hospital Course:  Lauren Redd is a 77 y.o. female with known history of bilateral chronic ulcers of lower extremities with chronic cellulitis and venous stasis admitted to hospital for sepsis and infection of both legs along with rapid heart rate.  Patient was in rapid A. fib as well as severely anemic.  Patient was admitted to ICU, intensivist as well as cardiologist was consulted..  She was started on medications for rapid A. fib as well as IV antibiotics for infection and sepsis..  She was initiated on vancomycin and cefepime.  Cultures were obtained..  Patient was critically ill and stayed in hospital for several days since then she was in ICU for 3 additional days.  She was closely  monitored by intensivist as well as cardiologist and myself.    She was seen by vascular surgeon, Dr. Wood who recommended no intervention as no significant active ileal lesions were noted and the ulcers are diffuse without any abscess..  Antibiotic and local treatment recommended.    Patient also has heme positive stool and severe anemia she also has anemia of chronic disease.  Gastro was consulted.  IV Protonix was initiated.  Patient dropped hemoglobin to 6 g and was transfused.    She was seen by cardiologist on regular basis and cardiac meds were adjusted her creatinine remained high it appears the patient has.  Acute on chronic kidney disease.    He has been stable for last few days and as she is remained stable she was seen by gastro and had an endoscopy 2 days ago which showed chronic gastric ulcers without any stigmata of bleeding..  Dr. Armstrong gastroenterologist recommended PPI as well as to resume anticoagulation for her A. Fib as she has no active bleed now.  Her hemoglobin has been stable.    Patient is unable to move around still have pain in the leg and foul-smelling drainage but significantly improved.  Due to her condition I recommended her to go to nursing home or long-term care facility and she agreed for inpatient rehab.  Patient will be discharged to [facility] for wound care as well as aggressive rehab..    Patient's diuretics on hold now as creatinine has jumped up.  Her potassium is also slightly high  Once her fluid status gets worse she may need to be restarted on diuretics.  Currently close monitoring of kidney functions along with potassium recommended.        DISCHARGE Follow Up Recommendations for labs and diagnostics:     Discharge to [facility]  Follow labs including hemoglobin, creatinine and monitor potassium closely.  PT and OT      Day of Discharge     Vital Signs:  Temp:  [97.2 °F (36.2 °C)-97.6 °F (36.4 °C)] 97.2 °F (36.2 °C)  Heart Rate:  [82-93] 84  Resp:  [12-20]  16  BP: ()/(58-88) 91/58  Flow (L/min):  [2] 2    Physical Exam:    Elderly female not in acute distress.  Heart irregular and lungs clear, diminished breath sounds.  Abdomen soft.Obese.  Extremities 2-3+ edema with chronic cellulitic changes and open wounds.      Pertinent  and/or Most Recent Results     LAB RESULTS:      Lab 10/14/22  0938 10/13/22  0609 10/12/22  0413 10/11/22  0540 10/10/22  1609 10/09/22  0512   WBC 18.04* 17.51* 18.69* 17.17* 16.49* 16.65*   HEMOGLOBIN 10.3* 10.4* 9.9* 9.6* 9.4* 6.8*   HEMATOCRIT 33.6* 33.6* 31.1* 31.4* 30.1* 22.3*   PLATELETS 275 300 294 300 298 310   NEUTROS ABS 15.33* 13.02* 13.91* 11.88* 14.02* 12.32*   IMMATURE GRANS (ABS)  --  0.26* 0.54* 0.65*  --   --    LYMPHS ABS  --  2.30 2.27 2.40  --   --    MONOS ABS  --  1.62* 1.57* 1.73*  --   --    EOS ABS 0.36 0.23 0.34 0.45*  --  0.50*   MCV 91.8 91.3 89.1 90.0 88.5 87.5         Lab 10/14/22  0938 10/13/22  0609 10/12/22  0413 10/11/22  0540 10/10/22  1609   SODIUM 130* 132* 134* 135* 134*   POTASSIUM 5.0 5.6* 4.8 4.7 4.6   CHLORIDE 96* 98 98 101 101   CO2 21.4* 24.6 23.0 25.4 23.8   ANION GAP 12.6 9.4 13.0 8.6 9.2   BUN 95* 93* 90* 78* 82*   CREATININE 2.75* 2.40* 2.16* 1.91* 2.00*   EGFR 17.4* 20.5* 23.2* 26.9* 25.5*   GLUCOSE 128* 114* 113* 110* 127*   CALCIUM 10.6* 10.0 10.5 9.9 10.2   PHOSPHORUS 4.2 3.8 2.9 2.5 2.7         Lab 10/14/22  0938 10/13/22  0609 10/12/22  0413 10/11/22  0540 10/10/22  1609   TOTAL PROTEIN  --   --   --  6.6  --    ALBUMIN 2.50* 2.70* 2.60* 2.60* 2.70*   GLOBULIN  --   --   --  4.0  --    ALT (SGPT)  --   --   --  <5  --    AST (SGOT)  --   --   --  20  --    BILIRUBIN  --   --   --  0.6  --    ALK PHOS  --   --   --  181*  --                  Lab 10/10/22  1609 10/09/22  0634   IRON 29*  --    IRON SATURATION 9*  --    TIBC 340  --    TRANSFERRIN 228  --    ABO TYPING  --  O   RH TYPING  --  Positive   ANTIBODY SCREEN  --  Negative         Lab 10/08/22  0013   PH, ARTERIAL 7.372   PCO2,  ARTERIAL 41.9   PO2 .1*   O2 SATURATION ART 98.6   FIO2 45   HCO3 ART 23.8   BASE EXCESS ART -1.4   CARBOXYHEMOGLOBIN 0.4     Brief Urine Lab Results  (Last result in the past 365 days)      Color   Clarity   Blood   Leuk Est   Nitrite   Protein   CREAT   Urine HCG        06/20/22 0550 Dark Yellow   Cloudy   Negative   Trace   Negative   Trace               Microbiology Results (last 10 days)     ** No results found for the last 240 hours. **          PROCEDURES:    [unfilled]    XR Chest 1 View    Result Date: 10/6/2022  Impression:   1. Interval development of a right-sided pleural effusion and right basilar atelectasis. 2. Cardiomegaly with mild passive congestion suggesting early heart failure       Pool Brandt MD       Electronically Signed and Approved By: Pool Brandt MD on 10/06/2022 at 13:47             XR Chest 1 View    Result Date: 10/1/2022  Impression:   1. Chronic changes are again noted.  There is no definitive evidence for acute cardiopulmonary process.         LUCINDA DILLON MD       Electronically Signed and Approved By: LUCINDA DILLON MD on 10/01/2022 at 22:58             CT Lower Extremity Bilateral Without Contrast    Result Date: 10/3/2022  Impression:   1. Nonspecific soft tissue edema is seen throughout the subcutaneous soft tissues circumferentially involving the distal lower legs bilaterally.  The findings may indicate changes of diffuse soft tissue cellulitis. 2. There is evidence for associated blister formation involving the cutaneous soft tissues of the distal lower legs bilaterally. 3. Evidence for possible focal developing abscess at the posterior lateral margin of the mid level of the right lower leg within the subcutaneous soft tissues.  This finding measures up to approximately 4.2 cm. 4. No evidence for osteomyelitis.     LUCINDA DILLON MD       Electronically Signed and Approved By: LUCINDA DILLON MD on 10/03/2022 at 2:11               Results for orders placed during  the hospital encounter of 03/14/22    Doppler Ankle Brachial Index Single Level CAR    Interpretation Summary  · Left Conclusion: The left PABLO is normal. Normal digital pressures.  · Right Conclusion: The right PABLO is normal. Normal digital pressures.      Results for orders placed during the hospital encounter of 03/14/22    Doppler Ankle Brachial Index Single Level CAR    Interpretation Summary  · Left Conclusion: The left PABLO is normal. Normal digital pressures.  · Right Conclusion: The right PABLO is normal. Normal digital pressures.      Results for orders placed during the hospital encounter of 10/01/22    Adult Transthoracic Echo Complete W/ Cont if Necessary Per Protocol    Interpretation Summary  · Left ventricular wall thickness is consistent with mild to moderate concentric hypertrophy.  · Calculated left ventricular EF = 59% Estimated left ventricular EF was in agreement with the calculated left ventricular EF. Left ventricular systolic function is normal.  · The right atrial cavity is mild to moderately dilated.  · Estimated right ventricular systolic pressure from tricuspid regurgitation is normal (<35 mmHg).  · Mild aortic valve regurgitation is present  · Mild mitral valve regurgitation is present.      Labs Pending at Discharge:        Discharge Details        Discharge Medications      New Medications      Instructions Start Date   ceFAZolin in dextrose 2-4 GM/100ML-% solution IVPB  Commonly known as: ANCEF   2 g, Intravenous, Every 12 Hours      dilTIAZem  MG 24 hr capsule  Commonly known as: CARDIZEM CD   180 mg, Oral, Every 24 Hours Scheduled   Start Date: October 15, 2022     HYDROcodone-acetaminophen 7.5-325 MG per tablet  Commonly known as: NORCO   1 tablet, Oral, 4 Times Daily      pantoprazole 40 MG EC tablet  Commonly known as: Protonix   40 mg, Oral, 2 Times Daily         Changes to Medications      Instructions Start Date   midodrine 10 MG tablet  Commonly known as: PROAMATINE  What  changed: how much to take   10 mg, Oral, 3 Times Daily Before Meals         Continue These Medications      Instructions Start Date   apixaban 5 MG tablet tablet  Commonly known as: ELIQUIS   5 mg, Oral, 2 Times Daily      atorvastatin 20 MG tablet  Commonly known as: LIPITOR   20 mg, Oral, Daily      docusate sodium 100 MG capsule  Commonly known as: COLACE   100 mg, Oral, 2 Times Daily PRN      DULoxetine 30 MG capsule  Commonly known as: CYMBALTA   30 mg, Oral, Daily      fluticasone 50 MCG/ACT nasal spray  Commonly known as: FLONASE   2 sprays, Nasal, Daily      melatonin 3 MG tablet   3 mg, Oral, Nightly      metoprolol succinate  MG 24 hr tablet  Commonly known as: TOPROL-XL   100 mg, Oral, 2 Times Daily      triamcinolone 0.1 % ointment  Commonly known as: KENALOG   1 application, Topical, Every Other Day         Stop These Medications    acetaminophen 325 MG tablet  Commonly known as: TYLENOL     bumetanide 2 MG tablet  Commonly known as: BUMEX     digoxin 125 MCG tablet  Commonly known as: LANOXIN     metOLazone 5 MG tablet  Commonly known as: ZAROXOLYN     spironolactone 25 MG tablet  Commonly known as: ALDACTONE            Allergies   Allergen Reactions   • Contrast Dye Rash   • Lotrel [Amlodipine Besy-Benazepril Hcl] Unknown - Low Severity         Discharge Disposition:  Discharge to [facility]  [facility] ([facility] - External)    Diet:    Heart healthy    Discharge Activity:     Activity Instructions     Activity as Tolerated        As tolerated with assistance      Follow-up with PCP post discharge from [facility] as well as cardiologist  Additional Instructions for the Follow-ups that You Need to Schedule     Discharge Follow-up with PCP   As directed       Currently Documented PCP:    Dennis Kohler MD    PCP Phone Number:    [phone]     Follow Up Details: Post discharge from [facility]         Discharge Follow-up with Specified Provider: Dr. Grey in 2 to  4-week   As directed      To: Dr. Grey in 2 to 4-week               Time spent on Discharge including face to face service: 39 minutes.            I have dictated this note utilizing Dragon Dictation.             Please note that portions of this note were completed with a voice recognition program.             Part of this note may be an electronic transcription/translation of spoken language to printed text         using the Dragon Dictation System.       Electronically signed by Pablo Fajardo MD, 10/14/22, 1:08 PM EDT.

## 2022-10-27 NOTE — TELEPHONE ENCOUNTER
----- Message from GERMAINE Callaway sent at 10/26/2022  4:32 PM EDT -----  EGD 10/12/2022: Small hiatal hernia, gastric ulcer noted along with gastritis-chronic inactive gastritis and intestinal metaplasia, Protonix prescribed    Repeat EGD in 2 months along with colonoscopy for iron deficiency anemia

## 2022-11-24 LAB
ABO GROUP BLD: NORMAL
BLD GP AB SCN SERPL QL: NEGATIVE
RH BLD: POSITIVE
T&S EXPIRATION DATE: NORMAL

## 2025-07-09 NOTE — CONSULTS
"Nutrition Services    Patient Name: Lauren Redd  YOB: 1945  MRN: [id number]  Admission date: 10/1/2022      CLINICAL NUTRITION ASSESSMENT      Reason for Assessment  Nonhealing wound or pressure ulcer     H&P:    Past Medical History:   Diagnosis Date   • Afib (HCC)    • Aneurysm (HCC)    • Arthritis    • CHF (congestive heart failure) (HCC)    • GERD (gastroesophageal reflux disease)    • Hypertension    • Irregular heart beat         Current Problems:   Active Hospital Problems    Diagnosis    • Severe anemia    • GI bleed    • Sepsis (HCC)    • CHF (congestive heart failure) ([facility])    • Cellulitis of anterior lower leg    • Atrial fibrillation with RVR (HCC)         Nutrition/Diet History         Narrative     Patient admitted  w/ leg pain. Has multiple areas of impaired skin integrity to lower extremities. Would benefit from the addition of Ryan BID. Will trial and follow up with patient for acceptance and nutrition interview.      Anthropometrics        Current Height, Weight Height: 172.7 cm (68\")  Weight: 90.6 kg (199 lb 11.8 oz)   Current BMI Body mass index is 30.37 kg/m².       Weight Hx  Wt Readings from Last 30 Encounters:   10/02/22 0000 90.6 kg (199 lb 11.8 oz)   10/01/22 1922 96.5 kg (212 lb 11.9 oz)   07/14/22 0625 98.5 kg (217 lb 2.5 oz)   07/10/22 0524 92.9 kg (204 lb 12.9 oz)   07/10/22 0425 92.9 kg (204 lb 12.9 oz)   07/09/22 2252 107 kg (235 lb)   06/24/22 0600 112 kg (246 lb 14.6 oz)   06/22/22 1211 113 kg (249 lb 1.9 oz)   06/20/22 0130 109 kg (240 lb 4.8 oz)   06/19/22 2210 109 kg (240 lb 4.8 oz)   04/23/22 1241 112 kg (246 lb 4.1 oz)   04/20/22 0630 113 kg (248 lb 10.9 oz)   04/19/22 0722 112 kg (247 lb 2.2 oz)   04/18/22 0500 112 kg (246 lb 4.1 oz)   04/16/22 0500 113 kg (249 lb 1.9 oz)   04/15/22 0503 115 kg (252 lb 6.8 oz)   04/14/22 0603 113 kg (250 lb 3.6 oz)   04/12/22 0600 114 kg (251 lb 1.7 oz)   04/11/22 0514 113 kg (248 lb 0.3 oz)   04/10/22 0500 114 kg (250 lb " Speech Therapy      Visit Type: Evaluation and Discharge  -  Clinical swallow  Reason for consult: Stroke protocol  NIH - 2.  Passed dysphagia screen  Treatment Diagnosis: Dysphagia, Oropharyngeal Phase    Relevant History/Co-morbidities: Admitted with left arm weakness and numbness.  CTOH - no acute findings    past medical history of Arthritis, Chronic kidney disease, COPD (chronic obstructive pulmonary disease)  (CMD), Coronary artery disease, Diabetes mellitus  (CMD), Essential (primary) hypertension, Gastroesophageal reflux disease, Inflammatory bowel disease, and Parkinson's disease.    No previous Speech Therapy.    SUBJECTIVE  - Patient verbally agrees to allow the following to be present during the session: spouse    Patient alert and cooperative.  Patient and her  reports communication/cognition skills at baseline.        - Patient/family goals: return to previous functional status   Functional Cognition:    - Expression is verbal.     - Following commands intact.      Affect/Behavior: appropriate and alert    Pain at onset of session:   Patient does not demonstrate pain behaviors.      Prior treatment: no therapies   Patient has not been hospitalized, in a skilled nursing facility, or seen by home health in the last 30 days.      OBJECTIVE     Oral Mechanism   Oral Motor Assessment: intact      Diet prior to visit (see the Recommendations section below for up-to-date swallow recommendations): nothing by mouth  - Dentition: upper partial and lower partial, dentures: with patient  - Feeding: feeds self    Swallow  Patient positioning: upright in chair  Pretrial vocal quality: normal  Volitional swallow: present.    Numbers listed with consistency indicate IDDSI diet level.  Oral and pharyngeal phases assessed and found clinically unremarkable for thin (0), regular and pureed (4).           Test and Outcome Measures  FOIS: Level 7 - Total oral diet with no restrictions            Education:   -  10.6 oz)   04/09/22 0500 114 kg (251 lb 1.7 oz)   04/08/22 0502 114 kg (251 lb 5.2 oz)   04/07/22 0600 116 kg (255 lb 11.7 oz)   04/06/22 0500 115 kg (254 lb 10.1 oz)   04/05/22 0500 117 kg (257 lb 8 oz)   03/23/22 1500 106 kg (233 lb 7.5 oz)   03/14/22 1833 106 kg (233 lb 11 oz)   03/14/22 1521 107 kg (235 lb 0.2 oz)   03/14/22 0931 120 kg (265 lb)   03/10/22 0842 120 kg (265 lb)   03/09/22 0840 120 kg (265 lb)   03/08/22 0831 120 kg (265 lb)   02/28/22 0921 120 kg (265 lb)   02/23/22 0842 120 kg (265 lb)   02/07/22 2339 115 kg (252 lb 6.8 oz)   02/07/22 1558 114 kg (251 lb 15.8 oz)   02/07/22 1333 102 kg (225 lb)   01/20/22 1727 104 kg (229 lb)   12/14/21 1543 103 kg (227 lb)   03/19/19 0000 107 kg (235 lb)   01/07/19 0000 107 kg (235 lb)            Wt Change Observation 15% weight loss x 3 months      Estimated/Assessed Needs       Energy Requirements 25-30 kcal/kg IBW    EST Needs (kcal/day) 1947-8849       Protein Requirements 1.3 g/kg IBW    EST Daily Needs (g/day) 83       Fluid Requirements 1 ml/kcal     Estimated Needs (mL/day) 9851-0174     Labs/Medications         Pertinent Labs Reviewed.   Results from last 7 days   Lab Units 10/04/22  0435 10/03/22  0411 10/02/22  0743 10/02/22  0614 10/01/22  1929   SODIUM mmol/L 135* 132*  --  136 134*   SODIUM, ARTERIAL mmol/L  --   --  129.3*  --   --    POTASSIUM mmol/L 4.0 4.4  --  4.3 3.6   CHLORIDE mmol/L 102 97*  --  98 103   CO2 mmol/L 23.5 22.8  --  22.6 19.6*   BUN mg/dL 81* 91*  --  100* 83*   CREATININE mg/dL 2.32* 2.59*  --  2.23* 1.66*   CALCIUM mg/dL 8.5* 9.0  --  9.3 7.3*   BILIRUBIN mg/dL 0.8  --   --   --  0.6   ALK PHOS U/L 129*  --   --   --  101   ALT (SGPT) U/L 15  --   --   --  8   AST (SGOT) U/L 25  --   --   --  14   GLUCOSE mg/dL 136* 154*  --  111* 237*   GLUCOSE, ARTERIAL mmol/L  --   --  90  --   --      Results from last 7 days   Lab Units 10/04/22  1109 10/04/22  0435 10/03/22  1344 10/03/22  0411   MAGNESIUM mg/dL  --  2.1  --  2.2  Present and ready to learn: patient and patient's significant other  Education provided during session:  - dysphagia, cognition and communication  - Results of above outlined education: Verbalizes understanding    ASSESSMENT  Discharge needs based on today's assessment:  - Current level of function: at baseline level of function  - Therapy needs at discharge: does not require ongoing therapy    Patient presents with functional oropharyngeal swallow.  Patient alert and cooperative.  She is able to express her thoughts and needs effectively.  Patient and her  reports communication/cognition is at baseline.  Patient with timely oral transit with puree.  Adequate mastication with regular solids and complete clearance.  No overt SS of aspiration with thin liquids.    Recommend regular solids and thin liquids.  Medications with liquids.    No further acute ST indicated at this time. ST to sign off this date. Please re consult as needed.      PLAN (while hospitalized)      SLP Frequency: DC speech therapy  Frequency Comments: DC ST             RECOMMENDATIONS     -Diet:          *Liquid- Thin and Regular    -Medication Administration:         *whole and with liquids    -Feeding Guidelines:          *feeds self , sit up straight in bed/chair  and stay upright after meals     -Speech Reviewed Swallow:         *with patient/family and with clinical caregivers    Patient at End of Session:   Location: in chair  Safety measures: call light within reach  Handoff to: nurse  Documented in the chart in the following areas: Assessment. Prior Function.       Treatment Diagnosis: Dysphagia, Oropharyngeal Phase    The referring provider's electronic signature on the evaluation authorizes the therapy plan of care and certifies the need for these services, furnished under this plan of care while under their care.        Therapy procedure time and total treatment time can be found documented on the Time Entry flowsheet     PHOSPHORUS mg/dL  --  3.6  --  4.0   HEMOGLOBIN g/dL 7.6* 7.7*   < > 8.5*   HEMATOCRIT % 23.7* 24.0*   < > 26.7*    < > = values in this interval not displayed.     COVID19   Date Value Ref Range Status   04/23/2022 Not Detected Not Detected - Ref. Range Final     No results found for: HGBA1C      Pertinent Medications Reviewed.     Current Nutrition Orders & Evaluation of Intake       Oral Nutrition     Current PO Diet Diet Regular; Cardiac   Supplement Orders Placed This Encounter      Dietary Nutrition Supplements Ryan; orange       Malnutrition Severity Assessment                Nutrition Diagnosis         Nutrition Dx Problem 1 Increased nutrient needs related to increased demand for protein to promote wound healing as evidenced by multiple areas of impaired skin integrity.       Nutrition Intervention         Trial Ryan BID to promote wound healing and follow up for nutrition interview      Medical Nutrition Therapy/Nutrition Education          Learner     Readiness N/A  N/A     Method     Response N/A  N/A     Monitor/Evaluation        Monitor PO intake, Supplement intake, Skin status       Nutrition Discharge Plan         To be determined       Electronically signed by:  Blake Han RD  10/04/22 13:43 EDT

## (undated) DEVICE — SOL IRRG H2O PL/BG 1000ML STRL

## (undated) DEVICE — CONN JET HYDRA H20 AUXILIARY DISP

## (undated) DEVICE — Device

## (undated) DEVICE — BLCK/BITE BLOX WO/DENTL/RIM W/STRAP 54F

## (undated) DEVICE — SOLIDIFIER LIQLOC PLS 1500CC BT

## (undated) DEVICE — SINGLE-USE BIOPSY FORCEPS: Brand: RADIAL JAW 4

## (undated) DEVICE — Device: Brand: DEFENDO AIR/WATER/SUCTION AND BIOPSY VALVE

## (undated) DEVICE — LINER SURG CANSTR SXN S/RIGD 1500CC